# Patient Record
Sex: FEMALE | Race: WHITE | NOT HISPANIC OR LATINO | Employment: OTHER | URBAN - METROPOLITAN AREA
[De-identification: names, ages, dates, MRNs, and addresses within clinical notes are randomized per-mention and may not be internally consistent; named-entity substitution may affect disease eponyms.]

---

## 2018-02-21 ENCOUNTER — AMBULATORY - HEALTHEAST (OUTPATIENT)
Dept: ADMINISTRATIVE | Facility: CLINIC | Age: 73
End: 2018-02-21

## 2018-02-22 ENCOUNTER — OFFICE VISIT - HEALTHEAST (OUTPATIENT)
Dept: GERIATRICS | Facility: CLINIC | Age: 73
End: 2018-02-22

## 2018-02-22 DIAGNOSIS — J18.9 COMMUNITY ACQUIRED PNEUMONIA, UNSPECIFIED LATERALITY: ICD-10-CM

## 2018-02-22 DIAGNOSIS — J43.9 PULMONARY EMPHYSEMA, UNSPECIFIED EMPHYSEMA TYPE (H): ICD-10-CM

## 2018-02-22 DIAGNOSIS — K46.9 HERNIA OF ABDOMINAL CAVITY: ICD-10-CM

## 2018-02-22 DIAGNOSIS — F32.9 MAJOR DEPRESSIVE DISORDER: ICD-10-CM

## 2018-02-22 DIAGNOSIS — K21.9 CHRONIC GERD: ICD-10-CM

## 2018-02-22 DIAGNOSIS — K59.01 SLOW TRANSIT CONSTIPATION: ICD-10-CM

## 2018-02-22 DIAGNOSIS — I25.10 CORONARY ARTERY DISEASE WITHOUT ANGINA PECTORIS: ICD-10-CM

## 2018-02-22 DIAGNOSIS — N39.3 STRESS INCONTINENCE IN FEMALE: ICD-10-CM

## 2018-02-28 ENCOUNTER — OFFICE VISIT - HEALTHEAST (OUTPATIENT)
Dept: GERIATRICS | Facility: CLINIC | Age: 73
End: 2018-02-28

## 2018-02-28 DIAGNOSIS — J18.9 COMMUNITY ACQUIRED PNEUMONIA, UNSPECIFIED LATERALITY: ICD-10-CM

## 2018-02-28 DIAGNOSIS — N32.81 OVERACTIVE BLADDER: ICD-10-CM

## 2018-02-28 DIAGNOSIS — F32.A DEPRESSION: ICD-10-CM

## 2018-02-28 DIAGNOSIS — J44.9 COPD (CHRONIC OBSTRUCTIVE PULMONARY DISEASE) (H): ICD-10-CM

## 2018-02-28 DIAGNOSIS — K44.9 HIATAL HERNIA: ICD-10-CM

## 2018-03-01 ENCOUNTER — OFFICE VISIT - HEALTHEAST (OUTPATIENT)
Dept: GERIATRICS | Facility: CLINIC | Age: 73
End: 2018-03-01

## 2018-03-01 DIAGNOSIS — K59.01 SLOW TRANSIT CONSTIPATION: ICD-10-CM

## 2018-03-01 DIAGNOSIS — J18.9 COMMUNITY ACQUIRED PNEUMONIA, UNSPECIFIED LATERALITY: ICD-10-CM

## 2018-03-01 DIAGNOSIS — K21.9 CHRONIC GERD: ICD-10-CM

## 2018-03-01 DIAGNOSIS — K46.9 HERNIA OF ABDOMINAL CAVITY: ICD-10-CM

## 2018-03-01 DIAGNOSIS — F32.9 MAJOR DEPRESSIVE DISORDER: ICD-10-CM

## 2018-03-05 ENCOUNTER — OFFICE VISIT - HEALTHEAST (OUTPATIENT)
Dept: GERIATRICS | Facility: CLINIC | Age: 73
End: 2018-03-05

## 2018-03-05 DIAGNOSIS — M81.0 OSTEOPOROSIS: ICD-10-CM

## 2018-03-05 DIAGNOSIS — R63.4 WEIGHT LOSS: ICD-10-CM

## 2018-03-05 DIAGNOSIS — J44.9 COPD (CHRONIC OBSTRUCTIVE PULMONARY DISEASE) (H): ICD-10-CM

## 2018-03-05 DIAGNOSIS — K44.9 HIATAL HERNIA: ICD-10-CM

## 2018-03-05 DIAGNOSIS — J18.9 COMMUNITY ACQUIRED PNEUMONIA, UNSPECIFIED LATERALITY: ICD-10-CM

## 2018-03-08 ENCOUNTER — OFFICE VISIT - HEALTHEAST (OUTPATIENT)
Dept: GERIATRICS | Facility: CLINIC | Age: 73
End: 2018-03-08

## 2018-03-08 DIAGNOSIS — J18.9 COMMUNITY ACQUIRED PNEUMONIA, UNSPECIFIED LATERALITY: ICD-10-CM

## 2018-03-08 DIAGNOSIS — K59.01 SLOW TRANSIT CONSTIPATION: ICD-10-CM

## 2018-03-08 DIAGNOSIS — N39.3 STRESS INCONTINENCE IN FEMALE: ICD-10-CM

## 2018-03-08 DIAGNOSIS — I25.10 CORONARY ARTERY DISEASE WITHOUT ANGINA PECTORIS: ICD-10-CM

## 2018-03-08 DIAGNOSIS — F32.9 MAJOR DEPRESSIVE DISORDER: ICD-10-CM

## 2018-03-08 DIAGNOSIS — K21.9 CHRONIC GERD: ICD-10-CM

## 2018-03-08 DIAGNOSIS — J43.9 PULMONARY EMPHYSEMA, UNSPECIFIED EMPHYSEMA TYPE (H): ICD-10-CM

## 2018-03-08 DIAGNOSIS — K46.9 HERNIA OF ABDOMINAL CAVITY: ICD-10-CM

## 2018-03-08 ASSESSMENT — MIFFLIN-ST. JEOR: SCORE: 845.16

## 2018-03-12 ENCOUNTER — AMBULATORY - HEALTHEAST (OUTPATIENT)
Dept: GERIATRICS | Facility: CLINIC | Age: 73
End: 2018-03-12

## 2018-07-03 ENCOUNTER — OFFICE VISIT - HEALTHEAST (OUTPATIENT)
Dept: GERIATRICS | Facility: CLINIC | Age: 73
End: 2018-07-03

## 2018-07-03 ENCOUNTER — AMBULATORY - HEALTHEAST (OUTPATIENT)
Dept: ADMINISTRATIVE | Facility: CLINIC | Age: 73
End: 2018-07-03

## 2018-07-03 DIAGNOSIS — K46.9 HERNIA OF ABDOMINAL CAVITY: ICD-10-CM

## 2018-07-03 DIAGNOSIS — I25.10 CORONARY ARTERY DISEASE WITHOUT ANGINA PECTORIS: ICD-10-CM

## 2018-07-03 DIAGNOSIS — K59.01 SLOW TRANSIT CONSTIPATION: ICD-10-CM

## 2018-07-03 DIAGNOSIS — F32.9 MAJOR DEPRESSIVE DISORDER: ICD-10-CM

## 2018-07-03 DIAGNOSIS — K21.9 CHRONIC GERD: ICD-10-CM

## 2018-07-03 DIAGNOSIS — N39.3 STRESS INCONTINENCE IN FEMALE: ICD-10-CM

## 2018-07-03 DIAGNOSIS — J43.9 PULMONARY EMPHYSEMA, UNSPECIFIED EMPHYSEMA TYPE (H): ICD-10-CM

## 2018-07-03 DIAGNOSIS — R62.7 FAILURE TO THRIVE IN ADULT: ICD-10-CM

## 2018-07-03 ASSESSMENT — MIFFLIN-ST. JEOR: SCORE: 771.67

## 2018-07-04 ENCOUNTER — OFFICE VISIT - HEALTHEAST (OUTPATIENT)
Dept: GERIATRICS | Facility: CLINIC | Age: 73
End: 2018-07-04

## 2018-07-04 DIAGNOSIS — J44.9 COPD (CHRONIC OBSTRUCTIVE PULMONARY DISEASE) (H): ICD-10-CM

## 2018-07-04 DIAGNOSIS — J69.0 ASPIRATION PNEUMONITIS (H): ICD-10-CM

## 2018-07-04 DIAGNOSIS — D64.9 ANEMIA: ICD-10-CM

## 2018-07-04 DIAGNOSIS — E46 MALNUTRITION (H): ICD-10-CM

## 2018-07-05 ENCOUNTER — OFFICE VISIT - HEALTHEAST (OUTPATIENT)
Dept: GERIATRICS | Facility: CLINIC | Age: 73
End: 2018-07-05

## 2018-07-05 ENCOUNTER — RECORDS - HEALTHEAST (OUTPATIENT)
Dept: LAB | Facility: CLINIC | Age: 73
End: 2018-07-05

## 2018-07-05 DIAGNOSIS — I25.10 CORONARY ARTERY DISEASE WITHOUT ANGINA PECTORIS: ICD-10-CM

## 2018-07-05 DIAGNOSIS — K59.01 SLOW TRANSIT CONSTIPATION: ICD-10-CM

## 2018-07-05 DIAGNOSIS — K46.9 HERNIA OF ABDOMINAL CAVITY: ICD-10-CM

## 2018-07-05 DIAGNOSIS — K21.9 CHRONIC GERD: ICD-10-CM

## 2018-07-05 DIAGNOSIS — F32.9 MAJOR DEPRESSIVE DISORDER: ICD-10-CM

## 2018-07-05 DIAGNOSIS — J43.9 PULMONARY EMPHYSEMA, UNSPECIFIED EMPHYSEMA TYPE (H): ICD-10-CM

## 2018-07-05 DIAGNOSIS — R09.89 RHONCHI: ICD-10-CM

## 2018-07-05 DIAGNOSIS — R62.7 FAILURE TO THRIVE IN ADULT: ICD-10-CM

## 2018-07-05 DIAGNOSIS — N39.3 STRESS INCONTINENCE IN FEMALE: ICD-10-CM

## 2018-07-05 ASSESSMENT — MIFFLIN-ST. JEOR: SCORE: 767.14

## 2018-07-06 LAB
ALBUMIN SERPL-MCNC: 2.9 G/DL (ref 3.5–5)
ALP SERPL-CCNC: 104 U/L (ref 45–120)
ALT SERPL W P-5'-P-CCNC: 16 U/L (ref 0–45)
ANION GAP SERPL CALCULATED.3IONS-SCNC: 5 MMOL/L (ref 5–18)
AST SERPL W P-5'-P-CCNC: 17 U/L (ref 0–40)
BILIRUB SERPL-MCNC: 0.5 MG/DL (ref 0–1)
BUN SERPL-MCNC: 21 MG/DL (ref 8–28)
CALCIUM SERPL-MCNC: 9.4 MG/DL (ref 8.5–10.5)
CHLORIDE BLD-SCNC: 104 MMOL/L (ref 98–107)
CO2 SERPL-SCNC: 29 MMOL/L (ref 22–31)
CREAT SERPL-MCNC: 0.58 MG/DL (ref 0.6–1.1)
ERYTHROCYTE [DISTWIDTH] IN BLOOD BY AUTOMATED COUNT: 18.2 % (ref 11–14.5)
GFR SERPL CREATININE-BSD FRML MDRD: >60 ML/MIN/1.73M2
GLUCOSE BLD-MCNC: 101 MG/DL (ref 70–125)
HCT VFR BLD AUTO: 36.8 % (ref 35–47)
HGB BLD-MCNC: 11.6 G/DL (ref 12–16)
MCH RBC QN AUTO: 30.2 PG (ref 27–34)
MCHC RBC AUTO-ENTMCNC: 31.5 G/DL (ref 32–36)
MCV RBC AUTO: 96 FL (ref 80–100)
PLATELET # BLD AUTO: 269 THOU/UL (ref 140–440)
PMV BLD AUTO: 13.4 FL (ref 8.5–12.5)
POTASSIUM BLD-SCNC: 4.5 MMOL/L (ref 3.5–5)
PROT SERPL-MCNC: 5.9 G/DL (ref 6–8)
RBC # BLD AUTO: 3.84 MILL/UL (ref 3.8–5.4)
SODIUM SERPL-SCNC: 138 MMOL/L (ref 136–145)
WBC: 5.4 THOU/UL (ref 4–11)

## 2018-07-10 ENCOUNTER — AMBULATORY - HEALTHEAST (OUTPATIENT)
Dept: GERIATRICS | Facility: CLINIC | Age: 73
End: 2018-07-10

## 2018-07-13 ENCOUNTER — AMBULATORY - HEALTHEAST (OUTPATIENT)
Dept: ADMINISTRATIVE | Facility: CLINIC | Age: 73
End: 2018-07-13

## 2018-07-16 ENCOUNTER — OFFICE VISIT - HEALTHEAST (OUTPATIENT)
Dept: GERIATRICS | Facility: CLINIC | Age: 73
End: 2018-07-16

## 2018-07-16 DIAGNOSIS — D64.9 ANEMIA: ICD-10-CM

## 2018-07-16 DIAGNOSIS — E46 MALNUTRITION (H): ICD-10-CM

## 2018-07-16 DIAGNOSIS — F32.A DEPRESSION: ICD-10-CM

## 2018-07-16 DIAGNOSIS — J44.9 COPD (CHRONIC OBSTRUCTIVE PULMONARY DISEASE) (H): ICD-10-CM

## 2018-07-16 DIAGNOSIS — J93.9 PNEUMOTHORAX: ICD-10-CM

## 2018-07-17 ENCOUNTER — OFFICE VISIT - HEALTHEAST (OUTPATIENT)
Dept: GERIATRICS | Facility: CLINIC | Age: 73
End: 2018-07-17

## 2018-07-17 DIAGNOSIS — F32.9 MAJOR DEPRESSIVE DISORDER: ICD-10-CM

## 2018-07-17 DIAGNOSIS — K46.9 HERNIA OF ABDOMINAL CAVITY: ICD-10-CM

## 2018-07-17 DIAGNOSIS — R62.7 FAILURE TO THRIVE IN ADULT: ICD-10-CM

## 2018-07-17 DIAGNOSIS — S22.42XK: ICD-10-CM

## 2018-07-17 DIAGNOSIS — N39.3 STRESS INCONTINENCE IN FEMALE: ICD-10-CM

## 2018-07-17 DIAGNOSIS — K21.9 CHRONIC GERD: ICD-10-CM

## 2018-07-17 DIAGNOSIS — I25.10 CORONARY ARTERY DISEASE WITHOUT ANGINA PECTORIS: ICD-10-CM

## 2018-07-17 DIAGNOSIS — J43.9 PULMONARY EMPHYSEMA, UNSPECIFIED EMPHYSEMA TYPE (H): ICD-10-CM

## 2018-07-17 ASSESSMENT — MIFFLIN-ST. JEOR: SCORE: 781.65

## 2018-07-19 ENCOUNTER — OFFICE VISIT - HEALTHEAST (OUTPATIENT)
Dept: GERIATRICS | Facility: CLINIC | Age: 73
End: 2018-07-19

## 2018-07-19 DIAGNOSIS — R62.7 FAILURE TO THRIVE IN ADULT: ICD-10-CM

## 2018-07-19 DIAGNOSIS — S22.42XK: ICD-10-CM

## 2018-07-24 ENCOUNTER — OFFICE VISIT - HEALTHEAST (OUTPATIENT)
Dept: GERIATRICS | Facility: CLINIC | Age: 73
End: 2018-07-24

## 2018-07-24 DIAGNOSIS — F32.9 MAJOR DEPRESSIVE DISORDER: ICD-10-CM

## 2018-07-24 DIAGNOSIS — N39.3 STRESS INCONTINENCE IN FEMALE: ICD-10-CM

## 2018-07-24 DIAGNOSIS — S22.42XK: ICD-10-CM

## 2018-07-24 DIAGNOSIS — I25.10 CORONARY ARTERY DISEASE WITHOUT ANGINA PECTORIS: ICD-10-CM

## 2018-07-24 DIAGNOSIS — R62.7 FAILURE TO THRIVE IN ADULT: ICD-10-CM

## 2018-07-24 DIAGNOSIS — J43.9 PULMONARY EMPHYSEMA, UNSPECIFIED EMPHYSEMA TYPE (H): ICD-10-CM

## 2018-07-24 DIAGNOSIS — K21.9 CHRONIC GERD: ICD-10-CM

## 2018-07-24 DIAGNOSIS — K46.9 HERNIA OF ABDOMINAL CAVITY: ICD-10-CM

## 2018-07-24 ASSESSMENT — MIFFLIN-ST. JEOR: SCORE: 786.19

## 2018-07-26 ENCOUNTER — OFFICE VISIT - HEALTHEAST (OUTPATIENT)
Dept: GERIATRICS | Facility: CLINIC | Age: 73
End: 2018-07-26

## 2018-07-26 DIAGNOSIS — R62.7 FAILURE TO THRIVE IN ADULT: ICD-10-CM

## 2018-07-26 DIAGNOSIS — N39.3 STRESS INCONTINENCE IN FEMALE: ICD-10-CM

## 2018-07-26 DIAGNOSIS — K21.9 CHRONIC GERD: ICD-10-CM

## 2018-07-26 DIAGNOSIS — K46.9 HERNIA OF ABDOMINAL CAVITY: ICD-10-CM

## 2018-07-26 DIAGNOSIS — J43.9 PULMONARY EMPHYSEMA, UNSPECIFIED EMPHYSEMA TYPE (H): ICD-10-CM

## 2018-07-26 DIAGNOSIS — S22.42XK: ICD-10-CM

## 2018-07-26 DIAGNOSIS — I25.10 CORONARY ARTERY DISEASE WITHOUT ANGINA PECTORIS: ICD-10-CM

## 2018-07-26 DIAGNOSIS — F32.9 MAJOR DEPRESSIVE DISORDER: ICD-10-CM

## 2018-07-26 DIAGNOSIS — R15.9 INCONTINENCE OF FECES, UNSPECIFIED FECAL INCONTINENCE TYPE: ICD-10-CM

## 2018-07-26 DIAGNOSIS — K59.1 FUNCTIONAL DIARRHEA: ICD-10-CM

## 2018-07-26 ASSESSMENT — MIFFLIN-ST. JEOR: SCORE: 784.37

## 2018-07-31 ENCOUNTER — OFFICE VISIT - HEALTHEAST (OUTPATIENT)
Dept: GERIATRICS | Facility: CLINIC | Age: 73
End: 2018-07-31

## 2018-07-31 DIAGNOSIS — N39.3 STRESS INCONTINENCE IN FEMALE: ICD-10-CM

## 2018-07-31 DIAGNOSIS — K21.9 CHRONIC GERD: ICD-10-CM

## 2018-07-31 DIAGNOSIS — F32.9 MAJOR DEPRESSIVE DISORDER: ICD-10-CM

## 2018-07-31 DIAGNOSIS — I25.10 CORONARY ARTERY DISEASE WITHOUT ANGINA PECTORIS: ICD-10-CM

## 2018-07-31 DIAGNOSIS — K59.1 FUNCTIONAL DIARRHEA: ICD-10-CM

## 2018-07-31 DIAGNOSIS — R62.7 FAILURE TO THRIVE IN ADULT: ICD-10-CM

## 2018-07-31 DIAGNOSIS — R15.9 INCONTINENCE OF FECES, UNSPECIFIED FECAL INCONTINENCE TYPE: ICD-10-CM

## 2018-07-31 DIAGNOSIS — K46.9 HERNIA OF ABDOMINAL CAVITY: ICD-10-CM

## 2018-07-31 ASSESSMENT — MIFFLIN-ST. JEOR: SCORE: 783.47

## 2018-08-01 ENCOUNTER — COMMUNICATION - HEALTHEAST (OUTPATIENT)
Dept: GERIATRICS | Facility: CLINIC | Age: 73
End: 2018-08-01

## 2018-08-02 ENCOUNTER — OFFICE VISIT - HEALTHEAST (OUTPATIENT)
Dept: GERIATRICS | Facility: CLINIC | Age: 73
End: 2018-08-02

## 2018-08-02 ENCOUNTER — RECORDS - HEALTHEAST (OUTPATIENT)
Dept: LAB | Facility: CLINIC | Age: 73
End: 2018-08-02

## 2018-08-02 DIAGNOSIS — R62.7 FAILURE TO THRIVE IN ADULT: ICD-10-CM

## 2018-08-02 DIAGNOSIS — I25.10 CORONARY ARTERY DISEASE WITHOUT ANGINA PECTORIS: ICD-10-CM

## 2018-08-02 DIAGNOSIS — N39.3 STRESS INCONTINENCE IN FEMALE: ICD-10-CM

## 2018-08-02 DIAGNOSIS — K59.1 FUNCTIONAL DIARRHEA: ICD-10-CM

## 2018-08-02 DIAGNOSIS — K21.9 CHRONIC GERD: ICD-10-CM

## 2018-08-02 DIAGNOSIS — K46.9 HERNIA OF ABDOMINAL CAVITY: ICD-10-CM

## 2018-08-02 DIAGNOSIS — F32.9 MAJOR DEPRESSIVE DISORDER: ICD-10-CM

## 2018-08-02 ASSESSMENT — MIFFLIN-ST. JEOR: SCORE: 778.93

## 2018-08-06 LAB
H PYLORI AG STL QL IA: NORMAL
REPORT STATUS: NORMAL
SPECIMEN DESCRIPTION: NORMAL

## 2018-08-07 ENCOUNTER — OFFICE VISIT - HEALTHEAST (OUTPATIENT)
Dept: GERIATRICS | Facility: CLINIC | Age: 73
End: 2018-08-07

## 2018-08-07 DIAGNOSIS — I25.10 CORONARY ARTERY DISEASE WITHOUT ANGINA PECTORIS: ICD-10-CM

## 2018-08-07 DIAGNOSIS — R62.7 FAILURE TO THRIVE IN ADULT: ICD-10-CM

## 2018-08-07 DIAGNOSIS — K21.9 CHRONIC GERD: ICD-10-CM

## 2018-08-07 DIAGNOSIS — R32 BOWEL AND BLADDER INCONTINENCE: ICD-10-CM

## 2018-08-07 DIAGNOSIS — K46.9 HERNIA OF ABDOMINAL CAVITY: ICD-10-CM

## 2018-08-07 DIAGNOSIS — S22.42XK: ICD-10-CM

## 2018-08-07 DIAGNOSIS — R15.9 BOWEL AND BLADDER INCONTINENCE: ICD-10-CM

## 2018-08-07 DIAGNOSIS — F32.9 MAJOR DEPRESSIVE DISORDER: ICD-10-CM

## 2018-08-07 DIAGNOSIS — J43.9 PULMONARY EMPHYSEMA, UNSPECIFIED EMPHYSEMA TYPE (H): ICD-10-CM

## 2018-08-07 ASSESSMENT — MIFFLIN-ST. JEOR: SCORE: 788

## 2018-08-09 ENCOUNTER — OFFICE VISIT - HEALTHEAST (OUTPATIENT)
Dept: GERIATRICS | Facility: CLINIC | Age: 73
End: 2018-08-09

## 2018-08-09 DIAGNOSIS — I25.10 CORONARY ARTERY DISEASE INVOLVING NATIVE CORONARY ARTERY OF NATIVE HEART WITHOUT ANGINA PECTORIS: ICD-10-CM

## 2018-08-09 DIAGNOSIS — F32.9 MAJOR DEPRESSIVE DISORDER: ICD-10-CM

## 2018-08-09 DIAGNOSIS — R62.7 FAILURE TO THRIVE IN ADULT: ICD-10-CM

## 2018-08-09 DIAGNOSIS — R15.9 BOWEL AND BLADDER INCONTINENCE: ICD-10-CM

## 2018-08-09 DIAGNOSIS — S22.42XK: ICD-10-CM

## 2018-08-09 DIAGNOSIS — R32 BOWEL AND BLADDER INCONTINENCE: ICD-10-CM

## 2018-08-09 DIAGNOSIS — K46.9 HERNIA OF ABDOMINAL CAVITY: ICD-10-CM

## 2018-08-09 DIAGNOSIS — K21.9 CHRONIC GERD: ICD-10-CM

## 2018-08-09 DIAGNOSIS — J43.9 PULMONARY EMPHYSEMA, UNSPECIFIED EMPHYSEMA TYPE (H): ICD-10-CM

## 2018-08-09 ASSESSMENT — MIFFLIN-ST. JEOR: SCORE: 792.54

## 2018-08-14 ENCOUNTER — OFFICE VISIT - HEALTHEAST (OUTPATIENT)
Dept: GERIATRICS | Facility: CLINIC | Age: 73
End: 2018-08-14

## 2018-08-14 DIAGNOSIS — R15.9 BOWEL AND BLADDER INCONTINENCE: ICD-10-CM

## 2018-08-14 DIAGNOSIS — R13.19 ESOPHAGEAL DYSPHAGIA: ICD-10-CM

## 2018-08-14 DIAGNOSIS — R62.7 FAILURE TO THRIVE IN ADULT: ICD-10-CM

## 2018-08-14 DIAGNOSIS — K46.9 HERNIA OF ABDOMINAL CAVITY: ICD-10-CM

## 2018-08-14 DIAGNOSIS — F32.9 MAJOR DEPRESSIVE DISORDER: ICD-10-CM

## 2018-08-14 DIAGNOSIS — K21.9 CHRONIC GERD: ICD-10-CM

## 2018-08-14 DIAGNOSIS — R32 BOWEL AND BLADDER INCONTINENCE: ICD-10-CM

## 2018-08-14 DIAGNOSIS — S22.42XK: ICD-10-CM

## 2018-08-14 DIAGNOSIS — I25.10 CORONARY ARTERY DISEASE INVOLVING NATIVE CORONARY ARTERY OF NATIVE HEART WITHOUT ANGINA PECTORIS: ICD-10-CM

## 2018-08-14 DIAGNOSIS — J43.9 PULMONARY EMPHYSEMA, UNSPECIFIED EMPHYSEMA TYPE (H): ICD-10-CM

## 2018-08-14 ASSESSMENT — MIFFLIN-ST. JEOR: SCORE: 798.89

## 2018-08-21 ENCOUNTER — OFFICE VISIT - HEALTHEAST (OUTPATIENT)
Dept: GERIATRICS | Facility: CLINIC | Age: 73
End: 2018-08-21

## 2018-08-21 DIAGNOSIS — K46.9 HERNIA OF ABDOMINAL CAVITY: ICD-10-CM

## 2018-08-21 DIAGNOSIS — J43.9 PULMONARY EMPHYSEMA, UNSPECIFIED EMPHYSEMA TYPE (H): ICD-10-CM

## 2018-08-21 DIAGNOSIS — R62.7 FAILURE TO THRIVE IN ADULT: ICD-10-CM

## 2018-08-21 DIAGNOSIS — R15.9 BOWEL AND BLADDER INCONTINENCE: ICD-10-CM

## 2018-08-21 DIAGNOSIS — R32 BOWEL AND BLADDER INCONTINENCE: ICD-10-CM

## 2018-08-21 DIAGNOSIS — S22.42XK: ICD-10-CM

## 2018-08-21 DIAGNOSIS — R13.19 ESOPHAGEAL DYSPHAGIA: ICD-10-CM

## 2018-08-21 DIAGNOSIS — F32.9 MAJOR DEPRESSIVE DISORDER: ICD-10-CM

## 2018-08-21 DIAGNOSIS — K21.9 CHRONIC GERD: ICD-10-CM

## 2018-08-21 DIAGNOSIS — I25.10 CORONARY ARTERY DISEASE INVOLVING NATIVE CORONARY ARTERY OF NATIVE HEART WITHOUT ANGINA PECTORIS: ICD-10-CM

## 2018-08-21 ASSESSMENT — MIFFLIN-ST. JEOR: SCORE: 802.52

## 2018-08-23 ENCOUNTER — OFFICE VISIT - HEALTHEAST (OUTPATIENT)
Dept: GERIATRICS | Facility: CLINIC | Age: 73
End: 2018-08-23

## 2018-08-23 DIAGNOSIS — R62.7 FAILURE TO THRIVE IN ADULT: ICD-10-CM

## 2018-08-23 DIAGNOSIS — K46.9 HERNIA OF ABDOMINAL CAVITY: ICD-10-CM

## 2018-08-23 DIAGNOSIS — S22.42XK: ICD-10-CM

## 2018-08-23 DIAGNOSIS — J43.9 PULMONARY EMPHYSEMA, UNSPECIFIED EMPHYSEMA TYPE (H): ICD-10-CM

## 2018-08-23 DIAGNOSIS — F32.9 MAJOR DEPRESSIVE DISORDER: ICD-10-CM

## 2018-08-23 DIAGNOSIS — R32 BOWEL AND BLADDER INCONTINENCE: ICD-10-CM

## 2018-08-23 DIAGNOSIS — R13.19 ESOPHAGEAL DYSPHAGIA: ICD-10-CM

## 2018-08-23 DIAGNOSIS — R15.9 BOWEL AND BLADDER INCONTINENCE: ICD-10-CM

## 2018-08-23 DIAGNOSIS — I25.10 CORONARY ARTERY DISEASE INVOLVING NATIVE CORONARY ARTERY OF NATIVE HEART WITHOUT ANGINA PECTORIS: ICD-10-CM

## 2018-08-23 DIAGNOSIS — K21.9 CHRONIC GERD: ICD-10-CM

## 2018-08-23 ASSESSMENT — MIFFLIN-ST. JEOR: SCORE: 797.98

## 2018-08-28 ENCOUNTER — OFFICE VISIT - HEALTHEAST (OUTPATIENT)
Dept: GERIATRICS | Facility: CLINIC | Age: 73
End: 2018-08-28

## 2018-08-28 DIAGNOSIS — R32 BOWEL AND BLADDER INCONTINENCE: ICD-10-CM

## 2018-08-28 DIAGNOSIS — R13.19 ESOPHAGEAL DYSPHAGIA: ICD-10-CM

## 2018-08-28 DIAGNOSIS — F32.9 MAJOR DEPRESSIVE DISORDER: ICD-10-CM

## 2018-08-28 DIAGNOSIS — I25.10 CORONARY ARTERY DISEASE INVOLVING NATIVE CORONARY ARTERY OF NATIVE HEART WITHOUT ANGINA PECTORIS: ICD-10-CM

## 2018-08-28 DIAGNOSIS — K46.9 HERNIA OF ABDOMINAL CAVITY: ICD-10-CM

## 2018-08-28 DIAGNOSIS — R15.9 BOWEL AND BLADDER INCONTINENCE: ICD-10-CM

## 2018-08-28 DIAGNOSIS — J43.9 PULMONARY EMPHYSEMA, UNSPECIFIED EMPHYSEMA TYPE (H): ICD-10-CM

## 2018-08-28 DIAGNOSIS — K21.9 CHRONIC GERD: ICD-10-CM

## 2018-08-28 DIAGNOSIS — R62.7 FAILURE TO THRIVE IN ADULT: ICD-10-CM

## 2018-08-28 DIAGNOSIS — S22.42XK: ICD-10-CM

## 2018-08-28 ASSESSMENT — MIFFLIN-ST. JEOR: SCORE: 801.61

## 2018-08-30 ENCOUNTER — OFFICE VISIT - HEALTHEAST (OUTPATIENT)
Dept: GERIATRICS | Facility: CLINIC | Age: 73
End: 2018-08-30

## 2018-08-30 DIAGNOSIS — K46.9 HERNIA OF ABDOMINAL CAVITY: ICD-10-CM

## 2018-08-30 DIAGNOSIS — R32 BOWEL AND BLADDER INCONTINENCE: ICD-10-CM

## 2018-08-30 DIAGNOSIS — F32.9 MAJOR DEPRESSIVE DISORDER: ICD-10-CM

## 2018-08-30 DIAGNOSIS — J43.9 PULMONARY EMPHYSEMA, UNSPECIFIED EMPHYSEMA TYPE (H): ICD-10-CM

## 2018-08-30 DIAGNOSIS — K21.9 CHRONIC GERD: ICD-10-CM

## 2018-08-30 DIAGNOSIS — R13.19 ESOPHAGEAL DYSPHAGIA: ICD-10-CM

## 2018-08-30 DIAGNOSIS — R62.7 FAILURE TO THRIVE IN ADULT: ICD-10-CM

## 2018-08-30 DIAGNOSIS — I25.10 CORONARY ARTERY DISEASE INVOLVING NATIVE CORONARY ARTERY OF NATIVE HEART WITHOUT ANGINA PECTORIS: ICD-10-CM

## 2018-08-30 DIAGNOSIS — R15.9 BOWEL AND BLADDER INCONTINENCE: ICD-10-CM

## 2018-08-30 DIAGNOSIS — S22.42XK: ICD-10-CM

## 2018-08-30 ASSESSMENT — MIFFLIN-ST. JEOR: SCORE: 798.89

## 2018-09-04 ENCOUNTER — OFFICE VISIT - HEALTHEAST (OUTPATIENT)
Dept: GERIATRICS | Facility: CLINIC | Age: 73
End: 2018-09-04

## 2018-09-04 DIAGNOSIS — R62.7 FAILURE TO THRIVE IN ADULT: ICD-10-CM

## 2018-09-04 DIAGNOSIS — F32.9 MAJOR DEPRESSIVE DISORDER: ICD-10-CM

## 2018-09-04 DIAGNOSIS — S22.42XK: ICD-10-CM

## 2018-09-04 DIAGNOSIS — M79.604 RIGHT LEG PAIN: ICD-10-CM

## 2018-09-04 DIAGNOSIS — R15.9 BOWEL AND BLADDER INCONTINENCE: ICD-10-CM

## 2018-09-04 DIAGNOSIS — K21.9 CHRONIC GERD: ICD-10-CM

## 2018-09-04 DIAGNOSIS — J43.9 PULMONARY EMPHYSEMA, UNSPECIFIED EMPHYSEMA TYPE (H): ICD-10-CM

## 2018-09-04 DIAGNOSIS — I25.10 CORONARY ARTERY DISEASE INVOLVING NATIVE CORONARY ARTERY OF NATIVE HEART WITHOUT ANGINA PECTORIS: ICD-10-CM

## 2018-09-04 DIAGNOSIS — K46.9 HERNIA OF ABDOMINAL CAVITY: ICD-10-CM

## 2018-09-04 DIAGNOSIS — R32 BOWEL AND BLADDER INCONTINENCE: ICD-10-CM

## 2018-09-04 DIAGNOSIS — R13.19 ESOPHAGEAL DYSPHAGIA: ICD-10-CM

## 2018-09-04 ASSESSMENT — MIFFLIN-ST. JEOR: SCORE: 803.42

## 2018-09-11 ENCOUNTER — OFFICE VISIT - HEALTHEAST (OUTPATIENT)
Dept: GERIATRICS | Facility: CLINIC | Age: 73
End: 2018-09-11

## 2018-09-11 DIAGNOSIS — I25.10 CORONARY ARTERY DISEASE INVOLVING NATIVE CORONARY ARTERY OF NATIVE HEART WITHOUT ANGINA PECTORIS: ICD-10-CM

## 2018-09-11 DIAGNOSIS — R62.7 FAILURE TO THRIVE IN ADULT: ICD-10-CM

## 2018-09-11 DIAGNOSIS — K21.9 CHRONIC GERD: ICD-10-CM

## 2018-09-11 DIAGNOSIS — R15.9 BOWEL AND BLADDER INCONTINENCE: ICD-10-CM

## 2018-09-11 DIAGNOSIS — F32.9 MAJOR DEPRESSIVE DISORDER: ICD-10-CM

## 2018-09-11 DIAGNOSIS — R13.19 ESOPHAGEAL DYSPHAGIA: ICD-10-CM

## 2018-09-11 DIAGNOSIS — K46.9 HERNIA OF ABDOMINAL CAVITY: ICD-10-CM

## 2018-09-11 DIAGNOSIS — S22.42XK: ICD-10-CM

## 2018-09-11 DIAGNOSIS — J43.9 PULMONARY EMPHYSEMA, UNSPECIFIED EMPHYSEMA TYPE (H): ICD-10-CM

## 2018-09-11 DIAGNOSIS — R32 BOWEL AND BLADDER INCONTINENCE: ICD-10-CM

## 2018-09-11 ASSESSMENT — MIFFLIN-ST. JEOR: SCORE: 803.42

## 2018-09-18 ENCOUNTER — OFFICE VISIT - HEALTHEAST (OUTPATIENT)
Dept: GERIATRICS | Facility: CLINIC | Age: 73
End: 2018-09-18

## 2018-09-18 DIAGNOSIS — F32.9 MAJOR DEPRESSIVE DISORDER: ICD-10-CM

## 2018-09-18 DIAGNOSIS — R15.9 BOWEL AND BLADDER INCONTINENCE: ICD-10-CM

## 2018-09-18 DIAGNOSIS — R32 BOWEL AND BLADDER INCONTINENCE: ICD-10-CM

## 2018-09-18 DIAGNOSIS — K46.9 HERNIA OF ABDOMINAL CAVITY: ICD-10-CM

## 2018-09-18 DIAGNOSIS — K21.9 CHRONIC GERD: ICD-10-CM

## 2018-09-18 DIAGNOSIS — J43.9 PULMONARY EMPHYSEMA, UNSPECIFIED EMPHYSEMA TYPE (H): ICD-10-CM

## 2018-09-18 DIAGNOSIS — I25.10 CORONARY ARTERY DISEASE INVOLVING NATIVE CORONARY ARTERY OF NATIVE HEART WITHOUT ANGINA PECTORIS: ICD-10-CM

## 2018-09-18 DIAGNOSIS — R13.19 ESOPHAGEAL DYSPHAGIA: ICD-10-CM

## 2018-09-18 DIAGNOSIS — R62.7 FAILURE TO THRIVE IN ADULT: ICD-10-CM

## 2018-09-18 DIAGNOSIS — S22.42XK: ICD-10-CM

## 2018-09-18 ASSESSMENT — MIFFLIN-ST. JEOR: SCORE: 801.61

## 2018-09-27 ENCOUNTER — OFFICE VISIT - HEALTHEAST (OUTPATIENT)
Dept: GERIATRICS | Facility: CLINIC | Age: 73
End: 2018-09-27

## 2018-09-27 DIAGNOSIS — S22.42XK: ICD-10-CM

## 2018-09-27 DIAGNOSIS — K21.9 CHRONIC GERD: ICD-10-CM

## 2018-09-27 DIAGNOSIS — R13.19 ESOPHAGEAL DYSPHAGIA: ICD-10-CM

## 2018-09-27 DIAGNOSIS — R62.7 FAILURE TO THRIVE IN ADULT: ICD-10-CM

## 2018-09-27 DIAGNOSIS — F32.9 MAJOR DEPRESSIVE DISORDER: ICD-10-CM

## 2018-10-02 ENCOUNTER — OFFICE VISIT - HEALTHEAST (OUTPATIENT)
Dept: GERIATRICS | Facility: CLINIC | Age: 73
End: 2018-10-02

## 2018-10-02 DIAGNOSIS — R62.7 FAILURE TO THRIVE IN ADULT: ICD-10-CM

## 2018-10-02 DIAGNOSIS — R15.9 BOWEL AND BLADDER INCONTINENCE: ICD-10-CM

## 2018-10-02 DIAGNOSIS — K46.9 HERNIA OF ABDOMINAL CAVITY: ICD-10-CM

## 2018-10-02 DIAGNOSIS — R32 BOWEL AND BLADDER INCONTINENCE: ICD-10-CM

## 2018-10-02 DIAGNOSIS — J43.9 PULMONARY EMPHYSEMA, UNSPECIFIED EMPHYSEMA TYPE (H): ICD-10-CM

## 2018-10-02 DIAGNOSIS — K21.9 CHRONIC GERD: ICD-10-CM

## 2018-10-02 DIAGNOSIS — S22.42XK: ICD-10-CM

## 2018-10-02 DIAGNOSIS — I25.10 CORONARY ARTERY DISEASE INVOLVING NATIVE CORONARY ARTERY OF NATIVE HEART WITHOUT ANGINA PECTORIS: ICD-10-CM

## 2018-10-02 DIAGNOSIS — F32.9 MAJOR DEPRESSIVE DISORDER: ICD-10-CM

## 2018-10-02 DIAGNOSIS — R13.19 ESOPHAGEAL DYSPHAGIA: ICD-10-CM

## 2018-10-02 ASSESSMENT — MIFFLIN-ST. JEOR: SCORE: 801.61

## 2018-10-15 ENCOUNTER — COMMUNICATION - HEALTHEAST (OUTPATIENT)
Dept: GERIATRICS | Facility: CLINIC | Age: 73
End: 2018-10-15

## 2018-10-24 ENCOUNTER — HOSPITAL ENCOUNTER (OUTPATIENT)
Dept: INTERVENTIONAL RADIOLOGY/VASCULAR | Facility: HOSPITAL | Age: 73
Discharge: HOME | End: 2018-10-24
Attending: RADIOLOGY | Admitting: RADIOLOGY

## 2018-10-24 ENCOUNTER — RECORDS - HEALTHEAST (OUTPATIENT)
Dept: ADMINISTRATIVE | Facility: OTHER | Age: 73
End: 2018-10-24

## 2018-10-24 ENCOUNTER — OFFICE VISIT - HEALTHEAST (OUTPATIENT)
Dept: GERIATRICS | Facility: CLINIC | Age: 73
End: 2018-10-24

## 2018-10-24 DIAGNOSIS — E46 MALNUTRITION (H): ICD-10-CM

## 2018-10-24 DIAGNOSIS — R13.19 ESOPHAGEAL DYSPHAGIA: ICD-10-CM

## 2018-10-24 DIAGNOSIS — I10 ESSENTIAL HYPERTENSION: ICD-10-CM

## 2018-10-24 DIAGNOSIS — J44.9 COPD (CHRONIC OBSTRUCTIVE PULMONARY DISEASE) (H): ICD-10-CM

## 2018-10-24 DIAGNOSIS — D64.9 ANEMIA, UNSPECIFIED TYPE: ICD-10-CM

## 2018-11-07 ENCOUNTER — RECORDS - HEALTHEAST (OUTPATIENT)
Dept: ADMINISTRATIVE | Facility: OTHER | Age: 73
End: 2018-11-07

## 2018-11-08 ENCOUNTER — HOSPITAL ENCOUNTER (OUTPATIENT)
Dept: INTERVENTIONAL RADIOLOGY/VASCULAR | Facility: HOSPITAL | Age: 73
Discharge: HOME | End: 2018-11-08
Attending: RADIOLOGY | Admitting: RADIOLOGY

## 2018-11-08 ENCOUNTER — OFFICE VISIT - HEALTHEAST (OUTPATIENT)
Dept: GERIATRICS | Facility: CLINIC | Age: 73
End: 2018-11-08

## 2018-11-08 DIAGNOSIS — F32.9 MAJOR DEPRESSIVE DISORDER: ICD-10-CM

## 2018-11-08 DIAGNOSIS — J43.9 PULMONARY EMPHYSEMA, UNSPECIFIED EMPHYSEMA TYPE (H): ICD-10-CM

## 2018-11-08 DIAGNOSIS — R32 BOWEL AND BLADDER INCONTINENCE: ICD-10-CM

## 2018-11-08 DIAGNOSIS — K46.9 HERNIA OF ABDOMINAL CAVITY: ICD-10-CM

## 2018-11-08 DIAGNOSIS — I25.10 CORONARY ARTERY DISEASE INVOLVING NATIVE CORONARY ARTERY OF NATIVE HEART WITHOUT ANGINA PECTORIS: ICD-10-CM

## 2018-11-08 DIAGNOSIS — R13.19 ESOPHAGEAL DYSPHAGIA: ICD-10-CM

## 2018-11-08 DIAGNOSIS — R63.30 FEEDING DIFFICULTIES: ICD-10-CM

## 2018-11-08 DIAGNOSIS — K21.9 CHRONIC GERD: ICD-10-CM

## 2018-11-08 DIAGNOSIS — R15.9 BOWEL AND BLADDER INCONTINENCE: ICD-10-CM

## 2018-11-08 DIAGNOSIS — R62.7 FAILURE TO THRIVE IN ADULT: ICD-10-CM

## 2018-11-08 ASSESSMENT — MIFFLIN-ST. JEOR: SCORE: 821.57

## 2018-11-26 ENCOUNTER — COMMUNICATION - HEALTHEAST (OUTPATIENT)
Dept: GERIATRICS | Facility: CLINIC | Age: 73
End: 2018-11-26

## 2018-12-10 ENCOUNTER — RECORDS - HEALTHEAST (OUTPATIENT)
Dept: LAB | Facility: CLINIC | Age: 73
End: 2018-12-10

## 2018-12-10 ENCOUNTER — RECORDS - HEALTHEAST (OUTPATIENT)
Dept: ADMINISTRATIVE | Facility: OTHER | Age: 73
End: 2018-12-10

## 2018-12-10 ENCOUNTER — COMMUNICATION - HEALTHEAST (OUTPATIENT)
Dept: GERIATRICS | Facility: CLINIC | Age: 73
End: 2018-12-10

## 2018-12-10 LAB
ANION GAP SERPL CALCULATED.3IONS-SCNC: 8 MMOL/L (ref 5–18)
BUN SERPL-MCNC: 20 MG/DL (ref 8–28)
CALCIUM SERPL-MCNC: 9.3 MG/DL (ref 8.5–10.5)
CHLORIDE BLD-SCNC: 104 MMOL/L (ref 98–107)
CO2 SERPL-SCNC: 27 MMOL/L (ref 22–31)
CREAT SERPL-MCNC: 0.51 MG/DL (ref 0.6–1.1)
GFR SERPL CREATININE-BSD FRML MDRD: >60 ML/MIN/1.73M2
GLUCOSE BLD-MCNC: 89 MG/DL (ref 70–125)
POTASSIUM BLD-SCNC: 4.4 MMOL/L (ref 3.5–5)
SODIUM SERPL-SCNC: 139 MMOL/L (ref 136–145)

## 2018-12-12 ENCOUNTER — RECORDS - HEALTHEAST (OUTPATIENT)
Dept: ADMINISTRATIVE | Facility: OTHER | Age: 73
End: 2018-12-12

## 2018-12-13 ENCOUNTER — HOSPITAL ENCOUNTER (OUTPATIENT)
Dept: INTERVENTIONAL RADIOLOGY/VASCULAR | Facility: HOSPITAL | Age: 73
Discharge: HOME | End: 2018-12-13
Attending: FAMILY MEDICINE | Admitting: RADIOLOGY

## 2018-12-13 ENCOUNTER — AMBULATORY - HEALTHEAST (OUTPATIENT)
Dept: ADMINISTRATIVE | Facility: CLINIC | Age: 73
End: 2018-12-13

## 2018-12-13 DIAGNOSIS — R63.30 FEEDING DIFFICULTIES: ICD-10-CM

## 2018-12-17 ENCOUNTER — COMMUNICATION - HEALTHEAST (OUTPATIENT)
Dept: CARE COORDINATION | Facility: HOSPITAL | Age: 73
End: 2018-12-17

## 2018-12-20 ENCOUNTER — RECORDS - HEALTHEAST (OUTPATIENT)
Dept: LAB | Facility: CLINIC | Age: 73
End: 2018-12-20

## 2018-12-20 LAB
ALBUMIN SERPL-MCNC: 3.2 G/DL (ref 3.5–5)
ALP SERPL-CCNC: 96 U/L (ref 45–120)
ALT SERPL W P-5'-P-CCNC: 27 U/L (ref 0–45)
ANION GAP SERPL CALCULATED.3IONS-SCNC: 8 MMOL/L (ref 5–18)
AST SERPL W P-5'-P-CCNC: 23 U/L (ref 0–40)
BILIRUB SERPL-MCNC: 0.5 MG/DL (ref 0–1)
BUN SERPL-MCNC: 22 MG/DL (ref 8–28)
CALCIUM SERPL-MCNC: 9.7 MG/DL (ref 8.5–10.5)
CHLORIDE BLD-SCNC: 104 MMOL/L (ref 98–107)
CO2 SERPL-SCNC: 28 MMOL/L (ref 22–31)
CREAT SERPL-MCNC: 0.57 MG/DL (ref 0.6–1.1)
ERYTHROCYTE [DISTWIDTH] IN BLOOD BY AUTOMATED COUNT: 16.8 % (ref 11–14.5)
GFR SERPL CREATININE-BSD FRML MDRD: >60 ML/MIN/1.73M2
GLUCOSE BLD-MCNC: 107 MG/DL (ref 70–125)
HCT VFR BLD AUTO: 45.5 % (ref 35–47)
HGB BLD-MCNC: 14.3 G/DL (ref 12–16)
MAGNESIUM SERPL-MCNC: 2.2 MG/DL (ref 1.8–2.6)
MCH RBC QN AUTO: 28.9 PG (ref 27–34)
MCHC RBC AUTO-ENTMCNC: 31.4 G/DL (ref 32–36)
MCV RBC AUTO: 92 FL (ref 80–100)
PHOSPHATE SERPL-MCNC: 2.5 MG/DL (ref 2.5–4.5)
PLATELET # BLD AUTO: 200 THOU/UL (ref 140–440)
PMV BLD AUTO: 13.1 FL (ref 8.5–12.5)
POTASSIUM BLD-SCNC: 4 MMOL/L (ref 3.5–5)
PROT SERPL-MCNC: 6.6 G/DL (ref 6–8)
RBC # BLD AUTO: 4.95 MILL/UL (ref 3.8–5.4)
SODIUM SERPL-SCNC: 140 MMOL/L (ref 136–145)
WBC: 6.8 THOU/UL (ref 4–11)

## 2019-01-04 ENCOUNTER — RECORDS - HEALTHEAST (OUTPATIENT)
Dept: LAB | Facility: CLINIC | Age: 74
End: 2019-01-04

## 2019-01-04 LAB
ALBUMIN SERPL-MCNC: 3 G/DL (ref 3.5–5)
ANION GAP SERPL CALCULATED.3IONS-SCNC: 8 MMOL/L (ref 5–18)
BUN SERPL-MCNC: 24 MG/DL (ref 8–28)
CALCIUM SERPL-MCNC: 9.3 MG/DL (ref 8.5–10.5)
CHLORIDE BLD-SCNC: 102 MMOL/L (ref 98–107)
CO2 SERPL-SCNC: 30 MMOL/L (ref 22–31)
CREAT SERPL-MCNC: 0.62 MG/DL (ref 0.6–1.1)
ERYTHROCYTE [DISTWIDTH] IN BLOOD BY AUTOMATED COUNT: 15.7 % (ref 11–14.5)
FERRITIN SERPL-MCNC: 39 NG/ML (ref 10–130)
GFR SERPL CREATININE-BSD FRML MDRD: >60 ML/MIN/1.73M2
GLUCOSE BLD-MCNC: 83 MG/DL (ref 70–125)
HCT VFR BLD AUTO: 44 % (ref 35–47)
HGB BLD-MCNC: 13.8 G/DL (ref 12–16)
INR PPP: 0.91 (ref 0.9–1.1)
MCH RBC QN AUTO: 28.9 PG (ref 27–34)
MCHC RBC AUTO-ENTMCNC: 31.4 G/DL (ref 32–36)
MCV RBC AUTO: 92 FL (ref 80–100)
PLATELET # BLD AUTO: 212 THOU/UL (ref 140–440)
PMV BLD AUTO: 12.5 FL (ref 8.5–12.5)
POTASSIUM BLD-SCNC: 4.4 MMOL/L (ref 3.5–5)
PREALB SERPL-MCNC: 27.9 MG/DL (ref 19–38)
RBC # BLD AUTO: 4.77 MILL/UL (ref 3.8–5.4)
SODIUM SERPL-SCNC: 140 MMOL/L (ref 136–145)
WBC: 6 THOU/UL (ref 4–11)

## 2019-01-19 ENCOUNTER — OFFICE VISIT - HEALTHEAST (OUTPATIENT)
Dept: GERIATRICS | Facility: CLINIC | Age: 74
End: 2019-01-19

## 2019-01-19 DIAGNOSIS — R32 BOWEL AND BLADDER INCONTINENCE: ICD-10-CM

## 2019-01-19 DIAGNOSIS — I25.10 CORONARY ARTERY DISEASE INVOLVING NATIVE CORONARY ARTERY OF NATIVE HEART WITHOUT ANGINA PECTORIS: ICD-10-CM

## 2019-01-19 DIAGNOSIS — K21.9 CHRONIC GERD: ICD-10-CM

## 2019-01-19 DIAGNOSIS — R15.9 BOWEL AND BLADDER INCONTINENCE: ICD-10-CM

## 2019-01-19 DIAGNOSIS — J43.9 PULMONARY EMPHYSEMA, UNSPECIFIED EMPHYSEMA TYPE (H): ICD-10-CM

## 2019-01-19 DIAGNOSIS — K46.9 HERNIA OF ABDOMINAL CAVITY: ICD-10-CM

## 2019-01-19 DIAGNOSIS — R13.19 ESOPHAGEAL DYSPHAGIA: ICD-10-CM

## 2019-01-19 DIAGNOSIS — F32.9 MAJOR DEPRESSIVE DISORDER, REMISSION STATUS UNSPECIFIED, UNSPECIFIED WHETHER RECURRENT: ICD-10-CM

## 2019-01-19 ASSESSMENT — MIFFLIN-ST. JEOR: SCORE: 862.39

## 2019-01-29 ENCOUNTER — AMBULATORY - HEALTHEAST (OUTPATIENT)
Dept: ADMINISTRATIVE | Facility: CLINIC | Age: 74
End: 2019-01-29

## 2019-01-31 ENCOUNTER — OFFICE VISIT - HEALTHEAST (OUTPATIENT)
Dept: GERIATRICS | Facility: CLINIC | Age: 74
End: 2019-01-31

## 2019-01-31 DIAGNOSIS — J44.9 CHRONIC OBSTRUCTIVE PULMONARY DISEASE, UNSPECIFIED COPD TYPE (H): ICD-10-CM

## 2019-01-31 DIAGNOSIS — K44.9 PARAESOPHAGEAL HERNIA: ICD-10-CM

## 2019-01-31 DIAGNOSIS — D64.9 ANEMIA, UNSPECIFIED TYPE: ICD-10-CM

## 2019-01-31 DIAGNOSIS — I10 ESSENTIAL HYPERTENSION: ICD-10-CM

## 2019-02-05 ENCOUNTER — COMMUNICATION - HEALTHEAST (OUTPATIENT)
Dept: GERIATRICS | Facility: CLINIC | Age: 74
End: 2019-02-05

## 2019-02-05 ENCOUNTER — RECORDS - HEALTHEAST (OUTPATIENT)
Dept: LAB | Facility: CLINIC | Age: 74
End: 2019-02-05

## 2019-02-05 LAB — WBC: 8.6 THOU/UL (ref 4–11)

## 2019-02-08 ENCOUNTER — COMMUNICATION - HEALTHEAST (OUTPATIENT)
Dept: GERIATRICS | Facility: CLINIC | Age: 74
End: 2019-02-08

## 2019-02-14 ENCOUNTER — OFFICE VISIT - HEALTHEAST (OUTPATIENT)
Dept: GERIATRICS | Facility: CLINIC | Age: 74
End: 2019-02-14

## 2019-02-14 DIAGNOSIS — I25.10 CORONARY ARTERY DISEASE INVOLVING NATIVE CORONARY ARTERY OF NATIVE HEART WITHOUT ANGINA PECTORIS: ICD-10-CM

## 2019-02-14 DIAGNOSIS — F32.9 MAJOR DEPRESSIVE DISORDER, REMISSION STATUS UNSPECIFIED, UNSPECIFIED WHETHER RECURRENT: ICD-10-CM

## 2019-02-14 DIAGNOSIS — Z87.19 HISTORY OF ABDOMINAL HERNIA: ICD-10-CM

## 2019-02-14 DIAGNOSIS — J43.9 PULMONARY EMPHYSEMA, UNSPECIFIED EMPHYSEMA TYPE (H): ICD-10-CM

## 2019-02-14 DIAGNOSIS — R15.9 BOWEL AND BLADDER INCONTINENCE: ICD-10-CM

## 2019-02-14 DIAGNOSIS — K21.9 CHRONIC GERD: ICD-10-CM

## 2019-02-14 DIAGNOSIS — R13.19 ESOPHAGEAL DYSPHAGIA: ICD-10-CM

## 2019-02-14 DIAGNOSIS — R32 BOWEL AND BLADDER INCONTINENCE: ICD-10-CM

## 2019-02-14 ASSESSMENT — MIFFLIN-ST. JEOR: SCORE: 846.06

## 2019-03-15 ENCOUNTER — HOSPITAL ENCOUNTER (OUTPATIENT)
Dept: INTERVENTIONAL RADIOLOGY/VASCULAR | Facility: HOSPITAL | Age: 74
Discharge: HOME | End: 2019-03-15
Attending: PHYSICIAN ASSISTANT | Admitting: RADIOLOGY

## 2019-03-15 DIAGNOSIS — R63.30 FEEDING DIFFICULTIES: ICD-10-CM

## 2019-03-25 ENCOUNTER — RECORDS - HEALTHEAST (OUTPATIENT)
Dept: ADMINISTRATIVE | Facility: OTHER | Age: 74
End: 2019-03-25

## 2019-03-25 ENCOUNTER — HOSPITAL ENCOUNTER (OUTPATIENT)
Dept: INTERVENTIONAL RADIOLOGY/VASCULAR | Facility: HOSPITAL | Age: 74
Discharge: HOME | End: 2019-03-25
Attending: RADIOLOGY | Admitting: RADIOLOGY

## 2019-03-25 DIAGNOSIS — R63.30 FEEDING DIFFICULTIES: ICD-10-CM

## 2019-04-29 ENCOUNTER — COMMUNICATION - HEALTHEAST (OUTPATIENT)
Dept: GERIATRICS | Facility: CLINIC | Age: 74
End: 2019-04-29

## 2019-06-18 ENCOUNTER — OFFICE VISIT - HEALTHEAST (OUTPATIENT)
Dept: GERIATRICS | Facility: CLINIC | Age: 74
End: 2019-06-18

## 2019-06-18 DIAGNOSIS — R32 BOWEL AND BLADDER INCONTINENCE: ICD-10-CM

## 2019-06-18 DIAGNOSIS — R15.9 BOWEL AND BLADDER INCONTINENCE: ICD-10-CM

## 2019-06-18 DIAGNOSIS — F32.5 MAJOR DEPRESSIVE DISORDER IN FULL REMISSION, UNSPECIFIED WHETHER RECURRENT (H): ICD-10-CM

## 2019-06-18 DIAGNOSIS — R68.81 EARLY SATIETY: ICD-10-CM

## 2019-06-18 DIAGNOSIS — Z87.898 HISTORY OF DYSPHAGIA: ICD-10-CM

## 2019-06-18 DIAGNOSIS — J43.9 PULMONARY EMPHYSEMA, UNSPECIFIED EMPHYSEMA TYPE (H): ICD-10-CM

## 2019-06-18 DIAGNOSIS — I25.10 CORONARY ARTERY DISEASE INVOLVING NATIVE CORONARY ARTERY OF NATIVE HEART WITHOUT ANGINA PECTORIS: ICD-10-CM

## 2019-06-18 ASSESSMENT — MIFFLIN-ST. JEOR: SCORE: 905.94

## 2019-06-24 ENCOUNTER — HOSPITAL ENCOUNTER (OUTPATIENT)
Dept: INTERVENTIONAL RADIOLOGY/VASCULAR | Facility: HOSPITAL | Age: 74
Discharge: HOME | End: 2019-06-24
Admitting: RADIOLOGY

## 2019-06-24 DIAGNOSIS — E46 MALNUTRITION, UNSPECIFIED TYPE (H): ICD-10-CM

## 2019-07-01 ENCOUNTER — COMMUNICATION - HEALTHEAST (OUTPATIENT)
Dept: GERIATRICS | Facility: CLINIC | Age: 74
End: 2019-07-01

## 2019-07-25 ENCOUNTER — OFFICE VISIT - HEALTHEAST (OUTPATIENT)
Dept: GERIATRICS | Facility: CLINIC | Age: 74
End: 2019-07-25

## 2019-07-25 DIAGNOSIS — I10 ESSENTIAL HYPERTENSION: ICD-10-CM

## 2019-07-25 DIAGNOSIS — J44.9 CHRONIC OBSTRUCTIVE PULMONARY DISEASE, UNSPECIFIED COPD TYPE (H): ICD-10-CM

## 2019-07-25 DIAGNOSIS — D64.9 ANEMIA, UNSPECIFIED TYPE: ICD-10-CM

## 2019-07-25 DIAGNOSIS — K44.9 PARAESOPHAGEAL HERNIA: ICD-10-CM

## 2019-08-08 ENCOUNTER — OFFICE VISIT - HEALTHEAST (OUTPATIENT)
Dept: GERIATRICS | Facility: CLINIC | Age: 74
End: 2019-08-08

## 2019-08-08 DIAGNOSIS — Z87.898 HISTORY OF DYSPHAGIA: ICD-10-CM

## 2019-08-08 DIAGNOSIS — Z87.19 HISTORY OF ABDOMINAL HERNIA: ICD-10-CM

## 2019-08-08 DIAGNOSIS — R15.9 BOWEL AND BLADDER INCONTINENCE: ICD-10-CM

## 2019-08-08 DIAGNOSIS — F32.5 MAJOR DEPRESSIVE DISORDER IN FULL REMISSION, UNSPECIFIED WHETHER RECURRENT (H): ICD-10-CM

## 2019-08-08 DIAGNOSIS — I25.10 CORONARY ARTERY DISEASE INVOLVING NATIVE CORONARY ARTERY OF NATIVE HEART WITHOUT ANGINA PECTORIS: ICD-10-CM

## 2019-08-08 DIAGNOSIS — R32 BOWEL AND BLADDER INCONTINENCE: ICD-10-CM

## 2019-08-08 DIAGNOSIS — J43.9 PULMONARY EMPHYSEMA, UNSPECIFIED EMPHYSEMA TYPE (H): ICD-10-CM

## 2019-08-08 ASSESSMENT — MIFFLIN-ST. JEOR: SCORE: 948.58

## 2019-08-16 ENCOUNTER — RECORDS - HEALTHEAST (OUTPATIENT)
Dept: ADMINISTRATIVE | Facility: OTHER | Age: 74
End: 2019-08-16

## 2019-08-16 ENCOUNTER — COMMUNICATION - HEALTHEAST (OUTPATIENT)
Dept: GERIATRICS | Facility: CLINIC | Age: 74
End: 2019-08-16

## 2019-08-19 ENCOUNTER — HOSPITAL ENCOUNTER (OUTPATIENT)
Dept: INTERVENTIONAL RADIOLOGY/VASCULAR | Facility: HOSPITAL | Age: 74
Discharge: HOME | End: 2019-08-19

## 2019-08-19 DIAGNOSIS — R13.19 ESOPHAGEAL DYSPHAGIA: ICD-10-CM

## 2019-10-24 ENCOUNTER — OFFICE VISIT - HEALTHEAST (OUTPATIENT)
Dept: GERIATRICS | Facility: CLINIC | Age: 74
End: 2019-10-24

## 2019-10-24 DIAGNOSIS — Z87.19 HISTORY OF ABDOMINAL HERNIA: ICD-10-CM

## 2019-10-24 DIAGNOSIS — I25.10 CORONARY ARTERY DISEASE INVOLVING NATIVE CORONARY ARTERY OF NATIVE HEART WITHOUT ANGINA PECTORIS: ICD-10-CM

## 2019-10-24 DIAGNOSIS — F32.5 MAJOR DEPRESSIVE DISORDER IN FULL REMISSION, UNSPECIFIED WHETHER RECURRENT (H): ICD-10-CM

## 2019-10-24 DIAGNOSIS — J43.9 PULMONARY EMPHYSEMA, UNSPECIFIED EMPHYSEMA TYPE (H): ICD-10-CM

## 2019-10-24 DIAGNOSIS — Z87.898 HISTORY OF DYSPHAGIA: ICD-10-CM

## 2019-10-24 DIAGNOSIS — R15.9 BOWEL AND BLADDER INCONTINENCE: ICD-10-CM

## 2019-10-24 DIAGNOSIS — R32 BOWEL AND BLADDER INCONTINENCE: ICD-10-CM

## 2019-10-24 ASSESSMENT — MIFFLIN-ST. JEOR: SCORE: 965.36

## 2019-11-14 ENCOUNTER — AMBULATORY - HEALTHEAST (OUTPATIENT)
Dept: ADMINISTRATIVE | Facility: CLINIC | Age: 74
End: 2019-11-14

## 2019-11-25 ENCOUNTER — COMMUNICATION - HEALTHEAST (OUTPATIENT)
Dept: CARE COORDINATION | Facility: HOSPITAL | Age: 74
End: 2019-11-25

## 2019-11-29 ENCOUNTER — OFFICE VISIT - HEALTHEAST (OUTPATIENT)
Dept: GERIATRICS | Facility: CLINIC | Age: 74
End: 2019-11-29

## 2019-11-29 DIAGNOSIS — Z87.19 STATUS POST REPAIR OF PARAESOPHAGEAL DIAPHRAGMATIC HERNIA: ICD-10-CM

## 2019-11-29 DIAGNOSIS — Z98.890 STATUS POST REPAIR OF PARAESOPHAGEAL DIAPHRAGMATIC HERNIA: ICD-10-CM

## 2019-11-29 DIAGNOSIS — F32.A DEPRESSION, UNSPECIFIED DEPRESSION TYPE: ICD-10-CM

## 2019-11-29 DIAGNOSIS — I10 ESSENTIAL HYPERTENSION: ICD-10-CM

## 2019-11-29 DIAGNOSIS — J44.9 CHRONIC OBSTRUCTIVE PULMONARY DISEASE, UNSPECIFIED COPD TYPE (H): ICD-10-CM

## 2019-12-17 ENCOUNTER — RECORDS - HEALTHEAST (OUTPATIENT)
Dept: LAB | Facility: CLINIC | Age: 74
End: 2019-12-17

## 2019-12-17 LAB
ANION GAP SERPL CALCULATED.3IONS-SCNC: 8 MMOL/L (ref 5–18)
BUN SERPL-MCNC: 16 MG/DL (ref 8–28)
CALCIUM SERPL-MCNC: 9 MG/DL (ref 8.5–10.5)
CHLORIDE BLD-SCNC: 100 MMOL/L (ref 98–107)
CO2 SERPL-SCNC: 31 MMOL/L (ref 22–31)
CREAT SERPL-MCNC: 0.82 MG/DL (ref 0.6–1.1)
GFR SERPL CREATININE-BSD FRML MDRD: >60 ML/MIN/1.73M2
GLUCOSE BLD-MCNC: 99 MG/DL (ref 70–125)
POTASSIUM BLD-SCNC: 3.5 MMOL/L (ref 3.5–5)
SODIUM SERPL-SCNC: 139 MMOL/L (ref 136–145)

## 2019-12-20 ENCOUNTER — COMMUNICATION - HEALTHEAST (OUTPATIENT)
Dept: GERIATRICS | Facility: CLINIC | Age: 74
End: 2019-12-20

## 2020-01-07 ENCOUNTER — OFFICE VISIT - HEALTHEAST (OUTPATIENT)
Dept: GERIATRICS | Facility: CLINIC | Age: 75
End: 2020-01-07

## 2020-01-07 DIAGNOSIS — J43.9 PULMONARY EMPHYSEMA, UNSPECIFIED EMPHYSEMA TYPE (H): ICD-10-CM

## 2020-01-07 DIAGNOSIS — R32 BOWEL AND BLADDER INCONTINENCE: ICD-10-CM

## 2020-01-07 DIAGNOSIS — Z87.19 HISTORY OF ABDOMINAL HERNIA: ICD-10-CM

## 2020-01-07 DIAGNOSIS — S00.83XA CONTUSION OF OTHER PART OF HEAD, INITIAL ENCOUNTER: ICD-10-CM

## 2020-01-07 DIAGNOSIS — Z87.898 HISTORY OF DYSPHAGIA: ICD-10-CM

## 2020-01-07 DIAGNOSIS — R07.89 CHEST WALL PAIN: ICD-10-CM

## 2020-01-07 DIAGNOSIS — R15.9 BOWEL AND BLADDER INCONTINENCE: ICD-10-CM

## 2020-01-07 DIAGNOSIS — F32.5 MAJOR DEPRESSIVE DISORDER IN FULL REMISSION, UNSPECIFIED WHETHER RECURRENT (H): ICD-10-CM

## 2020-01-07 DIAGNOSIS — I25.10 CORONARY ARTERY DISEASE INVOLVING NATIVE CORONARY ARTERY OF NATIVE HEART WITHOUT ANGINA PECTORIS: ICD-10-CM

## 2020-01-07 ASSESSMENT — MIFFLIN-ST. JEOR: SCORE: 977.61

## 2020-01-16 ENCOUNTER — COMMUNICATION - HEALTHEAST (OUTPATIENT)
Dept: GERIATRICS | Facility: CLINIC | Age: 75
End: 2020-01-16

## 2020-01-20 ENCOUNTER — COMMUNICATION - HEALTHEAST (OUTPATIENT)
Dept: GERIATRICS | Facility: CLINIC | Age: 75
End: 2020-01-20

## 2020-01-21 ENCOUNTER — OFFICE VISIT - HEALTHEAST (OUTPATIENT)
Dept: GERIATRICS | Facility: CLINIC | Age: 75
End: 2020-01-21

## 2020-01-21 DIAGNOSIS — Z01.818 PREOPERATIVE CLEARANCE: ICD-10-CM

## 2020-01-21 ASSESSMENT — MIFFLIN-ST. JEOR: SCORE: 976.7

## 2020-01-23 ENCOUNTER — RECORDS - HEALTHEAST (OUTPATIENT)
Dept: LAB | Facility: CLINIC | Age: 75
End: 2020-01-23

## 2020-01-23 LAB
ALBUMIN SERPL-MCNC: 3 G/DL (ref 3.5–5)
ALP SERPL-CCNC: 106 U/L (ref 45–120)
ALT SERPL W P-5'-P-CCNC: 26 U/L (ref 0–45)
ANION GAP SERPL CALCULATED.3IONS-SCNC: 5 MMOL/L (ref 5–18)
AST SERPL W P-5'-P-CCNC: 17 U/L (ref 0–40)
BILIRUB SERPL-MCNC: 0.4 MG/DL (ref 0–1)
BUN SERPL-MCNC: 12 MG/DL (ref 8–28)
CALCIUM SERPL-MCNC: 9 MG/DL (ref 8.5–10.5)
CHLORIDE BLD-SCNC: 99 MMOL/L (ref 98–107)
CO2 SERPL-SCNC: 34 MMOL/L (ref 22–31)
CREAT SERPL-MCNC: 0.73 MG/DL (ref 0.6–1.1)
ERYTHROCYTE [DISTWIDTH] IN BLOOD BY AUTOMATED COUNT: 14.8 % (ref 11–14.5)
GFR SERPL CREATININE-BSD FRML MDRD: >60 ML/MIN/1.73M2
GLUCOSE BLD-MCNC: 123 MG/DL (ref 70–125)
HCT VFR BLD AUTO: 43.2 % (ref 35–47)
HGB BLD-MCNC: 13.8 G/DL (ref 12–16)
MCH RBC QN AUTO: 28.6 PG (ref 27–34)
MCHC RBC AUTO-ENTMCNC: 31.9 G/DL (ref 32–36)
MCV RBC AUTO: 90 FL (ref 80–100)
PLATELET # BLD AUTO: 227 THOU/UL (ref 140–440)
PMV BLD AUTO: 11.7 FL (ref 8.5–12.5)
POTASSIUM BLD-SCNC: 4.3 MMOL/L (ref 3.5–5)
PROT SERPL-MCNC: 6 G/DL (ref 6–8)
RBC # BLD AUTO: 4.82 MILL/UL (ref 3.8–5.4)
SODIUM SERPL-SCNC: 138 MMOL/L (ref 136–145)
WBC: 5.7 THOU/UL (ref 4–11)

## 2020-02-26 ENCOUNTER — AMBULATORY - HEALTHEAST (OUTPATIENT)
Dept: GERIATRICS | Facility: CLINIC | Age: 75
End: 2020-02-26

## 2020-02-26 ENCOUNTER — COMMUNICATION - HEALTHEAST (OUTPATIENT)
Dept: GERIATRICS | Facility: CLINIC | Age: 75
End: 2020-02-26

## 2020-02-26 DIAGNOSIS — R52 PAIN: ICD-10-CM

## 2020-03-18 ENCOUNTER — AMBULATORY - HEALTHEAST (OUTPATIENT)
Dept: ADMINISTRATIVE | Facility: CLINIC | Age: 75
End: 2020-03-18

## 2020-03-19 ENCOUNTER — OFFICE VISIT - HEALTHEAST (OUTPATIENT)
Dept: GERIATRICS | Facility: CLINIC | Age: 75
End: 2020-03-19

## 2020-03-19 DIAGNOSIS — I25.10 CORONARY ARTERY DISEASE INVOLVING NATIVE CORONARY ARTERY OF NATIVE HEART WITHOUT ANGINA PECTORIS: ICD-10-CM

## 2020-03-19 DIAGNOSIS — F32.5 MAJOR DEPRESSIVE DISORDER IN FULL REMISSION, UNSPECIFIED WHETHER RECURRENT (H): ICD-10-CM

## 2020-03-19 DIAGNOSIS — R62.7 FAILURE TO THRIVE IN ADULT: ICD-10-CM

## 2020-03-19 DIAGNOSIS — J43.9 PULMONARY EMPHYSEMA, UNSPECIFIED EMPHYSEMA TYPE (H): ICD-10-CM

## 2020-03-19 DIAGNOSIS — I10 ESSENTIAL HYPERTENSION: ICD-10-CM

## 2020-03-19 DIAGNOSIS — J44.9 CHRONIC OBSTRUCTIVE PULMONARY DISEASE, UNSPECIFIED COPD TYPE (H): ICD-10-CM

## 2020-03-30 ENCOUNTER — OFFICE VISIT - HEALTHEAST (OUTPATIENT)
Dept: GERIATRICS | Facility: CLINIC | Age: 75
End: 2020-03-30

## 2020-03-30 DIAGNOSIS — J44.9 CHRONIC OBSTRUCTIVE PULMONARY DISEASE, UNSPECIFIED COPD TYPE (H): ICD-10-CM

## 2020-03-30 DIAGNOSIS — I31.39 PERICARDIAL EFFUSION: ICD-10-CM

## 2020-03-30 DIAGNOSIS — J96.21 ACUTE ON CHRONIC RESPIRATORY FAILURE WITH HYPOXIA (H): ICD-10-CM

## 2020-03-30 DIAGNOSIS — F32.A DEPRESSION, UNSPECIFIED DEPRESSION TYPE: ICD-10-CM

## 2020-04-17 ENCOUNTER — OFFICE VISIT - HEALTHEAST (OUTPATIENT)
Dept: GERIATRICS | Facility: CLINIC | Age: 75
End: 2020-04-17

## 2020-04-17 DIAGNOSIS — I25.10 CORONARY ARTERY DISEASE INVOLVING NATIVE CORONARY ARTERY OF NATIVE HEART WITHOUT ANGINA PECTORIS: ICD-10-CM

## 2020-04-17 DIAGNOSIS — J44.9 CHRONIC OBSTRUCTIVE PULMONARY DISEASE, UNSPECIFIED COPD TYPE (H): ICD-10-CM

## 2020-04-17 DIAGNOSIS — F32.A DEPRESSION, UNSPECIFIED DEPRESSION TYPE: ICD-10-CM

## 2020-04-17 DIAGNOSIS — R62.7 FAILURE TO THRIVE IN ADULT: ICD-10-CM

## 2020-04-17 DIAGNOSIS — I10 ESSENTIAL HYPERTENSION: ICD-10-CM

## 2020-05-18 ENCOUNTER — OFFICE VISIT - HEALTHEAST (OUTPATIENT)
Dept: GERIATRICS | Facility: CLINIC | Age: 75
End: 2020-05-18

## 2020-05-18 DIAGNOSIS — I10 ESSENTIAL HYPERTENSION: ICD-10-CM

## 2020-05-18 DIAGNOSIS — F32.A DEPRESSION, UNSPECIFIED DEPRESSION TYPE: ICD-10-CM

## 2020-05-18 DIAGNOSIS — J44.9 CHRONIC OBSTRUCTIVE PULMONARY DISEASE, UNSPECIFIED COPD TYPE (H): ICD-10-CM

## 2020-05-18 DIAGNOSIS — I31.39 PERICARDIAL EFFUSION: ICD-10-CM

## 2020-06-17 ENCOUNTER — OFFICE VISIT - HEALTHEAST (OUTPATIENT)
Dept: GERIATRICS | Facility: CLINIC | Age: 75
End: 2020-06-17

## 2020-06-17 DIAGNOSIS — J44.9 CHRONIC OBSTRUCTIVE PULMONARY DISEASE, UNSPECIFIED COPD TYPE (H): ICD-10-CM

## 2020-06-17 DIAGNOSIS — I25.10 CORONARY ARTERY DISEASE INVOLVING NATIVE CORONARY ARTERY OF NATIVE HEART WITHOUT ANGINA PECTORIS: ICD-10-CM

## 2020-06-17 DIAGNOSIS — I10 ESSENTIAL HYPERTENSION: ICD-10-CM

## 2020-06-17 DIAGNOSIS — R62.7 FAILURE TO THRIVE IN ADULT: ICD-10-CM

## 2020-06-17 DIAGNOSIS — F32.5 MAJOR DEPRESSIVE DISORDER IN FULL REMISSION, UNSPECIFIED WHETHER RECURRENT (H): ICD-10-CM

## 2020-07-23 ENCOUNTER — OFFICE VISIT - HEALTHEAST (OUTPATIENT)
Dept: GERIATRICS | Facility: CLINIC | Age: 75
End: 2020-07-23

## 2020-07-23 DIAGNOSIS — F32.5 MAJOR DEPRESSIVE DISORDER IN FULL REMISSION, UNSPECIFIED WHETHER RECURRENT (H): ICD-10-CM

## 2020-07-23 DIAGNOSIS — I10 ESSENTIAL HYPERTENSION: ICD-10-CM

## 2020-07-23 DIAGNOSIS — J44.9 CHRONIC OBSTRUCTIVE PULMONARY DISEASE, UNSPECIFIED COPD TYPE (H): ICD-10-CM

## 2020-07-23 DIAGNOSIS — K21.9 CHRONIC GERD: ICD-10-CM

## 2020-08-26 ENCOUNTER — OFFICE VISIT - HEALTHEAST (OUTPATIENT)
Dept: GERIATRICS | Facility: CLINIC | Age: 75
End: 2020-08-26

## 2020-08-26 ENCOUNTER — COMMUNICATION - HEALTHEAST (OUTPATIENT)
Dept: GERIATRICS | Facility: CLINIC | Age: 75
End: 2020-08-26

## 2020-08-26 DIAGNOSIS — I10 ESSENTIAL HYPERTENSION: ICD-10-CM

## 2020-08-26 DIAGNOSIS — F32.A DEPRESSION, UNSPECIFIED DEPRESSION TYPE: ICD-10-CM

## 2020-08-26 DIAGNOSIS — J44.9 CHRONIC OBSTRUCTIVE PULMONARY DISEASE, UNSPECIFIED COPD TYPE (H): ICD-10-CM

## 2020-08-26 DIAGNOSIS — B02.9 HERPES ZOSTER WITHOUT COMPLICATION: ICD-10-CM

## 2020-08-31 ENCOUNTER — COMMUNICATION - HEALTHEAST (OUTPATIENT)
Dept: GERIATRICS | Facility: CLINIC | Age: 75
End: 2020-08-31

## 2020-09-01 ENCOUNTER — OFFICE VISIT - HEALTHEAST (OUTPATIENT)
Dept: GERIATRICS | Facility: CLINIC | Age: 75
End: 2020-09-01

## 2020-09-01 DIAGNOSIS — M79.2 NEURALGIA: ICD-10-CM

## 2020-09-01 DIAGNOSIS — B02.9 HERPES ZOSTER WITHOUT COMPLICATION: ICD-10-CM

## 2020-10-02 ENCOUNTER — OFFICE VISIT - HEALTHEAST (OUTPATIENT)
Dept: GERIATRICS | Facility: CLINIC | Age: 75
End: 2020-10-02

## 2020-10-02 DIAGNOSIS — R29.898 WEAKNESS OF BOTH LOWER EXTREMITIES: ICD-10-CM

## 2020-10-02 DIAGNOSIS — Z00.01 ENCOUNTER FOR GENERAL ADULT MEDICAL EXAMINATION WITH ABNORMAL FINDINGS: ICD-10-CM

## 2020-12-02 ENCOUNTER — OFFICE VISIT - HEALTHEAST (OUTPATIENT)
Dept: GERIATRICS | Facility: CLINIC | Age: 75
End: 2020-12-02

## 2020-12-02 DIAGNOSIS — K29.00 ACUTE GASTRITIS WITHOUT HEMORRHAGE, UNSPECIFIED GASTRITIS TYPE: ICD-10-CM

## 2020-12-02 DIAGNOSIS — R55 SYNCOPE, UNSPECIFIED SYNCOPE TYPE: ICD-10-CM

## 2020-12-02 ASSESSMENT — MIFFLIN-ST. JEOR: SCORE: 916.82

## 2020-12-31 ENCOUNTER — OFFICE VISIT - HEALTHEAST (OUTPATIENT)
Dept: GERIATRICS | Facility: CLINIC | Age: 75
End: 2020-12-31

## 2020-12-31 DIAGNOSIS — D64.9 ANEMIA, UNSPECIFIED TYPE: ICD-10-CM

## 2020-12-31 DIAGNOSIS — F32.A DEPRESSION, UNSPECIFIED DEPRESSION TYPE: ICD-10-CM

## 2020-12-31 DIAGNOSIS — J44.9 CHRONIC OBSTRUCTIVE PULMONARY DISEASE, UNSPECIFIED COPD TYPE (H): ICD-10-CM

## 2020-12-31 DIAGNOSIS — I10 ESSENTIAL HYPERTENSION: ICD-10-CM

## 2021-02-24 ENCOUNTER — OFFICE VISIT - HEALTHEAST (OUTPATIENT)
Dept: GERIATRICS | Facility: CLINIC | Age: 76
End: 2021-02-24

## 2021-02-24 DIAGNOSIS — E46 MALNUTRITION, UNSPECIFIED TYPE (H): ICD-10-CM

## 2021-02-24 DIAGNOSIS — I25.10 CORONARY ARTERY DISEASE INVOLVING NATIVE CORONARY ARTERY OF NATIVE HEART WITHOUT ANGINA PECTORIS: ICD-10-CM

## 2021-02-24 DIAGNOSIS — I10 ESSENTIAL HYPERTENSION: ICD-10-CM

## 2021-02-24 DIAGNOSIS — F32.5 MAJOR DEPRESSIVE DISORDER IN FULL REMISSION, UNSPECIFIED WHETHER RECURRENT (H): ICD-10-CM

## 2021-02-24 DIAGNOSIS — J44.9 CHRONIC OBSTRUCTIVE PULMONARY DISEASE, UNSPECIFIED COPD TYPE (H): ICD-10-CM

## 2021-02-24 DIAGNOSIS — K21.9 CHRONIC GERD: ICD-10-CM

## 2021-02-24 ASSESSMENT — MIFFLIN-ST. JEOR: SCORE: 940.41

## 2021-03-11 ENCOUNTER — COMMUNICATION - HEALTHEAST (OUTPATIENT)
Dept: GERIATRICS | Facility: CLINIC | Age: 76
End: 2021-03-11

## 2021-03-17 ENCOUNTER — OFFICE VISIT - HEALTHEAST (OUTPATIENT)
Dept: GERIATRICS | Facility: CLINIC | Age: 76
End: 2021-03-17

## 2021-03-17 DIAGNOSIS — J02.9 SORE THROAT: ICD-10-CM

## 2021-03-17 DIAGNOSIS — K21.9 CHRONIC GERD: ICD-10-CM

## 2021-03-17 ASSESSMENT — MIFFLIN-ST. JEOR: SCORE: 912.29

## 2021-04-30 ENCOUNTER — OFFICE VISIT - HEALTHEAST (OUTPATIENT)
Dept: GERIATRICS | Facility: CLINIC | Age: 76
End: 2021-04-30

## 2021-04-30 DIAGNOSIS — I10 ESSENTIAL HYPERTENSION: ICD-10-CM

## 2021-04-30 DIAGNOSIS — K21.9 GASTROESOPHAGEAL REFLUX DISEASE, UNSPECIFIED WHETHER ESOPHAGITIS PRESENT: ICD-10-CM

## 2021-04-30 DIAGNOSIS — F32.A DEPRESSION, UNSPECIFIED DEPRESSION TYPE: ICD-10-CM

## 2021-04-30 DIAGNOSIS — J44.9 CHRONIC OBSTRUCTIVE PULMONARY DISEASE, UNSPECIFIED COPD TYPE (H): ICD-10-CM

## 2021-05-27 VITALS — HEART RATE: 96 BPM | RESPIRATION RATE: 14 BRPM | TEMPERATURE: 97.8 F

## 2021-05-28 NOTE — TELEPHONE ENCOUNTER
[facility] Nurse Triage Telephone Note      Provider: ERIBERTO Walker  Facility: [facility], [place] Type: LTC    Caller: [facility] fax notification    Allergies: Sulfa (sulfonamide antibiotics) and Penicillins    Reason for call: Spiriva Inhaler requires step therapy attempts/failures before consideration for coverage.     Verbal Order/Direction given by Provider: Change to Incruse Ellipta 1 inhalation daily, when current supply of Spiriva runs out.    Provider giving order: ERIBERTO Walker    Verbal order given to: Temi Roman RN

## 2021-05-29 NOTE — PROGRESS NOTES
[facility]    Name:   Rosina Link  : 1945  Facility:   [facility] [[id number]]   Room: 228A  Code Status: DNR/DNI -   Fac type:   NF (Long Term Care, LTC) -     CHIEF COMPLAINT / REASON FOR VISIT:  Chief Complaint   Patient presents with     Review Of Multiple Medical Conditions     [facility] from 18 until 18  [facility] TCU from 18 until 03/10/18  [facility] from 18 until 18  [facility] TCU from 8018 until   [facility] from 18 until 18    Patient was last seen by me on 19 for a preop examination prior to hiatal hernia repair and cholecystic gastroplasty.  She was subsequently seen by Dr. Guo for a readmission visit on 19.    HPI: Rosina is a 73 y.o. female with a history of severe COPD/pulmonary emphysema, depression, hiatal hernia/esophageal dysphagia/GERD, and insomnia.        MOST RECENT HOSPITALIZATION    Hospitalization 2019: She underwent surgery in 2016 for a large hiatal hernia (total gastric herniation) that included laparoscopic repair with mesh and Gorge fundoplication.  With recurrence, she was scheduled again for repair (and possible Jamee gastroplasty) on 19 with Dr. De Jesus.    On 19, she was admitted after laparoscopic reduction repair of a recurrent paraesophageal hernia.  Her postoperative course was uneventful.  An esophagram on POD 1 revealed no hernia, no leak, and no obstruction.  She was tolerating a full liquid diet, and her pain was under control.  Tube feeding was reinitiated during her admission as well.  Discharge orders back to [facility] included a full liquid diet for 2 weeks with subsequent follow-up with Dr. De Jesus.  The hope was that tube feeding could be discontinued in the near future.  Overall, she was noted to be doing well and was considered happy with the outcome.  She was  "readmitted on 01/25/19.      EARLIER HOSPITALIZATIONS    Hospitalization March 2018: She had suffered a 60 pound unintentional weight loss prior to that, dropping from about 160 pounds down to 95 pounds.  There was a brief period of weight gain after that, but she eventually returned to the lower weight.  She developed progressive weakness and inability to eat much.  She would take several bites and then feel full.  She was so weak upon her admission to [facility] in February 2018 that she could hardly walk.      She had had a CT scan on 02/09/18 that showed of her stomach back in her chest.  She was supposed to see Dr. De Jesus at on 03/07/18 to review pictures and to discuss options.  The CT scan described \"small amount of scarring and residual consolidation in the paramedial right lower lobe at the site of resolving pneumonia.\"  Chest x-rays performed on 02/16/18 showed \"right lower lobe pneumonia with small parapneumonic effusion.  The amount of infiltrate in the right lower lobe appeared increased from the CT examination.\"  She was admitted for community-acquired pneumonia.  She had no fevers or leukocytosis.      Hospitalization June 2018: More recently, she was seen by Dr. Vasquez on 05/18/18 for dysphagia, nausea, and early satiety with weight loss.  An EGD with general anesthesia was planned at some point with consideration for repair of hiatal hernia and redo partial fundoplication.    She presented to [facility] ER on 06/05/18 with weakness, anorexia, and constipation.  Constipation eventually resolved without changes to appetite.  Psych was consulted with concern that depression may be contributory, and she was started on low-dose mirtazapine.  Megace was started as an appetite stimulant, and a CT showed a large recurrent hiatal hernia.  A GJ tube placement was suggested, but the patient was felt not to be a candidate for GJ tube per IR due to anatomy.  Therefore, GI and surgery were consulted, " and she underwent laparoscopic partial fundoplication takedown repair paraesophageal hernia and placement of gastric jejunostomy tube placement; upper endoscopy by Dr. Daniel De Jesus on 06/12/18.  She was also found to have esophageal candidiasis and was given fluconazole.  The PICC line was placed on 06/13/18 to start TPN.    Overnight (06/14/18 to 06/15/18), rapid response was called due to hypoxemia and tachycardia.  A CT of the chest was negative for pulmonary embolism but showed aspiration pneumonitis.  She was treated with IV antibiotics which ended on 06/23/18.    She underwent EGD and advancement of GJ feeding tube on 06/21/18.  Abdominal x-rays on 06/22/18 showed GJ tube malpositioned, looped in the fundus.  This J-tube repositioned itself and was then being used again.      Of note: An echocardiogram on 06/15/18 showed moderate pericardial effusion (known pericardial effusion 08/20/17 and 12/20/17).  Cardiology felt likely malnutrition with low protein causing capillary leak as a probable etiology.  Recommendations for observation with no further workup, but a repeat echocardiogram on 06/29/18 showed no change in no hemodynamic effects of effusion.      Hospitalization in July 2018: On 07/7/18, she was getting out of bed early in the day and decided to walk to the bathroom on her own rather than summoned assistance.  On the way to her bathroom, she became dizzy, fell, and struck the side of the garbage can.  She had experienced dizzy spells which have caused falls frequently before.  A CT of the chest, abdomen, and pelvis showed displaced left ninth and 10th rib fractures with a moderate sized pneumothorax and increased moderate pericardial effusion.  A chest tube was placed in the ED.  Trauma surgery evaluated the patient, and the chest tube was removed on 07/10/18.  She remained stable off oxygen with no dyspnea.    Another echocardiogram was done, as the pericardial effusion had possibly increased in size  per the CT on admission.  It showed no significant change compared to previously, and there are recommendations for her to follow-up with cardiology in 4 weeks.    A UGI was done, showing persistent partial functional obstruction at the level of the diaphragmatic hiatus, and a strict n.p.o. and tube feedings continued.  She was subsequently discharged back here on 07/12/18.  She was to follow-up with Dr. De Jesus on 08/10/18 for further esophageal evaluation (as described in CURRENT ISSUES).      CURRENT ISSUES    Early satiety/weight gain: Most likely a result of the long time she was not eating.  She tells me she can eat without choking but gets full rather quickly.  She still receives tube feeding at night for nutritional supplementation.  Fortunately, she has been gaining weight.  When last seen in February, she weighed 98.2 pounds.  Over the last 4 months, she is now up to 111.4 pounds, a gain of 13.2 pounds.    Fall: She did fall yesterday, twisting around from her walker to sit in the chair.  There was no apparent injury.    GERD: She has been on both omeprazole and sucralfate.  Lately, she has been taking how standing orders Maalox frequently.  We will write orders for 15 mL every 6 hours as needed.    Cardiopulmonary issues: Chest x-rays on 02/05/2019 revealed mild cardiomegaly with congestive failure and superimposed right lower lobe infiltrate.  Her white count was normal but temp slightly elevated from her baseline.  She had a nonproductive cough, wheeze on inspiration, crackles on expiration, and diminished sounds in the left lower lobe with sats of 89% on room air.  We ordered 40 mg of Lasix daily and Augmentin for 10 days.  A follow-up BMP was unremarkable.  She continues on 40 mg of Lasix.    COPD: She does have COPD/pulmonary emphysema, is barrel-chested, and is on a variety of inhalers.  She continues to deny dyspnea, coughing, or chest pain.    Urinary incontinence: She does have stress  incontinence and is checked by nursing staff during the night.   She finds it very hard to control, stating that she does not realize it until after it starts.  There is some nocturia as well.  None of this has changed since admission.      ROS: No complaints whatsoever today. She is on trazodone 25 mg nightly and sleeps well with this.  She denies any headaches or chest pains, coughing or congestion, nausea or vomiting,  dizziness or dyspnea, dysuria, difficulty chewing or swallowing, or any integumentary issues.      Past Medical History:   Diagnosis Date     Acute encephalopathy      Aperistalsis of esophagus      Arthritis      Compression fracture of lumbar vertebra (H)     L2     COPD (chronic obstructive pulmonary disease) (H)      Depression      Dyslipidemia      Encephalopathy      Esophageal candidiasis (H)      Esophagitis      Failure to thrive in adult      Fall      GERD (gastroesophageal reflux disease)      Hiatal hernia      HLD (hyperlipidemia)      HTN (hypertension)      Hypocalcemia      Hypomagnesemia      Hypoxia      Insomnia      Lactic acidosis      Major depressive disorder      Malnutrition (H)      Maternal MCV (mean corpuscular volume) low      Microcytic anemia      Microcytic hypochromic anemia 01/2013     Osteoporosis      Pericardial effusion      PMR (polymyalgia rheumatica) (H)      Pneumonia      Pneumothorax      Positive urine drug screen 10/2012    meth and coke positive 10/12. negative in 11/19/12     Pulmonary emphysema (H)      Rib fracture      S/P laparoscopic fundoplication      SIRS (systemic inflammatory response syndrome) (H)      Thrombocytopenia (H)      Trigger finger     left hand, middle finger     Underweight      Urinary urgency               Family History   Problem Relation Age of Onset     Diabetes Mother      Cancer Mother         lung     Hypertension Mother      Emphysema Father      Heart disease Father      Hypertension Father      No Medical Problems  Brother      Breast cancer Maternal Aunt      Social History     Socioeconomic History     Marital status: Single     Spouse name: Not on file     Number of children: Not on file     Years of education: Not on file     Highest education level: Not on file   Occupational History     Not on file   Social Needs     Financial resource strain: Not on file     Food insecurity:     Worry: Not on file     Inability: Not on file     Transportation needs:     Medical: Not on file     Non-medical: Not on file   Tobacco Use     Smoking status: Former Smoker     Packs/day: 1.50     Years: 30.00     Pack years: 45.00     Types: Cigarettes     Last attempt to quit: 8/10/1991     Years since quittin.8     Smokeless tobacco: Never Used   Substance and Sexual Activity     Alcohol use: No     Drug use: No     Sexual activity: Not Currently   Lifestyle     Physical activity:     Days per week: Not on file     Minutes per session: Not on file     Stress: Not on file   Relationships     Social connections:     Talks on phone: Not on file     Gets together: Not on file     Attends Judaism service: Not on file     Active member of club or organization: Not on file     Attends meetings of clubs or organizations: Not on file     Relationship status: Not on file     Intimate partner violence:     Fear of current or ex partner: Not on file     Emotionally abused: Not on file     Physically abused: Not on file     Forced sexual activity: Not on file   Other Topics Concern     Not on file   Social History Narrative     Not on file     MEDICATIONS: Reviewed from the MAR, physician orders, and earlier progress notes.   Updated by me today (2019) with a decrease in venlafaxine (and other changes not previously made) reflected below.  Current Outpatient Medications   Medication Sig     aluminum-magnesium hydroxide 200-200 mg/5 mL suspension Take 15 mL by mouth every 6 (six) hours as needed for indigestion.     sucralfate (CARAFATE) 100  "mg/mL suspension 1 g by G-tube route 4 (four) times a day with meals and bedtime.     acetaminophen (TYLENOL) 650 mg/20.3 mL Soln 650 mg by G-tube route every 4 (four) hours as needed.     aspirin 81 mg chewable tablet 81 mg by G-tube route daily.     atorvastatin (LIPITOR) 20 MG tablet 20 mg by G-tube route daily.      budesonide-formoterol (SYMBICORT) 160-4.5 mcg/actuation inhaler Inhale 2 puffs 2 (two) times a day.     calcium carbonate 500 mg/5 mL (1,250 mg/5 mL) suspension 600 mg by G-tube route daily.     cholecalciferol, vitamin D3, 400 unit/5 mL Liqd 400 Units by G-tube route daily.     ferrous sulfate 220 mg (44 mg iron)/5 mL solution 7.4ml by gastric tube daily.           furosemide (LASIX) 40 MG tablet Take 40 mg by mouth daily.     lactose-reduced food/fiber (ISOSOURCE 1.5 JASPREET ENTERAL TUBE) 71 mL/hr by Feeding route 2 (two) times a day.            methylcellulose (CITRUCEL) 500 mg Tab 500 mg by g tube at bedtime.           mirtazapine (REMERON) 15 MG tablet 7.5 mg by G-tube route at bedtime.     omeprazole (PRILOSEC) 2 mg/mL SusR suspension 20 mg by Gastrostomy Tube route daily before breakfast.     saliva stimulant (BIOTENE ORALBALANCE, GLYCERIN,) gel Take 1 application by mouth as needed Half inch length of gel directly on the tongue and spread thoroughly inside mouth .     umeclidinium (INCRUSE ELLIPTA) 62.5 mcg/actuation DsDv inhaler Inhale 1 puff daily.     venlafaxine (EFFEXOR) 37.5 MG tablet 37.5 mg by G-tube route daily.            ALLERGIES:   Allergies   Allergen Reactions     Sulfa (Sulfonamide Antibiotics) Other (See Comments)      Ototoxicity         Penicillins Rash     DIET: Regular diet, mechanical soft texture, thin liquids.  She is still receiving tube feeding supplementally as well as an oral supplement.    Vitals:    06/18/19 1031   BP: 122/78   Pulse: 88   Resp: 18   Temp: 97  F (36.1  C)   SpO2: 96%   Weight: 111 lb 6.4 oz (50.5 kg)   Height: 4' 11\" (1.499 m)   Weight gain of 13.2 " pounds with oral intake and tube feeding at night, along with oral nutritional supplement.  Body mass index is 22.5 kg/m .    EXAMINATION:   General: Pleasant, alert, and conversant middle-aged female, lying in bed, looking much older than her stated age, in no apparent distress.  Head: Normocephalic and atraumatic.  Significant hirsutism.  Eyes: PERRLA, sclerae clear.   ENT: Slightly dry oral mucosa.  Full upper and lower dentures.  She is only wearing the uppers currently.  Hearing is unimpaired.  Cardiovascular: Sinus tachycardia with no appreciable murmur.  Respiratory: Lung sounds are diminished due to severe kyphosis but otherwise clear.   Abdomen: Soft and nontender.   Musculoskeletal/Extremities: Age-related degenerative joint disease.  Barrel chested with severe thoracic kyphosis.  No peripheral edema.   Neurological: Occasional fine bilateral upper extremity tremor (not noticeable today).  Integument: No rashes, clinically significant lesions, or skin breakdown.   Cognitive/Psychiatric: Alert and oriented.  To affect is euthymic/upbeat.    DIAGNOSTICS: .  Results for orders placed or performed in visit on 01/04/19   Basic Metabolic Panel   Result Value Ref Range    Sodium 140 136 - 145 mmol/L    Potassium 4.4 3.5 - 5.0 mmol/L    Chloride 102 98 - 107 mmol/L    CO2 30 22 - 31 mmol/L    Anion Gap, Calculation 8 5 - 18 mmol/L    Glucose 83 70 - 125 mg/dL    Calcium 9.3 8.5 - 10.5 mg/dL    BUN 24 8 - 28 mg/dL    Creatinine 0.62 0.60 - 1.10 mg/dL    GFR MDRD Af Amer >60 >60 mL/min/1.73m2    GFR MDRD Non Af Amer >60 >60 mL/min/1.73m2     Lab Results   Component Value Date    WBC 8.6 02/05/2019    HGB 13.8 01/04/2019    HCT 44.0 01/04/2019    MCV 92 01/04/2019     01/04/2019     CrCl cannot be calculated (Patient's most recent lab result is older than the maximum 5 days allowed.).    ASSESSMENT/Plan:      ICD-10-CM    1. History of esophageal dysphagia Z87.19     Resolved following surgery.   2. Early  satiety R68.81     Suggesting that she eats several small meals.   3. Major depressive disorder in full remission, unspecified whether recurrent (H) F32.5     Doing well.  Decreasing her venlafaxine.   4. Pulmonary emphysema, unspecified emphysema type (H) J43.9    5. Bowel and bladder incontinence R32     R15.9     No change.   6. Coronary artery disease involving native coronary artery of native heart without angina pectoris I25.10      CHANGES:  Decrease venlafaxine from 37.5 mg twice daily to 37.5 mg daily.  No change to mirtazapine at this time.   Maalox 15 mL every 6 hours as needed.    CARE PLAN:  The care plan has been reviewed and all orders signed.  Changes to care plan, if any, as noted.  Otherwise, continue current plan of care.    The above has been created using voice recognition software. Please be aware that this may unintentionally  produce inaccuracies and/or nonsensical sentences.       Electronically signed by: Ramana Blandon CNP

## 2021-05-30 NOTE — PROGRESS NOTES
[facility]    Facility:   [facility] [[id number]]   Code Status: DNR/DNI       Chief Complaint   Patient presents with     Review Of Multiple Medical Conditions     LT 7/25/19.       HPI:  Rosina is a 74 y.o. female with hx of HTN, hiatal hernia, GERD, anemia, PMR, TERESA, pancreatitis, gastric obstruction and FTT on feeding tube, residing in LT. She is seen today for routine physician follow up. Of note, she was last hospitalized 1/23/19-1/25/19 for paraesophageal hernia repair as per below.     HOSPITAL DISCHARGE SUMMARY  BRIEF HOSPITAL COURSE: This 73 y.o. female was admitted after laparoscopic reduction repair of recurrent paraesophageal hernia. Her postoperative course has been uneventful. A esophagram on postop day 1 revealed no hernia, no leak, and no obstruction. She is tolerating a full liquid diet and her pain is under control. We reinitiated tube feeding during her admission as well.    She will be transferred back to her facility. Her orders will be the same except that we will send her on a full liquid diet for 2 weeks. She will follow-up with Dr. De Jesus. Our hope was that we can discontinue tube feeding and remove the feeding tube in the next couple of weeks. Overall she is done very well and she is happy with the outcome.    PROCEDURES PERFORMED DURING HOSPITALIZATION: Laparoscopic reduction and repair of recurrent paraesophageal hernia    Today:  She has been doing pretty well, has gained weight but she is actually upset a little at that thinking she is now too heavy. TF has been reduced to 4 hours and dietician following. She apparently has good oral intake. She will have follow up with her surgeon to discuss removal of TF. She denies any abdominal pain. Denies shortness of breath or chest pain. Normal BMs. No urinary problems. She went on MARY over the 4th of July holiday with her family to [place]. She ate normally, reports she didn't do too much, mostly stayed  in hotel room but she enjoyed getting away.       Past Medical History:  Past Medical History:   Diagnosis Date     Acute encephalopathy      Aperistalsis of esophagus      Arthritis      Compression fracture of lumbar vertebra (H)     L2     COPD (chronic obstructive pulmonary disease) (H)      Depression      Dyslipidemia      Encephalopathy      Esophageal candidiasis (H)      Esophagitis      Failure to thrive in adult      Fall      GERD (gastroesophageal reflux disease)      Hiatal hernia      HLD (hyperlipidemia)      HTN (hypertension)      Hypocalcemia      Hypomagnesemia      Hypoxia      Insomnia      Lactic acidosis      Major depressive disorder      Malnutrition (H)      Maternal MCV (mean corpuscular volume) low      Microcytic anemia      Microcytic hypochromic anemia 01/2013     Osteoporosis      Pericardial effusion      PMR (polymyalgia rheumatica) (H)      Pneumonia      Pneumothorax      Positive urine drug screen 10/2012    meth and coke positive 10/12. negative in 11/19/12     Pulmonary emphysema (H)      Rib fracture      S/P laparoscopic fundoplication      SIRS (systemic inflammatory response syndrome) (H)      Thrombocytopenia (H)      Trigger finger     left hand, middle finger     Underweight      Urinary urgency        Medications:  Current Outpatient Medications   Medication Sig Note     acetaminophen (TYLENOL) 650 mg/20.3 mL Soln 650 mg by G-tube route every 4 (four) hours as needed.      aluminum-magnesium hydroxide 200-200 mg/5 mL suspension Take 15 mL by mouth every 6 (six) hours as needed for indigestion.      aspirin 81 mg chewable tablet 81 mg by G-tube route daily.      atorvastatin (LIPITOR) 20 MG tablet 20 mg by G-tube route daily.       budesonide-formoterol (SYMBICORT) 160-4.5 mcg/actuation inhaler Inhale 2 puffs 2 (two) times a day.      calcium carbonate 500 mg/5 mL (1,250 mg/5 mL) suspension 600 mg by G-tube route daily.      cholecalciferol, vitamin D3, 400 unit/5 mL  Liqd 400 Units by G-tube route daily.      ferrous sulfate 220 mg (44 mg iron)/5 mL solution 7.4ml by gastric tube daily.            furosemide (LASIX) 40 MG tablet Take 40 mg by mouth daily.      lactose-reduced food/fiber (ISOSOURCE 1.5 JASPREET ENTERAL TUBE) 71 mL/hr by Feeding route 2 (two) times a day.             methylcellulose (CITRUCEL) 500 mg Tab 500 mg by g tube at bedtime.            mirtazapine (REMERON) 15 MG tablet 7.5 mg by G-tube route at bedtime.      omeprazole (PRILOSEC) 2 mg/mL SusR suspension 20 mg by Gastrostomy Tube route daily before breakfast.      saliva stimulant (BIOTENE ORALBALANCE, GLYCERIN,) gel Take 1 application by mouth as needed Half inch length of gel directly on the tongue and spread thoroughly inside mouth .      sucralfate (CARAFATE) 100 mg/mL suspension 1 g by G-tube route 4 (four) times a day with meals and bedtime.      umeclidinium (INCRUSE ELLIPTA) 62.5 mcg/actuation DsDv inhaler Inhale 1 puff daily.      venlafaxine (EFFEXOR) 37.5 MG tablet 37.5 mg by G-tube route daily.        6/18/2019: Decreased from a 37.5 mg twice daily to 37.5 mg daily on 06/18/2019, an attempt at gradual dose reduction.   Note: Iron was discontinued.     Physical Exam:   General: Patient is alert pale female, no distress.   Vitals: BP 99/63, 106/64, Temp 97.4, Pulse 76, RR 19, O2 sat 96% RA.  HEENT: Head is NCAT. Eyes show no injection or icterus. Nares negative. Oropharynx well hydrated.  Neck: Supple. No tenderness or adenopathy. No JVD.  Lungs: Clear bilaterally. No wheezes.  Cardiovascular: Regular rate and rhythm, normal S1. S2.  Back: No spinal or CVA tenderness.  Abdomen: FT intact. Soft, no tenderness on exam. Bowel sounds present. No guarding rebound or rigidity.  Extremities: No LE edema is noted.  Musculoskeletal: Mild degen changes.   Psych: Mood appears good.      Labs:  Lab Results   Component Value Date    WBC 8.6 02/05/2019    HGB 13.8 01/04/2019    HCT 44.0 01/04/2019    MCV 92  01/04/2019     01/04/2019     Results for orders placed or performed in visit on 01/04/19   Basic Metabolic Panel   Result Value Ref Range    Sodium 140 136 - 145 mmol/L    Potassium 4.4 3.5 - 5.0 mmol/L    Chloride 102 98 - 107 mmol/L    CO2 30 22 - 31 mmol/L    Anion Gap, Calculation 8 5 - 18 mmol/L    Glucose 83 70 - 125 mg/dL    Calcium 9.3 8.5 - 10.5 mg/dL    BUN 24 8 - 28 mg/dL    Creatinine 0.62 0.60 - 1.10 mg/dL    GFR MDRD Af Amer >60 >60 mL/min/1.73m2    GFR MDRD Non Af Amer >60 >60 mL/min/1.73m2       Assessment/Plan:   1. Paraesophageal hernia. S/p operative repair Jan 2019. Continues on supplemental TF with noted weight gain. Dietary following. She plans to follow up with GI/surgeon regarding removal of TF.    2. Anemia. No longer on iron. Last hgb at 13.8.   3. COPD. Stable. No current respiratory concerns.    4. HTN. On Lasix but no other BP meds.   5. Depression. Continue on venlafaxine.          Electronically signed by: Rosina Guo MD

## 2021-05-30 NOTE — TELEPHONE ENCOUNTER
[facility] Nurse Triage Telephone Note      Provider: ERIBERTO Walker  Facility: [facility] Type: LTC    Caller: Justine  Call Back Number:  [phone]    Allergies: Sulfa (sulfonamide antibiotics) and Penicillins    Reason for call: Daughter requests to take mom to [place] 7/3-7/7 like has done previously. She knows & is able to manage her transfers, cares with her TF, meds etc. Is not on O2, insulin or narcotics.     Verbal Order/Direction given by Provider: Okay for MARY with daughter.    Provider giving order: ERIBERTO Walker    Verbal order given to: Justine Roman RN

## 2021-05-31 NOTE — TELEPHONE ENCOUNTER
[facility] Nurse Triage Telephone Note      Provider: ERIBERTO Walker  Facility: [facility] Type: LTC    Caller: Gema  Call Back Number:  [phone]    Allergies: Sulfa (sulfonamide antibiotics) and Penicillins    Reason for call: Staff is noting that patient has had increasing constipation.  Currently taking fiber tablets, but she's no longer receiving tube feedings.  Staff have been giving her Colace per EVAN, but is still in need of something.  Patient also has not been using incentive spirometer.       Verbal Order/Direction given by Provider: Start Senna S, 1 tablet two times a day.  DC fiber tabs.  DC IS.      Provider giving order: ERIBERTO Walker    Verbal order given to: Gema Nagy RN

## 2021-05-31 NOTE — PROGRESS NOTES
[facility]    Name:   Rosina Link  : 1945  Facility:   [facility] [[id number]]   Room: 228A  Code Status: DNR/DNI -   Fac type:   NF (Long Term Care, LTC) -     CHIEF COMPLAINT / REASON FOR VISIT:  Chief Complaint   Patient presents with     FVP Care Coordination - Home Visit-Annual Assessment     [facility] from 18 until 18  [facility] TCU from 18 until 03/10/18  [facility] from 18 until 18  [facility] TCU from 8018 until 18  [facility] from 18 until 18    Patient was last seen by me on 2019 and subsequently seen by Dr. Guo on 2019.      HPI: Rosina is a 74 y.o. female with a history of severe COPD/pulmonary emphysema, depression, hiatal hernia/esophageal dysphagia/GERD, and insomnia.        MOST RECENT HOSPITALIZATION    Hospitalization 2019: She underwent surgery in 2016 for a large hiatal hernia (total gastric herniation) that included laparoscopic repair with mesh and Gorge fundoplication.  With recurrence, she was scheduled again for repair (and possible Jamee gastroplasty) on 19 with Dr. De Jesus.    On 19, she was admitted after laparoscopic reduction repair of a recurrent paraesophageal hernia.  Her postoperative course was uneventful.  An esophagram on POD 1 revealed no hernia, no leak, and no obstruction.  She was tolerating a full liquid diet, and her pain was under control.  Tube feeding was reinitiated during her admission as well.  Discharge orders back to [facility] included a full liquid diet for 2 weeks with subsequent follow-up with Dr. De Jesus.  The hope was that tube feeding could be discontinued in the near future.  Overall, she was noted to be doing well and was considered happy with the outcome.  She was readmitted on 19.      EARLIER HOSPITALIZATIONS    Hospitalization 2018: She had  "suffered a 60 pound unintentional weight loss prior to that, dropping from about 160 pounds down to 95 pounds.  There was a brief period of weight gain after that, but she eventually returned to the lower weight.  She developed progressive weakness and inability to eat much.  She would take several bites and then feel full.  She was so weak upon her admission to [facility] in February 2018 that she could hardly walk.      She had had a CT scan on 02/09/18 that showed of her stomach back in her chest.  She was supposed to see Dr. De Jesus at on 03/07/18 to review pictures and to discuss options.  The CT scan described \"small amount of scarring and residual consolidation in the paramedial right lower lobe at the site of resolving pneumonia.\"  Chest x-rays performed on 02/16/18 showed \"right lower lobe pneumonia with small parapneumonic effusion.  The amount of infiltrate in the right lower lobe appeared increased from the CT examination.\"  She was admitted for community-acquired pneumonia.  She had no fevers or leukocytosis.      Hospitalization June 2018: More recently, she was seen by Dr. Vasquez on 05/18/18 for dysphagia, nausea, and early satiety with weight loss.  An EGD with general anesthesia was planned at some point with consideration for repair of hiatal hernia and redo partial fundoplication.    She presented to [facility] ER on 06/05/18 with weakness, anorexia, and constipation.  Constipation eventually resolved without changes to appetite.  Psych was consulted with concern that depression may be contributory, and she was started on low-dose mirtazapine.  Megace was started as an appetite stimulant, and a CT showed a large recurrent hiatal hernia.  A GJ tube placement was suggested, but the patient was felt not to be a candidate for GJ tube per IR due to anatomy.  Therefore, GI and surgery were consulted, and she underwent laparoscopic partial fundoplication takedown repair paraesophageal hernia " and placement of gastric jejunostomy tube placement; upper endoscopy by Dr. Daniel De Jesus on 06/12/18.  She was also found to have esophageal candidiasis and was given fluconazole.  The PICC line was placed on 06/13/18 to start TPN.    Overnight (06/14/18 to 06/15/18), rapid response was called due to hypoxemia and tachycardia.  A CT of the chest was negative for pulmonary embolism but showed aspiration pneumonitis.  She was treated with IV antibiotics which ended on 06/23/18.    She underwent EGD and advancement of GJ feeding tube on 06/21/18.  Abdominal x-rays on 06/22/18 showed GJ tube malpositioned, looped in the fundus.  This J-tube repositioned itself and was then being used again.      Of note: An echocardiogram on 06/15/18 showed moderate pericardial effusion (known pericardial effusion 08/20/17 and 12/20/17).  Cardiology felt likely malnutrition with low protein causing capillary leak as a probable etiology.  Recommendations for observation with no further workup, but a repeat echocardiogram on 06/29/18 showed no change in no hemodynamic effects of effusion.      Hospitalization in July 2018: On 07/7/18, she was getting out of bed early in the day and decided to walk to the bathroom on her own rather than summoned assistance.  On the way to her bathroom, she became dizzy, fell, and struck the side of the garbage can.  She had experienced dizzy spells which have caused falls frequently before.  A CT of the chest, abdomen, and pelvis showed displaced left ninth and 10th rib fractures with a moderate sized pneumothorax and increased moderate pericardial effusion.  A chest tube was placed in the ED.  Trauma surgery evaluated the patient, and the chest tube was removed on 07/10/18.  She remained stable off oxygen with no dyspnea.    Another echocardiogram was done, as the pericardial effusion had possibly increased in size per the CT on admission.  It showed no significant change compared to previously, and there  are recommendations for her to follow-up with cardiology in 4 weeks.    A UGI was done, showing persistent partial functional obstruction at the level of the diaphragmatic hiatus, and a strict n.p.o. and tube feedings continued.  She was subsequently discharged back here on 07/12/18.  She was to follow-up with Dr. De Jesus on 08/10/18 for further esophageal evaluation (as described in CURRENT ISSUES).      CURRENT ISSUES    GERD: She had been on both omeprazole and sucralfate.  When last seen, she had been taking standing orders Maalox frequently.  We wrote orders for 15 mL every 6 hours as needed.  Seen by Dr. Meng Michelle of [facility] on 07/15/2019, ondansetron was started 4 mg every 8 hours.  Tube feeding was decreased.    Oral intake/weight gain: Previously, early satiety was most likely a result of the long time she was not eating.  She tells me told me she could eat without choking but was getting full rather quickly.  Up until 08/02/2019, she was receiving tube feedings at night for nutritional supplementation.  Fortunately, she has been gaining weight.  When last seen in February, she weighed 98.2 pounds.  By my visit in June, she was up to 111.4 pounds, a gain of 13.2 pounds.  Currently, she is up an additional 9.4 pounds at 120.8 pounds.  Tube feeding is being held for 2 weeks to see if she can maintain her weight by oral intake alone.  We will continue with water flushes 100 mL 3 times daily.  If she does well, she will be referred to Dr. De Jesus for discontinuation of her feeding tube.    Cardiopulmonary issues: Chest x-rays on 02/05/2019 revealed mild cardiomegaly with congestive failure and superimposed right lower lobe infiltrate.  Her white count was normal but temp slightly elevated from her baseline.  She had a nonproductive cough, wheeze on inspiration, crackles on expiration, and diminished sounds in the left lower lobe with sats of 89% on room air.  We ordered 40 mg of Lasix  daily and Augmentin for 10 days.  A follow-up BMP was unremarkable.  She continues on 40 mg of Lasix.    COPD: She does have COPD/pulmonary emphysema, is barrel-chested, and is on a variety of inhalers.  She continues to deny dyspnea, coughing, or chest pain.    Urinary incontinence: She does have stress incontinence and is checked by nursing staff during the night.   She finds it very hard to control, stating that she does not realize it until after it starts.  There is some nocturia as well.  None of this has changed since admission.      ROS: No complaints whatsoever today (except for the eggs).  She tells me that her appetite is all right as long as the food is good.  She is no longer getting nauseated.  She is on trazodone 25 mg nightly and sleeps well with this.  She denies any headaches or chest pains, coughing or congestion, nausea or vomiting,  dizziness or dyspnea, dysuria, difficulty chewing or swallowing, or any integumentary issues.      Past Medical History:   Diagnosis Date     Acute encephalopathy      Aperistalsis of esophagus      Arthritis      Compression fracture of lumbar vertebra (H)     L2     COPD (chronic obstructive pulmonary disease) (H)      Depression      Dyslipidemia      Encephalopathy      Esophageal candidiasis (H)      Esophagitis      Failure to thrive in adult      Fall      GERD (gastroesophageal reflux disease)      Hiatal hernia      HLD (hyperlipidemia)      HTN (hypertension)      Hypocalcemia      Hypomagnesemia      Hypoxia      Insomnia      Lactic acidosis      Major depressive disorder      Malnutrition (H)      Maternal MCV (mean corpuscular volume) low      Microcytic anemia      Microcytic hypochromic anemia 01/2013     Osteoporosis      Pericardial effusion      PMR (polymyalgia rheumatica) (H)      Pneumonia      Pneumothorax      Positive urine drug screen 10/2012    meth and coke positive 10/12. negative in 11/19/12     Pulmonary emphysema (H)      Rib fracture       S/P laparoscopic fundoplication      SIRS (systemic inflammatory response syndrome) (H)      Thrombocytopenia (H)      Trigger finger     left hand, middle finger     Underweight      Urinary urgency               Family History   Problem Relation Age of Onset     Diabetes Mother      Cancer Mother         lung     Hypertension Mother      Emphysema Father      Heart disease Father      Hypertension Father      No Medical Problems Brother      Breast cancer Maternal Aunt      Social History     Socioeconomic History     Marital status: Single     Spouse name: Not on file     Number of children: Not on file     Years of education: Not on file     Highest education level: Not on file   Occupational History     Not on file   Social Needs     Financial resource strain: Not on file     Food insecurity:     Worry: Not on file     Inability: Not on file     Transportation needs:     Medical: Not on file     Non-medical: Not on file   Tobacco Use     Smoking status: Former Smoker     Packs/day: 1.50     Years: 30.00     Pack years: 45.00     Types: Cigarettes     Last attempt to quit: 8/10/1991     Years since quittin.0     Smokeless tobacco: Never Used   Substance and Sexual Activity     Alcohol use: No     Drug use: No     Sexual activity: Not Currently   Lifestyle     Physical activity:     Days per week: Not on file     Minutes per session: Not on file     Stress: Not on file   Relationships     Social connections:     Talks on phone: Not on file     Gets together: Not on file     Attends Restoration service: Not on file     Active member of club or organization: Not on file     Attends meetings of clubs or organizations: Not on file     Relationship status: Not on file     Intimate partner violence:     Fear of current or ex partner: Not on file     Emotionally abused: Not on file     Physically abused: Not on file     Forced sexual activity: Not on file   Other Topics Concern     Not on file   Social History  Narrative     Not on file     MEDICATIONS: Reviewed from the MAR, physician orders, and earlier progress notes.     Current Outpatient Medications   Medication Sig     ondansetron (ZOFRAN) 4 MG tablet Take 4 mg by mouth every 8 (eight) hours.     acetaminophen (TYLENOL) 650 mg/20.3 mL Soln 650 mg by G-tube route every 4 (four) hours as needed.     aluminum-magnesium hydroxide 200-200 mg/5 mL suspension Take 15 mL by mouth every 6 (six) hours as needed for indigestion.     aspirin 81 mg chewable tablet 81 mg by G-tube route daily.     atorvastatin (LIPITOR) 20 MG tablet 20 mg by G-tube route daily.      budesonide-formoterol (SYMBICORT) 160-4.5 mcg/actuation inhaler Inhale 2 puffs 2 (two) times a day.     calcium carbonate 500 mg/5 mL (1,250 mg/5 mL) suspension 600 mg by G-tube route daily.     cholecalciferol, vitamin D3, 400 unit/5 mL Liqd 400 Units by G-tube route daily.     ferrous sulfate 220 mg (44 mg iron)/5 mL solution 7.4ml by gastric tube daily.           furosemide (LASIX) 40 MG tablet Take 40 mg by mouth daily.     lactose-reduced food/fiber (ISOSOURCE 1.5 JASPREET ENTERAL TUBE) 71 mL/hr by Feeding route 2 (two) times a day.            methylcellulose (CITRUCEL) 500 mg Tab 500 mg by g tube at bedtime.           mirtazapine (REMERON) 15 MG tablet 7.5 mg by G-tube route at bedtime.     omeprazole (PRILOSEC) 2 mg/mL SusR suspension 20 mg by Gastrostomy Tube route daily before breakfast.     saliva stimulant (BIOTENE ORALBALANCE, GLYCERIN,) gel Take 1 application by mouth as needed Half inch length of gel directly on the tongue and spread thoroughly inside mouth .     umeclidinium (INCRUSE ELLIPTA) 62.5 mcg/actuation DsDv inhaler Inhale 1 puff daily.     venlafaxine (EFFEXOR) 37.5 MG tablet 37.5 mg by G-tube route daily.            ALLERGIES:   Allergies   Allergen Reactions     Sulfa (Sulfonamide Antibiotics) Other (See Comments)      Ototoxicity         Penicillins Rash     DIET: Regular diet, mechanical soft  "texture, thin liquids.  She is still receiving tube feeding supplementally as well as an oral supplement.    Vitals:    08/08/19 1252   BP: 123/82   Pulse: 96   Resp: 20   Temp: (!) 95.4  F (35.2  C)   SpO2: 93%   Weight: 120 lb 12.8 oz (54.8 kg)   Height: 4' 11\" (1.499 m)   Weight gain of 13.2 pounds with oral intake and tube feeding at night, along with oral nutritional supplement.  Body mass index is 24.4 kg/m .    EXAMINATION:   General: Pleasant, alert, and conversant middle-aged female, sitting on her bed and eating breakfast, looking much older than her stated age, in no apparent distress.  She is complaining about the eggs.  Head: Normocephalic and atraumatic.  Significant hirsutism.  Eyes: PERRLA, sclerae clear.   ENT: Slightly dry oral mucosa.  Full upper and lower dentures.  She is only wearing the uppers currently.  Hearing is unimpaired.  Cardiovascular: Regular rate and rhythm with no appreciable murmur.  Respiratory: Lung sounds are clear to auscultation bilaterally.   Abdomen: Soft and nontender.   Musculoskeletal/Extremities: Age-related degenerative joint disease.  Barrel chested with severe thoracic kyphosis.  No peripheral edema.   Neurological: Occasional fine bilateral upper extremity tremor (not noticeable today).  Integument: No rashes, clinically significant lesions, or skin breakdown.   Cognitive/Psychiatric: Alert and oriented.  To affect is euthymic/upbeat.    DIAGNOSTICS: .  Results for orders placed or performed in visit on 01/04/19   Basic Metabolic Panel   Result Value Ref Range    Sodium 140 136 - 145 mmol/L    Potassium 4.4 3.5 - 5.0 mmol/L    Chloride 102 98 - 107 mmol/L    CO2 30 22 - 31 mmol/L    Anion Gap, Calculation 8 5 - 18 mmol/L    Glucose 83 70 - 125 mg/dL    Calcium 9.3 8.5 - 10.5 mg/dL    BUN 24 8 - 28 mg/dL    Creatinine 0.62 0.60 - 1.10 mg/dL    GFR MDRD Af Amer >60 >60 mL/min/1.73m2    GFR MDRD Non Af Amer >60 >60 mL/min/1.73m2     Lab Results   Component Value Date "    WBC 8.6 02/05/2019    HGB 13.8 01/04/2019    HCT 44.0 01/04/2019    MCV 92 01/04/2019     01/04/2019     CrCl cannot be calculated (Patient's most recent lab result is older than the maximum 5 days allowed.).    ASSESSMENT/Plan:      ICD-10-CM    1. History of esophageal dysphagia Z87.19    2. History of abdominal hernia Z87.19    3. Major depressive disorder in full remission, unspecified whether recurrent (H) F32.5    4. Pulmonary emphysema, unspecified emphysema type (H) J43.9    5. Bowel and bladder incontinence R32     R15.9    6. Coronary artery disease involving native coronary artery of native heart without angina pectoris I25.10      CHANGES:  On 08/16/2019, if weight has been maintained over the prior 2 weeks with adjust p.o. intake, may refer to Dr. De Jesus for discontinuation of tube feeding.    Case Management:  I have reviewed the facility/SNF care plan/MDS which was done 05/14/2019, including the falls risk, nutrition and pain screening. I also reviewed the current immunizations, and preventive care.. Future cancer screening is not clinically indicated secondary to age/goals of care.   Patient's desire to return to the community is present, but is not able due to care needs .    Advance Directive Discussion:    I reviewed the current advanced directives as reflected in EPIC and the facility chart. I did review the advance directives with the resident.     Team Discussion:  I communicated with the appropriate disciplines involved with the Plan of Care:   Nursing   and Dietitian      Patient Goal:  Patient's goal is comfort.    Information reviewed:  Medications, vital signs, orders, and nursing notes.      CARE PLAN:  The care plan has been reviewed and all orders signed.  Changes to care plan, if any, as noted.  Otherwise, continue current plan of care.  Total time spent with this patient with approximately 35 minutes, with greater than 50% spent in counseling and coordination of care that  included discussions/review with nursing staff and dietary as well as other issues described above.    The above has been created using voice recognition software. Please be aware that this may unintentionally  produce inaccuracies and/or nonsensical sentences.       Electronically signed by: Ramana Blandon CNP

## 2021-06-01 VITALS — HEIGHT: 59 IN | BODY MASS INDEX: 16.29 KG/M2 | WEIGHT: 80.8 LBS

## 2021-06-01 VITALS — HEIGHT: 59 IN | BODY MASS INDEX: 16.49 KG/M2 | WEIGHT: 81.8 LBS

## 2021-06-01 VITALS — HEIGHT: 59 IN | BODY MASS INDEX: 17.66 KG/M2 | WEIGHT: 87.6 LBS

## 2021-06-01 VITALS — BODY MASS INDEX: 17.82 KG/M2 | WEIGHT: 88.4 LBS | HEIGHT: 59 IN

## 2021-06-01 VITALS — HEIGHT: 59 IN | BODY MASS INDEX: 17.7 KG/M2 | WEIGHT: 87.8 LBS

## 2021-06-01 VITALS — HEIGHT: 59 IN | WEIGHT: 84.4 LBS | BODY MASS INDEX: 17.01 KG/M2

## 2021-06-01 VITALS — HEIGHT: 59 IN | WEIGHT: 84 LBS | BODY MASS INDEX: 16.93 KG/M2

## 2021-06-01 VITALS — WEIGHT: 83.4 LBS | BODY MASS INDEX: 16.81 KG/M2 | HEIGHT: 59 IN

## 2021-06-01 VITALS — WEIGHT: 86.4 LBS | BODY MASS INDEX: 17.42 KG/M2 | HEIGHT: 59 IN

## 2021-06-01 VITALS — WEIGHT: 87.8 LBS | HEIGHT: 59 IN | BODY MASS INDEX: 17.7 KG/M2

## 2021-06-01 VITALS — HEIGHT: 59 IN | BODY MASS INDEX: 17.86 KG/M2 | WEIGHT: 88.6 LBS

## 2021-06-01 VITALS — BODY MASS INDEX: 17.05 KG/M2 | WEIGHT: 84.6 LBS | HEIGHT: 59 IN

## 2021-06-01 VITALS — BODY MASS INDEX: 17.22 KG/M2 | HEIGHT: 59 IN | WEIGHT: 85.4 LBS

## 2021-06-01 VITALS — BODY MASS INDEX: 17.9 KG/M2 | WEIGHT: 88.8 LBS | HEIGHT: 59 IN

## 2021-06-01 VITALS — BODY MASS INDEX: 19.76 KG/M2 | WEIGHT: 98 LBS | HEIGHT: 59 IN

## 2021-06-01 VITALS — BODY MASS INDEX: 17.14 KG/M2 | HEIGHT: 59 IN | WEIGHT: 85 LBS

## 2021-06-01 VITALS — WEIGHT: 100.4 LBS

## 2021-06-01 VITALS — WEIGHT: 97 LBS

## 2021-06-02 VITALS — HEIGHT: 59 IN | WEIGHT: 88.8 LBS | BODY MASS INDEX: 17.9 KG/M2

## 2021-06-02 VITALS — WEIGHT: 88.4 LBS | BODY MASS INDEX: 17.82 KG/M2 | HEIGHT: 59 IN

## 2021-06-02 VITALS — BODY MASS INDEX: 20.52 KG/M2 | WEIGHT: 101.8 LBS | HEIGHT: 59 IN

## 2021-06-02 VITALS — BODY MASS INDEX: 19.8 KG/M2 | WEIGHT: 98.2 LBS | HEIGHT: 59 IN

## 2021-06-02 VITALS — WEIGHT: 92.8 LBS | BODY MASS INDEX: 18.71 KG/M2 | HEIGHT: 59 IN

## 2021-06-02 VITALS — BODY MASS INDEX: 17.82 KG/M2 | WEIGHT: 88.4 LBS | HEIGHT: 59 IN

## 2021-06-03 VITALS
HEIGHT: 59 IN | SYSTOLIC BLOOD PRESSURE: 101 MMHG | BODY MASS INDEX: 25.1 KG/M2 | DIASTOLIC BLOOD PRESSURE: 63 MMHG | TEMPERATURE: 96.4 F | RESPIRATION RATE: 19 BRPM | WEIGHT: 124.5 LBS | HEART RATE: 73 BPM | OXYGEN SATURATION: 93 %

## 2021-06-03 VITALS — BODY MASS INDEX: 24.35 KG/M2 | HEIGHT: 59 IN | WEIGHT: 120.8 LBS

## 2021-06-03 VITALS — HEIGHT: 59 IN | BODY MASS INDEX: 22.46 KG/M2 | WEIGHT: 111.4 LBS

## 2021-06-03 NOTE — PROGRESS NOTES
[facility]    Facility:   [facility] [[id number]]   Code Status: DNR/DNI       Chief Complaint   Patient presents with     Review Of Multiple Medical Conditions     LT 11/29/19.       HPI:  Rosina is a 74 y.o. female with hx of HTN, hiatal hernia, GERD, anemia, PMR, TERESA, pancreatitis, gastric obstruction and FTT on feeding tube, residing in LT. She is seen today for routine physician follow up. Of note, she was last hospitalized 1/23/19-1/25/19 for paraesophageal hernia repair as per below.     HOSPITAL DISCHARGE SUMMARY  BRIEF HOSPITAL COURSE: This 73 y.o. female was admitted after laparoscopic reduction repair of recurrent paraesophageal hernia. Her postoperative course has been uneventful. A esophagram on postop day 1 revealed no hernia, no leak, and no obstruction. She is tolerating a full liquid diet and her pain is under control. We reinitiated tube feeding during her admission as well.    She will be transferred back to her facility. Her orders will be the same except that we will send her on a full liquid diet for 2 weeks. She will follow-up with Dr. De Jesus. Our hope was that we can discontinue tube feeding and remove the feeding tube in the next couple of weeks. Overall she is done very well and she is happy with the outcome.    PROCEDURES PERFORMED DURING HOSPITALIZATION: Laparoscopic reduction and repair of recurrent paraesophageal hernia    Today:  She had her GJ tube removed Aug 19, 2019. Was no longer using. Had gained weight and in fact has gained another 10 pounds since removal. She denies any abdominal pain. Denies shortness of breath or chest pain. Normal BMs. No urinary problems. She reports contracture of left hand little and ring fingers, not similar to trigger finger she had injected a while ago, but unable to straighten, thinks it might be arthritis. Suggested she should see ortho for consult and she will consider. She has not been ill recently with  cough cold or congestion. No new concerns per nursing.       Past Medical History:  Past Medical History:   Diagnosis Date     Acute encephalopathy      Aperistalsis of esophagus      Arthritis      Compression fracture of lumbar vertebra (H)     L2     COPD (chronic obstructive pulmonary disease) (H)      Depression      Dyslipidemia      Encephalopathy      Esophageal candidiasis (H)      Esophagitis      Failure to thrive in adult      Fall      GERD (gastroesophageal reflux disease)      Hiatal hernia      HLD (hyperlipidemia)      HTN (hypertension)      Hypocalcemia      Hypomagnesemia      Hypoxia      Insomnia      Lactic acidosis      Major depressive disorder      Malnutrition (H)      Maternal MCV (mean corpuscular volume) low      Microcytic anemia      Microcytic hypochromic anemia 01/2013     Osteoporosis      Pericardial effusion      PMR (polymyalgia rheumatica) (H)      Pneumonia      Pneumothorax      Positive urine drug screen 10/2012    meth and coke positive 10/12. negative in 11/19/12     Pulmonary emphysema (H)      Rib fracture      S/P laparoscopic fundoplication      SIRS (systemic inflammatory response syndrome) (H)      Thrombocytopenia (H)      Trigger finger     left hand, middle finger     Underweight      Urinary urgency        Medications:  Current Outpatient Medications   Medication Sig Note     acetaminophen (TYLENOL) 325 MG tablet Take 650 mg by mouth every 4 (four) hours as needed for pain.      aspirin 81 mg chewable tablet Chew 81 mg daily.             atorvastatin (LIPITOR) 20 MG tablet Take 20 mg by mouth daily.             budesonide-formoterol (SYMBICORT) 160-4.5 mcg/actuation inhaler Inhale 2 puffs 2 (two) times a day.      calcium carbonate (OS-JASPREET) 600 mg calcium (1,500 mg) tablet Take 600 mg by mouth daily.      cholecalciferol, vitamin D3, (CHOLECALCIFEROL) 400 unit Tab Take 400 Units by mouth daily.      furosemide (LASIX) 40 MG tablet Take 40 mg by mouth daily.       omeprazole (PRILOSEC) 20 MG capsule Take 20 mg by mouth daily.      senna-docusate (SENNOSIDES-DOCUSATE SODIUM) 8.6-50 mg tablet Take 1 tablet by mouth 2 (two) times a day.      umeclidinium (INCRUSE ELLIPTA) 62.5 mcg/actuation DsDv inhaler Inhale 1 puff daily.      venlafaxine (EFFEXOR) 37.5 MG tablet Take 37.5 mg by mouth daily.        6/18/2019: Decreased from a 37.5 mg twice daily to 37.5 mg daily on 06/18/2019, an attempt at gradual dose reduction.       Physical Exam:   General: Patient is alert pale female, no distress.   Vitals: /80, Temp 97.3, Pulse 96, RR 18, O2 sat 94% RA.  HEENT: Head is NCAT. Eyes show no injection or icterus. Nares negative. Oropharynx well hydrated.  Neck: Supple. No tenderness or adenopathy. No JVD.  Lungs: Clear bilaterally. No wheezes.  Cardiovascular: Regular rate and rhythm, normal S1. S2.  Back: No spinal or CVA tenderness.  Abdomen: FT intact. Soft, no tenderness on exam. Bowel sounds present. No guarding rebound or rigidity.  Extremities: No LE edema is noted.  Musculoskeletal: Mild degen changes. Contracture left hand ring and little fingers.  Psych: Mood appears good.      Labs:  Lab Results   Component Value Date    WBC 8.6 02/05/2019    HGB 13.8 01/04/2019    HCT 44.0 01/04/2019    MCV 92 01/04/2019     01/04/2019     Results for orders placed or performed in visit on 01/04/19   Basic Metabolic Panel   Result Value Ref Range    Sodium 140 136 - 145 mmol/L    Potassium 4.4 3.5 - 5.0 mmol/L    Chloride 102 98 - 107 mmol/L    CO2 30 22 - 31 mmol/L    Anion Gap, Calculation 8 5 - 18 mmol/L    Glucose 83 70 - 125 mg/dL    Calcium 9.3 8.5 - 10.5 mg/dL    BUN 24 8 - 28 mg/dL    Creatinine 0.62 0.60 - 1.10 mg/dL    GFR MDRD Af Amer >60 >60 mL/min/1.73m2    GFR MDRD Non Af Amer >60 >60 mL/min/1.73m2       Assessment/Plan:  1. HTN. BPs well controlled on Lasix. Continue to monitor per protocol.  2. COPD. On Symbicort. No current respiratory concerns. No need for  oxygen.  3. Depression. She is on venlafaxine.  4. Hx of Paraesophageal hernia. S/p operative repair Jan 2019. Had supplemental TF for quite some time. She however, gained weight and was also allowed oral feeding and ultimately GJ was removed Aug 2019 as was no longer using.    5. Anemia. She is no longer on iron. Last hgb at 13.8.        Electronically signed by: Rosina Guo MD

## 2021-06-03 NOTE — TELEPHONE ENCOUNTER
This patient's medication list and chart were reviewed as part of the service provided by [facility].    Assessment/Recommendations:  Noted upon chart review that Mirtazapine was recently dc'd at end of October (10/24/19); had been initiated to help with appetite stimulation.  Continues on Venlafaxine IR 37.5mg daily for depression.  If pt has tolerated d'c of Mirtazapine without negative changes to mood, may consider d'c of Venlafaxine altogether and monitor.  (please note epic med list indicates pt still on Mirtazapine; please reconcile).    Appears Bps run low, and pt with h/o urinary incontinence.  If no current peripheral edema, pulmonary congestion, may consider reduction in furosemide to 20mg daily and follow-up BMP.    Mayelin Do, Pharm.D.,[facility] [place]  Board Certified Geriatric Pharmacist  Medication Therapy Management Pharmacist  [phone]

## 2021-06-03 NOTE — PROGRESS NOTES
"[facility]    Name:   Rosina Link  : 1945  Facility:   [facility] [[id number]]   Room: 228A  Code Status: DNR/DNI -   Fac type:   NF (Long Term Care, LTC) -     CHIEF COMPLAINT / REASON FOR VISIT:  Chief Complaint   Patient presents with     FVP Care Coordination - Regulatory     [facility] from 18 until 18  [facility]CU from 18 until 03/10/18  [facility] from 18 until 18  [facility] from 8018 until 18  [facility] from 18 until 18    Patient was last seen by me on 2019.      HPI: Rosina is a 74 y.o. female with a history of severe COPD/pulmonary emphysema, depression, hiatal hernia/esophageal dysphagia/GERD, and insomnia.        HOSPITALIZATIONS    Hospitalization 2018: She had suffered a 60 pound unintentional weight loss prior to that, dropping from about 160 pounds down to 95 pounds.  There was a brief period of weight gain after that, but she eventually returned to the lower weight.  She developed progressive weakness and inability to eat much.  She would take several bites and then feel full.  She was so weak upon her admission to [facility] in 2018 that she could hardly walk.      She had had a CT scan on 18 that showed of her stomach back in her chest.  She was supposed to see Dr. De Jesus at on 18 to review pictures and to discuss options.  The CT scan described \"small amount of scarring and residual consolidation in the paramedial right lower lobe at the site of resolving pneumonia.\"  Chest x-rays performed on 18 showed \"right lower lobe pneumonia with small parapneumonic effusion.  The amount of infiltrate in the right lower lobe appeared increased from the CT examination.\"  She was admitted for community-acquired pneumonia.  She had no fevers or leukocytosis.    Hospitalization 2018: More recently, she was seen " by Dr. Vasquez on 05/18/18 for dysphagia, nausea, and early satiety with weight loss.  An EGD with general anesthesia was planned at some point with consideration for repair of hiatal hernia and redo partial fundoplication.    She presented to [facility] ER on 06/05/18 with weakness, anorexia, and constipation.  Constipation eventually resolved without changes to appetite.  Psych was consulted with concern that depression may be contributory, and she was started on low-dose mirtazapine.  Megace was started as an appetite stimulant, and a CT showed a large recurrent hiatal hernia.  A GJ tube placement was suggested, but the patient was felt not to be a candidate for GJ tube per IR due to anatomy.  Therefore, GI and surgery were consulted, and she underwent laparoscopic partial fundoplication takedown repair paraesophageal hernia and placement of gastric jejunostomy tube placement; upper endoscopy by Dr. Daniel De Jesus on 06/12/18.  She was also found to have esophageal candidiasis and was given fluconazole.  The PICC line was placed on 06/13/18 to start TPN.    Overnight (06/14/18 to 06/15/18), rapid response was called due to hypoxemia and tachycardia.  A CT of the chest was negative for pulmonary embolism but showed aspiration pneumonitis.  She was treated with IV antibiotics which ended on 06/23/18.    She underwent EGD and advancement of GJ feeding tube on 06/21/18.  Abdominal x-rays on 06/22/18 showed GJ tube malpositioned, looped in the fundus.  This J-tube repositioned itself and was then being used again.      Of note: An echocardiogram on 06/15/18 showed moderate pericardial effusion (known pericardial effusion 08/20/17 and 12/20/17).  Cardiology felt likely malnutrition with low protein causing capillary leak as a probable etiology.  Recommendations for observation with no further workup, but a repeat echocardiogram on 06/29/18 showed no change in no hemodynamic effects of effusion.    Hospitalization  in July 2018: On 07/7/18, she was getting out of bed early in the day and decided to walk to the bathroom on her own rather than summoned assistance.  On the way to her bathroom, she became dizzy, fell, and struck the side of the garbage can.  She had experienced dizzy spells which have caused falls frequently before.  A CT of the chest, abdomen, and pelvis showed displaced left ninth and 10th rib fractures with a moderate sized pneumothorax and increased moderate pericardial effusion.  A chest tube was placed in the ED.  Trauma surgery evaluated the patient, and the chest tube was removed on 07/10/18.  She remained stable off oxygen with no dyspnea.    Another echocardiogram was done, as the pericardial effusion had possibly increased in size per the CT on admission.  It showed no significant change compared to previously, and there are recommendations for her to follow-up with cardiology in 4 weeks.    A UGI was done, showing persistent partial functional obstruction at the level of the diaphragmatic hiatus, and a strict n.p.o. and tube feedings continued.  She was subsequently discharged back here on 07/12/18.  She was to follow-up with Dr. De Jesus on 08/10/18 for further esophageal evaluation (as described in CURRENT ISSUES).    Hospitalization January 2019: She underwent surgery in February 2016 for a large hiatal hernia (total gastric herniation) that included laparoscopic repair with mesh and Gorge fundoplication.  With recurrence, she was scheduled again for repair (and possible Jamee gastroplasty) on 01/23/19 with Dr. De Jesus.    On 01/23/19, she was admitted after laparoscopic reduction repair of a recurrent paraesophageal hernia.  Her postoperative course was uneventful.  An esophagram on POD 1 revealed no hernia, no leak, and no obstruction.  She was tolerating a full liquid diet, and her pain was under control.  Tube feeding was reinitiated during her admission as well.  Discharge orders back to [facility] included a full liquid diet for 2 weeks with subsequent follow-up with Dr. De Jesus.  The hope was that tube feeding could be discontinued in the near future.  Overall, she was noted to be doing well and was considered happy with the outcome.  She was readmitted on 01/25/19.      CURRENT ISSUES    GERD: Accompanied by nausea, all has resolved after hernia repair.  We are discontinuing ondansetron.    Oral intake/weight gain: With discontinuation of tube feeding, she initially had difficulty eating but, as oral intake improved -- and her PEG tube was discontinued -- she has slowly gained weight.  We will be discontinuing mirtazapine.  Saliva stimulant also no longer needed and will be discontinued.    Cardiopulmonary issues/COPD: Chest x-rays on 02/05/2019 revealed mild cardiomegaly with congestive failure and superimposed right lower lobe infiltrate.  Her white count was normal but temp slightly elevated from her baseline.  She had a nonproductive cough, wheeze on inspiration, crackles on expiration, and diminished sounds in the left lower lobe with sats of 89% on room air.  We ordered 40 mg of Lasix daily and Augmentin for 10 days.  A follow-up BMP was unremarkable.  She continues on 40 mg of Lasix.    She does have COPD/pulmonary emphysema, is barrel-chested, and is on a variety of inhalers.  She continues to deny dyspnea, coughing, or chest pain.    Urinary incontinence: She does have stress incontinence and is checked by nursing staff during the night.   She finds it very hard to control, stating that she does not realize it until after it starts.  There is some nocturia as well.  None of this has changed since admission.      ROS: She is no longer getting nauseated.  She is on trazodone 25 mg nightly and sleeps well with this.  She denies any headaches or chest pains, coughing or congestion, nausea or vomiting,  dizziness or dyspnea, dysuria, difficulty chewing or swallowing, or any  integumentary issues.      Past Medical History:   Diagnosis Date     Acute encephalopathy      Aperistalsis of esophagus      Arthritis      Compression fracture of lumbar vertebra (H)     L2     COPD (chronic obstructive pulmonary disease) (H)      Depression      Dyslipidemia      Encephalopathy      Esophageal candidiasis (H)      Esophagitis      Failure to thrive in adult      Fall      GERD (gastroesophageal reflux disease)      Hiatal hernia      HLD (hyperlipidemia)      HTN (hypertension)      Hypocalcemia      Hypomagnesemia      Hypoxia      Insomnia      Lactic acidosis      Major depressive disorder      Malnutrition (H)      Maternal MCV (mean corpuscular volume) low      Microcytic anemia      Microcytic hypochromic anemia 01/2013     Osteoporosis      Pericardial effusion      PMR (polymyalgia rheumatica) (H)      Pneumonia      Pneumothorax      Positive urine drug screen 10/2012    meth and coke positive 10/12. negative in 11/19/12     Pulmonary emphysema (H)      Rib fracture      S/P laparoscopic fundoplication      SIRS (systemic inflammatory response syndrome) (H)      Thrombocytopenia (H)      Trigger finger     left hand, middle finger     Underweight      Urinary urgency               Family History   Problem Relation Age of Onset     Diabetes Mother      Cancer Mother         lung     Hypertension Mother      Emphysema Father      Heart disease Father      Hypertension Father      No Medical Problems Brother      Breast cancer Maternal Aunt      Social History     Socioeconomic History     Marital status: Single     Spouse name: Not on file     Number of children: Not on file     Years of education: Not on file     Highest education level: Not on file   Occupational History     Not on file   Social Needs     Financial resource strain: Not on file     Food insecurity:     Worry: Not on file     Inability: Not on file     Transportation needs:     Medical: Not on file     Non-medical: Not on  file   Tobacco Use     Smoking status: Former Smoker     Packs/day: 1.50     Years: 30.00     Pack years: 45.00     Types: Cigarettes     Last attempt to quit: 8/10/1991     Years since quittin.2     Smokeless tobacco: Never Used   Substance and Sexual Activity     Alcohol use: No     Drug use: No     Sexual activity: Not Currently   Lifestyle     Physical activity:     Days per week: Not on file     Minutes per session: Not on file     Stress: Not on file   Relationships     Social connections:     Talks on phone: Not on file     Gets together: Not on file     Attends Cheondoism service: Not on file     Active member of club or organization: Not on file     Attends meetings of clubs or organizations: Not on file     Relationship status: Not on file     Intimate partner violence:     Fear of current or ex partner: Not on file     Emotionally abused: Not on file     Physically abused: Not on file     Forced sexual activity: Not on file   Other Topics Concern     Not on file   Social History Narrative     Not on file     MEDICATIONS: Reviewed from the MAR, physician orders, and earlier progress notes.   Updated by me today (10/24/2019) with a change in all G-tube medications to oral and elimination of ondansetron and mirtazapine (as well as saliva stimulant and fiber) reflected below.  Current Outpatient Medications   Medication Sig     acetaminophen (TYLENOL) 650 mg/20.3 mL Soln Take 650 mg by mouth every 4 (four) hours as needed.            aluminum-magnesium hydroxide 200-200 mg/5 mL suspension Take 15 mL by mouth every 6 (six) hours as needed for indigestion.     aspirin 81 mg chewable tablet Chew 81 mg daily.            atorvastatin (LIPITOR) 20 MG tablet Take 20 mg by mouth daily.            budesonide-formoterol (SYMBICORT) 160-4.5 mcg/actuation inhaler Inhale 2 puffs 2 (two) times a day.     calcium carbonate 500 mg/5 mL (1,250 mg/5 mL) suspension Take 600 mg by mouth daily.            cholecalciferol,  "vitamin D3, 400 unit/5 mL Liqd Take 400 Units by mouth daily.            furosemide (LASIX) 40 MG tablet Take 40 mg by mouth daily.     omeprazole (PRILOSEC) 2 mg/mL SusR suspension Take 20 mg by mouth daily before breakfast.            senna-docusate (SENNOSIDES-DOCUSATE SODIUM) 8.6-50 mg tablet Take 1 tablet by mouth 2 (two) times a day.     umeclidinium (INCRUSE ELLIPTA) 62.5 mcg/actuation DsDv inhaler Inhale 1 puff daily.     venlafaxine (EFFEXOR) 37.5 MG tablet Take 37.5 mg by mouth daily.            ALLERGIES:   Allergies   Allergen Reactions     Sulfa (Sulfonamide Antibiotics) Other (See Comments)      Ototoxicity         Penicillins Rash     DIET: Regular diet, mechanical soft texture, thin liquids.  She is still receiving tube feeding supplementally as well as an oral supplement.    Vitals:    10/24/19 1509   BP: 101/63   Pulse: 73   Resp: 19   Temp: (!) 96.4  F (35.8  C)   SpO2: 93%   Weight: 124 lb 8 oz (56.5 kg)   Height: 4' 11\" (1.499 m)   She is put on a good deal of weight over the last several months.  Body mass index is 25.15 kg/m .    EXAMINATION:   General: Pleasant, alert, and conversant middle-aged female, observed after a long walk with her walker, looking a bit winded.  She has clearly put on a good deal of weight.  Head: Normocephalic and atraumatic.  Significant hirsutism.  Eyes: PERRLA, sclerae clear.   ENT: Slightly dry oral mucosa.  Full upper and lower dentures.  She is only wearing the uppers currently.  Hearing is unimpaired.  Cardiovascular: Regular rate and rhythm with no appreciable murmur.  Respiratory: Lung sounds are clear to auscultation bilaterally.   Abdomen: Soft and nontender.   Musculoskeletal/Extremities: Age-related degenerative joint disease.  Barrel chested with severe thoracic kyphosis.  No peripheral edema.   Neurological: Occasional fine bilateral upper extremity tremor (not noticeable today).  Integument: No rashes, clinically significant lesions, or skin breakdown. "   Cognitive/Psychiatric: Alert and oriented.  To affect is euthymic/upbeat.    DIAGNOSTICS: .  Results for orders placed or performed in visit on 01/04/19   Basic Metabolic Panel   Result Value Ref Range    Sodium 140 136 - 145 mmol/L    Potassium 4.4 3.5 - 5.0 mmol/L    Chloride 102 98 - 107 mmol/L    CO2 30 22 - 31 mmol/L    Anion Gap, Calculation 8 5 - 18 mmol/L    Glucose 83 70 - 125 mg/dL    Calcium 9.3 8.5 - 10.5 mg/dL    BUN 24 8 - 28 mg/dL    Creatinine 0.62 0.60 - 1.10 mg/dL    GFR MDRD Af Amer >60 >60 mL/min/1.73m2    GFR MDRD Non Af Amer >60 >60 mL/min/1.73m2     Lab Results   Component Value Date    WBC 8.6 02/05/2019    HGB 13.8 01/04/2019    HCT 44.0 01/04/2019    MCV 92 01/04/2019     01/04/2019     CrCl cannot be calculated (Patient's most recent lab result is older than the maximum 5 days allowed.).    ASSESSMENT/Plan:      ICD-10-CM    1. Major depressive disorder in full remission, unspecified whether recurrent (H) F32.5    2. Pulmonary emphysema, unspecified emphysema type (H) J43.9    3. Bowel and bladder incontinence R32     R15.9    4. Coronary artery disease involving native coronary artery of native heart without angina pectoris I25.10    5. History of esophageal dysphagia Z87.19    6. History of abdominal hernia Z87.19      CHANGES:  Discontinue ondansetron and mirtazapine.      CASE MANAGEMENT:  I have reviewed the facility/SNF care plan/MDS which was done 00/00/00, including the falls risk, nutrition and pain screening. I also reviewed the current immunizations, and preventive care.. Future cancer screening is not clinically indicated secondary to age/goals of care.   Patient's desire to return to the community is not present.    Information reviewed:  Medications, vital signs, orders, and nursing notes.    Total 40 minutes of which 50% was spent counseling and coordination of care of the above plan.      CARE PLAN:  The care plan has been reviewed and all orders signed.  Changes  to care plan, if any, as noted.  Otherwise, continue current plan of care.  Total time spent with this patient with approximately 40 minutes, with greater than 50% spent in counseling and coordination of care that, because of changing from tube feeding to oral, each one of her medications was reviewed with some changes made.    The above has been created using voice recognition software. Please be aware that this may unintentionally  produce inaccuracies and/or nonsensical sentences.       Electronically signed by: Ramana Blandon CNP

## 2021-06-04 VITALS
TEMPERATURE: 97.9 F | DIASTOLIC BLOOD PRESSURE: 77 MMHG | BODY MASS INDEX: 25.64 KG/M2 | RESPIRATION RATE: 19 BRPM | OXYGEN SATURATION: 93 % | HEIGHT: 59 IN | HEART RATE: 77 BPM | WEIGHT: 127.2 LBS | SYSTOLIC BLOOD PRESSURE: 116 MMHG

## 2021-06-04 VITALS
HEART RATE: 80 BPM | HEIGHT: 59 IN | WEIGHT: 127 LBS | OXYGEN SATURATION: 95 % | RESPIRATION RATE: 16 BRPM | BODY MASS INDEX: 25.6 KG/M2

## 2021-06-04 NOTE — TELEPHONE ENCOUNTER
[facility] Nurse Triage Telephone Note      Provider: ERIBERTO Walker  Facility: [facility] Type: LTC    Caller: Nai  Call Back Number:  [phone]    Allergies: Sulfa (sulfonamide antibiotics) and Penicillins    Reason for call: Pt's daughter is at the facility now requesting to take her on an MARY to [place] for 3 days and would like to get the ok from the NP to go.    Pt has gone in the past with no issues, walks with a walker, no diabetic and not on any narcotics    Verbal Order/Direction given by Provider: ok to leave on MARY with daughter with medications     Provider giving order: ERIBERTO Walker    Verbal order given to: Nai Anderson RN

## 2021-06-05 VITALS
SYSTOLIC BLOOD PRESSURE: 133 MMHG | HEART RATE: 83 BPM | RESPIRATION RATE: 20 BRPM | DIASTOLIC BLOOD PRESSURE: 89 MMHG | WEIGHT: 119 LBS | TEMPERATURE: 97.5 F | BODY MASS INDEX: 23.99 KG/M2 | OXYGEN SATURATION: 96 % | HEIGHT: 59 IN

## 2021-06-05 VITALS
SYSTOLIC BLOOD PRESSURE: 116 MMHG | HEIGHT: 59 IN | OXYGEN SATURATION: 94 % | WEIGHT: 113.8 LBS | RESPIRATION RATE: 20 BRPM | DIASTOLIC BLOOD PRESSURE: 73 MMHG | TEMPERATURE: 97.6 F | HEART RATE: 76 BPM | BODY MASS INDEX: 22.94 KG/M2

## 2021-06-05 VITALS
WEIGHT: 112.8 LBS | HEIGHT: 59 IN | DIASTOLIC BLOOD PRESSURE: 75 MMHG | OXYGEN SATURATION: 93 % | SYSTOLIC BLOOD PRESSURE: 124 MMHG | RESPIRATION RATE: 19 BRPM | TEMPERATURE: 97.5 F | HEART RATE: 72 BPM | BODY MASS INDEX: 22.74 KG/M2

## 2021-06-05 NOTE — TELEPHONE ENCOUNTER
[facility] Nurse Triage Telephone Note      Provider: ERIBERTO Walker  Facility: [facility] Type: LTC    Caller: Gema  Call Back Number:  [phone]    Allergies: Sulfa (sulfonamide antibiotics) and Penicillins    Reason for call: C/O severe chest pain when coughing.  Has been using Tylenol 1000mg which she states is ineffective. Wonder about Tramadol rx?  (She dis have a fall several days ago With chest pain-x-ray was negative for Fx.)    Verbal Order/Direction given by Provider: Robitussin AC 120ml, 15ml q 4hrPRN cough.    Provider giving order: ERIBERTO Walker    Verbal order given to: Gema Roman RN

## 2021-06-05 NOTE — PROGRESS NOTES
[facility]    Name:   Rosina Lnik  : 1945  Facility:   [facility] [[id number]]   Room: 228A  Code Status: DNR/DNI -   Fac type:   NF (Long Term Care, LTC) -     CHIEF COMPLAINT / REASON FOR VISIT:  Chief Complaint   Patient presents with     [facility]VP Care Coordination - Regulatory     Problem Visit     Recent fall with head contusion and chest pain.     [facility] from 18 until 18  [facility] from 18 until 03/10/18  [facility] from 18 until 18  [facility] from 8018 until 18  [facility] from 18 until 18    Patient was last seen by me on 10/24/2019 and subsequently seen by Dr. Guo on 2019.      HPI: Rosina is a 74 y.o. female with a history of severe COPD/pulmonary emphysema, depression, hiatal hernia/esophageal dysphagia/GERD (resolved surgically), and insomnia.      Initially here in the TCU.  For a while, she was existing on tube feedings due to esophageal dysphagia and massive hiatal hernia.  Eventual surgery resolve this issue.    CURRENT ISSUES    Problem -- fall with injury: Yesterday, she was reaching for something from bed and rolled out, suffering a mid forehead contusion, tiny laceration of the left temple (Steri-Stripped), and now complaining of suprasternal pain.  It is quite uncomfortable, hurts when breathing, and we will obtain chest x-rays.    Medication changes: Accompanied by nausea, all has resolved after hernia repair.  We discontinued ondansetron.  With discontinuation of tube feeding, she initially had difficulty eating but, as oral intake improved -- and her PEG tube was discontinued -- she has slowly gained weight.  We discontinued mirtazapine.  Saliva stimulant was also no longer needed and was discontinued as well.  She does have COPD/pulmonary emphysema, is barrel-chested, and is on a variety of inhalers.  She continues to deny  dyspnea, coughing, or chest pain.  We discontinued Incruse Ellipta due to nonuse.    She has a history of GERD due to issues of esophageal dysphagia and hiatal hernia.  With these items resolved, she continues on 20 mg of omeprazole daily, and we are going to decrease the dose to 10 mg daily with the possibility of eventually discontinuing.    Cardiopulmonary issues/COPD: Chest x-rays on 02/05/2019 revealed mild cardiomegaly with congestive failure and superimposed right lower lobe infiltrate.  Her white count was normal but temp slightly elevated from her baseline.  She had a nonproductive cough, wheeze on inspiration, crackles on expiration, and diminished sounds in the left lower lobe with sats of 89% on room air.  We ordered 40 mg of Lasix daily and Augmentin for 10 days.  A follow-up BMP was unremarkable.  She continues on 40 mg of Lasix.  She does suffer from COPD/pulmonary emphysema from years of smoking.    Urinary incontinence: She does have stress incontinence and is checked by nursing staff during the night.   She finds it very hard to control, stating that she does not realize it until after it starts.  There is some nocturia as well.  None of this has changed since admission.      ROS: She is no longer getting nauseated.  She is on trazodone 25 mg nightly and sleeps well with this.  She denies any headaches or chest pains, coughing or congestion, nausea or vomiting,  dizziness or dyspnea, dysuria, difficulty chewing or swallowing, or any integumentary issues.        HOSPITALIZATIONS    Hospitalization March 2018: She had suffered a 60 pound unintentional weight loss prior to that, dropping from about 160 pounds down to 95 pounds.  There was a brief period of weight gain after that, but she eventually returned to the lower weight.  She developed progressive weakness and inability to eat much.  She would take several bites and then feel full.  She was so weak upon her admission to [facility] in  "February 2018 that she could hardly walk.      She had had a CT scan on 02/09/18 that showed her stomach back in her chest.  She was supposed to see Dr. De Jesus at on 03/07/18 to review pictures and to discuss options.  The CT scan described \"small amount of scarring and residual consolidation in the paramedial right lower lobe at the site of resolving pneumonia.\"  Chest x-rays performed on 02/16/18 showed \"right lower lobe pneumonia with small parapneumonic effusion.  The amount of infiltrate in the right lower lobe appeared increased from the CT examination.\"  She was admitted for community-acquired pneumonia.  She had no fevers or leukocytosis.    Hospitalization June 2018: More recently, she was seen by Dr. Vasquez on 05/18/18 for dysphagia, nausea, and early satiety with weight loss.  An EGD with general anesthesia was planned at some point with consideration for repair of hiatal hernia and redo partial fundoplication.    She presented to [facility] ER on 06/05/18 with weakness, anorexia, and constipation.  Constipation eventually resolved without changes to appetite.  Psych was consulted with concern that depression may be contributory, and she was started on low-dose mirtazapine.  Megace was started as an appetite stimulant, and a CT showed a large recurrent hiatal hernia.  A GJ tube placement was suggested, but the patient was felt not to be a candidate for GJ tube per IR due to anatomy.  Therefore, GI and surgery were consulted, and she underwent laparoscopic partial fundoplication takedown repair paraesophageal hernia and placement of gastric jejunostomy tube placement; upper endoscopy by Dr. Daniel De Jesus on 06/12/18.  She was also found to have esophageal candidiasis and was given fluconazole.  The PICC line was placed on 06/13/18 to start TPN.    Overnight (06/14/18 to 06/15/18), rapid response was called due to hypoxemia and tachycardia.  A CT of the chest was negative for pulmonary embolism but " showed aspiration pneumonitis.  She was treated with IV antibiotics which ended on 06/23/18.    She underwent EGD and advancement of GJ feeding tube on 06/21/18.  Abdominal x-rays on 06/22/18 showed GJ tube malpositioned, looped in the fundus.  This J-tube repositioned itself and was then being used again.      Of note: An echocardiogram on 06/15/18 showed moderate pericardial effusion (known pericardial effusion 08/20/17 and 12/20/17).  Cardiology felt likely malnutrition with low protein causing capillary leak as a probable etiology.  Recommendations for observation with no further workup, but a repeat echocardiogram on 06/29/18 showed no change in no hemodynamic effects of effusion.    Hospitalization in July 2018: On 07/7/18, she was getting out of bed early in the day and decided to walk to the bathroom on her own rather than summoned assistance.  On the way to her bathroom, she became dizzy, fell, and struck the side of the garbage can.  She had experienced dizzy spells which have caused falls frequently before.  A CT of the chest, abdomen, and pelvis showed displaced left ninth and 10th rib fractures with a moderate sized pneumothorax and increased moderate pericardial effusion.  A chest tube was placed in the ED.  Trauma surgery evaluated the patient, and the chest tube was removed on 07/10/18.  She remained stable off oxygen with no dyspnea.    Another echocardiogram was done, as the pericardial effusion had possibly increased in size per the CT on admission.  It showed no significant change compared to previously, and there are recommendations for her to follow-up with cardiology in 4 weeks.    A UGI was done, showing persistent partial functional obstruction at the level of the diaphragmatic hiatus, and a strict n.p.o. and tube feedings continued.  She was subsequently discharged back here on 07/12/18.  She was to follow-up with Dr. De Jesus on 08/10/18 for further esophageal evaluation (as described in  CURRENT ISSUES).    Hospitalization January 2019: She underwent surgery in February 2016 for a large hiatal hernia (total gastric herniation) that included laparoscopic repair with mesh and Gorge fundoplication.  With recurrence, she was scheduled again for repair (and possible Jamee gastroplasty) on 01/23/19 with Dr. De Jesus.    On 01/23/19, she was admitted after laparoscopic reduction repair of a recurrent paraesophageal hernia.  Her postoperative course was uneventful.  An esophagram on POD 1 revealed no hernia, no leak, and no obstruction.  She was tolerating a full liquid diet, and her pain was under control.  Tube feeding was reinitiated during her admission as well.  Discharge orders back to [facility] included a full liquid diet for 2 weeks with subsequent follow-up with Dr. De Jesus.  The hope was that tube feeding could be discontinued in the near future.  Overall, she was noted to be doing well and was considered happy with the outcome.  She was readmitted on 01/25/19.    Past Medical History:   Diagnosis Date     Acute encephalopathy      Aperistalsis of esophagus      Arthritis      Compression fracture of lumbar vertebra (H)     L2     COPD (chronic obstructive pulmonary disease) (H)      Depression      Dyslipidemia      Encephalopathy      Esophageal candidiasis (H)      Esophagitis      Failure to thrive in adult      Fall      GERD (gastroesophageal reflux disease)      Hiatal hernia      HLD (hyperlipidemia)      HTN (hypertension)      Hypocalcemia      Hypomagnesemia      Hypoxia      Insomnia      Lactic acidosis      Major depressive disorder      Malnutrition (H)      Maternal MCV (mean corpuscular volume) low      Microcytic anemia      Microcytic hypochromic anemia 01/2013     Osteoporosis      Pericardial effusion      PMR (polymyalgia rheumatica) (H)      Pneumonia      Pneumothorax      Positive urine drug screen 10/2012    meth and coke positive 10/12. negative in 11/19/12      Pulmonary emphysema (H)      Rib fracture      S/P laparoscopic fundoplication      SIRS (systemic inflammatory response syndrome) (H)      Thrombocytopenia (H)      Trigger finger     left hand, middle finger     Underweight      Urinary urgency               Family History   Problem Relation Age of Onset     Diabetes Mother      Cancer Mother         lung     Hypertension Mother      Emphysema Father      Heart disease Father      Hypertension Father      No Medical Problems Brother      Breast cancer Maternal Aunt      Social History     Socioeconomic History     Marital status: Single     Spouse name: Not on file     Number of children: Not on file     Years of education: Not on file     Highest education level: Not on file   Occupational History     Not on file   Social Needs     Financial resource strain: Not on file     Food insecurity:     Worry: Not on file     Inability: Not on file     Transportation needs:     Medical: Not on file     Non-medical: Not on file   Tobacco Use     Smoking status: Former Smoker     Packs/day: 1.50     Years: 30.00     Pack years: 45.00     Types: Cigarettes     Last attempt to quit: 8/10/1991     Years since quittin.4     Smokeless tobacco: Never Used   Substance and Sexual Activity     Alcohol use: No     Drug use: No     Sexual activity: Not Currently   Lifestyle     Physical activity:     Days per week: Not on file     Minutes per session: Not on file     Stress: Not on file   Relationships     Social connections:     Talks on phone: Not on file     Gets together: Not on file     Attends Hindu service: Not on file     Active member of club or organization: Not on file     Attends meetings of clubs or organizations: Not on file     Relationship status: Not on file     Intimate partner violence:     Fear of current or ex partner: Not on file     Emotionally abused: Not on file     Physically abused: Not on file     Forced sexual activity: Not on file   Other  "Topics Concern     Not on file   Social History Narrative     Not on file     MEDICATIONS: Reviewed from the MAR, physician orders, and earlier progress notes.   Updated by me today (01/07/2020) with discontinuation of Incruse Ellipta and a decrease in omeprazole reflected below.  Current Outpatient Medications   Medication Sig     omeprazole (PRILOSEC) 10 MG capsule Take 10 mg by mouth daily before breakfast.     acetaminophen (TYLENOL) 325 MG tablet Take 650 mg by mouth every 4 (four) hours as needed for pain.     atorvastatin (LIPITOR) 20 MG tablet Take 20 mg by mouth daily.            budesonide-formoterol (SYMBICORT) 160-4.5 mcg/actuation inhaler Inhale 2 puffs 2 (two) times a day.     calcium carbonate (OS-JASPREET) 600 mg calcium (1,500 mg) tablet Take 600 mg by mouth daily.     cholecalciferol, vitamin D3, (CHOLECALCIFEROL) 400 unit Tab Take 400 Units by mouth daily.     furosemide (LASIX) 40 MG tablet Take 40 mg by mouth daily.     senna-docusate (SENNOSIDES-DOCUSATE SODIUM) 8.6-50 mg tablet Take 1 tablet by mouth 2 (two) times a day.     venlafaxine (EFFEXOR) 37.5 MG tablet Take 37.5 mg by mouth daily.            ALLERGIES:   Allergies   Allergen Reactions     Sulfa (Sulfonamide Antibiotics) Other (See Comments)      Ototoxicity         Penicillins Rash     DIET: Regular diet, mechanical soft texture, thin liquids.  She is still receiving tube feeding supplementally as well as an oral supplement.    Vitals:    01/07/20 1028   BP: 116/77   Pulse: 77   Resp: 19   Temp: 97.9  F (36.6  C)   SpO2: 93%   Weight: 127 lb 3.2 oz (57.7 kg)   Height: 4' 11\" (1.499 m)   She is put on a good deal of weight over the last several months, now stable.  Body mass index is 25.69 kg/m .    EXAMINATION:   General: Pleasant, alert, and conversant middle-aged female, observed after a long walk with her walker, looking a bit winded.  She has clearly put on a good deal of weight.  Head: Normocephalic.  Contusion/abrasion of the mid " forehead, small laceration of the left temple (with Steri-Strips).  Significant hirsutism.  Eyes: PERRLA, sclerae clear.   ENT: Slightly dry oral mucosa.  Full upper and lower dentures.  She is only wearing the uppers currently.  Hearing is unimpaired.  Cardiovascular: Regular rate and rhythm with no appreciable murmur.  Respiratory: Lung sounds are clear to auscultation bilaterally.   Abdomen: Soft and nontender.   Musculoskeletal/Extremities: Age-related degenerative joint disease.  Barrel chested with severe thoracic kyphosis.  No peripheral edema.  Chest is tender to palpation suprasternally.  Neurological: Occasional fine bilateral upper extremity tremor (not noticeable today).  Integument: No rashes, clinically significant lesions, or skin breakdown.   Cognitive/Psychiatric: Alert and oriented.  Affect is euthymic.    DIAGNOSTICS: .  Results for orders placed or performed in visit on 12/17/19   Basic Metabolic Panel   Result Value Ref Range    Sodium 139 136 - 145 mmol/L    Potassium 3.5 3.5 - 5.0 mmol/L    Chloride 100 98 - 107 mmol/L    CO2 31 22 - 31 mmol/L    Anion Gap, Calculation 8 5 - 18 mmol/L    Glucose 99 70 - 125 mg/dL    Calcium 9.0 8.5 - 10.5 mg/dL    BUN 16 8 - 28 mg/dL    Creatinine 0.82 0.60 - 1.10 mg/dL    GFR MDRD Af Amer >60 >60 mL/min/1.73m2    GFR MDRD Non Af Amer >60 >60 mL/min/1.73m2     Lab Results   Component Value Date    WBC 8.6 02/05/2019    HGB 13.8 01/04/2019    HCT 44.0 01/04/2019    MCV 92 01/04/2019     01/04/2019     CrCl cannot be calculated (Patient's most recent lab result is older than the maximum 5 days allowed.).    ASSESSMENT/Plan:      ICD-10-CM    1. Pulmonary emphysema, unspecified emphysema type (H) J43.9    2. Major depressive disorder in full remission, unspecified whether recurrent (H) F32.5    3. Bowel and bladder incontinence R32     R15.9    4. Chest wall pain R07.89    5. Contusion of other part of head, initial encounter S00.83XA    6. Coronary artery  disease involving native coronary artery of native heart without angina pectoris I25.10    7. History of esophageal dysphagia Z87.19    8. History of abdominal hernia Z87.19      CASE MANAGEMENT:  I have reviewed the facility/SNF care plan/MDS which was done 01/05/2020, including the falls risk, nutrition and pain screening. I also reviewed the current immunizations, and preventive care. Future cancer screening is not clinically indicated secondary to age/goals of care.   Patient's desire to return to the community is not present.    Information reviewed:  Medications, vital signs, orders, and nursing notes.    Total 30 minutes of which 50% was spent counseling and coordination of care of the above plan.    CHANGES:  1.  AP chest x-ray.  2.  Decrease omeprazole from 20 mg daily to 10 mg daily.    CARE PLAN:  The care plan has been reviewed and all orders signed.  Changes to care plan, if any, as noted.  Otherwise, continue current plan of care.      The above has been created using voice recognition software. Please be aware that this may unintentionally  produce inaccuracies and/or nonsensical sentences.       Electronically signed by: Ramana Blandon, CNP

## 2021-06-05 NOTE — PROGRESS NOTES
[facility]    Facility:   [facility] [[id number]]   Code Status: POLST AVAILABLE  PCP: Ramana Blandon CNP   Phone: [phone]   Fax: [phone]      Assessment/Plan:        Visit for Preoperative Exam.     Patient approved for surgery with general or local anesthesia. Labs will be done as indicated. Above recommendations were reviewed with the patient. Copy of the pre-op was given to the patient to bring along on the day of surgery. Follow up as needed. No Cardiology consultation or non-invasive testing.     Today Ms. Link was evaluated for a preoperative exam prior to left 4th/5th trigger finger release on 1/28/20.  She denied any concerns at this visit.  She did note that she has a cardiac history with CAD and was agreeable to having an EKG this week prior to her procedure.  Her blood pressure has been well-controlled on her current antihypertensive regimen with furosemide for chronic diuresis.  She noted that she has a history of COPD that she manages with Symbicort without any worsening dyspnea, wheezing, or cough recently.  She was agreeable to preoperative monitoring of her BMP and CBC this week as well.  The patient denied lightheadedness, dizziness, breathing difficulty, chest pain, palpitations, constipation, urinary symptoms, numbness or tingling in extremities, and pain. Nursing staff denied any new concerns for the patient at this time and feel that they have been at their baseline functioning over the past month.     Subjective:     Scheduled Procedure: Left 4th/5th trigger finger release  Surgery Date:  1/28/20  Surgery Location:  [facility]  Surgeon:  Dr. Jorge Luis Aguirre    Current Outpatient Medications   Medication Sig Dispense Refill     acetaminophen (TYLENOL) 325 MG tablet Take 650 mg by mouth every 4 (four) hours as needed for pain.       atorvastatin (LIPITOR) 20 MG tablet Take 20 mg by mouth daily.              budesonide-formoterol  (SYMBICORT) 160-4.5 mcg/actuation inhaler Inhale 2 puffs 2 (two) times a day.       calcium carbonate (OS-JASPREET) 600 mg calcium (1,500 mg) tablet Take 600 mg by mouth daily.       cholecalciferol, vitamin D3, (CHOLECALCIFEROL) 400 unit Tab Take 400 Units by mouth daily.       furosemide (LASIX) 40 MG tablet Take 40 mg by mouth daily.       omeprazole (PRILOSEC) 10 MG capsule Take 10 mg by mouth daily before breakfast.       senna-docusate (SENNOSIDES-DOCUSATE SODIUM) 8.6-50 mg tablet Take 1 tablet by mouth 2 (two) times a day.       venlafaxine (EFFEXOR) 37.5 MG tablet Take 37.5 mg by mouth daily.              No current facility-administered medications for this visit.        Allergies   Allergen Reactions     Sulfa (Sulfonamide Antibiotics) Other (See Comments)      Ototoxicity         Penicillins Rash       Immunization History   Administered Date(s) Administered     Influenza, inj, historic,unspecified 10/31/2018       Patient Active Problem List   Diagnosis     Pulmonary emphysema (H)     Hernia of abdominal cavity     Slow transit constipation     Chronic GERD     Coronary artery disease without angina pectoris     Major depressive disorder     Stress incontinence in female     Failure to thrive in adult     Bowel incontinence     Heartburn     Functional diarrhea     Bowel and bladder incontinence     Esophageal dysphagia     History of abdominal hernia     Early satiety     History of esophageal dysphagia       Past Medical History:   Diagnosis Date     Acute encephalopathy      Aperistalsis of esophagus      Arthritis      Compression fracture of lumbar vertebra (H)     L2     COPD (chronic obstructive pulmonary disease) (H)      Depression      Dyslipidemia      Encephalopathy      Esophageal candidiasis (H)      Esophagitis      Failure to thrive in adult      Fall      GERD (gastroesophageal reflux disease)      Hiatal hernia      HLD (hyperlipidemia)      HTN (hypertension)      Hypocalcemia       Hypomagnesemia      Hypoxia      Insomnia      Lactic acidosis      Major depressive disorder      Malnutrition (H)      Maternal MCV (mean corpuscular volume) low      Microcytic anemia      Microcytic hypochromic anemia 2013     Osteoporosis      Pericardial effusion      PMR (polymyalgia rheumatica) (H)      Pneumonia      Pneumothorax      Positive urine drug screen 10/2012    meth and coke positive 10/12. negative in 12     Pulmonary emphysema (H)      Rib fracture      S/P laparoscopic fundoplication      SIRS (systemic inflammatory response syndrome) (H)      Thrombocytopenia (H)      Trigger finger     left hand, middle finger     Underweight      Urinary urgency        Social History     Socioeconomic History     Marital status: Single     Spouse name: Not on file     Number of children: Not on file     Years of education: Not on file     Highest education level: Not on file   Occupational History     Not on file   Social Needs     Financial resource strain: Not on file     Food insecurity:     Worry: Not on file     Inability: Not on file     Transportation needs:     Medical: Not on file     Non-medical: Not on file   Tobacco Use     Smoking status: Former Smoker     Packs/day: 1.50     Years: 30.00     Pack years: 45.00     Types: Cigarettes     Last attempt to quit: 8/10/1991     Years since quittin.4     Smokeless tobacco: Never Used   Substance and Sexual Activity     Alcohol use: No     Drug use: No     Sexual activity: Not Currently   Lifestyle     Physical activity:     Days per week: Not on file     Minutes per session: Not on file     Stress: Not on file   Relationships     Social connections:     Talks on phone: Not on file     Gets together: Not on file     Attends [employer] service: Not on file     Active member of club or organization: Not on file     Attends meetings of clubs or organizations: Not on file     Relationship status: Not on file     Intimate partner violence:      Fear of current or ex partner: Not on file     Emotionally abused: Not on file     Physically abused: Not on file     Forced sexual activity: Not on file   Other Topics Concern     Not on file   Social History Narrative     Not on file       Past Surgical History:   Procedure Laterality Date     CARPAL TUNNEL RELEASE Right      CHOLECYSTECTOMY  01/09/2015     ESOPHAGOGASTRODUODENOSCOPY       HIATAL HERNIA REPAIR  02/15/2016    lap with mesh     HIATAL HERNIA REPAIR  01/23/2019    Laparoscopic repair (reduction and repair) of recurrent hiatal hernia,      HYSTERECTOMY  1990     IR GJ TUBE REPLACEMENT  12/13/2018     IR GJ TUBE REPLACEMENT  3/15/2019     IR GJ TUBE REPLACEMENT  3/25/2019     IR GJ TUBE REPLACEMENT  6/24/2019     IR TUBE REMOVAL  8/19/2019     REPAIR PARA ESOPHAGEAL PLACEMENT OF GASTRIC J TUBE       TRIGGER FINGER RELEASE Left     left middle finger       History of Present Illness  Recent Health  Fever: no  Chills: no  Fatigue: no  Chest Pain: no  Cough: no  Dyspnea: yes, hx COPD  Urinary Frequency: no  Nausea: no  Vomiting: no  Diarrhea: no  Abdominal Pain: no  Easy Bruising: no  Lower Extremity Swelling: no  Poor Exercise Tolerance: yes, hx multiple chronic illnesses    Most recent Health Maintenance Visit:  2 week(s) ago    Pertinent History  Prior Anesthesia: yes  Previous Anesthesia Reaction:  no  Diabetes: no  Cardiovascular Disease: yes  Pulmonary Disease: yes  Renal Disease: no  GI Disease: yes  Sleep Apnea: no  Thromboembolic Problems: no  Clotting Disorder: no  Bleeding Disorder: no  Transfusion Reaction: no  Impaired Immunity: no  Steroid use in the last 6 months: no  Frequent Aspirin use: no    Family history of no perioperative complications    Social history of there are no concerns regarding care after surgery    After surgery, the patient plans to recover at a nursing home.    Review of Systems  Review of Systems      **As noted in HPI    Objective:         Vitals:    01/21/20 0843  "  Pulse: 80   Resp: 16   SpO2: 95%   Weight: 127 lb (57.6 kg)   Height: 4' 11\" (1.499 m)   PainSc: 0-No pain       Physical Exam:  Physical Exam      Physical Examination: General appearance - alert, well appearing, and in no distress, oriented to person, place, and time and normal appearing weight  Mental status - alert, oriented to person, place, and time, normal mood, behavior, speech, dress, motor activity, and thought processes  Eyes - pupils equal and reactive, extraocular eye movements intact  Ears - bilateral TM's and external ear canals normal  Nose - normal and patent, no erythema, discharge or polyps  Mouth - mucous membranes moist, pharynx normal without lesions  Chest - clear to auscultation, no wheezes, rales or rhonchi, symmetric air entry  Heart - normal rate, regular rhythm, normal S1, S2, no murmurs, rubs, clicks or gallops  Abdomen - soft, nontender, nondistended, no masses or organomegaly  Neurological - alert, oriented, normal speech, no focal findings or movement disorder noted  Musculoskeletal - no joint tenderness, deformity or swelling  Extremities - peripheral pulses normal, no pedal edema, no clubbing or cyanosis; left 4th/5th trigger fingers contracted  Skin - normal coloration and turgor, no rashes, no suspicious skin lesions noted      Check EKG, BMP, and CBC on 1/23/20 for preoperative monitoring.  Otherwise continue current care plan for all other chronic medical conditions, as they are stable. Encouraged patient to engage in healthy lifestyle behaviors such as engaging in social activities, exercising (PT/OT), eating well, and following care plan. Follow up for routine check-up, or sooner if needed. Will continue to monitor patient and work with nursing staff collaboratively to work toward positive patient outcomes.     Electronically signed by: Jessi Arthur CNP  "

## 2021-06-05 NOTE — TELEPHONE ENCOUNTER
[facility] Nurse Triage Telephone Note      Provider: ERIBERTO Walker  Facility: [facility] Type: TCU    Caller: nurse  Call Back Number:  [phone]    Allergies: Sulfa (sulfonamide antibiotics) and Penicillins    Reason for call: Pt having Left ring trigger finger release surgery on Tues 1/28/2020 & needs a Pre-Op done.     Verbal Order/Direction given by Provider: Will see this week.    Provider giving order: ERIBERTO Quintanilla    Verbal order given to: nurse      Martha Roman RN

## 2021-06-07 NOTE — PROGRESS NOTES
[facility]    Facility:   [facility] [[id number]]   Code Status: DNR/DNI on Hospice      CHIEF COMPLAINT/REASON FOR VISIT:  Chief Complaint   Patient presents with     Review Of Multiple Medical Conditions     COPD, constipation and failure to thrive       HISTORY:      HPI: Rosina is a 74 y.o. female who has a past medical history of COPD, dyslipidemia, HTN, anemia, PMR, TERESA, pancreatitis, chronic pericardial effusion, depression, and insomnia. She is on Hospice for her COPD as she did not want progressive treatments during her last hospitalization.     Today, she is seen lying in bed and states she isn't feeling well.  She is unable to elaborate on this.  Nursing reports that often times she becomes constipated and needs a suppository and then she feels better afterward.  The suppository was given to her earlier this morning.  She has been weaned off of her nasal cannula oxygen.  She states her breathing is well.  I did not examine her lungs due to the COVID crisis and social distancing however she did appear to be respiratorily comfortable without any significant issues. She continues to be failure to thrive and does not eat much.  She is on the same dose of venlafaxine and does not feel this needs to change.       Past Medical History:   Diagnosis Date     Acute encephalopathy      Acute on chronic respiratory failure (H)      Aperistalsis of esophagus      Arthritis      Compression fracture of lumbar vertebra (H)     L2     COPD (chronic obstructive pulmonary disease) (H)      Depression      Dyslipidemia      Encephalopathy      Esophageal candidiasis (H)      Esophagitis      Failure to thrive in adult      Fall      GERD (gastroesophageal reflux disease)      Hiatal hernia      HLD (hyperlipidemia)      HTN (hypertension)      Hypocalcemia      Hypomagnesemia      Hypoxia      Insomnia      Lactic acidosis      Major depressive disorder      Malnutrition (H)       Maternal MCV (mean corpuscular volume) low      Microcytic anemia      Microcytic hypochromic anemia 2013     Mucus plugging of bronchi      Osteoporosis      Pericardial effusion      PMR (polymyalgia rheumatica) (H)      Pneumonia      Pneumothorax      Positive urine drug screen 10/2012    meth and coke positive 10/12. negative in 12     Pulmonary emphysema (H)      Rib fracture      S/P laparoscopic fundoplication      SIRS (systemic inflammatory response syndrome) (H)      Thrombocytopenia (H)      Trigger finger     left hand, middle finger     Underweight      Urinary urgency              Family History   Problem Relation Age of Onset     Diabetes Mother      Cancer Mother         lung     Hypertension Mother      Emphysema Father      Heart disease Father      Hypertension Father      No Medical Problems Brother      Breast cancer Maternal Aunt      Social History     Socioeconomic History     Marital status: Single     Spouse name: Not on file     Number of children: Not on file     Years of education: Not on file     Highest education level: Not on file   Occupational History     Not on file   Social Needs     Financial resource strain: Not on file     Food insecurity     Worry: Not on file     Inability: Not on file     Transportation needs     Medical: Not on file     Non-medical: Not on file   Tobacco Use     Smoking status: Former Smoker     Packs/day: 1.50     Years: 30.00     Pack years: 45.00     Types: Cigarettes     Last attempt to quit: 8/10/1991     Years since quittin.7     Smokeless tobacco: Never Used   Substance and Sexual Activity     Alcohol use: No     Drug use: No     Sexual activity: Not Currently   Lifestyle     Physical activity     Days per week: Not on file     Minutes per session: Not on file     Stress: Not on file   Relationships     Social connections     Talks on phone: Not on file     Gets together: Not on file     Attends Restorationist service: Not on file      Active member of club or organization: Not on file     Attends meetings of clubs or organizations: Not on file     Relationship status: Not on file     Intimate partner violence     Fear of current or ex partner: Not on file     Emotionally abused: Not on file     Physically abused: Not on file     Forced sexual activity: Not on file   Other Topics Concern     Not on file   Social History Narrative     Not on file         Review of Systems   Patient denies fever, chills, headache, lightheadedness, dizziness, rhinorrhea, cough, congestion, shortness of breath, chest pain, palpitations, abdominal pain, n/v, diarrhea, change in appetite, dysuria, frequency, burning or pain with urination.  Other than stated in HPI all other review of systems is negative.       Physical Exam   Vital signs: /78, heart rate 100, temp 95.5, weight down 6 pounds in 1 month at 110 pounds.  GENERAL APPEARANCE: Thin, frail elderly woman in no acute distress.  HEENT: normocephalic, atraumatic  PERRL, sclerae anicteric, conjunctivae clear and moist, EOM intact  LUNGS:respiratory effort normal.  MSK: Muscle strength and tone were normal.  EXTREMITIES: No cyanosis, clubbing or edema.  NEURO: Alert and oriented x 3. . Face is symmetric.  SKIN: Inspection of the skin reveals no rashes, ulcerations or petechiae.  PSYCH: Flat affect      LABS:   No results found for this or any previous visit (from the past 240 hour(s)).    Case Management:  Patient has the desire to be in her own home but requires nursing care at LTC level    Information reviewed:  Medications, vital signs, orders, and nursing notes.    ASSESSMENT:      ICD-10-CM    1. Chronic obstructive pulmonary disease, unspecified COPD type (H)  J44.9    2. Coronary artery disease involving native coronary artery of native heart without angina pectoris  I25.10    3. Essential hypertension  I10    4. Failure to thrive in adult  R62.7    5. Depression, unspecified depression type  F32.9         PLAN:    COPD: Stable, continue with inhalers, and oxygen as needed.  Is on hospice medications for air hunger morphine does not ask for this very frequently at this time.    CAD: On no treatments for this due to being on hospice aspirin and statins have been discontinued.    Hypertension: Blood pressures are controlled on no antihypertensives.    Failure to thrive: On hospice continue to encourage fluids and meals.    Depression: Continue with venlafaxine 37.5 mg per patient request.    Electronically signed by: Elif Carter CNP

## 2021-06-07 NOTE — PROGRESS NOTES
Code Status:  DNR/DNI on [facility]  Visit Type: Review Of Multiple Medical Conditions (recent hospitalization for COPD exacerbation, pericardial effusion. )     Facility:  [facility] [[id number]]      Facility Type:  (Long Term Care, LTC)    History of Present Illness:   Hospital Admission Date: 2/28/2020 Hospital Discharge Date: 3/16/2020      Rosina Link is a 74 y.o. female who has a past medical history of COPD, dyslipidemia, chronic pericardial effusion, depression, and insomnia.  She was recently hospitalized at [facility] after following up at the cardiology clinic for routine visit and she complained of a nonproductive cough with mild rhinorrhea.  She was found to be hypoxemic and respiratory failure secondary to rhinovirus and was started on O2 supplementation.  She developed bronchitis with COPD exacerbation needing multiple bronchoscopies for mucous plugging.  On 3/6 she was found unresponsive briefly.  She did not want to continue with compressive vest or meta nebs or therapy and was struggling with her nasal cannula and a CPAP mask and so goals of care were discussed and palliative care was consulted.  Given her refusal to conventional treatments and knowing that her previous bronchoscopy had not been effective she was placed on comfort cares and signed onto hospice.  She was taken off of high flow nasal cannula and started on MS Contin.  She eventually stabilized and was transferred back to the long-term care facility with hospice services.    Today, I see her and she appears very comfortable breathing comfortably on 2 L nasal cannula.  She states that her mood waxes and wanes she has good days and bad days.  For her mood she does have Effexor.  She does not appear to have any agitation and appears very comfortable.  She does have a budesonide/formoterol inhaler which at this point I think she can still manage.  She denies any pain or discomforts.    Past Medical History:    Diagnosis Date     Acute encephalopathy      Acute on chronic respiratory failure (H)      Aperistalsis of esophagus      Arthritis      Compression fracture of lumbar vertebra (H)     L2     COPD (chronic obstructive pulmonary disease) (H)      Depression      Dyslipidemia      Encephalopathy      Esophageal candidiasis (H)      Esophagitis      Failure to thrive in adult      Fall      GERD (gastroesophageal reflux disease)      Hiatal hernia      HLD (hyperlipidemia)      HTN (hypertension)      Hypocalcemia      Hypomagnesemia      Hypoxia      Insomnia      Lactic acidosis      Major depressive disorder      Malnutrition (H)      Maternal MCV (mean corpuscular volume) low      Microcytic anemia      Microcytic hypochromic anemia 01/2013     Mucus plugging of bronchi      Osteoporosis      Pericardial effusion      PMR (polymyalgia rheumatica) (H)      Pneumonia      Pneumothorax      Positive urine drug screen 10/2012    meth and coke positive 10/12. negative in 11/19/12     Pulmonary emphysema (H)      Rib fracture      S/P laparoscopic fundoplication      SIRS (systemic inflammatory response syndrome) (H)      Thrombocytopenia (H)      Trigger finger     left hand, middle finger     Underweight      Urinary urgency      Past Surgical History:   Procedure Laterality Date     CARPAL TUNNEL RELEASE Right      CHOLECYSTECTOMY  01/09/2015     ESOPHAGOGASTRODUODENOSCOPY       HIATAL HERNIA REPAIR  02/15/2016    lap with mesh     HIATAL HERNIA REPAIR  01/23/2019    Laparoscopic repair (reduction and repair) of recurrent hiatal hernia,      HYSTERECTOMY  1990     IR GJ TUBE REPLACEMENT  12/13/2018     IR GJ TUBE REPLACEMENT  3/15/2019     IR GJ TUBE REPLACEMENT  3/25/2019     IR GJ TUBE REPLACEMENT  6/24/2019     IR TUBE REMOVAL  8/19/2019     REPAIR PARA ESOPHAGEAL PLACEMENT OF GASTRIC J TUBE       TRIGGER FINGER RELEASE Left     left middle finger     Family History   Problem Relation Age of Onset     Diabetes  Mother      Cancer Mother         lung     Hypertension Mother      Emphysema Father      Heart disease Father      Hypertension Father      No Medical Problems Brother      Breast cancer Maternal Aunt      Social History     Socioeconomic History     Marital status: Single     Spouse name: Not on file     Number of children: Not on file     Years of education: Not on file     Highest education level: Not on file   Occupational History     Not on file   Social Needs     Financial resource strain: Not on file     Food insecurity     Worry: Not on file     Inability: Not on file     Transportation needs     Medical: Not on file     Non-medical: Not on file   Tobacco Use     Smoking status: Former Smoker     Packs/day: 1.50     Years: 30.00     Pack years: 45.00     Types: Cigarettes     Last attempt to quit: 8/10/1991     Years since quittin.6     Smokeless tobacco: Never Used   Substance and Sexual Activity     Alcohol use: No     Drug use: No     Sexual activity: Not Currently   Lifestyle     Physical activity     Days per week: Not on file     Minutes per session: Not on file     Stress: Not on file   Relationships     Social connections     Talks on phone: Not on file     Gets together: Not on file     Attends [employer] service: Not on file     Active member of club or organization: Not on file     Attends meetings of clubs or organizations: Not on file     Relationship status: Not on file     Intimate partner violence     Fear of current or ex partner: Not on file     Emotionally abused: Not on file     Physically abused: Not on file     Forced sexual activity: Not on file   Other Topics Concern     Not on file   Social History Narrative     Not on file       Current Outpatient Medications   Medication Sig Dispense Refill     atropine 1 % ophthalmic solution Place 1-4 drops under the tongue every 4 (four) hours as needed.       bisacodyL (DULCOLAX) 10 mg suppository Insert 10 mg into the rectum daily as  needed.       budesonide-formoterol (SYMBICORT) 160-4.5 mcg/actuation inhaler Inhale 2 puffs 2 (two) times a day.       calcium carbonate (OS-JASPREET) 600 mg calcium (1,500 mg) tablet Take 600 mg by mouth daily.       codeine-guaiFENesin (GUAIFENESIN AC)  mg/5 mL liquid Take 15 mL by mouth every 4 (four) hours as needed for cough.       melatonin 3 mg Tab tablet Take 3 mg by mouth at bedtime as needed.       morphine (MS CONTIN) 15 MG 12 hr tablet Take 15 mg by mouth every 3 (three) hours as needed for pain.       morphine 100 mg/5 mL (20 mg/mL) concentrated solution Take 10 mg by mouth every hour as needed for pain.       omeprazole (PRILOSEC) 10 MG capsule Take 10 mg by mouth daily before breakfast.       ondansetron (ZOFRAN-ODT) 4 MG disintegrating tablet Take 4 mg by mouth every 6 (six) hours as needed for nausea.       polyethylene glycol (MIRALAX) 17 gram packet Take 17 g by mouth daily as needed.       senna-docusate (SENNOSIDES-DOCUSATE SODIUM) 8.6-50 mg tablet Take 1 tablet by mouth 2 (two) times a day.       venlafaxine (EFFEXOR) 37.5 MG tablet Take 37.5 mg by mouth daily.              No current facility-administered medications for this visit.      Allergies   Allergen Reactions     Sulfa (Sulfonamide Antibiotics) Other (See Comments)      Ototoxicity         Penicillins Rash     Immunization History   Administered Date(s) Administered     Influenza, inj, historic,unspecified 10/31/2018, 11/11/2019       Post Discharge Medication Reconciliation Status: discharge medications reconciled, continue medications without change    Review of Systems   Patient denies fever, chills, headache, lightheadedness, dizziness, rhinorrhea, cough, congestion, shortness of breath, chest pain, palpitations, abdominal pain, n/v, diarrhea, constipation, change in appetite, dysuria, frequency, burning or pain with urination.  Other than stated in HPI all other review of systems is negative.         Physical Exam   Vital  signs: /69, heart rate 89, respiratory 16, temp 97.5, 96% on 2 L nasal cannula.  GENERAL APPEARANCE: Well developed, well nourished, in no acute distress.  HEENT: normocephalic, atraumatic  PERRL, sclerae anicteric, conjunctivae clear and moist, EOM intact  LUNGS: Lung sounds CTA, no adventitious sounds, respiratory effort normal.  CARD: RRR, S1, S2, without murmurs, gallops, rubs,   ABD: Soft and nontender with normal bowel sounds.   MSK: Muscle strength and tone were normal.  EXTREMITIES: No cyanosis, clubbing or edema.  NEURO: Alert with mild cognitive impairment,. Face is symmetric.  SKIN: Inspection of the skin reveals no rashes, ulcerations or petechiae.  PSYCH: euthymic            Labs:  No results found for this or any previous visit (from the past 240 hour(s)).      Assessment:  1. Pulmonary emphysema, unspecified emphysema type (H)     2. Coronary artery disease involving native coronary artery of native heart without angina pectoris     3. Essential hypertension     4. Chronic obstructive pulmonary disease, unspecified COPD type (H)     5. Major depressive disorder in full remission, unspecified whether recurrent (H)     6. Failure to thrive in adult         Plan:     At this time will continue with comfort cares.  Would consider switching her Symbicort inhaler to nebulizer treatments if she becomes significantly short of breath or if I find that she is unable to draw and the MDI.  Because she does not exhibit any terminal agitation will discontinue Haldol at this time and consider reinstating this when and if she is closer to end-of-life.  Continue with morphine for air hunger.  For her depression continue to monitor.  I did discuss with her and she is not open to increasing her antidepressant at this time will continue to observe for symptoms.  Supplements for her failure to thrive.    Electronically signed by: Elif Carter CNP

## 2021-06-07 NOTE — PROGRESS NOTES
[facility]      Facility:    [facility] [[id number]]  Code Status: DNR/DNI       Chief Complaint/Reason for Visit:  Chief Complaint   Patient presents with     H & P     Re-admit to LTC for COPD with respiratory failure, now on Hospice.        HPI:   Rosina is a 74 y.o. female who resides in LTC at [facility]. She has hx of COPD, HTN, hiatal hernia, GERD, anemia, PMR, TERESA, pancreatitis, gastric obstruction and FTT previously on feeding tube (discontinued 8/2019). She was sent to the hospital on 2/28/20 from a planned routine cardiology visit due to cough, dyspnea and hypoxia.    DISCHARGE SUMMARY   [facility]  Hospital Course:   Rosina Link is a 74 y.o. female with a history of COPD, dyslipidemia, chronic pericardial effusion which is not hemodynamically significant who presented to cardiology clinic for routine follow-up on 2/28/2020 and complained of 2 weeks of nonproductive cough with associated mild rhinorrhea. She was diagnosed with hypoxemic respiratory failure caused by Rhinovirus (positive on respiratory multiplex), started on HFNC O2 supplementation.     Bronch 3/1/20 opened L upper lobe and her O2/breathing improved somewhat, however she was unable to wean further than HFNC and taken for repeat bronch 3/4/2020 that showed extensive mucus plugging. Post-bronch CXR showed expanded lung.     Developed acute hypoxic respiratory failure 3/6/2020 requiring CPAP, was also briefly unresponsive. Patient has not wanted to comply with additional therapies (compressive vest, metanebs, ambulation/HOB elevation, etc) and has struggled even with nasal cannula let alone CPAP mask. Patient was not able to comprehend the risks/benefits of her decisions therefore [facility] deemed her to not have capacity and discussed with daughter, pulmonology and RN who were in agreement that we may be running out of options. Consulted palliative care to discuss goals of care  "and address end-of-life cares with her daughter and comfort cares given her refusal to conventional treatments and knowing previous bronchoscopy has not been effective for her. She underwent BAL which was nonspecific.      Hospital course:    After Care Conference with family and patient, pursued comfort cares but not initially withdrawing high flow oxygen until requested    Comfort care orders placed.     taken off HFNC and started MS Contin     Hospital Problems Addressed:    Principal Problem:  COPD exacerbation with hypoxic respiratory failure  Mucus plugging of bronchi  Complete left lower lobe atelectasis and partial left upper lobe atelectasis    Stop aggressive cares    Comfort care orders placed    HFNC O2 stopped    Added oral PRN morphine     Active Problems:  History of longstanding Pericardial effusion    Stable. Comfort cares     Major depressive disorder in partial remission    Continue home effexor     ACP    DNR    Daughter Maritza is health care agent     Return to SNF with Hospice Care     She returned to LTC on 3/16/20 on [facility].      Today:  She denies shortness of breath today and in fact is not using supplemental oxygen. No fever or cough reported. She denies chest pain. She is followed by Hospice. She is comfortable, denies pain. Simply states \"I'm ok\", tends to minimize concerns. No new concerns nursing. Her appetite is fair. No abdominal pain. No urinary sx. No new vision or hearing problems.       Past Medical History:  Past Medical History:   Diagnosis Date     Acute encephalopathy      Acute on chronic respiratory failure (H)      Aperistalsis of esophagus      Arthritis      Compression fracture of lumbar vertebra (H)     L2     COPD (chronic obstructive pulmonary disease) (H)      Depression      Dyslipidemia      Encephalopathy      Esophageal candidiasis (H)      Esophagitis      Failure to thrive in adult      Fall      GERD (gastroesophageal reflux disease)      Hiatal " hernia      HLD (hyperlipidemia)      HTN (hypertension)      Hypocalcemia      Hypomagnesemia      Hypoxia      Insomnia      Lactic acidosis      Major depressive disorder      Malnutrition (H)      Maternal MCV (mean corpuscular volume) low      Microcytic anemia      Microcytic hypochromic anemia 01/2013     Mucus plugging of bronchi      Osteoporosis      Pericardial effusion      PMR (polymyalgia rheumatica) (H)      Pneumonia      Pneumothorax      Positive urine drug screen 10/2012    meth and coke positive 10/12. negative in 11/19/12     Pulmonary emphysema (H)      Rib fracture      S/P laparoscopic fundoplication      SIRS (systemic inflammatory response syndrome) (H)      Thrombocytopenia (H)      Trigger finger     left hand, middle finger     Underweight      Urinary urgency            Surgical History:  Past Surgical History:   Procedure Laterality Date     CARPAL TUNNEL RELEASE Right      CHOLECYSTECTOMY  01/09/2015     ESOPHAGOGASTRODUODENOSCOPY       HIATAL HERNIA REPAIR  02/15/2016    lap with mesh     HIATAL HERNIA REPAIR  01/23/2019    Laparoscopic repair (reduction and repair) of recurrent hiatal hernia,      HYSTERECTOMY  1990     IR GJ TUBE REPLACEMENT  12/13/2018     IR GJ TUBE REPLACEMENT  3/15/2019     IR GJ TUBE REPLACEMENT  3/25/2019     IR GJ TUBE REPLACEMENT  6/24/2019     IR TUBE REMOVAL  8/19/2019     REPAIR PARA ESOPHAGEAL PLACEMENT OF GASTRIC J TUBE       TRIGGER FINGER RELEASE Left     left middle finger       Family History:   Family History   Problem Relation Age of Onset     Diabetes Mother      Cancer Mother         lung     Hypertension Mother      Emphysema Father      Heart disease Father      Hypertension Father      No Medical Problems Brother      Breast cancer Maternal Aunt        Social History:    Social History     Socioeconomic History     Marital status: Single     Spouse name: Not on file     Number of children: Not on file     Years of education: Not on file      Highest education level: Not on file   Occupational History     Not on file   Social Needs     Financial resource strain: Not on file     Food insecurity     Worry: Not on file     Inability: Not on file     Transportation needs     Medical: Not on file     Non-medical: Not on file   Tobacco Use     Smoking status: Former Smoker     Packs/day: 1.50     Years: 30.00     Pack years: 45.00     Types: Cigarettes     Last attempt to quit: 8/10/1991     Years since quittin.6     Smokeless tobacco: Never Used   Substance and Sexual Activity     Alcohol use: No     Drug use: No     Sexual activity: Not Currently   Lifestyle     Physical activity     Days per week: Not on file     Minutes per session: Not on file     Stress: Not on file   Relationships     Social connections     Talks on phone: Not on file     Gets together: Not on file     Attends [employer] service: Not on file     Active member of club or organization: Not on file     Attends meetings of clubs or organizations: Not on file     Relationship status: Not on file     Intimate partner violence     Fear of current or ex partner: Not on file     Emotionally abused: Not on file     Physically abused: Not on file     Forced sexual activity: Not on file   Other Topics Concern     Not on file   Social History Narrative     Not on file       Medications:  Current Outpatient Medications   Medication Sig Note     atropine 1 % ophthalmic solution Place 1-4 drops under the tongue every 4 (four) hours as needed.      bisacodyL (DULCOLAX) 10 mg suppository Insert 10 mg into the rectum daily as needed.      budesonide-formoterol (SYMBICORT) 160-4.5 mcg/actuation inhaler Inhale 2 puffs 2 (two) times a day.      calcium carbonate (OS-JASPREET) 600 mg calcium (1,500 mg) tablet Take 600 mg by mouth daily.      codeine-guaiFENesin (GUAIFENESIN AC)  mg/5 mL liquid Take 15 mL by mouth every 4 (four) hours as needed for cough.      melatonin 3 mg Tab tablet Take 3 mg by mouth  at bedtime as needed.      morphine (MS CONTIN) 15 MG 12 hr tablet Take 15 mg by mouth every 3 (three) hours as needed for pain.      morphine 100 mg/5 mL (20 mg/mL) concentrated solution Take 10 mg by mouth every hour as needed for pain.      omeprazole (PRILOSEC) 10 MG capsule Take 10 mg by mouth daily before breakfast. 1/7/2020: Reduction from 20 mg to 10 mg daily on 01/07/2020 in attempt to taper off.     ondansetron (ZOFRAN-ODT) 4 MG disintegrating tablet Take 4 mg by mouth every 6 (six) hours as needed for nausea.      polyethylene glycol (MIRALAX) 17 gram packet Take 17 g by mouth daily as needed.      senna-docusate (SENNOSIDES-DOCUSATE SODIUM) 8.6-50 mg tablet Take 1 tablet by mouth 2 (two) times a day.      venlafaxine (EFFEXOR) 37.5 MG tablet Take 37.5 mg by mouth daily.        6/18/2019: Decreased from a 37.5 mg twice daily to 37.5 mg daily on 06/18/2019, an attempt at gradual dose reduction.       Allergies:  Allergies   Allergen Reactions     Sulfa (Sulfonamide Antibiotics) Other (See Comments)      Ototoxicity         Penicillins Rash        Review of Systems:  Pertinent items are noted in HPI.      Physical Exam:   General: Patient is alert, somewhat pale appearing female, no distress, not on oxygen.   Vitals: /80, Temp 96.4, Pulse 96, RR 18, O2 sat 94%RA.  HEENT: Head is NCAT. Eyes show no injection or icterus. Nares negative. Oropharynx well hydrated.  Neck: Supple. No tenderness or adenopathy. No JVD.  Lungs: Diminished. No wheezes.  Cardiovascular: Regular rate and rhythm, normal S1, S2.  Back: No spinal or CVA tenderness.  Abdomen: Soft, no tenderness on exam. Bowel sounds present. No guarding rebound or rigidity.  : Deferred.  Extremities: No LE edema is noted.  Musculoskeletal: Age related degen changes.   Skin: Warm and dry.   Psych: Mood appears good.      Labs:  Outside hospital.      Assessment/Plan:  1. Respiratory failure. Acute on chronic with complex hospitalization, ultimately  with goals of care discussion concluding for Hospice cares and return to LTC.   2. COPD. Continue on Symbicort. She actually is no longer requiring supplemental oxygen.  3. Pericardial effusion. Has been followed by cardiology, no intervention.  4. Depression. Continue on Venlafaxine.  5. HTN. Noted per records. Not on BP meds and BPs have been satisfactory.  6. Anemia. Hx of anemia though hgb within normal range during hospital stay.  7. Debilitation. Overall frailty, multiple medical concerns, respiratory failure, not very responsive to inpatient treatment and patient not interested in more aggressive cares.  8. Code status is DNR/DNI/Hospice.         Electronically signed by: Rosina Guo MD

## 2021-06-08 NOTE — PROGRESS NOTES
[facility]    Facility:   [facility] [[id number]]   Code Status: DNR/DNI on Hospice      CHIEF COMPLAINT/REASON FOR VISIT:  Chief Complaint   Patient presents with     Problem Visit     pain management, COPD       HISTORY:      HPI: Rosina is a 74 y.o. female who has a past medical history of COPD, dyslipidemia, HTN, anemia, PMR, TERESA, pancreatitis, chronic pericardial effusion, depression, and insomnia. She is on Hospice for her COPD as she did not want progressive treatments during her last hospitalization.     Today, nursing request that I evaluate her pain management regimen.  Upon exam she is laying in her bed and she appears to be in good health.  Respiratory effort is normal and she denies any shortness of breath or issues with breathing.  She has had no illness symptoms of recent.  She reports a good appetite and is eating crackers when I visit her.  She has had a 5 pound weight gain in the past month.  She is on [facility] hospice who will be planning a video visit in the near future.  Upon review it is noted that she has 2 morphine orders active.  She has not used any of these medications in the past month.        Review of Systems   Patient denies fever, chills, headache, lightheadedness, dizziness, rhinorrhea, cough, congestion, shortness of breath, chest pain, palpitations, abdominal pain, n/v, diarrhea, change in appetite, dysuria, frequency, burning or pain with urination.  Other than stated in HPI all other review of systems is negative.       Physical Exam   Vital signs: /73, heart rate 75, respirations 17, temp 96.8.    Thorough physical exam not completed in order to maintain social distancing during the COVID pandemic.    Patient lying in bed in no acute distress.  Head is atraumatic and normocephalic and EOM is intact.  Respiratory effort is normal and nonlabored.  No lower extremity edema.  She is alert and oriented x3 and face is symmetric.  Range of  motion to bilateral upper and lower extremities is equal.  Mood euthymic.        LABS:   No results found for this or any previous visit (from the past 240 hour(s)).    ASSESSMENT:      ICD-10-CM    1. Chronic obstructive pulmonary disease, unspecified COPD type (H)  J44.9    2. Coronary artery disease involving native coronary artery of native heart without angina pectoris  I25.10    3. Essential hypertension  I10    4. Failure to thrive in adult  R62.7    5. Major depressive disorder in full remission, unspecified whether recurrent (H)  F32.5        PLAN:    At this time her status has shown great improvement since signing onto hospice.  Her weight is improved and her vital signs are stable.  She has had no changes with her blood pressures or CAD after discontinuing those medications.  She is on an inhaler with stable COPD without exacerbation.  Her depression is stable on her current dose of venlafaxine.  I will discontinue morphine 15 mg every 3 hours as needed at this time we will continue the hospice dose of morphine 10 mg every 3 hours as needed and atropine.  It is likely that she no longer will require hospice and these medications will be eventually discontinued.  At this time she is happy with the care that she is getting and will wait for hospice re-evaluation.    Electronically signed by: Elif Carter CNP

## 2021-06-08 NOTE — PROGRESS NOTES
[facility]    Facility:   [facility] [[id number]]   Code Status: DNR/DNI       Chief Complaint   Patient presents with     Review Of Multiple Medical Conditions     LTC 5/18/2020.       HPI:   Rosina is a 74 y.o. female who resides in LTC at [facility]. She has hx of COPD, HTN, hiatal hernia, GERD, anemia, PMR, TERESA, pancreatitis, gastric obstruction and FTT previously on feeding tube (discontinued 8/2019). She was sent to the hospital on 2/28/20 from a planned routine cardiology visit due to cough, dyspnea and hypoxia.    DISCHARGE SUMMARY   [facility] Course:   Rosina Link is a 74 y.o. female with a history of COPD, dyslipidemia, chronic pericardial effusion which is not hemodynamically significant who presented to cardiology clinic for routine follow-up on 2/28/2020 and complained of 2 weeks of nonproductive cough with associated mild rhinorrhea. She was diagnosed with hypoxemic respiratory failure caused by Rhinovirus (positive on respiratory multiplex), started on HFNC O2 supplementation.     Bronch 3/1/20 opened L upper lobe and her O2/breathing improved somewhat, however she was unable to wean further than HFNC and taken for repeat bronch 3/4/2020 that showed extensive mucus plugging. Post-bronch CXR showed expanded lung.     Developed acute hypoxic respiratory failure 3/6/2020 requiring CPAP, was also briefly unresponsive. Patient has not wanted to comply with additional therapies (compressive vest, metanebs, ambulation/HOB elevation, etc) and has struggled even with nasal cannula let alone CPAP mask. Patient was not able to comprehend the risks/benefits of her decisions therefore [facility] deemed her to not have capacity and discussed with daughter, pulmonology and RN who were in agreement that we may be running out of options. Consulted palliative care to discuss goals of care and address end-of-life cares with her daughter and comfort  cares given her refusal to conventional treatments and knowing previous bronchoscopy has not been effective for her. She underwent BAL which was nonspecific.      Hospital course:    After Care Conference with family and patient, pursued comfort cares but not initially withdrawing high flow oxygen until requested    Comfort care orders placed.     taken off HFNC and started MS Contin     Hospital Problems Addressed:    Principal Problem:  COPD exacerbation with hypoxic respiratory failure  Mucus plugging of bronchi  Complete left lower lobe atelectasis and partial left upper lobe atelectasis    Stop aggressive cares    Comfort care orders placed    HFNC O2 stopped    Added oral PRN morphine     Active Problems:  History of longstanding Pericardial effusion    Stable. Comfort cares     Major depressive disorder in partial remission    Continue home effexor     ACP    DNR    Daughter Maritza is health care agent     Return to SNF with [facility]     She returned to LTC on 3/16/20 on [facility].      Today:  Seen for routine physician follow up today. She has been doing well per her report. Not on oxygen. Continues on symbicort. Denies shortness of breath, chest pain, no hypoxia. No cough or fever. Doesn't get out of bed much, states it hurts to be up in chair. She continues on Hospice. Scheduled meds include omeprazole, Ca, senna and Effexor, all other meds are prn for comfort, symptom control. Her appetite is fair. She has lost weight, expected with decline. She is on supplements and dietician evaluates as needed. No abdominal pain. No urinary sx. No open areas of skin.       Past Medical History:  Past Medical History:   Diagnosis Date     Acute encephalopathy      Acute on chronic respiratory failure (H)      Aperistalsis of esophagus      Arthritis      Compression fracture of lumbar vertebra (H)     L2     COPD (chronic obstructive pulmonary disease) (H)      Depression      Dyslipidemia      Encephalopathy       Esophageal candidiasis (H)      Esophagitis      Failure to thrive in adult      Fall      GERD (gastroesophageal reflux disease)      Hiatal hernia      HLD (hyperlipidemia)      HTN (hypertension)      Hypocalcemia      Hypomagnesemia      Hypoxia      Insomnia      Lactic acidosis      Major depressive disorder      Malnutrition (H)      Maternal MCV (mean corpuscular volume) low      Microcytic anemia      Microcytic hypochromic anemia 01/2013     Mucus plugging of bronchi      Osteoporosis      Pericardial effusion      PMR (polymyalgia rheumatica) (H)      Pneumonia      Pneumothorax      Positive urine drug screen 10/2012    meth and coke positive 10/12. negative in 11/19/12     Pulmonary emphysema (H)      Rib fracture      S/P laparoscopic fundoplication      SIRS (systemic inflammatory response syndrome) (H)      Thrombocytopenia (H)      Trigger finger     left hand, middle finger     Underweight      Urinary urgency        Medications:  Current Outpatient Medications   Medication Sig Note     atropine 1 % ophthalmic solution Place 1-4 drops under the tongue every 4 (four) hours as needed.      bisacodyL (DULCOLAX) 10 mg suppository Insert 10 mg into the rectum daily as needed.      budesonide-formoterol (SYMBICORT) 160-4.5 mcg/actuation inhaler Inhale 2 puffs 2 (two) times a day.      calcium carbonate (OS-JASPREET) 600 mg calcium (1,500 mg) tablet Take 600 mg by mouth daily.      codeine-guaiFENesin (GUAIFENESIN AC)  mg/5 mL liquid Take 15 mL by mouth every 4 (four) hours as needed for cough.      melatonin 3 mg Tab tablet Take 3 mg by mouth at bedtime as needed.      morphine (MS CONTIN) 15 MG 12 hr tablet Take 15 mg by mouth every 3 (three) hours as needed for pain.      morphine 100 mg/5 mL (20 mg/mL) concentrated solution Take 10 mg by mouth every hour as needed for pain.      omeprazole (PRILOSEC) 10 MG capsule Take 10 mg by mouth daily before breakfast. 1/7/2020: Reduction from 20 mg to 10  mg daily on 01/07/2020 in attempt to taper off.     ondansetron (ZOFRAN-ODT) 4 MG disintegrating tablet Take 4 mg by mouth every 6 (six) hours as needed for nausea.      polyethylene glycol (MIRALAX) 17 gram packet Take 17 g by mouth daily as needed.      senna-docusate (SENNOSIDES-DOCUSATE SODIUM) 8.6-50 mg tablet Take 1 tablet by mouth 2 (two) times a day.      venlafaxine (EFFEXOR) 37.5 MG tablet Take 37.5 mg by mouth daily.        6/18/2019: Decreased from a 37.5 mg twice daily to 37.5 mg daily on 06/18/2019, an attempt at gradual dose reduction.       Physical Exam:   Note: COVID-19 pandemic precautions in place. Physical exam performed with social distancing considerations.  General: Patient is alert pale appearing female, no distress, not on oxygen.   Vitals: /84, Temp 96.7, Pulse 83, RR 17, O2 sat 94%RA.  HEENT: Head is NCAT. Eyes show no injection or icterus. Nares negative. Oropharynx well hydrated.  Neck: No JVD.  Lungs: Non labored respirations.   Abdomen: Soft.  : Deferred.  Extremities: No LE edema is noted.  Musculoskeletal: Age related degen changes.   Skin: Warm and dry.   Psych: Mood appears pretty good.      Labs:  Outside hospital.      Assessment/Plan:  1. COPD. Continue on Symbicort. She does not require supplemental oxygen. Hospitalization Feb/March 2020 for acute on chronic respiratory failure, complex hospitalization, returned to LTC on Hospice. Respiratory status currently stable.   2. Pericardial effusion. Had been followed by cardiology, no intervention. No complaints of shortness of breath, chest pain, no hypoxia.   3. Depression. Continue on Venlafaxine.  4. HTN. Noted per records. BPs satisfactory, not on medications for BP.   5. Anemia. Hx of anemia though hgb within normal range during hospital stay and not checked since due to Hospice designation.          Electronically signed by: Rosina Guo MD

## 2021-06-09 NOTE — PROGRESS NOTES
[facility]    Facility:   [facility] [[id number]]   Code Status: DNR/DNI on [facility]      CHIEF COMPLAINT/REASON FOR VISIT:  Chief Complaint   Patient presents with     Review Of Multiple Medical Conditions     COPD, GERD       HISTORY:      HPI: Rosina is a 75 y.o. female who has a past medical history of COPD, dyslipidemia, HTN, anemia, PMR, TERESA, pancreatitis, chronic pericardial effusion, depression, and insomnia. She has a history of pancreatitis, gastric obstruction and feeding tube (removed 8/2019) She is on Hospice for her COPD as she did not want progressive treatments during her last hospitalization.     Today, I see patient for review of her medical conditions.  She is currently on [facility] for her COPD.  In the past 3 months she has been very stable without any respiratory illnesses.  She has not had to use her O2.  She does spend a fair amount of time lying in bed due to having to stay in her room because of COVID restrictions.  She reports she has been able to have outside visitation with her daughter and she does enjoy those visits.  She does have multiple snacks in her room and endorses a good appetite.  She has 2lb weight gain in the past month and is 114lbs this month.  She reports she is sleeping well.  BPs are in in good range.    Last month, her maalox was discontinued due to nonuse.  Since then, nursing reports she has had reflux in the past week.  She continues on omeprazole 10mg daily.        Review of Systems   Patient denies fever, chills, headache, lightheadedness, dizziness, rhinorrhea, cough, congestion, shortness of breath, chest pain, palpitations, abdominal pain, n/v, diarrhea, constipation, change in appetite, dysuria, frequency, burning or pain with urination.  Other than stated in HPI all other review of systems is negative.         Physical Exam   Vital signs: /65, HR 74, resp 18, temp 97.6    Thorough physical exam not  completed in order to maintain social distancing during the COVID pandemic.    Patient lying in bed in no acute distress.  Head is atraumatic and normocephalic and EOM is intact.  Respiratory effort is normal and nonlabored.  No lower extremity edema.  She is alert and oriented x3 and face is symmetric.  Range of motion to bilateral upper and lower extremities is equal.  Mood euthymic.      Case Management:  Patient's desire to return to the community is present, but is not able due to care needs .    Advance Directive Discussion:    I reviewed the current advanced directives as reflected in EPIC and the facility chart. I did review the advance directives with the resident.      LABS:   No results found for this or any previous visit (from the past 240 hour(s)).    ASSESSMENT:      ICD-10-CM    1. Chronic obstructive pulmonary disease, unspecified COPD type (H)  J44.9    2. Essential hypertension  I10    3. Major depressive disorder in full remission, unspecified whether recurrent (H)  F32.5    4. Chronic GERD  K21.9        PLAN:    COPD: stable without exacerbation, continue on symbicort inhaler.  On no nebulizers as this time.     HTN: BPs stable on no medications    Depression: mood is stable, continue on venlafaxine    Chronic GERD: increased symptoms, add back in prn MAALOX and continue omeprazole 10mg daily.     Electronically signed by: Elif Carter CNP

## 2021-06-10 NOTE — TELEPHONE ENCOUNTER
"[facility] Nurse Triage Telephone Note      Provider: ERIBERTO Delgado  Facility: [facility] Type: LTC    Caller: Temi  Call Back Number:  [phone]    Allergies: Sulfa (sulfonamide antibiotics) and Penicillins    Reason for call: Nurse calling to report that patient has been c/o neck pain since last evening.  Upon exam, patient has a red, fiery rash that goes from behind her right ear, down the right side of her neck to her right should and under the right axilla and also on the upper right bicep.  No pustules or blisters noted at this time, but patient does endorse pain with the rash.  Of note, patient is currently on [facility] hospice, but was supposed to discharge off of hospice as of today.  The hospice team was out today and they're currently \"working on\" her hospice discharge.  Staff has given Tylenol 650mg of the standing house orders along with 2 doses of liquid morphine today.  Patient said they're helpful, but \"don't last long\".  No blisters/pustules noted in the right ear, nose or face.       Verbal Order/Direction given by Provider: Start Valtrex 1g three times a day x 7 days for herpes zoster.  Tylenol 650mg Q 4 hours PRN.      Provider giving order: Rosina Guo MD    Verbal order given to: Mechelle Nagy RN      "

## 2021-06-10 NOTE — PROGRESS NOTES
[facility]    Facility:   [facility] [[id number]]   Code Status: DNR/DNI       Chief Complaint   Patient presents with     Problem Visit     LTC 8/26/2020. Shingles.       HPI:   Rosina is a 75 y.o. female who resides in LTC at [facility]. She has hx of COPD, HTN, hiatal hernia, GERD, anemia, PMR, TERESA, pancreatitis, gastric obstruction and FTT previously on feeding tube (discontinued 8/2019). She was sent to the hospital on 2/28/20 from a planned routine cardiology visit due to cough, dyspnea and hypoxia.    DISCHARGE SUMMARY   [facility] Hospital Course:   Rosina Link is a 74 y.o. female with a history of COPD, dyslipidemia, chronic pericardial effusion which is not hemodynamically significant who presented to cardiology clinic for routine follow-up on 2/28/2020 and complained of 2 weeks of nonproductive cough with associated mild rhinorrhea. She was diagnosed with hypoxemic respiratory failure caused by Rhinovirus (positive on respiratory multiplex), started on HFNC O2 supplementation.     Bronch 3/1/20 opened L upper lobe and her O2/breathing improved somewhat, however she was unable to wean further than HFNC and taken for repeat bronch 3/4/2020 that showed extensive mucus plugging. Post-bronch CXR showed expanded lung.     Developed acute hypoxic respiratory failure 3/6/2020 requiring CPAP, was also briefly unresponsive. Patient has not wanted to comply with additional therapies (compressive vest, metanebs, ambulation/HOB elevation, etc) and has struggled even with nasal cannula let alone CPAP mask. Patient was not able to comprehend the risks/benefits of her decisions therefore [facility] deemed her to not have capacity and discussed with daughter, pulmonology and RN who were in agreement that we may be running out of options. Consulted palliative care to discuss goals of care and address end-of-life cares with her daughter and comfort cares given  her refusal to conventional treatments and knowing previous bronchoscopy has not been effective for her. She underwent BAL which was nonspecific.      Hospital course:    After Care Conference with family and patient, pursued comfort cares but not initially withdrawing high flow oxygen until requested    Comfort care orders placed.     taken off HFNC and started MS Contin     Hospital Problems Addressed:    Principal Problem:  COPD exacerbation with hypoxic respiratory failure  Mucus plugging of bronchi  Complete left lower lobe atelectasis and partial left upper lobe atelectasis    Stop aggressive cares    Comfort care orders placed    HFNC O2 stopped    Added oral PRN morphine     Active Problems:  History of longstanding Pericardial effusion    Stable. Comfort cares     Major depressive disorder in partial remission    Continue home effexor     ACP    DNR    Daughter Maritza is health care agent     Return to SNF with [facility]osp[facility]     She returned to LTC on 3/16/20 on [facility].      Today:  She is seen today for reports of rash and pain. She has had pain on the right side of her neck for a few days with some pain in to her right arm. Today had some itching and nursing noted new development of red rash concerning for shingles. She was therefore started on Valtrex. She states it is itchy, not particularly painful at this time but does have the aforementioned pain in her neck. She is on Hospice and has prn order for morphine or staff can use house prn acetaminophen. She otherwise states she is doing okay. She notes she does not walk, dose not feel that she can any longer due to weakness. No dizziness. She has been on Hospice since March. Per report, Hospice was here today and staff were told they would be signing her off Hospice soon. Her Hospice dx is COPD with respiratory failure and depression. She currently does not have any respiratoy concerns. No shortness of breath or wheezing. No fever or cough. She is  on budesonide/formoterol MDI.       Past Medical History:  Past Medical History:   Diagnosis Date     Acute encephalopathy      Acute on chronic respiratory failure (H)      Aperistalsis of esophagus      Arthritis      Compression fracture of lumbar vertebra (H)     L2     COPD (chronic obstructive pulmonary disease) (H)      Depression      Dyslipidemia      Encephalopathy      Esophageal candidiasis (H)      Esophagitis      Failure to thrive in adult      Fall      GERD (gastroesophageal reflux disease)      Hiatal hernia      HLD (hyperlipidemia)      HTN (hypertension)      Hypocalcemia      Hypomagnesemia      Hypoxia      Insomnia      Lactic acidosis      Major depressive disorder      Malnutrition (H)      Maternal MCV (mean corpuscular volume) low      Microcytic anemia      Microcytic hypochromic anemia 01/2013     Mucus plugging of bronchi      Osteoporosis      Pericardial effusion      PMR (polymyalgia rheumatica) (H)      Pneumonia      Pneumothorax      Positive urine drug screen 10/2012    meth and coke positive 10/12. negative in 11/19/12     Pulmonary emphysema (H)      Rib fracture      S/P laparoscopic fundoplication      SIRS (systemic inflammatory response syndrome) (H)      Thrombocytopenia (H)      Trigger finger     left hand, middle finger     Underweight      Urinary urgency        Medications:  Current Outpatient Medications   Medication Sig Note     atropine 1 % ophthalmic solution Place 1-4 drops under the tongue every 4 (four) hours as needed.      bisacodyL (DULCOLAX) 10 mg suppository Insert 10 mg into the rectum daily as needed.      budesonide-formoterol (SYMBICORT) 160-4.5 mcg/actuation inhaler Inhale 2 puffs 2 (two) times a day.      calcium carbonate (OS-JASPREET) 600 mg calcium (1,500 mg) tablet Take 600 mg by mouth daily.      codeine-guaiFENesin (GUAIFENESIN AC)  mg/5 mL liquid Take 15 mL by mouth every 4 (four) hours as needed for cough.      hydrOXYzine HCL (ATARAX) 10  MG tablet Take 10 mg by mouth 3 (three) times a day as needed for itching.      lidocaine (LIDODERM TOP) Apply 4 % topically 3 (three) times a day as needed.      melatonin 3 mg Tab tablet Take 3 mg by mouth at bedtime as needed.      morphine 100 mg/5 mL (20 mg/mL) concentrated solution Take 10 mg by mouth every hour as needed for pain.      omeprazole (PRILOSEC) 10 MG capsule Take 10 mg by mouth daily before breakfast. 1/7/2020: Reduction from 20 mg to 10 mg daily on 01/07/2020 in attempt to taper off.     ondansetron (ZOFRAN-ODT) 4 MG disintegrating tablet Take 4 mg by mouth every 6 (six) hours as needed for nausea.      polyethylene glycol (MIRALAX) 17 gram packet Take 17 g by mouth daily as needed.      senna-docusate (SENNOSIDES-DOCUSATE SODIUM) 8.6-50 mg tablet Take 1 tablet by mouth 2 (two) times a day.      valACYclovir (VALTREX) 1000 MG tablet Take 1,000 mg by mouth 3 (three) times a day. (for 7 days for herpes zoster of the right neck/axilla/arm)      venlafaxine (EFFEXOR) 37.5 MG tablet Take 37.5 mg by mouth daily.        6/18/2019: Decreased from a 37.5 mg twice daily to 37.5 mg daily on 06/18/2019, an attempt at gradual dose reduction.       Physical Exam:  Note: COVID-19 pandemic precautions in place. Physical exam performed with social distancing considerations.  General: Patient is alert pale appearing female, no distress, not on oxygen.   Vitals: /84, Temp 96.7, Pulse 83, RR 17, O2 sat 94%RA.  HEENT: Head is NCAT. Eyes show no injection or icterus. Nares negative. Oropharynx well hydrated.  Neck: No JVD.  Lungs: Non labored respirations.   Abdomen: Soft.  : Deferred.  Extremities: No LE edema is noted.  Musculoskeletal: Age related degen changes.   Skin: Red blotchy rash, somewhat thickened and irregular bumpy appearance though no pustules or vesicles at this time. Distribution right lateral neck to anterior upper chest, mid medial upper arm, does not cross midline.  Psych: Mood appears  pretty good.      Labs:  Outside hospital.      Assessment/Plan:  1. Shingles. Start Valtrex 1 gm three times a day for 7 days. Notify facility infection control nurse.   2. COPD. Continue on Symbicort. She does not require supplemental oxygen. Hospitalization Feb/March 2020 for acute on chronic respiratory failure, complex hospitalization, returned to LTC on Hospice. Respiratory status currently stable. Reports are she may be discharged from Hospice soon, no details.   3. Depression. Continue on Venlafaxine.  4. HTN. Noted per records. BPs satisfactory, not on medications for BP.   5. Anemia. Hx of anemia though hgb within normal range during hospital stay and not checked since due to Hospice designation.              Electronically signed by: Rosina Guo MD

## 2021-06-11 ENCOUNTER — OFFICE VISIT - HEALTHEAST (OUTPATIENT)
Dept: GERIATRICS | Facility: CLINIC | Age: 76
End: 2021-06-11

## 2021-06-11 DIAGNOSIS — J44.9 CHRONIC OBSTRUCTIVE PULMONARY DISEASE, UNSPECIFIED COPD TYPE (H): ICD-10-CM

## 2021-06-11 DIAGNOSIS — E46 MALNUTRITION, UNSPECIFIED TYPE (H): ICD-10-CM

## 2021-06-11 DIAGNOSIS — F32.A DEPRESSION, UNSPECIFIED DEPRESSION TYPE: ICD-10-CM

## 2021-06-11 DIAGNOSIS — I25.10 CORONARY ARTERY DISEASE INVOLVING NATIVE CORONARY ARTERY OF NATIVE HEART WITHOUT ANGINA PECTORIS: ICD-10-CM

## 2021-06-11 DIAGNOSIS — K21.9 CHRONIC GERD: ICD-10-CM

## 2021-06-11 DIAGNOSIS — E78.5 HYPERLIPIDEMIA, UNSPECIFIED HYPERLIPIDEMIA TYPE: ICD-10-CM

## 2021-06-11 ASSESSMENT — MIFFLIN-ST. JEOR: SCORE: 922.27

## 2021-06-11 NOTE — TELEPHONE ENCOUNTER
[facility] Nurse Triage Telephone Note      Provider: Rosina Guo MD  Facility: [facility] Type: LT    Caller: Temi  Call Back Number:  [phone]    Allergies: Sulfa (sulfonamide antibiotics) and Penicillins    Reason for call: Pt has a Dx of shingles last week and is currently on antiviral and the pt is c/o that the area is itchy and is requesting something for the itching.      Verbal Order/Direction given by Provider: make sure to cover the area with either clothes, guaze or abd until resolved. Atarax 10mg three times a day prn x2 weeks for itching.     Provider giving order: Rosina Guo MD    Verbal order given to: Giana Anderson RN

## 2021-06-11 NOTE — PROGRESS NOTES
[facility]    Facility:   [facility] [[id number]]   Code Status: DNR/DNI on [facility]      CHIEF COMPLAINT/REASON FOR VISIT:  Chief Complaint   Patient presents with     Problem Visit     herpes zoster, pain       HISTORY:      HPI: Rosina is a 75 y.o. female who has a past medical history of COPD, dyslipidemia, HTN, anemia, PMR, TERESA, pancreatitis, chronic pericardial effusion, depression, and insomnia. She has a history of pancreatitis, gastric obstruction and feeding tube (removed 8/2019) She is on Hospice for her COPD as she did not want progressive treatments during her last hospitalization.     Today, nursing requests that I see her for Herpes Zoster and pain.  Upon exam she does have a dried raised rash on her neck without any papules.  It is red in color.  Yesterday, nursing had called our office due to patient's itching and atarax was started.  Unfortunately, her insurance does not cover Atarax and so she never used this.  She denies any itching today but mostly burning pain.  Valtrex should be done on 9/3.     Of note: she is to discharge from Hospice soon. Awaiting notification from Hospice and then we will need to make some medication adjustments per her needs.     Review of Systems   Patient denies fever, chills, headache, lightheadedness, dizziness, rhinorrhea, cough, congestion, shortness of breath, chest pain, palpitations, abdominal pain, n/v, diarrhea, constipation, change in appetite, dysuria, frequency, burning or pain with urination.  Other than stated in HPI all other review of systems is negative.         Physical Exam   Vital signs: Hr 83, resp 16, temp 98.9    Thorough physical exam not completed in order to maintain social distancing during the COVID pandemic.    Patient lying in bed in no acute distress.  Head is atraumatic and normocephalic and EOM is intact.  Respiratory effort is normal and nonlabored.  No lower extremity edema. Right neck with  red, raised, dry/crusty rash, no drainage or papules.  She is alert and oriented x3 and face is symmetric.  Range of motion to bilateral upper and lower extremities is equal.  Mood euthymic.          LABS:   No results found for this or any previous visit (from the past 240 hour(s)).    ASSESSMENT:      ICD-10-CM    1. Neuralgia  M79.2    2. Herpes zoster without complication  B02.9        PLAN:    Neuralgia: will dc Atarax and start on Lidoderm gel 4% three times a day, recommend adding moisture to the skin to help with dryness which may cause itching.      Shingles; improving, finish Valtrex this week. Discussed with IC nurse and she will be top of the list for a Zostavax vaccine.     Electronically signed by: Elif Carter CNP

## 2021-06-12 ENCOUNTER — RECORDS - HEALTHEAST (OUTPATIENT)
Dept: LAB | Facility: CLINIC | Age: 76
End: 2021-06-12

## 2021-06-12 NOTE — PROGRESS NOTES
Assessment and Plan:     Patient has been advised of split billing requirements and indicates understanding: Yes        The patient's current medical problems were reviewed.      The following health maintenance schedule was reviewed      Health Maintenance   Topic Date Due     DEPRESSION ACTION PLAN  1945     HEPATITIS C SCREENING  1945     SPIROMETRY  1945     COPD ACTION PLAN  1945     TD 18+ HE  07/12/1963     ADVANCE CARE PLANNING  07/12/1963     COLORECTAL CANCER SCREENING  07/12/1963     LIPID  07/12/1990     ZOSTER VACCINES (1 of 2) 07/12/1995     MEDICARE ANNUAL WELLNESS VISIT  07/12/2010     Pneumococcal Vaccine: 65+ Years (1 of 1 - PPSV23) 07/12/2010     DXA SCAN  07/12/2010     FALL RISK ASSESSMENT  07/12/2010     INFLUENZA VACCINE RULE BASED (1) 08/01/2020     Pneumococcal Vaccine: Pediatrics (0 to 5 Years) and At-Risk Patients (6 to 64 Years)  Aged Out     HEPATITIS B VACCINES  Aged Out        Subjective:   Chief Complaint: Rosina Link is an 75 y.o. female here for an Annual Wellness visit.   HPI:  Patient has a PMH of COPD, dyslipidemia, HTN, anemia, PMR, TERESA, pancreatitis, chronic pericardial effusion, depression, and insomnia. She has a history of pancreatitis, gastric obstruction and feeding tube (removed 8/2019).  She was on Hospice 4/2020 thru 9/2020 for COPD.  She was hospitalized back in March 2020 for COPD exacerbation and had indicated she did not want to do aggressive care and so was signed onto Hospice.  Her respiratory status improved and she required no changes with medications and eventually weaned off of supplemental O2 and so Hospice discharge her from care.      Today, she reports feeling generally well and has no complaints other than progressive LE weakness and not able to ambulate.  She spends most of her time in her bed and is incontinent of urine.     Review of Systems:   Patient denies fever, chills, headache, lightheadedness, dizziness, rhinorrhea,  cough, congestion, shortness of breath, chest pain, palpitations, abdominal pain, n/v, diarrhea, constipation, change in appetite, dysuria, frequency, burning or pain with urination.  Other than stated in HPI all other review of systems is negative.         Patient Care Team:  Elif Carter CNP as PCP - General (Nurse Practitioner)  [facility] as Nursing Home Facility (Generic Provider)  Elif Carter CNP as Assigned PCP     Patient Active Problem List   Diagnosis     Pulmonary emphysema (H)     Hernia of abdominal cavity     Slow transit constipation     Chronic GERD     Coronary artery disease without angina pectoris     Major depressive disorder     Stress incontinence in female     Failure to thrive in adult     Bowel incontinence     Bowel and bladder incontinence     Esophageal dysphagia     History of abdominal hernia     Early satiety     History of esophageal dysphagia     Weakness of both lower extremities     Past Medical History:   Diagnosis Date     Acute encephalopathy      Acute on chronic respiratory failure (H)      Aperistalsis of esophagus      Arthritis      Compression fracture of lumbar vertebra (H)     L2     COPD (chronic obstructive pulmonary disease) (H)      Depression      Dyslipidemia      Encephalopathy      Esophageal candidiasis (H)      Esophagitis      Failure to thrive in adult      Fall      GERD (gastroesophageal reflux disease)      Hiatal hernia      HLD (hyperlipidemia)      HTN (hypertension)      Hypocalcemia      Hypomagnesemia      Hypoxia      Insomnia      Lactic acidosis      Major depressive disorder      Malnutrition (H)      Maternal MCV (mean corpuscular volume) low      Microcytic anemia      Microcytic hypochromic anemia 01/2013     Mucus plugging of bronchi      Osteoporosis      Pericardial effusion      PMR (polymyalgia rheumatica) (H)      Pneumonia      Pneumothorax      Positive urine drug screen 10/2012    meth  and coke positive 10/12. negative in 12     Pulmonary emphysema (H)      Rib fracture      S/P laparoscopic fundoplication      SIRS (systemic inflammatory response syndrome) (H)      Thrombocytopenia (H)      Trigger finger     left hand, middle finger     Underweight      Urinary urgency       Past Surgical History:   Procedure Laterality Date     CARPAL TUNNEL RELEASE Right      CHOLECYSTECTOMY  2015     ESOPHAGOGASTRODUODENOSCOPY       HIATAL HERNIA REPAIR  02/15/2016    lap with mesh     HIATAL HERNIA REPAIR  2019    Laparoscopic repair (reduction and repair) of recurrent hiatal hernia,      HYSTERECTOMY       IR GJ TUBE REPLACEMENT  2018     IR GJ TUBE REPLACEMENT  3/15/2019     IR GJ TUBE REPLACEMENT  3/25/2019     IR GJ TUBE REPLACEMENT  2019     IR TUBE REMOVAL  2019     REPAIR PARA ESOPHAGEAL PLACEMENT OF GASTRIC J TUBE       TRIGGER FINGER RELEASE Left     left middle finger      Family History   Problem Relation Age of Onset     Diabetes Mother      Cancer Mother         lung     Hypertension Mother      Emphysema Father      Heart disease Father      Hypertension Father      No Medical Problems Brother      Breast cancer Maternal Aunt       Social History     Socioeconomic History     Marital status: Single     Spouse name: Not on file     Number of children: Not on file     Years of education: Not on file     Highest education level: Not on file   Occupational History     Not on file   Social Needs     Financial resource strain: Not on file     Food insecurity     Worry: Not on file     Inability: Not on file     Transportation needs     Medical: Not on file     Non-medical: Not on file   Tobacco Use     Smoking status: Former Smoker     Packs/day: 1.50     Years: 30.00     Pack years: 45.00     Types: Cigarettes     Quit date: 8/10/1991     Years since quittin.1     Smokeless tobacco: Never Used   Substance and Sexual Activity     Alcohol use: No     Drug  use: No     Sexual activity: Not Currently   Lifestyle     Physical activity     Days per week: Not on file     Minutes per session: Not on file     Stress: Not on file   Relationships     Social connections     Talks on phone: Not on file     Gets together: Not on file     Attends [employer] service: Not on file     Active member of club or organization: Not on file     Attends meetings of clubs or organizations: Not on file     Relationship status: Not on file     Intimate partner violence     Fear of current or ex partner: Not on file     Emotionally abused: Not on file     Physically abused: Not on file     Forced sexual activity: Not on file   Other Topics Concern     Not on file   Social History Narrative     Not on file      Current Outpatient Medications   Medication Sig Dispense Refill     acetaminophen (TYLENOL) 325 MG tablet Take 650 mg by mouth every 4 (four) hours as needed for pain.       aluminum-magnesium hydroxide-simethicone (MAALOX ADVANCED) 200-200-20 mg/5 mL Susp Take 30 mL by mouth every 4 (four) hours as needed.       senna (SENOKOT) 8.6 mg tablet Take 1 tablet by mouth 2 (two) times a day.       bisacodyL (DULCOLAX) 10 mg suppository Insert 10 mg into the rectum daily as needed.       budesonide-formoterol (SYMBICORT) 160-4.5 mcg/actuation inhaler Inhale 2 puffs 2 (two) times a day.       calcium carbonate (OS-JASPREET) 600 mg calcium (1,500 mg) tablet Take 600 mg by mouth daily.       omeprazole (PRILOSEC) 10 MG capsule Take 10 mg by mouth daily before breakfast.       venlafaxine (EFFEXOR) 37.5 MG tablet Take 37.5 mg by mouth daily.              No current facility-administered medications for this visit.       Objective:   Vital Signs:   Visit Vitals  Pulse 96   Temp 97.8  F (36.6  C)   Resp 14        PHYSICAL EXAM  In order to maintain social distancing during the COVID pandemic, a visual exam was completed.       Patient is thin, frail, elderly woman in no acute distress.  Head is AT/NC, EOM  intact. Respiratory effort normal. No visible LE edema. Has minor foot contractures bilaterally. Alert and oriented, face symmetric. No visible skin issues or rashes. Mood euthymic      Assessment Results 10/2/2020   Activities of Daily Living 2-4 - Moderate impairment   Instrumental Activities of Daily Living 5-6 - Severe functional impairment   Get Up and Go Score (No Data)   Mini Cog Total Score 4   Some recent data might be hidden     A Mini-Cog score of 0-2 suggests the possibility of dementia, score of 3-5 suggests no dementia    Fall Risk not completed for medical reasons.      Identified Health Risks:     Malnutrition; Dietician involved and recommends supplements.     Weakness with functional decline: acute and likely due to being on Hospice care with poor functional goals.  PT/OT to eval and treat.     Depression: PHQ-9 negative, has a history and on antidepressant.  She recently had a gradual dose reduction and tolerated well.  Will continue on antidepressant.       Assessment and Plan:     Patient has been advised of split billing requirements and indicates understanding: Yes  Rosina was seen today for review of multiple medical conditions.    Encounter for general adult medical examination with abnormal findings    Weakness of both lower extremities        The patient's current medical problems were reviewed.    I have had an Advance Directives discussion with the patient.  The following health maintenance schedule was reviewed with the patient and provided in printed form in the after visit summary:   Health Maintenance   Topic Date Due     DEPRESSION ACTION PLAN  1945     HEPATITIS C SCREENING  1945     SPIROMETRY  1945     COPD ACTION PLAN  1945     TD 18+ HE  07/12/1963     ADVANCE CARE PLANNING  07/12/1963     COLORECTAL CANCER SCREENING  07/12/1963     LIPID  07/12/1990     ZOSTER VACCINES (1 of 2) 07/12/1995     MEDICARE ANNUAL WELLNESS VISIT  07/12/2010     Pneumococcal  Vaccine: 65+ Years (1 of 1 - PPSV23) 07/12/2010     DXA SCAN  07/12/2010     FALL RISK ASSESSMENT  07/12/2010     INFLUENZA VACCINE RULE BASED (1) 08/01/2020     Pneumococcal Vaccine: Pediatrics (0 to 5 Years) and At-Risk Patients (6 to 64 Years)  Aged Out     HEPATITIS B VACCINES  Aged Out        Subjective:   Chief Complaint: Rosina Link is an 75 y.o. female here for an Annual Wellness visit.   HPI:  COPD, dyslipidemia, HTN, anemia, PMR, TERESA, pancreatitis, chronic pericardial effusion, depression, and insomnia. She has a history of pancreatitis, gastric obstruction and feeding tube (removed 8/2019).  In March 2020, she had a severe COPD exacerbation and had indicated she did not want aggressive treatment.  She was sent back to the LTC on Hospice services for her COPD.  She did improve in status, weaning off of O2.  She has had no recent illnesses or exacerbations.  At the beginning of September she was signed off of Hospice.     Today, she states she generally feels well and has no significant complaints.  She reports that she no longer can walk due to increase weakness.  Prior to signing onto Hospice she did ambulate with a walker.  She spends a good part of her day in bed and is incontinent of urine.  Her mood is stable and she denies any depression.     Review of Systems:    Patient denies fever, chills, headache, lightheadedness, dizziness, rhinorrhea, cough, congestion, shortness of breath, chest pain, palpitations, abdominal pain, n/v, diarrhea, constipation, change in appetite, dysuria, frequency, burning or pain with urination.  Other than stated in HPI all other review of systems is negative.         Patient Care Team:  Elif Carter CNP as PCP - General (Nurse Practitioner)  [facility] Nursing Home Facility (Generic Provider)  Elif Carter CNP as Assigned PCP     Patient Active Problem List   Diagnosis     Pulmonary emphysema (H)     Hernia of abdominal  cavity     Slow transit constipation     Chronic GERD     Coronary artery disease without angina pectoris     Major depressive disorder     Stress incontinence in female     Failure to thrive in adult     Bowel incontinence     Bowel and bladder incontinence     Esophageal dysphagia     History of abdominal hernia     Early satiety     History of esophageal dysphagia     Weakness of both lower extremities     Past Medical History:   Diagnosis Date     Acute encephalopathy      Acute on chronic respiratory failure (H)      Aperistalsis of esophagus      Arthritis      Compression fracture of lumbar vertebra (H)     L2     COPD (chronic obstructive pulmonary disease) (H)      Depression      Dyslipidemia      Encephalopathy      Esophageal candidiasis (H)      Esophagitis      Failure to thrive in adult      Fall      GERD (gastroesophageal reflux disease)      Hiatal hernia      HLD (hyperlipidemia)      HTN (hypertension)      Hypocalcemia      Hypomagnesemia      Hypoxia      Insomnia      Lactic acidosis      Major depressive disorder      Malnutrition (H)      Maternal MCV (mean corpuscular volume) low      Microcytic anemia      Microcytic hypochromic anemia 01/2013     Mucus plugging of bronchi      Osteoporosis      Pericardial effusion      PMR (polymyalgia rheumatica) (H)      Pneumonia      Pneumothorax      Positive urine drug screen 10/2012    meth and coke positive 10/12. negative in 11/19/12     Pulmonary emphysema (H)      Rib fracture      S/P laparoscopic fundoplication      SIRS (systemic inflammatory response syndrome) (H)      Thrombocytopenia (H)      Trigger finger     left hand, middle finger     Underweight      Urinary urgency       Past Surgical History:   Procedure Laterality Date     CARPAL TUNNEL RELEASE Right      CHOLECYSTECTOMY  01/09/2015     ESOPHAGOGASTRODUODENOSCOPY       HIATAL HERNIA REPAIR  02/15/2016    lap with mesh     HIATAL HERNIA REPAIR  01/23/2019    Laparoscopic repair  (reduction and repair) of recurrent hiatal hernia,      HYSTERECTOMY       IR GJ TUBE REPLACEMENT  2018     IR GJ TUBE REPLACEMENT  3/15/2019     IR GJ TUBE REPLACEMENT  3/25/2019     IR GJ TUBE REPLACEMENT  2019     IR TUBE REMOVAL  2019     REPAIR PARA ESOPHAGEAL PLACEMENT OF GASTRIC J TUBE       TRIGGER FINGER RELEASE Left     left middle finger      Family History   Problem Relation Age of Onset     Diabetes Mother      Cancer Mother         lung     Hypertension Mother      Emphysema Father      Heart disease Father      Hypertension Father      No Medical Problems Brother      Breast cancer Maternal Aunt       Social History     Socioeconomic History     Marital status: Single     Spouse name: Not on file     Number of children: Not on file     Years of education: Not on file     Highest education level: Not on file   Occupational History     Not on file   Social Needs     Financial resource strain: Not on file     Food insecurity     Worry: Not on file     Inability: Not on file     Transportation needs     Medical: Not on file     Non-medical: Not on file   Tobacco Use     Smoking status: Former Smoker     Packs/day: 1.50     Years: 30.00     Pack years: 45.00     Types: Cigarettes     Quit date: 8/10/1991     Years since quittin.1     Smokeless tobacco: Never Used   Substance and Sexual Activity     Alcohol use: No     Drug use: No     Sexual activity: Not Currently   Lifestyle     Physical activity     Days per week: Not on file     Minutes per session: Not on file     Stress: Not on file   Relationships     Social connections     Talks on phone: Not on file     Gets together: Not on file     Attends [employer] service: Not on file     Active member of club or organization: Not on file     Attends meetings of clubs or organizations: Not on file     Relationship status: Not on file     Intimate partner violence     Fear of current or ex partner: Not on file     Emotionally abused:  Not on file     Physically abused: Not on file     Forced sexual activity: Not on file   Other Topics Concern     Not on file   Social History Narrative     Not on file      Current Outpatient Medications   Medication Sig Dispense Refill     acetaminophen (TYLENOL) 325 MG tablet Take 650 mg by mouth every 4 (four) hours as needed for pain.       aluminum-magnesium hydroxide-simethicone (MAALOX ADVANCED) 200-200-20 mg/5 mL Susp Take 30 mL by mouth every 4 (four) hours as needed.       senna (SENOKOT) 8.6 mg tablet Take 1 tablet by mouth 2 (two) times a day.       bisacodyL (DULCOLAX) 10 mg suppository Insert 10 mg into the rectum daily as needed.       budesonide-formoterol (SYMBICORT) 160-4.5 mcg/actuation inhaler Inhale 2 puffs 2 (two) times a day.       calcium carbonate (OS-JASPREET) 600 mg calcium (1,500 mg) tablet Take 600 mg by mouth daily.       omeprazole (PRILOSEC) 10 MG capsule Take 10 mg by mouth daily before breakfast.       venlafaxine (EFFEXOR) 37.5 MG tablet Take 37.5 mg by mouth daily.              No current facility-administered medications for this visit.       Objective:   Vital Signs:   Visit Vitals  Pulse 96   Temp 97.8  F (36.6  C)   Resp 14          VisionScreening:  No exam data present     PHYSICAL EXAM  In order to maintain social distancing during the COVID pandemic, a visual exam was completed.       Patient is elderly, thin female, and no acute distress.  Head is AT/NC, EOM intact. Respiratory effort normal. No visible LE edema, foot contractures. Alert and oriented, face symmetric. No visible skin issues or rashes. Mood euthymic      Assessment Results 10/2/2020   Activities of Daily Living 2-4 - Moderate impairment   Instrumental Activities of Daily Living 5-6 - Severe functional impairment   Get Up and Go Score (No Data)   Mini Cog Total Score 4   Some recent data might be hidden     A Mini-Cog score of 0-2 suggests the possibility of dementia, score of 3-5 suggests no dementia  Fall Risk  not completed for medical reasons.      Identified Health Risks:     She is at risk for lack of exercise and has been provided with information to increase physical activity for the benefit of her well-being.    Referral to Dietician for malnutrition and follow up on foods provided.     Referral to PT/OT for new onset weakness likely due to Hospice goal was not functional.     Has depression and will continue to be monitored and screened by the SW.

## 2021-06-13 NOTE — PROGRESS NOTES
Code Status:  DNI  Visit Type: Problem Visit (unresponsive episode, n/v)     Facility:  [facility] [[id number]]        Facility Type:  (Long Term Care, LTC)    History of Present Illness: Rosina Link is a 75 y.o. female with a PMH of COPD, dyslipidemia, HTN, anemia, PMR, TERESA, pancreatitis, chronic pericardial effusion, depression, and insomnia. She has a history of pancreatitis, gastric obstruction and feeding tube (removed 8/2019).  She was on Hospice 4/2020 thru 9/2020 for COPD.  She was hospitalized back in March 2020 for COPD exacerbation and had indicated she did not want to do aggressive care and so was signed onto Hospice.  Her respiratory status improved and she required no changes with medications and eventually weaned off of supplemental O2 and so Hospice discharge her from care.    Today, I am requested to see her after an unresponsive episode in the bathtub.  Nursing reports that she was typical self this morning until she had an reported episode of dizziness in the tub and her eyes fluttered and she did not respond.  Nursing states the episode was only seconds long and then she responded and was A&O x 3.  They were able to get her to her bed and she had a BM and vital signs were 108/72, , resp 21 and temp 97.0 and O2 sats were 92% on RA.  She complained of nausea and eventually had multiple episodes of small amounts of emesis that was brown/yellow thin.  Nursing reports she doesn't usually eat breakfast in the morning and her typical meals consist of high calorie snacks and Dr. Pepper.  LS are CTA and I don't hear any arrhythmias.     Review of Systems   Patient denies cough, congestion, shortness of breath, chest pain, palpitations,  change in appetite, dysuria, frequency, burning or pain with urination.  Other than stated in HPI all other review of systems is negative.         Physical Exam   In order to maintain social distancing during the COVID pandemic, a visual exam was  completed.     Vitals:    12/02/20 1041   BP: 116/73   Pulse: 76   Resp: 20   Temp: 97.6  F (36.4  C)   SpO2: 94%        Patient is a thin, elderly woman that is diaphoretic. Head is AT/NC, EOM intact. Respiratory effort normal. Lungs are CTA. Heart: regular rhythm, rapid pulse, S1, S2, no murmur, gallop or rub.  No visible LE edema. Alert and oriented, face symmetric. No visible skin issues or rashes.       Labs:  No results found for this or any previous visit (from the past 240 hour(s)).      Assessment:  1. Syncope, unspecified syncope type     2. Acute gastritis without hemorrhage, unspecified gastritis type         Plan:  Syncope: unsure of etiology, vitals normalized approximately 30 minutes after incident.      Acute gastritis: likely related to something she ate.  She was given ODT zofran during my visit and symptoms resolved within 30 minutes.  Recommend a BRAT diet today and be sure lots of fluids.          Electronically signed by: Elif Carter CNP

## 2021-06-14 NOTE — PROGRESS NOTES
[facility]    Facility:   [facility] [[id number]]   Code Status: DNI       Chief Complaint   Patient presents with     Review Of Multiple Medical Conditions     LTC 12/31/2020. COPD.        HPI:   Rosina is a 75 y.o. female who resides in LTC at [facility]. She has hx of COPD, HTN, hiatal hernia, GERD, anemia, PMR, TERESA, pancreatitis, gastric obstruction and FTT previously on feeding tube (discontinued 8/2019). She was sent to the hospital on 2/28/20 from a planned routine cardiology visit due to cough, dyspnea and hypoxia. She was diagnosed with hypoxemic respiratory failure, needed oxygen, had bronchoscopy which opened left upper lobe with some improvement but unable to be weaned. Repeat bronch with extensive mucous plugging. Subsequent resp failure requiring CPAP during which briefly unresponsive. She did not want to comply with treatments and ultimately moved to comfort based approach, returning to LTC on 3/16/20 on [facility]. Subsequent stabilization over time and was discharged from Hospice eff 9/11/2020.      Today:  She was seen by NP on 12/2/2020 after reported unresponsive episode. Per note, sx abated fairly quickly, unknown etiology. There is no nursing note in the facility EMR after that episode, until 12/20/2020, suggesting stability and no further concerns after that episode. Today patient has no complaints. She did have some nausea and loose bowel movement yesterday. No issues today. No fever, cough or congestion. She has consistently tested negative on facility wide COVID-19 surveillance testing. Appetite per usual. No open areas of skin. She is non ambulatory, does not get out of bed. She has COPD, respiratory status stable on Symbicort. She is on Venlafaxine for depression.         Past Medical History:  Past Medical History:   Diagnosis Date     Acute encephalopathy      Acute on chronic respiratory failure (H)      Aperistalsis of esophagus       Arthritis      Compression fracture of lumbar vertebra (H)     L2     COPD (chronic obstructive pulmonary disease) (H)      Depression      Dyslipidemia      Encephalopathy      Esophageal candidiasis (H)      Esophagitis      Failure to thrive in adult      Fall      GERD (gastroesophageal reflux disease)      Hiatal hernia      HLD (hyperlipidemia)      HTN (hypertension)      Hypocalcemia      Hypomagnesemia      Hypoxia      Insomnia      Lactic acidosis      Major depressive disorder      Malnutrition (H)      Maternal MCV (mean corpuscular volume) low      Microcytic anemia      Microcytic hypochromic anemia 01/2013     Mucus plugging of bronchi      Osteoporosis      Pericardial effusion      PMR (polymyalgia rheumatica) (H)      Pneumonia      Pneumothorax      Positive urine drug screen 10/2012    meth and coke positive 10/12. negative in 11/19/12     Pulmonary emphysema (H)      Rib fracture      S/P laparoscopic fundoplication      SIRS (systemic inflammatory response syndrome) (H)      Thrombocytopenia (H)      Trigger finger     left hand, middle finger     Underweight      Urinary urgency        Medications:  Current Outpatient Medications   Medication Sig Note     acetaminophen (TYLENOL) 325 MG tablet Take 650 mg by mouth every 4 (four) hours as needed for pain.      aluminum-magnesium hydroxide-simethicone (MAALOX ADVANCED) 200-200-20 mg/5 mL Susp Take 30 mL by mouth every 4 (four) hours as needed.      bisacodyL (DULCOLAX) 10 mg suppository Insert 10 mg into the rectum daily as needed.      budesonide-formoterol (SYMBICORT) 160-4.5 mcg/actuation inhaler Inhale 2 puffs 2 (two) times a day.      calcium carbonate (OS-JASPREET) 600 mg calcium (1,500 mg) tablet Take 600 mg by mouth daily.      omeprazole (PRILOSEC) 10 MG capsule Take 10 mg by mouth daily before breakfast. 1/7/2020: Reduction from 20 mg to 10 mg daily on 01/07/2020 in attempt to taper off.     ondansetron (ZOFRAN-ODT) 4 MG disintegrating  tablet Take 4 mg by mouth 3 (three) times a day as needed for nausea.      senna (SENOKOT) 8.6 mg tablet Take 1 tablet by mouth 2 (two) times a day.      venlafaxine (EFFEXOR) 37.5 MG tablet Take 37.5 mg by mouth daily.        6/18/2019: Decreased from a 37.5 mg twice daily to 37.5 mg daily on 06/18/2019, an attempt at gradual dose reduction.       Physical Exam:  Note: COVID-19 pandemic precautions in place. Physical exam performed with social distancing considerations.  General: Patient is alert pale appearing female, no distress, not on oxygen.   Vitals: /79, Temp 98, Pulse 77, RR 18, O2 sat 92%RA.  HEENT: Head is NCAT. Eyes show no injection or icterus. Nares negative. Oropharynx well hydrated.  Neck: No JVD.  Lungs: Non labored respirations.   Abdomen: Soft.  : Deferred.  Extremities: No LE edema is noted.  Musculoskeletal: Age related degen changes.   Psych: Mood appears pretty good.      Labs:  Lab Results   Component Value Date    WBC 5.7 01/23/2020    HGB 13.8 01/23/2020    HCT 43.2 01/23/2020    MCV 90 01/23/2020     01/23/2020     Results for orders placed or performed in visit on 12/17/19   Basic Metabolic Panel   Result Value Ref Range    Sodium 139 136 - 145 mmol/L    Potassium 3.5 3.5 - 5.0 mmol/L    Chloride 100 98 - 107 mmol/L    CO2 31 22 - 31 mmol/L    Anion Gap, Calculation 8 5 - 18 mmol/L    Glucose 99 70 - 125 mg/dL    Calcium 9.0 8.5 - 10.5 mg/dL    BUN 16 8 - 28 mg/dL    Creatinine 0.82 0.60 - 1.10 mg/dL    GFR MDRD Af Amer >60 >60 mL/min/1.73m2    GFR MDRD Non Af Amer >60 >60 mL/min/1.73m2         Assessment/Plan:  1. COPD. Continue on Symbicort. She does not require supplemental oxygen. Hospitalization Feb/March 2020 for acute on chronic respiratory failure, complex hospitalization, returned to LTC on Hospice though subsequently discharged eff 9/11/2020. Respiratory status currently stable.   2. Depression. Continue on Venlafaxine.  3. HTN. BPs are satisfactory, not on  medications for HTN.   4. Anemia. Hx of anemia, last checked Jan 2020 at 13.8.          Electronically signed by: Rosina Guo MD

## 2021-06-15 ENCOUNTER — COMMUNICATION - HEALTHEAST (OUTPATIENT)
Dept: GERIATRICS | Facility: CLINIC | Age: 76
End: 2021-06-15

## 2021-06-15 LAB
CHOLEST SERPL-MCNC: 162 MG/DL
FASTING STATUS PATIENT QL REPORTED: NORMAL
HBA1C MFR BLD: 5.1 %
HDLC SERPL-MCNC: 54 MG/DL
LDLC SERPL CALC-MCNC: 98 MG/DL
TRIGL SERPL-MCNC: 51 MG/DL
TSH SERPL DL<=0.005 MIU/L-ACNC: 1.55 UIU/ML (ref 0.3–5)

## 2021-06-15 NOTE — TELEPHONE ENCOUNTER
[facility] Nurse Triage Telephone Note      Provider: ERIBERTO Delgado  Facility: [facility] Type: LT    Caller: Kanika  Call Back Number:  [phone]    Allergies: Sulfa (sulfonamide antibiotics) and Penicillins    Reason for call: Nurse calling to report that patient is c/o of a sore throat.  Patient reports that her throat hurts when she swallows.  Nurse stated that patients throat is not red.  Patient denies cough and runny nose.  Temperature 97.3.  Patient does have GERD and recently her Omeprazole was decreased.       Verbal Order/Direction given by Provider: Push fluids, start Chloroseptine spray four times a day PRN, and increase Omeprazole to 20 mg po daily.      Provider giving order: ERIBERTO Delgado    Verbal order given to: Mechelle Batista RN

## 2021-06-15 NOTE — PROGRESS NOTES
[facility]    Facility:   [facility] [[id number]]   Code Status: DNI       CHIEF COMPLAINT/REASON FOR VISIT:  Chief Complaint   Patient presents with     Review Of Multiple Medical Conditions     COPD, HTN       HISTORY:      HPI: Rosina is a 75 y.o. female who has a past medical history of COPD, dyslipidemia, HTN, anemia, PMR, TERESA, pancreatitis, chronic pericardial effusion, depression, and insomnia. She has a history of pancreatitis, gastric obstruction and feeding tube (removed 8/2019). She was on Hospice for her COPD as she did not want progressive treatments during her last hospitalization however her status improved at the [facility] and she was signed off of Hospice on 9/11/2020.      I see her today for review of her multiple medical conditions.  She denies any pain or issues.  She spends most of her day lying in bed and watching tv.  Nursing reports that she does like junk food.      In September 2020, she had herpes zoster on her abdomen. This resolved with treatment of Valtrex.     In December 2020, she did have an unresponsive episode in the tub which appeared to be orthostatic. Vitals were stable and she quickly returned to baseline.  She did not have reoccurence of this and no residual effects.     COPD: without exacerbation since March 2020.  She continues on Symbicort and prn albuterol.     HTN: on no medications, BPs stable    PMR: no recent exacerbation, denies any pain, no medications    GERD: eats many snacks while lying in bed.  In December, she had an episode of acute gastritis that resolved with diet change and prn zofran. She has tolerated weaning omeprazole to 10mg every other day.  She denies any issues today.     HLD: no lipids noted, on no statin.      Labs drawn 1/23 showed CMP and CBC within normal limits.     Current Outpatient Medications on File Prior to Visit   Medication Sig Dispense Refill     acetaminophen (TYLENOL) 325 MG tablet Take 650 mg by  mouth every 4 (four) hours as needed for pain.       aluminum-magnesium hydroxide-simethicone (MAALOX ADVANCED) 200-200-20 mg/5 mL Susp Take 30 mL by mouth every 4 (four) hours as needed.       bisacodyL (DULCOLAX) 10 mg suppository Insert 10 mg into the rectum daily as needed.       budesonide-formoterol (SYMBICORT) 160-4.5 mcg/actuation inhaler Inhale 2 puffs 2 (two) times a day.       calcium carbonate (OS-JASPREET) 600 mg calcium (1,500 mg) tablet Take 600 mg by mouth daily.       omeprazole (PRILOSEC) 10 MG capsule Take 10 mg by mouth daily before breakfast.       senna (SENOKOT) 8.6 mg tablet Take 1 tablet by mouth 2 (two) times a day.       venlafaxine (EFFEXOR) 37.5 MG tablet Take 37.5 mg by mouth daily.              [DISCONTINUED] ondansetron (ZOFRAN-ODT) 4 MG disintegrating tablet Take 4 mg by mouth 3 (three) times a day as needed for nausea.       No current facility-administered medications on file prior to visit.             Review of Systems   Patient denies fever, chills, headache, lightheadedness, dizziness, rhinorrhea, cough, congestion, shortness of breath, chest pain, palpitations, abdominal pain, n/v, diarrhea, constipation, change in appetite, dysuria, frequency, burning or pain with urination.  Other than stated in HPI all other review of systems is negative.         Physical Exam   In order to maintain social distancing during the COVID pandemic, a visual exam was completed.     Vitals:    02/24/21 0942   BP: 133/89   Pulse: 83   Resp: 20   Temp: 97.5  F (36.4  C)   SpO2: 96%        Patient is a thin, frail elderly woman in no acute distress.  Head is AT/NC, EOM intact. Respiratory effort normal. No visible LE edema. Alert and oriented, face symmetric. No visible skin issues or rashes. Mood euthymic          LABS:   Results for TITA HODGSON (MRN [id number]) as of 2/25/2021 16:10   Ref. Range 1/23/2020 05:25   Sodium Latest Ref Range: 136 - 145 mmol/L 138   Potassium Latest Ref Range: 3.5 - 5.0  mmol/L 4.3   Chloride Latest Ref Range: 98 - 107 mmol/L 99   CO2 Latest Ref Range: 22 - 31 mmol/L 34 (H)   Anion Gap, Calculation Latest Ref Range: 5 - 18 mmol/L 5   BUN Latest Ref Range: 8 - 28 mg/dL 12   Creatinine Latest Ref Range: 0.60 - 1.10 mg/dL 0.73   GFR MDRD Af Amer Latest Ref Range: >60 mL/min/1.73m2 >60   GFR MDRD Non Af Amer Latest Ref Range: >60 mL/min/1.73m2 >60   Calcium Latest Ref Range: 8.5 - 10.5 mg/dL 9.0   AST Latest Ref Range: 0 - 40 U/L 17   ALT Latest Ref Range: 0 - 45 U/L 26   ALBUMIN Latest Ref Range: 3.5 - 5.0 g/dL 3.0 (L)   Protein, Total Latest Ref Range: 6.0 - 8.0 g/dL 6.0   Alkaline Phosphatase Latest Ref Range: 45 - 120 U/L 106   Bilirubin, Total Latest Ref Range: 0.0 - 1.0 mg/dL 0.4   Glucose Latest Ref Range: 70 - 125 mg/dL 123   WBC Latest Ref Range: 4.0 - 11.0 thou/uL 5.7   RBC Latest Ref Range: 3.80 - 5.40 mill/uL 4.82   Hemoglobin Latest Ref Range: 12.0 - 16.0 g/dL 13.8   Hematocrit Latest Ref Range: 35.0 - 47.0 % 43.2   MCV Latest Ref Range: 80 - 100 fL 90   MCH Latest Ref Range: 27.0 - 34.0 pg 28.6   MCHC Latest Ref Range: 32.0 - 36.0 g/dL 31.9 (L)   RDW Latest Ref Range: 11.0 - 14.5 % 14.8 (H)   Platelets Latest Ref Range: 140 - 440 thou/uL 227   MPV Latest Ref Range: 8.5 - 12.5 fL 11.7     ASSESSMENT:      ICD-10-CM    1. Coronary artery disease involving native coronary artery of native heart without angina pectoris  I25.10    2. Malnutrition, unspecified type (H)  E46    3. Chronic obstructive pulmonary disease, unspecified COPD type (H)  J44.9    4. Major depressive disorder in full remission, unspecified whether recurrent (H)  F32.5    5. Chronic GERD  K21.9        PLAN:    CAD: No chest pain, no asa or statin.  Will need to consider if adding these meds would align with her goals of care.     Malnutrition: good appetite, eats lots of junk food.  Weights are stable 119lbs.     COPD: stable without exacerbation, continue on symbicort and albuterol inhaler.       Depression: mood is stable, continue venlafaxine    GERD:  Tolerating reduction of omeprazole 10mg EOD.  Continue to monitor.     HTN: BPs stable on no medications    Depression: mood is stable, continue on venlafaxine    HLD: taken off of statin when started on hospice to reduce pill burden.  Will plan to check lipids on next lab draw and discuss goals of care with patient.         Electronically signed by: Elif Carter CNP

## 2021-06-16 PROBLEM — N39.3 STRESS INCONTINENCE IN FEMALE: Status: ACTIVE | Noted: 2018-02-25

## 2021-06-16 PROBLEM — F32.9 MAJOR DEPRESSIVE DISORDER: Status: ACTIVE | Noted: 2018-02-25

## 2021-06-16 PROBLEM — K46.9 HERNIA OF ABDOMINAL CAVITY: Status: ACTIVE | Noted: 2018-02-25

## 2021-06-16 PROBLEM — R13.19 ESOPHAGEAL DYSPHAGIA: Status: ACTIVE | Noted: 2018-08-14

## 2021-06-16 PROBLEM — J43.9 PULMONARY EMPHYSEMA (H): Status: ACTIVE | Noted: 2018-02-25

## 2021-06-16 PROBLEM — K21.9 CHRONIC GERD: Status: ACTIVE | Noted: 2018-02-25

## 2021-06-16 PROBLEM — I25.10 CORONARY ARTERY DISEASE WITHOUT ANGINA PECTORIS: Status: ACTIVE | Noted: 2018-02-25

## 2021-06-16 PROBLEM — K59.01 SLOW TRANSIT CONSTIPATION: Status: ACTIVE | Noted: 2018-02-25

## 2021-06-16 PROBLEM — R15.9 BOWEL AND BLADDER INCONTINENCE: Status: ACTIVE | Noted: 2018-08-07

## 2021-06-16 PROBLEM — R32 BOWEL AND BLADDER INCONTINENCE: Status: ACTIVE | Noted: 2018-08-07

## 2021-06-16 PROBLEM — E46 MALNUTRITION, UNSPECIFIED TYPE (H): Status: ACTIVE | Noted: 2021-02-25

## 2021-06-16 NOTE — PROGRESS NOTES
[facility]      Facility:    [facility] [[id number]]  239 Code Status: DNR/DNI      Chief Complaint/Reason for Visit:  Chief Complaint   Patient presents with     Review Of Multiple Medical Conditions       HPI:   Rosina is a 72 y.o. female who was admitted to [facility] on 2/17/18.  She has a past medical history GERD, sliding hiatal hernia, insomnia, depression, severe COPD was admitted for weakness, weight loss, and RLL CAP.  She was subsequently given ceftriaxone and azithromycin per coverage of community acquired pneumonia.  She was then transitioned to orals upon discharge on 2/20/18.  She reports several weeks to months having a gradual progressive weight loss and debility and inability to eat much.  She also was apparently 115lb and then rapidly returned to 95lb. She previously underwent a Nissen fundoplication in 2016 which resolved these like symptoms though now she presents with the same. Recent CT showed moderate sliding hiatal hernia with recurrence, with most of her stomach back in her chest wall. General surgery was consulted and patient will follow up with Dr. De Jesus after recovery from pneumonia on 3/7/18.     Patient denies pain, headache, chest pain, numbness or tingling, shortest of breath, eating or swallowing concerns, nausea or vomiting, diarrhea or bowel abnormalities, or no new integumentary concerns today.     Past Medical History:  Past Medical History:   Diagnosis Date     Aperistalsis of esophagus      Arthritis      COPD (chronic obstructive pulmonary disease)      Depression      GERD (gastroesophageal reflux disease)      Hiatal hernia      HLD (hyperlipidemia)      HTN (hypertension)      Hypoxia      Insomnia      Maternal MCV (mean corpuscular volume) low      Microcytic hypochromic anemia 01/2013     Osteoporosis      Pericardial effusion      PMR (polymyalgia rheumatica)      Pneumonia      Positive urine drug screen 10/2012    meth  and cocaine, confirmatory testing not done, negative since then     Urinary urgency            Surgical History:  Past Surgical History:   Procedure Laterality Date     CARPAL TUNNEL RELEASE Right      CHOLECYSTECTOMY  01/09/2015     HIATAL HERNIA REPAIR  02/15/2016    lap with mesh     HYSTERECTOMY  1990     TRIGGER FINGER RELEASE Left     left middle finger       Family History:   Family History   Problem Relation Age of Onset     Diabetes Mother      Cancer Mother      lung     Hypertension Mother      Emphysema Father      Heart disease Father      Hypertension Father      No Medical Problems Brother      Breast cancer Maternal Aunt        Social History:    Social History     Social History     Marital status: Single     Spouse name: N/A     Number of children: N/A     Years of education: N/A     Social History Main Topics     Smoking status: Former Smoker     Packs/day: 1.50     Years: 30.00     Quit date: 8/10/1991     Smokeless tobacco: Never Used     Alcohol use No     Drug use: No     Sexual activity: Not Currently     Other Topics Concern     Not on file     Social History Narrative          Review of Systems   Constitutional: Positive for activity change, appetite change, fatigue and unexpected weight change. Negative for diaphoresis and fever.   HENT: Negative for congestion, ear discharge, facial swelling, hearing loss and sinus pain.    Eyes: Negative for photophobia, pain and visual disturbance.   Respiratory: Negative for apnea, shortness of breath and wheezing.    Cardiovascular: Negative for chest pain.   Gastrointestinal: Negative for abdominal distention, abdominal pain, blood in stool, constipation, diarrhea and nausea.   Endocrine: Negative.    Genitourinary: Negative for dysuria and frequency.   Musculoskeletal:        Wc and walker, denies pain   Skin:        intact   Allergic/Immunologic: Negative.    Neurological: Negative for dizziness, tremors, speech difficulty and light-headedness.    Hematological: Bruises/bleeds easily.   Psychiatric/Behavioral: Negative for agitation, confusion and sleep disturbance. The patient is not nervous/anxious.        Vitals:    02/22/18 0750   BP: 118/68   Pulse: 90   Resp: 18   Temp: 97  F (36.1  C)   SpO2: 94%   Weight: 100 lb 6.4 oz (45.5 kg)       Physical Exam   Constitutional: She is oriented to person, place, and time. No distress.   No acute concerns   HENT:   Head: Normocephalic and atraumatic.   Mouth/Throat: Oropharynx is clear and moist. No oropharyngeal exudate.   Eyes: Pupils are equal, round, and reactive to light. Right eye exhibits no discharge. Left eye exhibits no discharge. No scleral icterus.   Neck: Normal range of motion. Neck supple. No JVD present.   Cardiovascular: Normal rate.  Exam reveals no gallop and no friction rub.    No murmur heard.  Pulmonary/Chest: Effort normal and breath sounds normal. No stridor. No respiratory distress. She has no wheezes. She has no rales.   Dim, on 1L NC   Abdominal: Soft. Bowel sounds are normal. She exhibits no distension. There is no tenderness.   Hiatal hernia, f/u with surgeon on 3/7   Genitourinary:   Genitourinary Comments: deferred   Musculoskeletal: She exhibits no edema, tenderness or deformity.   Use walker   Neurological: She is oriented to person, place, and time.   No focal deficits   Skin: Skin is warm and dry. She is not diaphoretic.   Psychiatric: She has a normal mood and affect.   No anxiety or depression       Medication List:  Current Outpatient Prescriptions   Medication Sig     acetaminophen (TYLENOL) 325 MG tablet Take 650 mg by mouth every 4 (four) hours as needed for pain.     albuterol (PROVENTIL HFA;VENTOLIN HFA) 90 mcg/actuation inhaler Inhale 2 puffs 4 (four) times a day as needed for wheezing.      aluminum-magnesium hydroxide-simethicone (MAALOX ADVANCED) 200-200-20 mg/5 mL Susp Take 20 mL by mouth 4 (four) times a day as needed.     aspirin 81 MG EC tablet Take 81 mg by  mouth daily.     atorvastatin (LIPITOR) 20 MG tablet Take 20 mg by mouth bedtime.     budesonide-formoterol (SYMBICORT) 160-4.5 mcg/actuation inhaler Inhale 2 puffs 2 (two) times a day.     calcium carbonate-vitamin D3 (CALTRATE 600 PLUS D3) 600 mg(1,500mg) -400 unit per tablet Take 1 tablet by mouth daily.      cholecalciferol, vitamin D3, 2,000 unit cap Take 2,000 capsules by mouth daily.     DULoxetine (CYMBALTA) 60 MG capsule Take 60 mg by mouth daily.     ferrous sulfate 325 (65 FE) MG tablet Take 1 tablet by mouth daily with breakfast.     omeprazole (PRILOSEC) 20 MG capsule Take 20 mg by mouth daily before breakfast.     polyethylene glycol (MIRALAX) 17 gram packet Take 17 g by mouth daily as needed.      senna (SENOKOT) 8.6 mg tablet Take 1 tablet by mouth daily.     tolterodine (DETROL LA) 4 MG ER capsule Take 4 mg by mouth daily.       Labs:  No results found for this or any previous visit (from the past 240 hour(s)).      Assessment/Plan:  CAP: finish cefdinir, total 3d, wean O2 to maintain sats >90%  COPD: continue LABA and KRAIG, no change  Sliding hiatal hernia: f/u with surgeon on 3/7, stable  Wt loss: monitor wts   Pain: denies, has tylenol PRN  Constipation: denies, continue senna daily and miralax PRN  GERD: PPI, no change  CAD: ASA 81mg daily  Depression: continue cymbalta, no issue  OAB: tolterodine, denies any concern    The care plan has been reviewed and all orders signed. Changes to care plan, if any, as noted. Otherwise, continue care plan of care.      Electronically signed by: Srini Giang NP

## 2021-06-16 NOTE — PROGRESS NOTES
[facility]      Facility:    [facility] [[id number]]  239 Code Status: DNR/DNI      Chief Complaint/Reason for Visit:  Chief Complaint   Patient presents with     Review Of Multiple Medical Conditions       HPI:   Rosina is a 72 y.o. female who was admitted to [facility] on 2/17/18.  She has a past medical history GERD, sliding hiatal hernia, insomnia, depression, severe COPD was admitted for weakness, weight loss, and RLL CAP.  She was subsequently given ceftriaxone and azithromycin per coverage of community acquired pneumonia.  She was then transitioned to orals upon discharge on 2/20/18.  She reports several weeks to months having a gradual progressive weight loss and debility and inability to eat much.  She also was apparently 115lb and then rapidly returned to 95lb. She previously underwent a Nissen fundoplication in 2016 which resolved these like symptoms though now she presents with the same. Recent CT showed moderate sliding hiatal hernia with recurrence, with most of her stomach back in her chest wall. General surgery was consulted and patient will follow up with Dr. De Jesus after recovery from pneumonia on 3/7/18.     Patient denies pain, headache, chest pain, numbness or tingling, shortest of breath, eating or swallowing concerns, nausea or vomiting, diarrhea or bowel abnormalities, or no new integumentary concerns today. She has a f/u with surgery next week.    Past Medical History:  Past Medical History:   Diagnosis Date     Aperistalsis of esophagus      Arthritis      COPD (chronic obstructive pulmonary disease)      Depression      GERD (gastroesophageal reflux disease)      Hiatal hernia      HLD (hyperlipidemia)      HTN (hypertension)      Hypoxia      Insomnia      Maternal MCV (mean corpuscular volume) low      Microcytic hypochromic anemia 01/2013     Osteoporosis      Pericardial effusion      PMR (polymyalgia rheumatica)      Pneumonia       Positive urine drug screen 10/2012    meth and cocaine, confirmatory testing not done, negative since then     Urinary urgency            Surgical History:  Past Surgical History:   Procedure Laterality Date     CARPAL TUNNEL RELEASE Right      CHOLECYSTECTOMY  01/09/2015     HIATAL HERNIA REPAIR  02/15/2016    lap with mesh     HYSTERECTOMY  1990     TRIGGER FINGER RELEASE Left     left middle finger       Family History:   Family History   Problem Relation Age of Onset     Diabetes Mother      Cancer Mother      lung     Hypertension Mother      Emphysema Father      Heart disease Father      Hypertension Father      No Medical Problems Brother      Breast cancer Maternal Aunt        Social History:    Social History     Social History     Marital status: Single     Spouse name: N/A     Number of children: N/A     Years of education: N/A     Social History Main Topics     Smoking status: Former Smoker     Packs/day: 1.50     Years: 30.00     Quit date: 8/10/1991     Smokeless tobacco: Never Used     Alcohol use No     Drug use: No     Sexual activity: Not Currently     Other Topics Concern     Not on file     Social History Narrative          Review of Systems   Constitutional: Positive for activity change, appetite change, fatigue and unexpected weight change. Negative for diaphoresis and fever.   HENT: Negative for congestion, ear discharge, facial swelling, hearing loss and sinus pain.    Eyes: Negative for photophobia, pain and visual disturbance.   Respiratory: Negative for apnea, shortness of breath and wheezing.    Cardiovascular: Negative for chest pain.   Gastrointestinal: Negative for abdominal distention, abdominal pain, blood in stool, constipation, diarrhea and nausea.   Endocrine: Negative.    Genitourinary: Negative for dysuria and frequency.   Musculoskeletal:        Wc and walker, denies pain   Skin:        intact   Allergic/Immunologic: Negative.    Neurological: Negative for dizziness, tremors,  speech difficulty and light-headedness.   Hematological: Bruises/bleeds easily.   Psychiatric/Behavioral: Negative for agitation, confusion and sleep disturbance. The patient is not nervous/anxious.        Vitals:    03/01/18 0737   BP: 125/81   Pulse: 92   Resp: 18   Temp: 97  F (36.1  C)   SpO2: 96%   Weight: (!) 97 lb (44 kg)       Physical Exam   Constitutional: She is oriented to person, place, and time. No distress.   No acute concerns   HENT:   Head: Normocephalic and atraumatic.   Mouth/Throat: Oropharynx is clear and moist. No oropharyngeal exudate.   Eyes: Pupils are equal, round, and reactive to light. Right eye exhibits no discharge. Left eye exhibits no discharge. No scleral icterus.   Neck: Normal range of motion. Neck supple. No JVD present.   Cardiovascular: Normal rate.  Exam reveals no gallop and no friction rub.    No murmur heard.  Pulmonary/Chest: Effort normal and breath sounds normal. No stridor. No respiratory distress. She has no wheezes. She has no rales.   CTA     Abdominal: Soft. Bowel sounds are normal. She exhibits no distension. There is no tenderness.   Hiatal hernia, f/u with surgeon on 3/7   Genitourinary:   Genitourinary Comments: deferred   Musculoskeletal: She exhibits no edema, tenderness or deformity.   Use walker   Neurological: She is oriented to person, place, and time.   No focal deficits   Skin: Skin is warm and dry. She is not diaphoretic.   Psychiatric: She has a normal mood and affect.   No anxiety or depression       Medication List:  Current Outpatient Prescriptions   Medication Sig     acetaminophen (TYLENOL) 325 MG tablet Take 650 mg by mouth every 4 (four) hours as needed for pain.     albuterol (PROVENTIL HFA;VENTOLIN HFA) 90 mcg/actuation inhaler Inhale 2 puffs 4 (four) times a day as needed for wheezing.      aluminum-magnesium hydroxide-simethicone (MAALOX ADVANCED) 200-200-20 mg/5 mL Susp Take 20 mL by mouth 4 (four) times a day as needed.     aspirin 81 MG EC  tablet Take 81 mg by mouth daily.     atorvastatin (LIPITOR) 20 MG tablet Take 20 mg by mouth bedtime.     budesonide-formoterol (SYMBICORT) 160-4.5 mcg/actuation inhaler Inhale 2 puffs 2 (two) times a day.     calcium carbonate-vitamin D3 (CALTRATE 600 PLUS D3) 600 mg(1,500mg) -400 unit per tablet Take 1 tablet by mouth daily.      cholecalciferol, vitamin D3, 2,000 unit cap Take 2,000 capsules by mouth daily.     DULoxetine (CYMBALTA) 60 MG capsule Take 60 mg by mouth daily.     ferrous sulfate 325 (65 FE) MG tablet Take 1 tablet by mouth daily with breakfast.     omeprazole (PRILOSEC) 20 MG capsule Take 20 mg by mouth daily before breakfast.     polyethylene glycol (MIRALAX) 17 gram packet Take 17 g by mouth daily as needed.      senna (SENOKOT) 8.6 mg tablet Take 1 tablet by mouth daily.     tolterodine (DETROL LA) 4 MG ER capsule Take 4 mg by mouth daily.       Labs:  No results found for this or any previous visit (from the past 240 hour(s)).      Assessment/Plan:  CAP: finish cefdinir, total 3d, wean O2 to maintain sats >90%  COPD: continue LABA and KRAIG, no change  Sliding hiatal hernia: f/u with surgeon on 3/7, stable  Wt loss: monitor wts, stable  Pain: denies, has tylenol PRN  Constipation: denies, continue senna daily and miralax PRN  GERD: PPI, no change  CAD: ASA 81mg daily  Depression: continue cymbalta, no issue  OAB: tolterodine, denies any concern    The care plan has been reviewed and all orders signed. Changes to care plan, if any, as noted. Otherwise, continue care plan of care.      Electronically signed by: Srini Giang NP

## 2021-06-16 NOTE — PROGRESS NOTES
[facility]    Visit Type: Review Of Multiple Medical Conditions (Multiple medical issues)    Code Status:  DNR  Facility:  [facility] [[id number]]            HISTORY OF PRESENT ILLNESS:   Rosina Link is a 72 y.o. female with a history of GERD with moderate hiatal hernia status post Nissen fundoplication with recurrence of hiatal hernia per CT scan, COPD, depression, osteoporosis who was admitted for weakness, weight loss and right lower lobe pneumonia.  Her pneumonia has resolved after completing cefdinir.  She now denies any fevers or chills, cough, shortness of breath or chest pains.  She states that she is eating better and she does not know why she still losing some weight.  She denies any nausea or vomiting although she still complains of occasional heartburns.  She does not have any diarrhea, constipation, abdominal pain.  She also states that she has improved energy and that she now walks independently with a walker.    Other review of systems are negative.      MEDICATION:   Reviewed per TCU order summary       PHYSICAL EXAMINATION:  110/64, 96, 72, 18  General: Awake, Alert, oriented x3, not in any form of acute distress, answers questions appropriately, follows simple commands, conversant, frail  HEENT: Pink conjunctiva, anicteric sclerae, oral mucosa is moist  NECK: Supple, without any lymphadenopathy, thyromegaly or any masses  LUNG: Clear to auscultation with good chest expansion. There are no crackles or wheezes, normal AP diameter  BACK: Mild kyphosis of the thoracic spine  CVS: There is good S1  S2, there are no murmurs or heaves, rhythm is regular  ABDOMEN: Globular and soft, nontender to palpation, no organomegaly, good bowel sounds  EXTREMITIES: Good range of motion on both upper and lower extremities, no pedal edema, no cyanosis or clubbing, no calf tenderness  SKIN: Warm and dry, no rashes or erythema noted    LABS:  All labs reviewed in the nursing  home record.    ASSESSMENT/PLAN:  1. Hiatal hernia   recurrent, moderate history of Nissen fundoplication.  Patient has appointment with Dr. De Jesus on 3/7 for possible repeat surgery.  Dietitian to see regarding small frequent meals.  GERD precautions discussed extensively.  Multiple questions answered   2. COPD (chronic obstructive pulmonary disease)   likely exacerbated by GERD.  Continue S TANVIR/L TANVIR   3. Community acquired pneumonia, unspecified laterality   now resolved with completion of Cefdinir   4. Weight loss   discussed multiple causes of weight loss.  Will ask dietitian to see.  GERD symptoms, recent pneumonia probably contributing   5. Osteoporosis   taking calcium with vitamin D.  Check TSH and vitamin D levels.  Consider other treatment options for osteoporosis         35 minutes of total time spent, greater than  55%  spent in coordination of care and counseling regarding the above medical issues and plan of care.    Electronically signed by:Cathi Aquino MD

## 2021-06-16 NOTE — PROGRESS NOTES
"[facility]      Facility:    [facility] [[id number]]  Code Status: DNR       Chief Complaint/Reason for Visit:  Chief Complaint   Patient presents with     H & P     New admit to TCU for pneumonia. (H & P 2/28/18).       HPI:   Rosina is a 72 y.o. female with hx of severe COPD, GERD, depression, insomnia admitted to [facility] on 2/16/18 with pneumonia.     HISTORY OF PRESENT ILLNESS: Rosina Link is a 72 y.o. female with a history of depression, insomnia, severe COPD and GERD. She had surgery in February of 2016 for a large hiatal hernia (total gastric herniation). She had suffered a 60 pound unintentional weight loss prior to that, dropping from 160 pounds to 95. After her surgery, her weight bradly to 115, but since then she has dropped back down to about 95 pounds. In the past several weeks to months she has had gradually progressive weakness and inability to eat much food. She will only eat several bites, then will feel Full. Today she was so weak she could hardly walk, so she came in to our ER. She has a CT scan on 2/9 which showed about half of her stomach was back in her chest. She was supposed to see Dr. Guzman on 3/7 to review the pictures and to discuss options. Her daughter thinks here recent problems are very similar to what she was experiencing in early 2016 when she had her surgery. The CT scan from 2/9 described \"Small amount of scarring and residual consolidation in the paramedian right lower lobe at the site of resolving pneumonia.\" Today she had a CXR, which shows \"Right lower lobe pneumonia with small parapneumonic effusion. The amount of infiltrate the right lower lobe appears increased from the CT examination\" We are asked to admit her for pneumonia. She has intermittent chronic sputum which is white, she has no fevers, she has no leukocytosis.     Hospital Course:   Rosina Link is a 72 y.o. female with a history of GERD, sliding hiatal " hernia, COPD who was admitted 2/16/2018 for weakness, weight loss and RLL pneumonia. She was started on Ceftriaxone and Azithromycin upon admission. She was seen by pulmonary for her COPD and current respiratory issues. She is now transitioned to oral antibiotics to cover pneumococcus. She previously had Nissen fundoplication done in 2016. Recent CT scan shows moderate sliding hiatal hernia (recurrence). General surgery has been consulted- plan for patient to follow with Dr De Jesus.      Today:  No SOB. Minimal cough. No need for supplemental oxygen. Plans to follow up with surgeon, states possibility she may need another surgery for hiatal hernia. Appetite is improving. No nausea or vomiting. Denies diarrhea. No abdominal pain. No new vision or hearing problems. Takes inhalers for COPD, feels breathing is almost at baseline.      Past Medical History:  Past Medical History:   Diagnosis Date     Aperistalsis of esophagus      Arthritis      COPD (chronic obstructive pulmonary disease)      Depression      GERD (gastroesophageal reflux disease)      Hiatal hernia      HLD (hyperlipidemia)      HTN (hypertension)      Hypoxia      Insomnia      Maternal MCV (mean corpuscular volume) low      Microcytic hypochromic anemia 01/2013     Osteoporosis      Pericardial effusion      PMR (polymyalgia rheumatica)      Pneumonia      Positive urine drug screen 10/2012    meth and cocaine, confirmatory testing not done, negative since then     Urinary urgency            Surgical History:  Past Surgical History:   Procedure Laterality Date     CARPAL TUNNEL RELEASE Right      CHOLECYSTECTOMY  01/09/2015     HIATAL HERNIA REPAIR  02/15/2016    lap with mesh     HYSTERECTOMY  1990     TRIGGER FINGER RELEASE Left     left middle finger       Family History:   Family History   Problem Relation Age of Onset     Diabetes Mother      Cancer Mother      lung     Hypertension Mother      Emphysema Father      Heart disease Father       Hypertension Father      No Medical Problems Brother      Breast cancer Maternal Aunt        Social History:    Social History     Social History     Marital status: Single     Spouse name: N/A     Number of children: N/A     Years of education: N/A     Social History Main Topics     Smoking status: Former Smoker     Packs/day: 1.50     Years: 30.00     Quit date: 8/10/1991     Smokeless tobacco: Never Used     Alcohol use No     Drug use: No     Sexual activity: Not Currently     Other Topics Concern     Not on file     Social History Narrative        Medications:  Current Outpatient Prescriptions   Medication Sig     acetaminophen (TYLENOL) 325 MG tablet Take 650 mg by mouth every 4 (four) hours as needed for pain.     albuterol (PROVENTIL HFA;VENTOLIN HFA) 90 mcg/actuation inhaler Inhale 2 puffs 4 (four) times a day as needed for wheezing.      aluminum-magnesium hydroxide-simethicone (MAALOX ADVANCED) 200-200-20 mg/5 mL Susp Take 20 mL by mouth 4 (four) times a day as needed.     aspirin 81 MG EC tablet Take 81 mg by mouth daily.     atorvastatin (LIPITOR) 20 MG tablet Take 20 mg by mouth bedtime.     budesonide-formoterol (SYMBICORT) 160-4.5 mcg/actuation inhaler Inhale 2 puffs 2 (two) times a day.     calcium carbonate-vitamin D3 (CALTRATE 600 PLUS D3) 600 mg(1,500mg) -400 unit per tablet Take 1 tablet by mouth daily.      cholecalciferol, vitamin D3, 2,000 unit cap Take 2,000 capsules by mouth daily.     DULoxetine (CYMBALTA) 60 MG capsule Take 60 mg by mouth daily.     ferrous sulfate 325 (65 FE) MG tablet Take 1 tablet by mouth daily with breakfast.     omeprazole (PRILOSEC) 20 MG capsule Take 20 mg by mouth daily before breakfast.     polyethylene glycol (MIRALAX) 17 gram packet Take 17 g by mouth daily as needed.      senna (SENOKOT) 8.6 mg tablet Take 1 tablet by mouth daily.     tolterodine (DETROL LA) 4 MG ER capsule Take 4 mg by mouth daily.       Allergies:  Allergies   Allergen Reactions     Sulfa  (Sulfonamide Antibiotics) Other (See Comments)      Ototoxicity         Penicillins Rash       Review of Systems:  Pertinent items are noted in HPI.      Physical Exam:   General: Patient is alert female, no distress.  Vitals: /81, Temp 96.5, Pulse 92, RR 20, O2 sat 96 %RA.  HEENT: Head is NCAT. Eyes show no injection or icterus. Nares negative. Oropharynx well hydrated.  Neck: Supple. No tenderness or adenopathy. No JVD.  Lungs: Clear bilaterally. No wheezes.  Cardiovascular: Regular rate and rhythm, normal S1, S2.  Back: No spinal or CVA tenderness.  Abdomen: Soft, no tenderness on exam. Bowel sounds present. No guarding rebound or rigidity.  : Deferred.  Extremities: No edema is noted.  Musculoskeletal: Neg.  Skin: Warm and dry.  Psych: Mood appears good.      Labs:  CBC WITH AUTO DIFFERENTIAL (02/16/2018 1:44 PM)  CBC WITH AUTO DIFFERENTIAL (02/16/2018 1:44 PM)   Component Value Ref Range   WHITE BLOOD COUNT  9.3 4.5 - 11.0 thou/cu mm   RED BLOOD COUNT  5.74 (H) 4.00 - 5.20 mil/cu mm   HEMOGLOBIN  16.1 (H) 12.0 - 16.0 g/dL   HEMATOCRIT  48.0 33.0 - 51.0 %   MCV  84 80 - 100 fL   MCH  28.0 26.0 - 34.0 pg   MCHC  33.5 32.0 - 36.0 g/dL   RDW  13.8 11.5 - 15.5 %   PLATELET COUNT  202 140 - 440 thou/cu mm   MPV  12.9 (H) 6.5 - 11.0 fL     Comp Metabolic Panel (02/16/2018 1:44 PM)  Comp Metabolic Panel (02/16/2018 1:44 PM)   Component Value Ref Range   SODIUM 130 (L) 135 - 145 mmol/L   POTASSIUM 3.4 (L) 3.5 - 5.0 mmol/L   CHLORIDE 95 (L) 98 - 110 mmol/L   CO2,TOTAL 20 (L) 21 - 31 mmol/L   ANION GAP 15 5 - 18    GLUCOSE 140 (H) 65 - 100 mg/dL   CALCIUM 8.9 8.5 - 10.5 mg/dL   BUN 17 8 - 25 mg/dL   CREATININE 0.85 0.57 - 1.11 mg/dL   BUN/CREAT RATIO  20 10 - 20    GFR if African American >60 >60 ml/min/1.73m2   GFR if not African American >60 >60 ml/min/1.73m2   ALBUMIN 3.2 3.2 - 4.6 g/dL   PROTEIN,TOTAL 7.0 6.0 - 8.0 g/dL   GLOBULIN  3.8 (H) 2.0 - 3.7 g/dL   A/G RATIO 0.8 (L) 1.0 - 2.0    BILIRUBIN,TOTAL 1.6 (H)  "0.2 - 1.2 mg/dL   ALK PHOSPHATASE 82 50 - 136 IU/L   ALT (SGPT) 6 (L) 8 - 45 IU/L   AST (SGOT) 11 2 - 40 IU/L     TSH (02/16/2018 1:44 PM)  TSH (02/16/2018 1:44 PM)   Component Value Ref Range   TSH 1.77 0.35 - 4.94 uIU/mL       STREP PNEUMO AGN (02/17/2018 8:06 PM)  STREP PNEUMO AGN (02/17/2018 8:06 PM)   Component Value Ref Range   STREP PNEUMO ANTIGEN Positive Urine (A)Comment: Positive for pneumococcal antigen, suggestive of pneumococcal pneumonia. \" \"       Assessment/Plan:  1. Pneumonia. Done with abx. Minimal cough. No need for oxygen. No SOB.  2. COPD. Cont home treatments.  3. Sliding hiatal hernia. Hx of prior surgery. Follow up with surgeon for further eval.  4. Depression. On Cymbalta.  5. Overactive bladder. Cont home tolterdine.  6. Code status is full code.      Total time greater than 50  minutes, greater than 50% counseling and coordination of care, time spent in interview and examination of patient, review of records, discussion with nursing staff.        Electronically signed by: Rosina Guo MD        "

## 2021-06-16 NOTE — PROGRESS NOTES
[facility]    Facility:   [facility] [[id number]]   Code Status: DNI       CHIEF COMPLAINT/REASON FOR VISIT:  Chief Complaint   Patient presents with     Problem Visit     sore throat, reflux       HISTORY:      HPI: Rosina is a 75 y.o. female who has a past medical history of COPD, dyslipidemia, HTN, anemia, PMR, TERESA, pancreatitis, chronic pericardial effusion, depression, and insomnia. She has a history of pancreatitis, gastric obstruction and feeding tube (removed 8/2019). She was on Hospice for her COPD as she did not want progressive treatments during her last hospitalization however her status improved at the [facility] and she was signed off of Hospice on 9/11/2020.      Today, I see her in follow up from a call received last week in which she had a sore throat.  I had recently decreased her omeprazole and she was tolerating until this call.  I did increase the omeprazole to 20mg daily.  Today, she is lying in bed with 3 empty Doritos chips around her and Pepsi on the bedside table.  She reports that her sore throat was momentary and is no longer.  Examination of throat and neck were normal.  Nursing reports she never used the chloraseptic spray.      Current Outpatient Medications on File Prior to Visit   Medication Sig Dispense Refill     acetaminophen (TYLENOL) 325 MG tablet Take 650 mg by mouth every 4 (four) hours as needed for pain.       aluminum-magnesium hydroxide-simethicone (MAALOX ADVANCED) 200-200-20 mg/5 mL Susp Take 30 mL by mouth every 4 (four) hours as needed.       bisacodyL (DULCOLAX) 10 mg suppository Insert 10 mg into the rectum daily as needed.       budesonide-formoterol (SYMBICORT) 160-4.5 mcg/actuation inhaler Inhale 2 puffs 2 (two) times a day.       calcium carbonate (OS-JASPREET) 600 mg calcium (1,500 mg) tablet Take 600 mg by mouth daily.       omeprazole (PRILOSEC) 10 MG capsule Take 20 mg by mouth daily before breakfast.        senna (SENOKOT) 8.6 mg  tablet Take 1 tablet by mouth 2 (two) times a day.       venlafaxine (EFFEXOR) 37.5 MG tablet Take 37.5 mg by mouth daily.              No current facility-administered medications on file prior to visit.             Review of Systems   Patient denies fever, chills, headache, lightheadedness, dizziness, rhinorrhea, cough, congestion, shortness of breath, chest pain, palpitations, abdominal pain, n/v, diarrhea, constipation, change in appetite, dysuria, frequency, burning or pain with urination.  Other than stated in HPI all other review of systems is negative.         Physical Exam   In order to maintain social distancing during the COVID pandemic, a visual exam was completed.     Vitals:    03/17/21 1039   BP: 124/75   Pulse: 72   Resp: 19   Temp: 97.5  F (36.4  C)   SpO2: 93%        Patient is a thin, frail elderly woman in no acute distress.  Head is AT/NC, EOM intact. Mouth and throat without erythema Respiratory effort normal. No visible LE edema. Alert and oriented, face symmetric. No visible skin issues or rashes. Mood euthymic          LABS:   No results found for this or any previous visit (from the past 240 hour(s)).      ASSESSMENT:      ICD-10-CM    1. Sore throat  J02.9    2. Chronic GERD  K21.9        PLAN:    Sore throat: 2/2 GERD resolved with increased omeprazole.     GERD: counseled patient to sit up while eating and to no lie done for 2 hours after eating.  Recommending eating junk food in more moderation.          Electronically signed by: Elif Carter CNP

## 2021-06-16 NOTE — PROGRESS NOTES
"[facility]    Name:   Rosina Link  : 1945  Facility:   [facility] [[id number]]   Room: 239  Code Status: DNR/DNI -   Fac type:   SNF (Skilled Nursing Facility, TCU) -     CHIEF COMPLAINT / REASON FOR VISIT:  Chief Complaint   Patient presents with     Discharge Summary     Discharge from TCU after being hospitalized with community-acquired pneumonia and problems with hiatal hernia.   [facility] from 18 until 18  Psychiatric from 18 until 03/10/18    HPI: Rosina is a 72 y.o. female with a history of severe COPD, depression, GERD, and insomnia.  She underwent surgery in 2016 for a large hiatal hernia (total gastric herniation).  She had suffered a 60 pound unintentional weight loss prior to that, dropping from about 160 pounds down to 95 pounds.  There was a brief period of weight gain after that, but she eventually returned to the lower weight.  She developed progressive weakness and inability to eat much.  She would take several bites and then feel full.  She was so weak upon her admission to [facility] that she could hardly walk.      She had had a CT scan on 18 that showed of her stomach back in her chest.  She was supposed to see Dr. De Jesus at on 18 to review pictures and to discuss options.  The CT scan described \"small amount of scarring and residual consolidation in the paramedial right lower lobe at the site of resolving pneumonia.\"  Chest x-rays performed on 18 showed \"right lower lobe pneumonia with small parapneumonic effusion.  The amount of infiltrate in the right lower lobe appears increased from the CT examination.\"  She was admitted for community-acquired pneumonia.  She had intermittent chronic sputum which was white.  There were no fevers, and she had no leukocytosis.    She does have COPD and is on a variety of inhalers.      CURRENT ISSUES    Follow-up visit with Dr. De Jesus did " occur she is felt to be doing well at this juncture.  She has had a small to medium recurrence of the hiatal hernia, sliding-type, for which she is asymptomatic.  She denies early satiety, dysphagia, heartburn, regurgitation, nausea, gaseous sensation, and she is eating better solid regular food after treatment for pneumococcus pneumonia.  She is to continue with 20 mg of omeprazole daily and continue a regular diet.    Note that she does not want any home services.  She lives with her daughter and the fiancé.  She does have a walker here but will not need one at home, as she has not used one in the past.    ROS: She denies any headaches or chest pains, coughing or congestion, nausea or vomiting, heartburn, dizziness or dyspnea, dysuria, difficulty chewing or swallowing, integumentary issues, problems with sleep.  She is anxious to be discharged.    Past Medical History:   Diagnosis Date     Aperistalsis of esophagus      Arthritis      COPD (chronic obstructive pulmonary disease)      Depression      GERD (gastroesophageal reflux disease)      Hiatal hernia      HLD (hyperlipidemia)      HTN (hypertension)      Hypoxia      Insomnia      Maternal MCV (mean corpuscular volume) low      Microcytic hypochromic anemia 01/2013     Osteoporosis      Pericardial effusion      PMR (polymyalgia rheumatica)      Pneumonia      Positive urine drug screen 10/2012    meth and cocaine, confirmatory testing not done, negative since then     Urinary urgency               Family History   Problem Relation Age of Onset     Diabetes Mother      Cancer Mother      lung     Hypertension Mother      Emphysema Father      Heart disease Father      Hypertension Father      No Medical Problems Brother      Breast cancer Maternal Aunt      Social History     Social History     Marital status: Single     Spouse name: N/A     Number of children: N/A     Years of education: N/A     Social History Main Topics     Smoking status: Former Smoker  "    Packs/day: 1.50     Years: 30.00     Quit date: 8/10/1991     Smokeless tobacco: Never Used     Alcohol use No     Drug use: No     Sexual activity: Not Currently     Other Topics Concern     Not on file     Social History Narrative     MEDICATIONS: Reviewed from the MAR, physician orders, and earlier progress notes.  Current Outpatient Prescriptions   Medication Sig     acetaminophen (TYLENOL) 325 MG tablet Take 650 mg by mouth every 4 (four) hours as needed for pain.     albuterol (PROVENTIL HFA;VENTOLIN HFA) 90 mcg/actuation inhaler Inhale 2 puffs 4 (four) times a day as needed for wheezing.      aluminum-magnesium hydroxide-simethicone (MAALOX ADVANCED) 200-200-20 mg/5 mL Susp Take 20 mL by mouth 4 (four) times a day as needed.     aspirin 81 MG EC tablet Take 81 mg by mouth daily.     atorvastatin (LIPITOR) 20 MG tablet Take 20 mg by mouth bedtime.     budesonide-formoterol (SYMBICORT) 160-4.5 mcg/actuation inhaler Inhale 2 puffs 2 (two) times a day.     calcium carbonate-vitamin D3 (CALTRATE 600 PLUS D3) 600 mg(1,500mg) -400 unit per tablet Take 1 tablet by mouth daily.      cholecalciferol, vitamin D3, 2,000 unit cap Take 2,000 capsules by mouth daily.     DULoxetine (CYMBALTA) 60 MG capsule Take 60 mg by mouth daily.     ferrous sulfate 325 (65 FE) MG tablet Take 1 tablet by mouth daily with breakfast.     omeprazole (PRILOSEC) 20 MG capsule Take 20 mg by mouth daily before breakfast.     polyethylene glycol (MIRALAX) 17 gram packet Take 17 g by mouth daily as needed.      senna (SENOKOT) 8.6 mg tablet Take 1 tablet by mouth daily.     tolterodine (DETROL LA) 4 MG ER capsule Take 4 mg by mouth daily.     ALLERGIES:   Allergies   Allergen Reactions     Sulfa (Sulfonamide Antibiotics) Other (See Comments)      Ototoxicity         Penicillins Rash     DIET: Regular.    Vitals:    03/08/18 1513   BP: 122/72   Pulse: 88   Resp: 18   Temp: (!) 96.2  F (35.7  C)   Weight: (!) 98 lb (44.5 kg)   Height: 4' 11\" " (1.499 m)     Body mass index is 19.79 kg/(m^2).    EXAMINATION:   General: Pleasant, alert, and conversant middle-aged female, sitting comfortably in her room, in no apparent distress.  She has a rather scratchy voice, probably from years of smoking.  She does have a history of COPD.  Head: Normocephalic and atraumatic.   Eyes: PERRLA, sclerae clear.   ENT: Moist oral mucosa.  Full upper and lower dentures.  She is only wearing the uppers currently.  Hearing is unimpaired.  Cardiovascular: Regular rate and rhythm.  No appreciable murmur.  Respiratory: Lungs clear to auscultation bilaterally.   Abdomen: Soft and nontender.   Musculoskeletal/Extremities: Age-related degenerative joint disease.  Moderate thoracic kyphosis.  Bilateral soft nonpitting lower extremity edema.  Integument: No rashes, clinically significant lesions, or skin breakdown.   Cognitive/Psychiatric:     DIAGNOSTICS:   No results found for this or any previous visit.  No results found for: WBC, HGB, HCT, MCV, PLT  CrCl cannot be calculated (No order found.).    ASSESSMENT/Plan:      ICD-10-CM    1. Community acquired pneumonia, unspecified laterality J18.9    2. Hernia of abdominal cavity K46.9    3. Chronic GERD K21.9    4. Major depressive disorder F32.9    5. Slow transit constipation K59.01    6. Pulmonary emphysema, unspecified emphysema type J43.9    7. Coronary artery disease without angina pectoris I25.10    8. Stress incontinence in female N39.3      PLAN:    The care plan has been reviewed and all orders signed. Changes to care plan, if any, as noted. Otherwise, patient may be discharged to home.  She has declined any home services.    The above has been created using voice recognition software. Please be aware that this may unintentionally  produce inaccuracies and/or nonsensical sentences.      Electronically signed by: Ramana Blandon CNP

## 2021-06-17 NOTE — PROGRESS NOTES
[facility]    Facility:   [facility] [[id number]]   Code Status: DNR/DNI       Chief Complaint   Patient presents with     Review Of Multiple Medical Conditions     LTC 4/30/2021. COPD. GERD.       HPI:   Rosina is a 75 y.o. female who resides in LTC at [facility]. She has hx of COPD, HTN, hiatal hernia, GERD, anemia, PMR, TERESA, pancreatitis, gastric obstruction and FTT previously on feeding tube (discontinued 8/2019). She was sent to the hospital on 2/28/20 from a planned routine cardiology visit due to cough, dyspnea and hypoxia. She was diagnosed with hypoxemic respiratory failure, needed oxygen, had bronchoscopy which opened left upper lobe with some improvement but unable to be weaned. Repeat bronch with extensive mucous plugging. Subsequent resp failure requiring CPAP during which briefly unresponsive. She did not want to comply with treatments and ultimately moved to comfort based approach, returning to LTC on 3/16/20 on [facility]. Subsequent stabilization over time and was discharged from Hospice eff 9/11/2020.      Today:  She has lis stable. Meds reviewed, she is on Symbicort for COPD, no reported respiratory concerns. She denies fever, cough, shortness of breath or wheezing. She is on omeprazole for GERD with increase in sx in March, no complaints today. It is noted she likes to eat chips, doritos, drinks pepsi etc. She denies abdominal pain, nausea, vomiting or diarrhea. Per nurses charting refuses toileting and doesn't like to walk. It is unclear if she is able to ambulate well. She is on venlafaxine for depression, denies any mood concerns.       Past Medical History:  Past Medical History:   Diagnosis Date     Acute encephalopathy      Acute on chronic respiratory failure (H)      Aperistalsis of esophagus      Arthritis      Compression fracture of lumbar vertebra (H)     L2     COPD (chronic obstructive pulmonary disease) (H)       Depression      Dyslipidemia      Encephalopathy      Esophageal candidiasis (H)      Esophagitis      Failure to thrive in adult      Fall      GERD (gastroesophageal reflux disease)      Hiatal hernia      HLD (hyperlipidemia)      HTN (hypertension)      Hypocalcemia      Hypomagnesemia      Hypoxia      Insomnia      Lactic acidosis      Major depressive disorder      Malnutrition (H)      Maternal MCV (mean corpuscular volume) low      Microcytic anemia      Microcytic hypochromic anemia 01/2013     Mucus plugging of bronchi      Osteoporosis      Pericardial effusion      PMR (polymyalgia rheumatica) (H)      Pneumonia      Pneumothorax      Positive urine drug screen 10/2012    meth and coke positive 10/12. negative in 11/19/12     Pulmonary emphysema (H)      Rib fracture      S/P laparoscopic fundoplication      SIRS (systemic inflammatory response syndrome) (H)      Thrombocytopenia (H)      Trigger finger     left hand, middle finger     Underweight      Urinary urgency        Medications:  Current Outpatient Medications   Medication Sig Note     acetaminophen (TYLENOL) 325 MG tablet Take 650 mg by mouth every 4 (four) hours as needed for pain.      aluminum-magnesium hydroxide-simethicone (MAALOX ADVANCED) 200-200-20 mg/5 mL Susp Take 30 mL by mouth every 4 (four) hours as needed.      bisacodyL (DULCOLAX) 10 mg suppository Insert 10 mg into the rectum daily as needed.      budesonide-formoterol (SYMBICORT) 160-4.5 mcg/actuation inhaler Inhale 2 puffs 2 (two) times a day.      calcium carbonate (OS-JASPREET) 600 mg calcium (1,500 mg) tablet Take 600 mg by mouth daily.      omeprazole (PRILOSEC) 10 MG capsule Take 20 mg by mouth daily before breakfast.  1/7/2020: Reduction from 20 mg to 10 mg daily on 01/07/2020 in attempt to taper off.     senna (SENOKOT) 8.6 mg tablet Take 1 tablet by mouth 2 (two) times a day.      venlafaxine (EFFEXOR) 37.5 MG tablet Take 37.5 mg by mouth daily.        6/18/2019: Decreased  from a 37.5 mg twice daily to 37.5 mg daily on 06/18/2019, an attempt at gradual dose reduction.       Physical Exam:  Note: COVID-19 pandemic precautions in place. Physical exam performed with social distancing considerations.  General: Patient is alert female, no distress.  Vitals: /56, Temp 97.3, Pulse 73, RR 18, O2 sat 93%RA.  HEENT: Head is NCAT. Eyes show no injection or icterus. Nares negative. Oropharynx well hydrated.  Neck: No JVD.  Lungs: Non labored respirations.   : Deferred.  Extremities: No LE edema is noted.  Musculoskeletal: Age related degen changes.   Psych: Mood appears pretty good.      Labs:  Lab Results   Component Value Date    WBC 5.7 01/23/2020    HGB 13.8 01/23/2020    HCT 43.2 01/23/2020    MCV 90 01/23/2020     01/23/2020     Results for orders placed or performed in visit on 12/17/19   Basic Metabolic Panel   Result Value Ref Range    Sodium 139 136 - 145 mmol/L    Potassium 3.5 3.5 - 5.0 mmol/L    Chloride 100 98 - 107 mmol/L    CO2 31 22 - 31 mmol/L    Anion Gap, Calculation 8 5 - 18 mmol/L    Glucose 99 70 - 125 mg/dL    Calcium 9.0 8.5 - 10.5 mg/dL    BUN 16 8 - 28 mg/dL    Creatinine 0.82 0.60 - 1.10 mg/dL    GFR MDRD Af Amer >60 >60 mL/min/1.73m2    GFR MDRD Non Af Amer >60 >60 mL/min/1.73m2         Assessment/Plan:  1. COPD. Continue on Symbicort. She does not require supplemental oxygen. Respiratory status currently stable.   2. Depression. Continue on Venlafaxine.  3. HTN. BPs are satisfactory, not on medications for HTN.   4. GERD. Sx improved with increased omeprazole. Dietary indiscretion contributing.   5. Anemia. Hx of anemia, last checked Jan 2020 at 13.8.    Overall she appears stable, no new orders needed.         Electronically signed by: Rosina Guo MD

## 2021-06-18 NOTE — PATIENT INSTRUCTIONS - HE
Patient Instructions by Elif Carter CNP at 10/2/2020  8:38 AM     Author: Elif Carter CNP Service: -- Author Type: Nurse Practitioner    Filed: 10/2/2020  2:37 PM Encounter Date: 10/2/2020 Status: Signed    : Elif Carter CNP (Nurse Practitioner)         Patient Education     Exercise for a Healthier Heart  You may wonder how you can improve the health of your heart. If youre thinking about exercise, youre on the right track. You dont need to become an athlete, but you do need a certain amount of brisk exercise to help strengthen your heart. If you have been diagnosed with a heart condition, your doctor may recommend exercise to help stabilize your condition. To help make exercise a habit, choose safe, fun activities.       Be sure to check with your health care provider before starting an exercise program.    Why exercise?  Exercising regularly offers many healthy rewards. It can help you do all of the following:    Improve your blood cholesterol levels to help prevent further heart trouble    Lower your blood pressure to help prevent a stroke or heart attack    Control diabetes, or reduce your risk of getting this disease    Improve your heart and lung function    Reach and maintain a healthy weight    Make your muscles stronger and more limber so you can stay active    Prevent falls and fractures by slowing the loss of bone mass (osteoporosis)    Manage stress better  Exercise tips  Ease into your routine. Set small goals. Then build on them.  Exercise on most days. Aim for a total of 150 or more minutes of moderate to  vigorous intensity activity each week. Consider 40 minutes, 3 to 4 times a week. For best results, activity should last for 40 minutes on average. It is OK to work up to the 40 minute period over time. Examples of moderate-intensity activity is walking one mile in 15 minutes or 30 to 45 minutes of yard work.  Step up your daily activity level. Along with your exercise program,  try being more active throughout the day. Walk instead of drive. Do more household tasks or yard work.  Choose one or more activities you enjoy. Walking is one of the easiest things you can do. You can also try swimming, riding a bike, or taking an exercise class.  Stop exercising and call your doctor if you:    Have chest pain or feel dizzy or lightheaded    Feel burning, tightness, pressure, or heaviness in your chest, neck, shoulders, back, or arms    Have unusual shortness of breath    Have increased joint or muscle pain    Have palpitations or an irregular heartbeat      2581-2022 [employer]. [address] [place] [address]. All rights reserved. This information is not intended as a substitute for professional medical care. Always follow your healthcare professional's instructions.         Patient Education   Understanding [employer] MyPlate  The USDA (US Department of Agriculture) has guidelines to help you make healthy food choices. These are called MyPlate. MyPlate shows the food groups that make up healthy meals using the image of a place setting. Before you eat, think about the healthiest choices for what to put onto your plate or into your cup or bowl. To learn more about building a healthy plate, visit www.choosemyplate.gov.       The Food Groups    Fruits: Any fruit or 100% fruit juice counts as part of the Fruit Group. Fruits may be fresh, canned, frozen, or dried, and may be whole, cut-up, or pureed. Make half your plate fruits and vegetables.    Vegetables: Any vegetable or 100% vegetable juice counts as a member of the Vegetable Group. Vegetables may be fresh, frozen, canned, or dried. They can be served raw or cooked and may be whole, cut-up, or mashed. Make half your plate fruits and vegetables.     Grains: All foods made from grains are part of the Grains Group. These include wheat, rice, oats, cornmeal, and barley such as bread, pasta, oatmeal, cereal, tortillas, and grits.  Grains should be no more than a quarter of your plate. At least half of your grains should be whole grains.    Protein: This group includes meat, poultry, seafood, beans and peas, eggs, processed soy products (like tofu), nuts (including nut butters), and seeds. Make protein choices no more than a quarter of your plate. Meat and poultry choices should be lean or low fat.    Dairy: All fluid milk products and foods made from milk that contain calcium, like yogurt and cheese are part of the Dairy Group. (Foods that have little calcium, such as cream, butter, and cream cheese, are not part of the group.) Most dairy choices should be low-fat or fat-free.    Oils: These are fats that are liquid at room temperature. They include canola, corn, olive, soybean, and sunflower oil. Foods that are mainly oil include mayonnaise, certain salad dressings, and soft margarines. You should have only 5 to 7 teaspoons of oils a day. You probably already get this much from the food you eat.  Use [employer] to Help Build Your Meals  The [employer] can help you plan and track your meals and activity. You can look up individual foods to see or compare their nutritional value. You can get guidelines for what and how much you should eat. You can compare your food choices. And you can assess personal physical activities and see ways you can improve. Go to www.[employer].gov/supertracker/.    0377-9830 [employer]. [address] [place] [address], [place] [address]. All rights reserved. This information is not intended as a substitute for professional medical care. Always follow your healthcare professional's instructions.           Patient Education   Activities of Daily Living  Your Health Risk Assessment indicates you have difficulties with activities of daily living such as eating, getting dressed, grooming, bathing, walking, or using the toilet. Please make a follow up appointment for us to address this issue in more detail.      Patient Education   Instrumental Activities of Daily Living  Your Health Risk Assessment indicates you have difficulties with instrumental activities of daily living which include laundry, housekeeping, banking, shopping, using the telephone, food preparation, transportation, or taking your own medications. Please make a follow up appointment for us to address this issue in more detail.    "[employer] has resources available on the following website: https://www.healthEmbarkly.org/caregivers.html     Also, here is a local agency that provides help with meals and other assistance:   [facility] [place]: [phone]     Patient Education   Signs of Hearing Loss  Hearing loss is a problem shared by many people. In fact, it is one of the most common health conditions, particularly as people age. Most people over age 65 have some hearing loss, and by age 80, almost everyone does. Because hearing loss usually occurs slowly over the years, you may not realize your hearing ability has gotten worse.       Have your hearing checked  Contact your [facility] care provider if you:    Have to strain to hear normal conversation.    Have to watch other peoples faces very carefully to follow what theyre saying.    Need to ask people to repeat what theyve said.    Often misunderstand what people are saying.    Turn the volume of the television or radio up so high that others complain.    Feel that people are mumbling when theyre talking to you.    Find that the effort to hear leaves you feeling tired and irritated.    Notice, when using the phone, that you hear better with 1 ear than the other.    0458-6999 [employer]. [address] [place] [address], [place] [address]. All rights reserved. This information is not intended as a substitute for professional medical care. Always follow your healthcare professional's instructions.         Patient Education   Preventing Falls in the Home  As you get older, falls are more likely. Thats  because your reaction time slows. Your muscles and joints may also get stiffer, making them less flexible. Illness, medications, and vision changes can also affect your balance. A fall could leave you unable to live on your own. To make your home safer, follow these tips:    Floors    Put nonskid pads under area rugs.    Remove throw rugs.    Replace worn floor coverings.    Tack carpets firmly to each step on carpeted stairs. Put nonskid strips on the edges of uncarpeted stairs.    Keep floors and stairs free of clutter and cords.    Arrange furniture so there are clear pathways.    Clean up any spills right away.    Bathrooms    Install grab bars in the tub or shower.    Apply nonskid strips or put a nonskid rubber mat in the tub or shower.    Sit on a bath chair to bathe.    Use bathmats with nonskid backing.    Lighting    Keep a flashlight in each room.    Put a nightlight along the pathway between the bedroom and the bathroom.    8386-4040 [employer]. [address] [place] [address]. All rights reserved. This information is not intended as a substitute for professional medical care. Always follow your healthcare professional's instructions.           Advance Directive  Patients advance directive was discussed and I am comfortable with the patients wishes.  Patient Education   Personalized Prevention Plan  You are due for the preventive services outlined below.  Your care team is available to assist you in scheduling these services.  If you have already completed any of these items, please share that information with your care team to update in your medical record.  Health Maintenance   Topic Date Due   ? DEPRESSION ACTION PLAN  1945   ? HEPATITIS C SCREENING  1945   ? SPIROMETRY  1945   ? COPD ACTION PLAN  1945   ? TD 18+ HE  07/12/1963   ? ADVANCE CARE PLANNING  07/12/1963   ? COLORECTAL CANCER SCREENING  07/12/1963   ? LIPID  07/12/1990   ? ZOSTER VACCINES (1  of 2) 07/12/1995   ? MEDICARE ANNUAL WELLNESS VISIT  07/12/2010   ? Pneumococcal Vaccine: 65+ Years (1 of 1 - PPSV23) 07/12/2010   ? DXA SCAN  07/12/2010   ? FALL RISK ASSESSMENT  07/12/2010   ? INFLUENZA VACCINE RULE BASED (1) 08/01/2020   ? Pneumococcal Vaccine: Pediatrics (0 to 5 Years) and At-Risk Patients (6 to 64 Years)  Aged Out   ? HEPATITIS B VACCINES  Aged Out

## 2021-06-18 NOTE — LETTER
Letter by Ramana Blandon CNP at      Author: Ramana Blandon CNP Service: -- Author Type: --    Filed:  Encounter Date: 2019 Status: (Other)         Patient: Rosina Link   MR Number: [id number]   YOB: 1945   Date of Visit: 2019     [facility]    Name:   Rosina Link  : 1945  Facility:   [facility] [[id number]]   Room: 224  Code Status: DNR/DNI -   Fac type:    (Long Term Care, LTC) -     CHIEF COMPLAINT / REASON FOR VISIT:  Chief Complaint   Patient presents with   ? Pre-op Exam     Laparoscopic repair of a recurrent hiatal hernia, possible open, possible cholecystic gastroplasty     [facility] from 18 until 18  [facility] from 18 until 03/10/18  [facility] from 18 until 18  [facility] from 8018 until   [facility] from 18 until 18    Patient was last seen by me on 18.    HPI: Rosina is a 73 y.o. female with a history of severe COPD/pulmonary emphysema, depression, hiatal hernia/GERD, and insomnia.  She underwent surgery in 2016 for a large hiatal hernia (total gastric herniation) that included laparoscopic repair with mesh and Gorge fundoplication.  With recurrence, she is scheduled again for repair (and possible Jamee gastroplasty) on 19 with Dr. De Jesus.      Hospitalization 2018: She had suffered a 60 pound unintentional weight loss prior to that, dropping from about 160 pounds down to 95 pounds.  There was a brief period of weight gain after that, but she eventually returned to the lower weight.  She developed progressive weakness and inability to eat much.  She would take several bites and then feel full.  She was so weak upon her admission to [facility] in 2018 that she could hardly walk.      She had had a CT scan on 18 that showed of her stomach back in her chest.  She  "was supposed to see Dr. De Jesus at on 03/07/18 to review pictures and to discuss options.  The CT scan described \"small amount of scarring and residual consolidation in the paramedial right lower lobe at the site of resolving pneumonia.\"  Chest x-rays performed on 02/16/18 showed \"right lower lobe pneumonia with small parapneumonic effusion.  The amount of infiltrate in the right lower lobe appeared increased from the CT examination.\"  She was admitted for community-acquired pneumonia.  She had no fevers or leukocytosis.      Hospitalization June 2018: More recently, she was seen by Dr. Vasquez on 05/18/18 for dysphagia, nausea, and early satiety with weight loss.  An EGD with general anesthesia was planned at some point with consideration for repair of hiatal hernia and redo partial fundoplication.    She presented to [facility] ER on 06/05/18 with weakness, anorexia, and constipation.  Constipation eventually resolved without changes to appetite.  Psych was consulted with concern that depression may be contributory, and she was started on low-dose mirtazapine.  Megace was started as an appetite stimulant, and a CT showed a large recurrent hiatal hernia.  A GJ tube placement was suggested, but the patient was felt not to be a candidate for GJ tube per IR due to anatomy.  Therefore, GI and surgery were consulted, and she underwent laparoscopic partial fundoplication takedown repair paraesophageal hernia and placement of gastric jejunostomy tube placement; upper endoscopy by Dr. Daniel De Jesus on 06/12/18.  She was also found to have esophageal candidiasis and was given fluconazole.  The PICC line was placed on 06/13/18 to start TPN.    Overnight (06/14/18 to 06/15/18), rapid response was called due to hypoxemia and tachycardia.  A CT of the chest was negative for pulmonary embolism but showed aspiration pneumonitis.  She was treated with IV antibiotics which ended on 06/23/18.    She underwent EGD and " advancement of GJ feeding tube on 06/21/18.  Abdominal x-rays on 06/22/18 showed GJ tube malpositioned, looped in the fundus.  This J-tube repositioned itself and was then being used again.      Of note: An echocardiogram on 06/15/18 showed moderate pericardial effusion (known pericardial effusion 08/20/17 and 12/20/17).  Cardiology felt likely malnutrition with low protein causing capillary leak as a probable etiology.  Recommendations for observation with no further workup, but a repeat echocardiogram on 06/29/18 showed no change in no hemodynamic effects of effusion.      Hospitalization in July 2018: On 07/7/18, she was getting out of bed early in the day and decided to walk to the bathroom on her own rather than summoned assistance.  On the way to her bathroom, she became dizzy, fell, and struck the side of the garbage can.  She had experienced dizzy spells which have caused falls frequently before.  A CT of the chest, abdomen, and pelvis showed displaced left ninth and 10th rib fractures with a moderate sized pneumothorax and increased moderate pericardial effusion.  A chest tube was placed in the ED.  Trauma surgery evaluated the patient, and the chest tube was removed on 07/10/18.  She remained stable off oxygen with no dyspnea.    Another echocardiogram was done, as the pericardial effusion had possibly increased in size per the CT on admission.  It showed no significant change compared to previously, and there are recommendations for her to follow-up with cardiology in 4 weeks.    A UGI was done, showing persistent partial functional obstruction at the level of the diaphragmatic hiatus, and a strict n.p.o. and tube feedings continued.  She was subsequently discharged back here on 07/12/18.  She was to follow-up with Dr. De Jesus on 08/10/18 for further esophageal evaluation (as described in CURRENT ISSUES).      CURRENT ISSUES    Failure to thrive/esophageal dysphagia/NPO status: This is the main reason why  "she is here.  Since her stay at the [facility] in February/March 2018, she had dropped 17 pounds.  She was quite cachectic with a BMI of 16-17 (now 20.56).   Admitted NPO with tube feedings at 30 mL/h, food was being introduced (regular diet, thin liquids, small portions).  Isosource 1.5 is now being administered at 71 mL/h for 14 hours.  Medications continue to be given via gastric tube. She was seen by speech therapy here and appeared to be doing okay; however, we are continuing to follow guidelines set by Dr. De Jesus at this juncture.  With her current tube feeding, her weight has varied by a few pounds in either direction.    She remains NPO.  She was also followed by speech therapy in the hospital, and her swallow function was deemed intact. She had a follow-up appointment on 07/09/18 but remained NPO.  At a follow-up appointment with Dr. De Jesus on 08/10/18, she was given a new diagnosis of dysphagia, related to her paraesophageal hernia.  More tests were performed, including an upper GI with barium swallow (on 08/23/18) and esophageal manometry (performed on 10/01/18) .  She is to remain strict NPO, pending recommendations, with tube feedings through the J port and medications through the G port.  At one point, she was told that surgery would not be appropriate for her but dilatation might.  She seems aware of the big picture with regard to what has already been done and her upcoming procedure(s).  She did undergo an EGD on 11/12/18.    GERD: She had complained of heartburn despite being on lansoprazole 30 mg daily.  She previously had orders for Maalox as needed, and she was not taking it.  We scheduled that while awake, keeping with 4 times daily dosing.  Epigastric/suprasternal heartburn was described as \"terrible, terrible, terrible.\"  She told me it had been all right for some time but had returned.  We added sucralfate 1 g 4 times daily and a stool check for H pylori (negative).  This " "has been quite effective, and she offers no further complaints.    Depression: She does admit to being depressed \"some days.\"  She continues on mirtazapine and duloxetine and seems to be doing well on these.    COPD: She does have COPD/pulmonary emphysema, is barrel-chested, and is on a variety of inhalers.  She continues to deny dyspnea, coughing, or chest pain.    Urinary incontinence: She does have stress incontinence and is checked by nursing staff during the night.   She finds it very hard to control, stating that she does not realize it until after it starts.  There is some nocturia as well.  None of this has changed since admission.    Bowel incontinence: Sometime back, she developed loose, incontinent stools. Her tube feeding formula had been recently changed, and despite the loose stools, she was receiving scheduled Colace and PRN senna and MiraLAX.  When addressed with staff, this was attributed to poor communication during shift report, so we discontinued all of her bowel medications.  The problem persisted until we replaced psyllium with a 500 mg tab of methylcellulose daily.  Despite resolution of her loose stools, she remains incontinent of bowel.      Pain management: For the most part, she is pain-free, and her morphine was discontinued.      ROS: No complaints whatsoever today. She is on trazodone 25 mg nightly and sleeps well with this.  She denies any headaches or chest pains, coughing or congestion, nausea or vomiting,  dizziness or dyspnea, dysuria, difficulty chewing or swallowing, or any integumentary issues.      Past Medical History:   Diagnosis Date   ? Acute encephalopathy    ? Aperistalsis of esophagus    ? Arthritis    ? Compression fracture of lumbar vertebra (H)     L2   ? COPD (chronic obstructive pulmonary disease) (H)    ? Depression    ? Dyslipidemia    ? Encephalopathy    ? Esophageal candidiasis (H)    ? Failure to thrive in adult    ? Fall    ? GERD (gastroesophageal reflux " disease)    ? Hiatal hernia    ? HLD (hyperlipidemia)    ? HTN (hypertension)    ? Hypocalcemia    ? Hypomagnesemia    ? Hypoxia    ? Insomnia    ? Lactic acidosis    ? Major depressive disorder    ? Malnutrition (H)    ? Maternal MCV (mean corpuscular volume) low    ? Microcytic anemia    ? Microcytic hypochromic anemia 2013   ? Osteoporosis    ? Pericardial effusion    ? PMR (polymyalgia rheumatica) (H)    ? Pneumonia    ? Pneumothorax    ? Positive urine drug screen 10/2012    meth and coke positive 10/12. negative in 12   ? Pulmonary emphysema (H)    ? Rib fracture    ? S/P laparoscopic fundoplication    ? SIRS (systemic inflammatory response syndrome) (H)    ? Thrombocytopenia (H)    ? Trigger finger     left hand, middle finger   ? Underweight    ? Urinary urgency               Family History   Problem Relation Age of Onset   ? Diabetes Mother    ? Cancer Mother         lung   ? Hypertension Mother    ? Emphysema Father    ? Heart disease Father    ? Hypertension Father    ? No Medical Problems Brother    ? Breast cancer Maternal Aunt      Social History     Socioeconomic History   ? Marital status: Single     Spouse name: Not on file   ? Number of children: Not on file   ? Years of education: Not on file   ? Highest education level: Not on file   Social Needs   ? Financial resource strain: Not on file   ? Food insecurity - worry: Not on file   ? Food insecurity - inability: Not on file   ? Transportation needs - medical: Not on file   ? Transportation needs - non-medical: Not on file   Occupational History   ? Not on file   Tobacco Use   ? Smoking status: Former Smoker     Packs/day: 1.50     Years: 30.00     Pack years: 45.00     Types: Cigarettes     Last attempt to quit: 8/10/1991     Years since quittin.4   ? Smokeless tobacco: Never Used   Substance and Sexual Activity   ? Alcohol use: No   ? Drug use: No   ? Sexual activity: Not Currently   Other Topics Concern   ? Not on file   Social  History Narrative   ? Not on file     MEDICATIONS: Reviewed from the MAR, physician orders, and earlier progress notes.    Current Outpatient Medications   Medication Sig   ? acetaminophen (TYLENOL) 650 mg/20.3 mL Soln 650 mg by G-tube route every 4 (four) hours as needed.   ? aluminum-magnesium hydroxide-simethicone (MAALOX ADVANCED) 200-200-20 mg/5 mL Susp 30 mL by G-tube route 4 (four) times a day .         ? aspirin 81 mg chewable tablet 81 mg by G-tube route daily.   ? atorvastatin (LIPITOR) 20 MG tablet 20 mg by G-tube route daily.    ? budesonide-formoterol (SYMBICORT) 160-4.5 mcg/actuation inhaler Inhale 2 puffs 2 (two) times a day.   ? calcium carbonate 500 mg/5 mL (1,250 mg/5 mL) suspension 600 mg by G-tube route daily.   ? cholecalciferol, vitamin D3, 400 unit/5 mL Liqd 400 Units by G-tube route daily.   ? ferrous sulfate 220 mg (44 mg iron)/5 mL solution 7.4ml by gastric tube daily.         ? lactose-reduced food/fiber (ISOSOURCE 1.5 JASPREET ENTERAL TUBE) 56 mL/hr by Enteral Tube route 2 (two) times a day .         ? lansoprazole (PREVACID SOLUTAB) 30 MG disintegrating tablet 30 mg by G-tube route daily.   ? lipase-protease-amylase (CREON) 3,000-9,500- 15,000 unit CpDR 2 capsules by G-tube route as needed With 650 mg of sodium bicarbonate dissolved in 30 cc of water. If unable to unclog GJ tube send to replace .   ? methylcellulose (CITRUCEL) 500 mg Tab 500 mg by g tube at bedtime.         ? mirtazapine (REMERON) 15 MG tablet 7.5 mg by G-tube route at bedtime.   ? omeprazole (PRILOSEC) 2 mg/mL SusR suspension 20 mg by Gastrostomy Tube route daily before breakfast.   ? saliva stimulant (BIOTENE ORALBALANCE, GLYCERIN,) gel Take 1 application by mouth as needed Half inch length of gel directly on the tongue and spread thoroughly inside mouth .   ? sucralfate (CARAFATE) 100 mg/mL suspension Take 1 g by mouth 4 (four) times a day.   ? tiotropium (SPIRIVA) 18 mcg inhalation capsule Place 18 mcg into inhaler and  "inhale daily.   ? venlafaxine (EFFEXOR) 37.5 MG tablet 37.5 mg by G-tube route 2 (two) times a day.     ALLERGIES:   Allergies   Allergen Reactions   ? Sulfa (Sulfonamide Antibiotics) Other (See Comments)      Ototoxicity       ? Penicillins Rash     DIET: NPO.  She is on tube feeding at 56 mL/h for 18 hours per day.    Vitals:    01/19/19 1753   BP: 110/76   Pulse: 78   Resp: 18   Temp: 97.2  F (36.2  C)   SpO2: 96%   Weight: 101 lb 12.8 oz (46.2 kg)   Height: 4' 11\" (1.499 m)   After considerable weight loss, she is actually gained 9 pounds over the last few months.  Body mass index is 20.56 kg/m .    EXAMINATION:   General: Pleasant, alert, and conversant middle-aged female, looking much older than her stated age, resting in bed and playing games on her smart phone, in no apparent distress.  Head: Normocephalic and atraumatic.  Significant hirsutism.  Eyes: PERRLA, sclerae clear.   ENT: Slightly dry oral mucosa.  Full upper and lower dentures.  She is only wearing the uppers currently.  Hearing is unimpaired.  Cardiovascular: Sinus tachycardia with no appreciable murmur.  Respiratory: Lung sounds are diminished due to severe kyphosis but otherwise clear.   Abdomen: Soft and nontender.   Musculoskeletal/Extremities: Age-related degenerative joint disease.  Barrel chested with severe thoracic kyphosis.  No peripheral edema.   Neurological: Occasional fine bilateral upper extremity tremor (not noticeable today).  Integument: No rashes, clinically significant lesions, or skin breakdown.   Cognitive/Psychiatric: Alert and oriented.  Today, she is obviously not feeling well following the procedure.     DIAGNOSTICS: Prior to upcoming surgery, labs ordered include PT/INR, prealbumin, albumin, ferritin, BMP, and CBC.  Results for orders placed or performed in visit on 01/04/19   Basic Metabolic Panel   Result Value Ref Range    Sodium 140 136 - 145 mmol/L    Potassium 4.4 3.5 - 5.0 mmol/L    Chloride 102 98 - 107 mmol/L "    CO2 30 22 - 31 mmol/L    Anion Gap, Calculation 8 5 - 18 mmol/L    Glucose 83 70 - 125 mg/dL    Calcium 9.3 8.5 - 10.5 mg/dL    BUN 24 8 - 28 mg/dL    Creatinine 0.62 0.60 - 1.10 mg/dL    GFR MDRD Af Amer >60 >60 mL/min/1.73m2    GFR MDRD Non Af Amer >60 >60 mL/min/1.73m2     Lab Results   Component Value Date    WBC 6.0 01/04/2019    HGB 13.8 01/04/2019    HCT 44.0 01/04/2019    MCV 92 01/04/2019     01/04/2019     CrCl cannot be calculated (Patient's most recent lab result is older than the maximum 5 days allowed.).    ASSESSMENT/Plan:      ICD-10-CM    1. Esophageal dysphagia R13.10    2. Hernia of abdominal cavity K46.9    3. Chronic GERD K21.9    4. Major depressive disorder, remission status unspecified, unspecified whether recurrent F32.9    5. Pulmonary emphysema, unspecified emphysema type (H) J43.9    6. Bowel and bladder incontinence R32     R15.9    7. Coronary artery disease involving native coronary artery of native heart without angina pectoris I25.10        This patient has been examined by me this day and has been found to be an acceptable candidate for surgery with appropriate anesthesia. Total time spent with the patient was approximately 35 minutes, with greater than 50% spent in face-to-face counseling regarding disease state, treatment, documentation, review of clinical data, and coordination of care for upcoming surgery.    The above has been created using voice recognition software. Please be aware that this may unintentionally  produce inaccuracies and/or nonsensical sentences.       Electronically signed by: Ramana Blandon, KAIDEN

## 2021-06-18 NOTE — LETTER
Letter by Rosina Guo MD at      Author: Rosina Guo MD Service: -- Author Type: --    Filed:  Encounter Date: 1/31/2019 Status: (Other)         Patient: Rosina Link   MR Number: [id number]   YOB: 1945   Date of Visit: 1/31/2019     [facility]    Facility:   [facility] [[id number]]   Code Status: DNR/DNI       Chief Complaint   Patient presents with   ? H & P     Re-admit to LT after para esophageal hernia surgery.        HPI:  Rosina is a 73 y.o. female with hx of HTN, hiatal hernia, GERD, anemia, PMR, TERESA, pancreatitis, gastric obstruction and FTT on feeding tube, residing in LT. She was admitted to the hospital on 1/23/19 for paraesophageal hernia repair as per below.     HOSPITAL DISCHARGE SUMMARY  BRIEF HOSPITAL COURSE: This 73 y.o. female was admitted after laparoscopic reduction repair of recurrent paraesophageal hernia. Her postoperative course has been uneventful. A esophagram on postop day 1 revealed no hernia, no leak, and no obstruction. She is tolerating a full liquid diet and her pain is under control. We reinitiated tube feeding during her admission as well.    She will be transferred back to her facility. Her orders will be the same except that we will send her on a full liquid diet for 2 weeks. She will follow-up with Dr. De Jesus. Our hope was that we can discontinue tube feeding and remove the feeding tube in the next couple of weeks. Overall she is done very well and she is happy with the outcome.    PROCEDURES PERFORMED DURING HOSPITALIZATION: Laparoscopic reduction and repair of recurrent paraesophageal hernia    Returned to LTC on 1/25/19.    Today:  She reports no concerns. States is now able to eat orally with TF supplemental. Will monitor closely to ensure she can hold her weight and nutrition. Dietary following. She reports no pain. Denies shortness of breath or chest pain. No abdominal pain. Normal BMs. No urinary  problems. No new vision or hearing problems.       Past Medical History:  Past Medical History:   Diagnosis Date   ? Acute encephalopathy    ? Aperistalsis of esophagus    ? Arthritis    ? Compression fracture of lumbar vertebra (H)     L2   ? COPD (chronic obstructive pulmonary disease) (H)    ? Depression    ? Dyslipidemia    ? Encephalopathy    ? Esophageal candidiasis (H)    ? Esophagitis    ? Failure to thrive in adult    ? Fall    ? GERD (gastroesophageal reflux disease)    ? Hiatal hernia    ? HLD (hyperlipidemia)    ? HTN (hypertension)    ? Hypocalcemia    ? Hypomagnesemia    ? Hypoxia    ? Insomnia    ? Lactic acidosis    ? Major depressive disorder    ? Malnutrition (H)    ? Maternal MCV (mean corpuscular volume) low    ? Microcytic anemia    ? Microcytic hypochromic anemia 01/2013   ? Osteoporosis    ? Pericardial effusion    ? PMR (polymyalgia rheumatica) (H)    ? Pneumonia    ? Pneumothorax    ? Positive urine drug screen 10/2012    meth and coke positive 10/12. negative in 11/19/12   ? Pulmonary emphysema (H)    ? Rib fracture    ? S/P laparoscopic fundoplication    ? SIRS (systemic inflammatory response syndrome) (H)    ? Thrombocytopenia (H)    ? Trigger finger     left hand, middle finger   ? Underweight    ? Urinary urgency            Surgical History:  Past Surgical History:   Procedure Laterality Date   ? CARPAL TUNNEL RELEASE Right    ? CHOLECYSTECTOMY  01/09/2015   ? ESOPHAGOGASTRODUODENOSCOPY     ? HIATAL HERNIA REPAIR  02/15/2016    lap with mesh   ? HIATAL HERNIA REPAIR  01/23/2019    Laparoscopic repair (reduction and repair) of recurrent hiatal hernia,    ? HYSTERECTOMY  1990   ? IR GJ TUBE REPLACEMENT  12/13/2018   ? REPAIR PARA ESOPHAGEAL PLACEMENT OF GASTRIC J TUBE     ? TRIGGER FINGER RELEASE Left     left middle finger       Family History:   Family History   Problem Relation Age of Onset   ? Diabetes Mother    ? Cancer Mother         lung   ? Hypertension Mother    ? Emphysema  Father    ? Heart disease Father    ? Hypertension Father    ? No Medical Problems Brother    ? Breast cancer Maternal Aunt        Social History:    Social History     Socioeconomic History   ? Marital status: Single     Spouse name: Not on file   ? Number of children: Not on file   ? Years of education: Not on file   ? Highest education level: Not on file   Social Needs   ? Financial resource strain: Not on file   ? Food insecurity - worry: Not on file   ? Food insecurity - inability: Not on file   ? Transportation needs - medical: Not on file   ? Transportation needs - non-medical: Not on file   Occupational History   ? Not on file   Tobacco Use   ? Smoking status: Former Smoker     Packs/day: 1.50     Years: 30.00     Pack years: 45.00     Types: Cigarettes     Last attempt to quit: 8/10/1991     Years since quittin.5   ? Smokeless tobacco: Never Used   Substance and Sexual Activity   ? Alcohol use: No   ? Drug use: No   ? Sexual activity: Not Currently   Other Topics Concern   ? Not on file   Social History Narrative   ? Not on file       Medications:  Current Outpatient Medications   Medication Sig   ? acetaminophen (TYLENOL) 650 mg/20.3 mL Soln 650 mg by G-tube route every 4 (four) hours as needed.   ? aluminum-magnesium hydroxide-simethicone (MAALOX ADVANCED) 200-200-20 mg/5 mL Susp 30 mL by G-tube route 4 (four) times a day .         ? amoxicillin-clavulanate (AUGMENTIN) 875-125 mg per tablet Take 1 tablet by mouth 2 (two) times a day.   ? aspirin 81 mg chewable tablet 81 mg by G-tube route daily.   ? atorvastatin (LIPITOR) 20 MG tablet 20 mg by G-tube route daily.    ? budesonide-formoterol (SYMBICORT) 160-4.5 mcg/actuation inhaler Inhale 2 puffs 2 (two) times a day.   ? calcium carbonate 500 mg/5 mL (1,250 mg/5 mL) suspension 600 mg by G-tube route daily.   ? cholecalciferol, vitamin D3, 400 unit/5 mL Liqd 400 Units by G-tube route daily.   ? ferrous sulfate 220 mg (44 mg iron)/5 mL solution 7.4ml by  gastric tube daily.         ? furosemide (LASIX) 40 MG tablet Take 40 mg by mouth daily.   ? lactose-reduced food/fiber (ISOSOURCE 1.5 JASPREET ENTERAL TUBE) 71 mL/hr by Feeding route 2 (two) times a day.          ? lipase-protease-amylase (CREON) 3,000-9,500- 15,000 unit CpDR 2 capsules by G-tube route as needed With 650 mg of sodium bicarbonate dissolved in 30 cc of water. If unable to unclog GJ tube send to replace .   ? methylcellulose (CITRUCEL) 500 mg Tab 500 mg by g tube at bedtime.         ? mirtazapine (REMERON) 15 MG tablet 7.5 mg by G-tube route at bedtime.   ? omeprazole (PRILOSEC) 2 mg/mL SusR suspension 20 mg by Gastrostomy Tube route daily before breakfast.   ? oxyCODONE (ROXICODONE) 5 mg/5 mL solution Take 5 mg by mouth every 4 (four) hours as needed for pain.   ? saliva stimulant (BIOTENE ORALBALANCE, GLYCERIN,) gel Take 1 application by mouth as needed Half inch length of gel directly on the tongue and spread thoroughly inside mouth .   ? sucralfate (CARAFATE) 100 mg/mL suspension 1 g by G-tube route 4 (four) times a day.          ? tiotropium (SPIRIVA) 18 mcg inhalation capsule Place 18 mcg into inhaler and inhale daily.   ? venlafaxine (EFFEXOR) 37.5 MG tablet 37.5 mg by G-tube route 2 (two) times a day.       Allergies:  Allergies   Allergen Reactions   ? Sulfa (Sulfonamide Antibiotics) Other (See Comments)      Ototoxicity       ? Penicillins Rash       Review of Systems:  Pertinent items are noted in HPI.      Physical Exam:   General: Patient is alert pale female, no distress.   Vitals: /73, Pulse 85, RR 18.  HEENT: Head is NCAT. Eyes show no injection or icterus. Nares negative. Oropharynx well hydrated.  Neck: Supple. No tenderness or adenopathy. No JVD.  Lungs: Clear bilaterally. No wheezes.  Cardiovascular: Regular rate and rhythm, normal S1. S2.  Back: No spinal or CVA tenderness.  Abdomen: FT intact. Soft, no tenderness on exam. Bowel sounds present. No guarding rebound or  rigidity.  : Deferred.  Extremities: No LE edema is noted.  Musculoskeletal: Mild degen changes.   Skin: No rashes.   Psych: Mood appears good.      Labs:  Outside hospital.      Assessment/Plan:  1. Paraesophageal hernia. S/p operative repair. Follow up with surgeon. Allowed oral intake with supplemental TF. Dietary following closely.  2. Anemia. On iron.  3. COPD. Stable. No current respiratory concerns.   4. HTN. Not on BP meds.  5. Depression. Continue on duloxetine.  6. Code status is DNR/DNI.       Total time greater than 35 minutes, greater than 50% counseling and coordination of care, time spent in interview and examination of patient, review of records, discussion with nursing staff. CC includes review of recent surgery, oral feeding orders and supplemental TF, discussion of nutrition, weight and overall medical condition.         Electronically signed by: Rosina Guo MD

## 2021-06-18 NOTE — LETTER
Letter by Ramana Blandon CNP at      Author: Ramana Blandon CNP Service: -- Author Type: --    Filed:  Encounter Date: 2019 Status: (Other)         Patient: Rosina Link   MR Number: [id number]   YOB: 1945   Date of Visit: 2019     [facility]    Name:   Rosina Link  : 1945  Facility:   [facility] [[id number]]   Room: 228A  Code Status: DNR/DNI -   Fac type:    (Long Term Care, LTC) -     CHIEF COMPLAINT / REASON FOR VISIT:  Chief Complaint   Patient presents with   ? Review Of Multiple Medical Conditions     Follow-up after hospitalization for laparoscopic repair of a recurrent hiatal hernia, possible open, possible cholecystic gastroplasty.     [facility] from 18 until 18  [facility] from 18 until 03/10/18  [facility] from 18 until 18  [facility] from 8018 until   [facility] from 18 until 18    Patient was last seen by me on 19 for a preop examination prior to hiatal hernia repair and cholecystic gastroplasty.  She was subsequently seen by Dr. Guo for a readmission visit on 19.    HPI: Rosina is a 73 y.o. female with a history of severe COPD/pulmonary emphysema, depression, hiatal hernia/GERD, and insomnia.        RECENT HOSPITALIZATION    Hospitalization 2019: She underwent surgery in 2016 for a large hiatal hernia (total gastric herniation) that included laparoscopic repair with mesh and Gorge fundoplication.  With recurrence, she was scheduled again for repair (and possible Jamee gastroplasty) on 19 with Dr. De Jesus.    On 19, she was admitted after laparoscopic reduction repair of a recurrent paraesophageal hernia.  Her postoperative course was uneventful.  An esophagram on POD 1 revealed no hernia, no leak, and no obstruction.  She was tolerating a full liquid diet, and  "her pain was under control.  Tube feeding was reinitiated during her admission as well.  Discharge orders back to [facility] included a full liquid diet for 2 weeks with subsequent follow-up with Dr. De Jesus.  The hope was that tube feeding could be discontinued in the near future.  Overall, she was noted to be doing well and was considered happy with the outcome.  She was readmitted on 01/25/19.      EARLIER HOSPITALIZATIONS    Hospitalization March 2018: She had suffered a 60 pound unintentional weight loss prior to that, dropping from about 160 pounds down to 95 pounds.  There was a brief period of weight gain after that, but she eventually returned to the lower weight.  She developed progressive weakness and inability to eat much.  She would take several bites and then feel full.  She was so weak upon her admission to [facility] in February 2018 that she could hardly walk.      She had had a CT scan on 02/09/18 that showed of her stomach back in her chest.  She was supposed to see Dr. De Jesus at on 03/07/18 to review pictures and to discuss options.  The CT scan described \"small amount of scarring and residual consolidation in the paramedial right lower lobe at the site of resolving pneumonia.\"  Chest x-rays performed on 02/16/18 showed \"right lower lobe pneumonia with small parapneumonic effusion.  The amount of infiltrate in the right lower lobe appeared increased from the CT examination.\"  She was admitted for community-acquired pneumonia.  She had no fevers or leukocytosis.      Hospitalization June 2018: More recently, she was seen by Dr. Vasquez on 05/18/18 for dysphagia, nausea, and early satiety with weight loss.  An EGD with general anesthesia was planned at some point with consideration for repair of hiatal hernia and redo partial fundoplication.    She presented to [facility] ER on 06/05/18 with weakness, anorexia, and constipation.  Constipation eventually resolved without " changes to appetite.  Psych was consulted with concern that depression may be contributory, and she was started on low-dose mirtazapine.  Megace was started as an appetite stimulant, and a CT showed a large recurrent hiatal hernia.  A GJ tube placement was suggested, but the patient was felt not to be a candidate for GJ tube per IR due to anatomy.  Therefore, GI and surgery were consulted, and she underwent laparoscopic partial fundoplication takedown repair paraesophageal hernia and placement of gastric jejunostomy tube placement; upper endoscopy by Dr. Daniel De Jesus on 06/12/18.  She was also found to have esophageal candidiasis and was given fluconazole.  The PICC line was placed on 06/13/18 to start TPN.    Overnight (06/14/18 to 06/15/18), rapid response was called due to hypoxemia and tachycardia.  A CT of the chest was negative for pulmonary embolism but showed aspiration pneumonitis.  She was treated with IV antibiotics which ended on 06/23/18.    She underwent EGD and advancement of GJ feeding tube on 06/21/18.  Abdominal x-rays on 06/22/18 showed GJ tube malpositioned, looped in the fundus.  This J-tube repositioned itself and was then being used again.      Of note: An echocardiogram on 06/15/18 showed moderate pericardial effusion (known pericardial effusion 08/20/17 and 12/20/17).  Cardiology felt likely malnutrition with low protein causing capillary leak as a probable etiology.  Recommendations for observation with no further workup, but a repeat echocardiogram on 06/29/18 showed no change in no hemodynamic effects of effusion.      Hospitalization in July 2018: On 07/7/18, she was getting out of bed early in the day and decided to walk to the bathroom on her own rather than summoned assistance.  On the way to her bathroom, she became dizzy, fell, and struck the side of the garbage can.  She had experienced dizzy spells which have caused falls frequently before.  A CT of the chest, abdomen, and pelvis  showed displaced left ninth and 10th rib fractures with a moderate sized pneumothorax and increased moderate pericardial effusion.  A chest tube was placed in the ED.  Trauma surgery evaluated the patient, and the chest tube was removed on 07/10/18.  She remained stable off oxygen with no dyspnea.    Another echocardiogram was done, as the pericardial effusion had possibly increased in size per the CT on admission.  It showed no significant change compared to previously, and there are recommendations for her to follow-up with cardiology in 4 weeks.    A UGI was done, showing persistent partial functional obstruction at the level of the diaphragmatic hiatus, and a strict n.p.o. and tube feedings continued.  She was subsequently discharged back here on 07/12/18.  She was to follow-up with Dr. De Jesus on 08/10/18 for further esophageal evaluation (as described in CURRENT ISSUES).      CURRENT ISSUES    Sequelae of recent surgery: Dr. De Jesus did start her on a puréed diet.  She still has her Rehan button in place, and speech say she can eat regular food; however, she tells me she feels full quickly and is not receiving sufficient nutrition orally.  She is being resumed on tube feeding as a supplement.  I did have conversations with speech therapy and the nutritionist regarding the need for supplementation.    Cardiopulmonary issues: Chest x-rays on 02/05/19 revealed mild cardiomegaly with congestive failure and superimposed right lower lobe infiltrate.  Her white count was normal but temp slightly elevated from her baseline.  She had a nonproductive cough, wheeze on inspiration, crackles on expiration, and diminished sounds in the left lower lobe with sats of 89% on room air.  We ordered 40 mg of Lasix daily and Augmentin for 10 days.  A follow-up BMP was unremarkable.     COPD: She does have COPD/pulmonary emphysema, is barrel-chested, and is on a variety of inhalers.  She continues to deny dyspnea, coughing, or  chest pain.    Urinary incontinence: She does have stress incontinence and is checked by nursing staff during the night.   She finds it very hard to control, stating that she does not realize it until after it starts.  There is some nocturia as well.  None of this has changed since admission.    Room change: She recently moved from a private room, as she no longer qualified for one.    Pain management: For the most part, she is pain-free.      ROS: No complaints whatsoever today. She is on trazodone 25 mg nightly and sleeps well with this.  She denies any headaches or chest pains, coughing or congestion, nausea or vomiting,  dizziness or dyspnea, dysuria, difficulty chewing or swallowing, or any integumentary issues.      Past Medical History:   Diagnosis Date   ? Acute encephalopathy    ? Aperistalsis of esophagus    ? Arthritis    ? Compression fracture of lumbar vertebra (H)     L2   ? COPD (chronic obstructive pulmonary disease) (H)    ? Depression    ? Dyslipidemia    ? Encephalopathy    ? Esophageal candidiasis (H)    ? Esophagitis    ? Failure to thrive in adult    ? Fall    ? GERD (gastroesophageal reflux disease)    ? Hiatal hernia    ? HLD (hyperlipidemia)    ? HTN (hypertension)    ? Hypocalcemia    ? Hypomagnesemia    ? Hypoxia    ? Insomnia    ? Lactic acidosis    ? Major depressive disorder    ? Malnutrition (H)    ? Maternal MCV (mean corpuscular volume) low    ? Microcytic anemia    ? Microcytic hypochromic anemia 01/2013   ? Osteoporosis    ? Pericardial effusion    ? PMR (polymyalgia rheumatica) (H)    ? Pneumonia    ? Pneumothorax    ? Positive urine drug screen 10/2012    meth and coke positive 10/12. negative in 11/19/12   ? Pulmonary emphysema (H)    ? Rib fracture    ? S/P laparoscopic fundoplication    ? SIRS (systemic inflammatory response syndrome) (H)    ? Thrombocytopenia (H)    ? Trigger finger     left hand, middle finger   ? Underweight    ? Urinary urgency               Family History    Problem Relation Age of Onset   ? Diabetes Mother    ? Cancer Mother         lung   ? Hypertension Mother    ? Emphysema Father    ? Heart disease Father    ? Hypertension Father    ? No Medical Problems Brother    ? Breast cancer Maternal Aunt      Social History     Socioeconomic History   ? Marital status: Single     Spouse name: Not on file   ? Number of children: Not on file   ? Years of education: Not on file   ? Highest education level: Not on file   Social Needs   ? Financial resource strain: Not on file   ? Food insecurity - worry: Not on file   ? Food insecurity - inability: Not on file   ? Transportation needs - medical: Not on file   ? Transportation needs - non-medical: Not on file   Occupational History   ? Not on file   Tobacco Use   ? Smoking status: Former Smoker     Packs/day: 1.50     Years: 30.00     Pack years: 45.00     Types: Cigarettes     Last attempt to quit: 8/10/1991     Years since quittin.5   ? Smokeless tobacco: Never Used   Substance and Sexual Activity   ? Alcohol use: No   ? Drug use: No   ? Sexual activity: Not Currently   Other Topics Concern   ? Not on file   Social History Narrative   ? Not on file     MEDICATIONS: Reviewed from the MAR, physician orders, and earlier progress notes.    Current Outpatient Medications   Medication Sig   ? acetaminophen (TYLENOL) 650 mg/20.3 mL Soln 650 mg by G-tube route every 4 (four) hours as needed.   ? aluminum-magnesium hydroxide-simethicone (MAALOX ADVANCED) 200-200-20 mg/5 mL Susp 30 mL by G-tube route 4 (four) times a day .         ? amoxicillin-clavulanate (AUGMENTIN) 875-125 mg per tablet Take 1 tablet by mouth 2 (two) times a day.   ? aspirin 81 mg chewable tablet 81 mg by G-tube route daily.   ? atorvastatin (LIPITOR) 20 MG tablet 20 mg by G-tube route daily.    ? budesonide-formoterol (SYMBICORT) 160-4.5 mcg/actuation inhaler Inhale 2 puffs 2 (two) times a day.   ? calcium carbonate 500 mg/5 mL (1,250 mg/5 mL) suspension 600 mg  "by G-tube route daily.   ? cholecalciferol, vitamin D3, 400 unit/5 mL Liqd 400 Units by G-tube route daily.   ? ferrous sulfate 220 mg (44 mg iron)/5 mL solution 7.4ml by gastric tube daily.         ? furosemide (LASIX) 40 MG tablet Take 40 mg by mouth daily.   ? lactose-reduced food/fiber (ISOSOURCE 1.5 JASPREET ENTERAL TUBE) 71 mL/hr by Feeding route 2 (two) times a day.          ? lipase-protease-amylase (CREON) 3,000-9,500- 15,000 unit CpDR 2 capsules by G-tube route as needed With 650 mg of sodium bicarbonate dissolved in 30 cc of water. If unable to unclog GJ tube send to replace .   ? methylcellulose (CITRUCEL) 500 mg Tab 500 mg by g tube at bedtime.         ? mirtazapine (REMERON) 15 MG tablet 7.5 mg by G-tube route at bedtime.   ? omeprazole (PRILOSEC) 2 mg/mL SusR suspension 20 mg by Gastrostomy Tube route daily before breakfast.   ? oxyCODONE (ROXICODONE) 5 mg/5 mL solution Take 5 mg by mouth every 4 (four) hours as needed for pain.   ? saliva stimulant (BIOTENE ORALBALANCE, GLYCERIN,) gel Take 1 application by mouth as needed Half inch length of gel directly on the tongue and spread thoroughly inside mouth .   ? sucralfate (CARAFATE) 100 mg/mL suspension 1 g by G-tube route 4 (four) times a day.          ? tiotropium (SPIRIVA) 18 mcg inhalation capsule Place 18 mcg into inhaler and inhale daily.   ? venlafaxine (EFFEXOR) 37.5 MG tablet 37.5 mg by G-tube route 2 (two) times a day.     ALLERGIES:   Allergies   Allergen Reactions   ? Sulfa (Sulfonamide Antibiotics) Other (See Comments)      Ototoxicity       ? Penicillins Rash     DIET: Regular diet, mechanical soft texture, thin liquids.  She is still receiving tube feeding supplementally.    Vitals:    02/14/19 1410   BP: 95/59   Pulse: 72   Resp: 21   Temp: (!) 96.4  F (35.8  C)   SpO2: 93%   Weight: (!) 98 lb 3.2 oz (44.5 kg)   Height: 4' 11\" (1.499 m)   Down 3.5 pounds, probably due to difficulty with oral intake.  Body mass index is 19.83 " kg/m .    EXAMINATION:   General: Pleasant, alert, and conversant middle-aged female, sitting in a wheelchair, in no apparent distress.  Head: Normocephalic and atraumatic.  Significant hirsutism.  Eyes: PERRLA, sclerae clear.   ENT: Slightly dry oral mucosa.  Full upper and lower dentures.  She is only wearing the uppers currently.  Hearing is unimpaired.  Cardiovascular: Sinus tachycardia with no appreciable murmur.  Respiratory: Lung sounds are diminished due to severe kyphosis but otherwise clear.   Abdomen: Soft and nontender.   Musculoskeletal/Extremities: Age-related degenerative joint disease.  Barrel chested with severe thoracic kyphosis.  No peripheral edema.   Neurological: Occasional fine bilateral upper extremity tremor (not noticeable today).  Integument: No rashes, clinically significant lesions, or skin breakdown.   Cognitive/Psychiatric: Alert and oriented.  Today, she is obviously not feeling well following the procedure.     DIAGNOSTICS: .  Results for orders placed or performed in visit on 01/04/19   Basic Metabolic Panel   Result Value Ref Range    Sodium 140 136 - 145 mmol/L    Potassium 4.4 3.5 - 5.0 mmol/L    Chloride 102 98 - 107 mmol/L    CO2 30 22 - 31 mmol/L    Anion Gap, Calculation 8 5 - 18 mmol/L    Glucose 83 70 - 125 mg/dL    Calcium 9.3 8.5 - 10.5 mg/dL    BUN 24 8 - 28 mg/dL    Creatinine 0.62 0.60 - 1.10 mg/dL    GFR MDRD Af Amer >60 >60 mL/min/1.73m2    GFR MDRD Non Af Amer >60 >60 mL/min/1.73m2     Lab Results   Component Value Date    WBC 8.6 02/05/2019    HGB 13.8 01/04/2019    HCT 44.0 01/04/2019    MCV 92 01/04/2019     01/04/2019     CrCl cannot be calculated (Patient's most recent lab result is older than the maximum 5 days allowed.).    ASSESSMENT/Plan:      ICD-10-CM    1. Esophageal dysphagia R13.10    2. History of abdominal hernia Z87.19    3. Chronic GERD K21.9    4. Major depressive disorder, remission status unspecified, unspecified whether recurrent F32.9     5. Pulmonary emphysema, unspecified emphysema type (H) J43.9    6. Bowel and bladder incontinence R32     R15.9    7. Coronary artery disease involving native coronary artery of native heart without angina pectoris I25.10      CHANGES:  None.    CARE PLAN:  The care plan has been reviewed and all orders signed.  Changes to care plan, if any, as noted.  Otherwise, continue current plan of care.  Total time spent with this patient was approximately 35 minutes, with greater than 50% spent in counseling and coordination of care that included a review of her recent hospital records and discussions with speech therapy and the nutritionist regarding the need for supplemental nutrition as she regains the ability to take food orally.    The above has been created using voice recognition software. Please be aware that this may unintentionally  produce inaccuracies and/or nonsensical sentences.       Electronically signed by: Ramana Blandon CNP

## 2021-06-19 NOTE — PROGRESS NOTES
[facility]      Facility:    [facility] [[id number]]  Code Status: FULL CODE       Chief Complaint/Reason for Visit:  Chief Complaint   Patient presents with     H & P       HPI:   Rosina is a 72 y.o. female who was recently hospitalized as per below.    DISCHARGE SUMMARY   Hospital Course:   Rosina Link is a 72 y.o. female with a history of COPD, GERD, hiatal hernia s/p laparoscopic repair with mesh, Gorge fundoplication 2/2016, and depression who was admitted on 6/5/2018 after presenting to ER for weakness, anorexia and constipation.      The patient reports that she did well after fundoplication until she started having nausea and loss of appetite and nausea. EGD 9/2016 showed pill esophagitis from Fosamax and partially circumferential wrap. Upper GI showed a small portion of the stomach superior to the wrap. She stopped drinking coca cola and had some improvement in symptoms. More recently she was seen by Dr. De Jesus 5/18/18 for dysphagia, nausea and early satiety with weight loss. EGD with General Anesthesia with Dr. De Jesus was planned at some point with consideration for repair of hiatal hernia and redo partial fundoplication.      Since admission, constipation now resolved without changes to appetite. Psych consulted and concerned depression may be contributing, started low dose mirtazapine. Megace started for appetite stimulant. CT showed large recurrent hiatal hernia. GJ tube placement suggested and patient was felt not a candidate for GJ tube per IR due to anatomy. GI and surgery consulted. Patient underwent LAPAROSCOPIC PARTIAL FUNDALPLICATION TAKE DOWN REPAIR PARA ESOPHAGEAL HERNIA AND PLACEMENT OF GASTRIC JEJUNOSTOMY TUBE PLACEMENT; UPPER ENDOSCOPY by  on 6/12/18. Patient found to have esophageal candidiasis, fluconazole given. PICC line placed on 6/13 to start TPN.      Overnight (6/14-6/15), rapid response called due to hypoxemia and tachycardia. CT  chest neg for pulmonary embolism, but showed aspiration pneumonitis. She was treated with IV abx (ended 6/23). She is now on RA.     She underwent EGD and advancement of J feeding tube with Dr. De Jesus 6/21/2018. Abdominal x-ray on 6/22 showed GJ tube malpositioned, looped in the fundus. This J tube repositioned itself, so now being used again. She remains strict NPO, with IVF and enteral feedings. She will need to follow up with Dr De Jesus in clinic in 1-2 weeks and will remain NPO until UGI is performed. She is also followed by speech therapy, and at present swallow function remains intact.     Of note, Echocardiogram 6/15 showed moderate pericardial effusion. Known pericardial effusion (8/2017 and 12/2017). Cardiology felt likely malnutrition with low protein causing capillary leak as the probable etiology. recommended observation with no further work up at this time, but repeat echocardiogram in 1-2 wks- so one was repeated on 6/29/2018 and it shows no change and no hemodynamic effects of effusion.      Palliative care also saw her for assistance with goals of care discussion. Community palliative care has been consulted for ongoing goals of care.     Overall stabilized and discharged to TCU on 6/29/18 for PT, OT, nursing cares, medical management and monitoring.     Today:  She is tolerating the tube feeding. Unclear what the long term plan is, she will need follow up with GI and surgery. No abdominal pian. Denies diarrhea. No fever. Minimal cough, hx of COPD. No abx currently. She does feel weak, working with therapy. No new vision or hearing problems.       Past Medical History:  Past Medical History:   Diagnosis Date     Acute encephalopathy      Aperistalsis of esophagus      Arthritis      COPD (chronic obstructive pulmonary disease) (H)      Depression      Esophageal candidiasis (H)      GERD (gastroesophageal reflux disease)      Hiatal hernia      HLD (hyperlipidemia)      HTN (hypertension)       Hypocalcemia      Hypomagnesemia      Hypoxia      Insomnia      Lactic acidosis      Maternal MCV (mean corpuscular volume) low      Microcytic hypochromic anemia 01/2013     Osteoporosis      Pericardial effusion      PMR (polymyalgia rheumatica) (H)      Pneumonia      Positive urine drug screen 10/2012    meth and cocaine, confirmatory testing not done, negative since then     SIRS (systemic inflammatory response syndrome) (H)      Thrombocytopenia (H)      Urinary urgency            Surgical History:  Past Surgical History:   Procedure Laterality Date     CARPAL TUNNEL RELEASE Right      CHOLECYSTECTOMY  01/09/2015     HIATAL HERNIA REPAIR  02/15/2016    lap with mesh     HYSTERECTOMY  1990     TRIGGER FINGER RELEASE Left     left middle finger       Family History:   Family History   Problem Relation Age of Onset     Diabetes Mother      Cancer Mother      lung     Hypertension Mother      Emphysema Father      Heart disease Father      Hypertension Father      No Medical Problems Brother      Breast cancer Maternal Aunt        Social History:    Social History     Social History     Marital status: Single     Spouse name: N/A     Number of children: N/A     Years of education: N/A     Social History Main Topics     Smoking status: Former Smoker     Packs/day: 1.50     Years: 30.00     Quit date: 8/10/1991     Smokeless tobacco: Never Used     Alcohol use No     Drug use: No     Sexual activity: Not Currently     Other Topics Concern     Not on file     Social History Narrative        Medications:  Current Outpatient Prescriptions   Medication Sig     acetaminophen (TYLENOL) 650 mg/20.3 mL Soln 650 mg by G-tube route every 4 (four) hours as needed.     albuterol (PROVENTIL HFA;VENTOLIN HFA) 90 mcg/actuation inhaler Inhale 2 puffs 4 (four) times a day as needed for wheezing.      aluminum-magnesium hydroxide-simethicone (MAALOX ADVANCED) 200-200-20 mg/5 mL Susp 20 mL by G-tube route 4 (four) times a day.       aspirin 81 MG EC tablet 81 mg by G-tube route daily.      atorvastatin (LIPITOR) 20 MG tablet 20 mg by G-tube route daily.      budesonide-formoterol (SYMBICORT) 160-4.5 mcg/actuation inhaler Inhale 2 puffs 2 (two) times a day.     calcium carbonate 500 mg/5 mL (1,250 mg/5 mL) suspension 600 mg by G-tube route daily.     cholecalciferol, vitamin D3, 2,000 unit cap 2,000 capsules by G-tube route daily.      cholecalciferol, vitamin D3, 400 unit/5 mL Liqd 400 Units by G-tube route daily.     docusate sodium (COLACE) 50 mg/5 mL oral liquid 50 mg by G-tube route daily.     DULoxetine (CYMBALTA) 60 MG capsule 60 mg by G-tube route daily.      ferrous sulfate 300 mg (60 mg iron)/5 mL syrup 325 mg by G-tube route daily.     lansoprazole (PREVACID SOLUTAB) 30 MG disintegrating tablet 30 mg by G-tube route daily.     mirtazapine (REMERON) 15 MG tablet 7.5 mg by G-tube route at bedtime.     ondansetron (ZOFRAN-ODT) 4 MG disintegrating tablet 4 mg by G-tube route every 6 (six) hours as needed for nausea.     polyethylene glycol (MIRALAX) 17 gram packet 17 g by G-tube route daily as needed.      sennosides (SENNOSIDES) 8.8 mg/5 mL Syrp syrup 6.5 mg by G-tube route daily.     tiotropium (SPIRIVA) 18 mcg inhalation capsule Place 18 mcg into inhaler and inhale daily.     traZODone (DESYREL) 50 MG tablet 25 mg by G-tube route at bedtime as needed for sleep.       Allergies:  Allergies   Allergen Reactions     Sulfa (Sulfonamide Antibiotics) Other (See Comments)      Ototoxicity         Penicillins Rash       Review of Systems:  Pertinent items are noted in HPI.      Physical Exam:   General: Patient is alert cachectic appearing female, no distress.   Vitals: Reviewed.  HEENT: Head is NCAT. Eyes show no injection or icterus. Nares negative. Oropharynx well hydrated.  Neck: Supple. No tenderness or adenopathy. No JVD.  Lungs: Clear bilaterally. No wheezes.  Cardiovascular: Regular rate and rhythm, normal S1, S2.  Back: No spinal or  CVA tenderness.  Abdomen: GJ tube. Soft. Bowel sounds present. No guarding rebound or rigidity.  : Deferred.  Extremities: Mild LE edema is noted.  Musculoskeletal: Age related degen changes.   Skin: No rashes.  Psych: Mood appears pretty good.      Labs:  None available.       Assessment/Plan:  1. Malnutrition. Hx of hiatal hernia and GERD/esophagitis. S/p prior surgery. Now with tube feeding. Dietician following. Patient to follow up with surgeon. Unclear what the long term plan is.  2. COPD. Continue inhalers.   3. Anemia. She is on iron.   4. Aspiration pneumonitis. Completed abx in the hospital.  5. Esophageal candidiasis.  6. Depression. On Cymbalta and Remeron.  7. Code status is full code.      Total time greater than 55 minutes, greater than 50% counseling and coordination of care, time spent in interview and examination of patient, review of records, discussion with nursing staff.      Electronically signed by: Rosina Guo MD

## 2021-06-19 NOTE — PROGRESS NOTES
"[facility]    Name:   Rosina Link  : 1945  Facility:   [facility] [[id number]]   Room: 243  Code Status: FULL CODE -   Fac type:   SNF (Skilled Nursing Facility, TCU) -     CHIEF COMPLAINT / REASON FOR VISIT:  Chief Complaint   Patient presents with     Review Of Multiple Medical Conditions     rib fractures, failure to thrive     [facility] from 18 until 18  [facility] from 18 until 03/10/18  [facility] from 18 until 18  [facility] from 8018 until   [facility] from 18 until 18    Patient was last seen by me on 18 and subsequently seen by Dr. Guo for a readmission visit on 18.    HPI: Rosina is a 73 y.o. female with a history of severe COPD, depression, GERD, and insomnia.  She underwent surgery in 2016 for a large hiatal hernia (total gastric herniation) that included laparoscopic repair with mesh and Gorge fundoplication.        Hospitalization 2018: She had suffered a 60 pound unintentional weight loss prior to that, dropping from about 160 pounds down to 95 pounds.  There was a brief period of weight gain after that, but she eventually returned to the lower weight.  She developed progressive weakness and inability to eat much.  She would take several bites and then feel full.  She was so weak upon her admission to [facility] in 2018 that she could hardly walk.      She had had a CT scan on 18 that showed of her stomach back in her chest.  She was supposed to see Dr. De Jesus at on 18 to review pictures and to discuss options.  The CT scan described \"small amount of scarring and residual consolidation in the paramedial right lower lobe at the site of resolving pneumonia.\"  Chest x-rays performed on 18 showed \"right lower lobe pneumonia with small parapneumonic effusion.  The amount of infiltrate in the " "right lower lobe appeared increased from the CT examination.\"  She was admitted for community-acquired pneumonia.  She had no fevers or leukocytosis.      Hospitalization June 2018: More recently, she was seen by Dr. Vasquez on 05/18/18 for dysphagia, nausea, and early satiety with weight loss.  An EGD with general anesthesia was planned at some point with consideration for repair of hiatal hernia and redo partial fundoplication.    She presented to [facility] ER on 06/05/18 with weakness, anorexia, and constipation.  Constipation eventually resolved without changes to appetite.  Psych was consulted with concern that depression may be contributory, and she was started on low-dose mirtazapine.  Megace was started as an appetite stimulant, and a CT showed a large recurrent hiatal hernia.  A GJ tube placement was suggested, but the patient was felt not to be a candidate for GJ tube per IR due to anatomy.  Therefore, GI and surgery were consulted, and she underwent laparoscopic partial fundoplication takedown repair paraesophageal hernia and placement of gastric jejunostomy tube placement; upper endoscopy by Dr. Daniel De Jesus on 06/12/18.  She was also found to have esophageal candidiasis and was given fluconazole.  The PICC line was placed on 06/13/18 to start TPN.    Overnight (06/14/18 to 06/15/18), rapid response was called due to hypoxemia and tachycardia.  A CT of the chest was negative for pulmonary embolism but showed aspiration pneumonitis.  She was treated with IV antibiotics which ended on 06/23/18.    She underwent EGD and advancement of GJ feeding tube on 06/21/18.  Abdominal x-rays on 06/22/18 showed GJ tube malpositioned, looped in the fundus.  This J-tube repositioned itself, so it is now being used again.      Of note: An echocardiogram on 06/15/18 showed moderate pericardial effusion (known pericardial effusion 08/20/17 and 12/20/17).  Cardiology felt likely malnutrition with low protein causing " "capillary leak as a probable etiology.  Recommendations for observation with no further workup, but a repeat echocardiogram on 06/29/18 showed no change in no hemodynamic effects of effusion.      Hospitalization in July 2018: On 07/7/18, she was getting out of bed early in the day and decided to walk to the bathroom on her own rather than summoned assistance.  On the way to her bathroom, she became dizzy, fell, and struck the side of the garbage can.  She had experienced dizzy spells which have caused falls frequently before.  A CT of the chest, abdomen, and pelvis showed displaced left ninth and 10th rib fractures with a moderate sized pneumothorax and increased moderate pericardial effusion.  A chest tube was placed in the ED.  Trauma surgery evaluated the patient, and the chest tube was removed on 07/10/18.  She remained stable off oxygen with no dyspnea.    Another echocardiogram was done, as the pericardial effusion had possibly increased in size per the CT on admission.  It showed no significant change compared to previously, and there are recommendations for her to follow-up with cardiology in 4 weeks.    A UGI was done, showing persistent partial functional obstruction at the level of the diaphragmatic hiatus, and a strict n.p.o. and tube feedings continued.  She is to follow-up with Dr. De Jesus on 08/10/18 for further esophageal evaluation.  She was subsequently discharged back here on 07/12/18.      CURRENT ISSUES  Today she is being evaluated for a routine review of multiple medical problems. She states she is doing just fine and doesn't have any issues. She was startled upon my arrival and cannot seem to move past that fact. Continues to state that she was \"scared\". She is laughing over it but appears somewhat anxious. She otherwise states she doesn't have any concerns, issues, or anything that she would like to discuss. She appears quite thin. She denies any other concerns including fevers/chills, " cough or cold symptoms, headaches, vision changes, chest pain/pressure, difficulty breathing, SOB, abdominal pain, nausea, vomiting, diarrhea, dysuria, increasing weakness, increasing pain.     ROS:   Per HPI    Past Medical History:   Diagnosis Date     Acute encephalopathy      Aperistalsis of esophagus      Arthritis      Compression fracture of lumbar vertebra (H)     L2     COPD (chronic obstructive pulmonary disease) (H)      Depression      Dyslipidemia      Encephalopathy      Esophageal candidiasis (H)      Failure to thrive in adult      Fall      GERD (gastroesophageal reflux disease)      Hiatal hernia      HLD (hyperlipidemia)      HTN (hypertension)      Hypocalcemia      Hypomagnesemia      Hypoxia      Insomnia      Lactic acidosis      Major depressive disorder      Malnutrition (H)      Maternal MCV (mean corpuscular volume) low      Microcytic anemia      Microcytic hypochromic anemia 01/2013     Osteoporosis      Pericardial effusion      PMR (polymyalgia rheumatica) (H)      Pneumonia      Pneumothorax      Positive urine drug screen 10/2012    meth and coke positive 10/12. negative in 11/19/12     Pulmonary emphysema (H)      Rib fracture      S/P laparoscopic fundoplication      SIRS (systemic inflammatory response syndrome) (H)      Thrombocytopenia (H)      Trigger finger     left hand, middle finger     Underweight      Urinary urgency               Family History   Problem Relation Age of Onset     Diabetes Mother      Cancer Mother      lung     Hypertension Mother      Emphysema Father      Heart disease Father      Hypertension Father      No Medical Problems Brother      Breast cancer Maternal Aunt      Social History     Social History     Marital status: Single     Spouse name: N/A     Number of children: N/A     Years of education: N/A     Social History Main Topics     Smoking status: Former Smoker     Packs/day: 1.50     Years: 30.00     Types: Cigarettes     Quit date: 8/10/1991      Smokeless tobacco: Never Used     Alcohol use No     Drug use: No     Sexual activity: Not Currently     Other Topics Concern     Not on file     Social History Narrative     MEDICATIONS: Reviewed from the MAR, physician orders, and earlier progress notes.  Current Outpatient Prescriptions   Medication Sig     acetaminophen (TYLENOL) 650 mg/20.3 mL Soln 650 mg by G-tube route every 4 (four) hours as needed.     albuterol (PROVENTIL HFA;VENTOLIN HFA) 90 mcg/actuation inhaler Inhale 2 puffs 4 (four) times a day as needed for wheezing.      aluminum-magnesium hydroxide-simethicone (MAALOX ADVANCED) 200-200-20 mg/5 mL Susp 20 mL by G-tube route 4 (four) times a day.      aspirin 81 mg chewable tablet 81 mg by G-tube route daily.     aspirin 81 MG EC tablet 81 mg by G-tube route daily.      atorvastatin (LIPITOR) 20 MG tablet 20 mg by G-tube route daily.      budesonide-formoterol (SYMBICORT) 160-4.5 mcg/actuation inhaler Inhale 2 puffs 2 (two) times a day.     calcium carbonate 500 mg/5 mL (1,250 mg/5 mL) suspension 600 mg by G-tube route daily.     cholecalciferol, vitamin D3, (VITAMIN D3 ORAL) Take by mouth daily. Vitamin D3 (D-VI-SOL)  liquid; 400U; amt: 1ml (400units); gastric tube  Once A Day;     cholecalciferol, vitamin D3, 2,000 unit cap 2,000 capsules by G-tube route daily.      cholecalciferol, vitamin D3, 400 unit/5 mL Liqd 400 Units by G-tube route daily.     docusate sodium (COLACE) 50 mg/5 mL oral liquid 50 mg by G-tube route daily.     DULoxetine (CYMBALTA) 60 MG capsule 60 mg by G-tube route daily.      ferrous sulfate 220 mg (44 mg iron)/5 mL solution Take by mouth daily. FERROUS SULFATE Rosina M Karlinski  solution; 220mg (44mg iron) / 5ml; amt: 7.4ml; gastric tube  Special Instructions: anorexia  Once A Day;     ferrous sulfate 300 mg (60 mg iron)/5 mL syrup 325 mg by G-tube route daily.     lactose-reduced food/fiber (ISOSOURCE 1.5 JASPREET ENTERAL TUBE) by Enteral Tube route. Isosource 1.5 continuous  at goal rate of 40ml/hr Rosina Guo  Special Instructions: tube feeding  Goal per day 960ml/day  Every Shift;     lansoprazole (PREVACID SOLUTAB) 30 MG disintegrating tablet 30 mg by G-tube route daily.     lidocaine (LIDODERM) 5 % Place 1 patch on the skin 2 (two) times a day. Remove & Discard patch within 12 hours or as directed by MD sienna Guo  adhesive patch,medicated; 5 %; topical  Special Instructions: apply to painful area of skin  dx: closed fracture of multiple ribs of left side  Twice A Day;     mirtazapine (REMERON) 15 MG tablet 7.5 mg by G-tube route at bedtime.     ondansetron (ZOFRAN-ODT) 4 MG disintegrating tablet 4 mg by G-tube route every 6 (six) hours as needed for nausea.     oxyCODONE (ROXICODONE) 5 MG immediate release tablet Take 5-10 mg by mouth every 4 (four) hours as needed for pain.     polyethylene glycol (MIRALAX) 17 gram packet 17 g by G-tube route daily as needed.      sennosides (SENNOSIDES) 8.8 mg/5 mL Syrp syrup 6.5 mg by G-tube route daily as needed.      tiotropium (SPIRIVA) 18 mcg inhalation capsule Place 18 mcg into inhaler and inhale daily.     tiotropium (SPIRIVA) 18 mcg inhalation capsule Place 18 mcg into inhaler and inhale daily.     traZODone (DESYREL) 50 MG tablet 25 mg by G-tube route at bedtime as needed for sleep.     ALLERGIES:   Allergies   Allergen Reactions     Sulfa (Sulfonamide Antibiotics) Other (See Comments)      Ototoxicity         Penicillins Rash     DIET: NPO.  She is on tube feeding at 35 mL/h.    Vitals:    07/23/18 2218   Resp: 20   She is down approximately 17 pounds over the last 4 months.  There is no height or weight on file to calculate BMI.    EXAMINATION:   General: Pleasant, alert, and conversant middle-aged female, looking much older than her stated age, up in a wheelchair, in no apparent distress.  She has a rather scratchy voice, probably from years of smoking.  She does have a history of COPD.    Head: Normocephalic  and atraumatic.  Significant hirsutism.  Eyes: PERRLA, sclerae clear.   ENT: Slightly dry oral mucosa.  Full upper and lower dentures.  She is only wearing the uppers currently.  Hearing is unimpaired.  Cardiovascular: Regular rate and rhythm.  No appreciable murmur.  Respiratory: Severely diminished lung sounds on the left (site of pneumothorax).  Otherwise, lungs are clear to auscultation bilaterally.  Abdomen: Soft and nontender.   Musculoskeletal/Extremities: Age-related degenerative joint disease.  Barrel chested with moderate to severe thoracic kyphosis.  Bilateral soft nonpitting lower extremity edema.  Neurological: Fine bilateral upper extremity tremor.  Integument: No rashes, clinically significant lesions, or skin breakdown.   Cognitive/Psychiatric: Alert and oriented but appears quite depressed with a very flat affect.    DIAGNOSTICS:   None today.     ASSESSMENT/Plan:      ICD-10-CM    1. Failure to thrive in adult R62.7    2. 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 Encouraged patient to engage in healthy lifestyle behaviors such as engaging in social activities, exercising (PT/OT), eating well, and following care plan. Follow up for routine check-up, or sooner if needed. Will continue to monitor patient and work with nursing staff collaboratively to work toward positive patient outcomes.    Greater than 35 minutes spent with patient with at least 55% of this time spent on review of previous records, counseling, education, and discussion of the above care plan with nursing staff, and patient.     Electronically signed by: Meera Biswas CNP

## 2021-06-19 NOTE — PROGRESS NOTES
"[facility]    Name:   Rosina Link  : 1945  Facility:   [facility] [[id number]]   Room: 224  Code Status: DNR/DNI -   Fac type:   SNF (Skilled Nursing Facility, TCU) -     CHIEF COMPLAINT / REASON FOR VISIT:  Chief Complaint   Patient presents with     Review Of Multiple Medical Conditions     TCU follow-up after hospitalization for hiatal hernia with GERD and esophagitis, as well as adult failure to thrive.  This was followed by a second hospitalization secondary to patient fall with multiple rib fractures, resulting in a pneumothorax.     [facility] from 18 until 18  [facility] TCU from 18 until 03/10/18  [facility] from 18 until 18  [facility] TCU from 8018 until   [facility] from 18 until 18    Patient was last seen by me on 18.    HPI: Rosina is a 73 y.o. female with a history of severe COPD, depression, GERD, and insomnia.  She underwent surgery in 2016 for a large hiatal hernia (total gastric herniation) that included laparoscopic repair with mesh and Gorge fundoplication.        Hospitalization 2018: She had suffered a 60 pound unintentional weight loss prior to that, dropping from about 160 pounds down to 95 pounds.  There was a brief period of weight gain after that, but she eventually returned to the lower weight.  She developed progressive weakness and inability to eat much.  She would take several bites and then feel full.  She was so weak upon her admission to [facility] in 2018 that she could hardly walk.      She had had a CT scan on 18 that showed of her stomach back in her chest.  She was supposed to see Dr. De Jesus at on 18 to review pictures and to discuss options.  The CT scan described \"small amount of scarring and residual consolidation in the paramedial right lower lobe at the site of resolving pneumonia.\"  " "Chest x-rays performed on 02/16/18 showed \"right lower lobe pneumonia with small parapneumonic effusion.  The amount of infiltrate in the right lower lobe appeared increased from the CT examination.\"  She was admitted for community-acquired pneumonia.  She had no fevers or leukocytosis.      Hospitalization June 2018: More recently, she was seen by Dr. Vasquez on 05/18/18 for dysphagia, nausea, and early satiety with weight loss.  An EGD with general anesthesia was planned at some point with consideration for repair of hiatal hernia and redo partial fundoplication.    She presented to [facility] ER on 06/05/18 with weakness, anorexia, and constipation.  Constipation eventually resolved without changes to appetite.  Psych was consulted with concern that depression may be contributory, and she was started on low-dose mirtazapine.  Megace was started as an appetite stimulant, and a CT showed a large recurrent hiatal hernia.  A GJ tube placement was suggested, but the patient was felt not to be a candidate for GJ tube per IR due to anatomy.  Therefore, GI and surgery were consulted, and she underwent laparoscopic partial fundoplication takedown repair paraesophageal hernia and placement of gastric jejunostomy tube placement; upper endoscopy by Dr. Daniel De Jesus on 06/12/18.  She was also found to have esophageal candidiasis and was given fluconazole.  The PICC line was placed on 06/13/18 to start TPN.    Overnight (06/14/18 to 06/15/18), rapid response was called due to hypoxemia and tachycardia.  A CT of the chest was negative for pulmonary embolism but showed aspiration pneumonitis.  She was treated with IV antibiotics which ended on 06/23/18.    She underwent EGD and advancement of GJ feeding tube on 06/21/18.  Abdominal x-rays on 06/22/18 showed GJ tube malpositioned, looped in the fundus.  This J-tube repositioned itself, so it is now being used again.      Of note: An echocardiogram on 06/15/18 showed " moderate pericardial effusion (known pericardial effusion 08/20/17 and 12/20/17).  Cardiology felt likely malnutrition with low protein causing capillary leak as a probable etiology.  Recommendations for observation with no further workup, but a repeat echocardiogram on 06/29/18 showed no change in no hemodynamic effects of effusion.      Hospitalization in July 2018: On 07/7/18, she was getting out of bed early in the day and decided to walk to the bathroom on her own rather than summoned assistance.  On the way to her bathroom, she became dizzy, fell, and struck the side of the garbage can.  She had experienced dizzy spells which have caused falls frequently before.  A CT of the chest, abdomen, and pelvis showed displaced left ninth and 10th rib fractures with a moderate sized pneumothorax and increased moderate pericardial effusion.  A chest tube was placed in the ED.  Trauma surgery evaluated the patient, and the chest tube was removed on 07/10/18.  She remained stable off oxygen with no dyspnea.    Another echocardiogram was done, as the pericardial effusion had possibly increased in size per the CT on admission.  It showed no significant change compared to previously, and there are recommendations for her to follow-up with cardiology in 4 weeks.    A UGI was done, showing persistent partial functional obstruction at the level of the diaphragmatic hiatus, and a strict n.p.o. and tube feedings continued.  She is to follow-up with Dr. De Jesus on 08/10/18 for further esophageal evaluation.  She was subsequently discharged back here on 07/12/18.      CURRENT ISSUES    Failure to thrive/NPO status: Since her stay at the [facility] TCU in February/March 2018, she had dropped 17 pounds.  She is quite cachectic with a BMI of 16-17.   Admitted NPO with tube feedings at 30 mL/h, food was being introduced (regular diet, thin liquids, small portions).  Isosource 1.5 is now being administered at 56 mL/h for 18  "hours, starting at 1500 and ending at 0900.  Medications continue to be given via feeding tube. She was seen by speech therapy here and appeared to be doing okay; however, we are going to follow guidelines set by Dr. De Jesus at this juncture.  With her current tube feeding, her weight has been stable.    As noted, she was to remain NPO until UGI is performed.  She was also followed by speech therapy in the hospital, and her swallow function was deemed intact. She had a follow-up appointment on 07/09/18, but she remains NPO.  Her next appointment with Dr. De Jesus is on 08/10/18.  She is anxious to start eating again.    Confusion: On 07/23/18 staff report that she became confused and agitated, requesting to be taken back to her old room and to the \"real [place]\".  When addressed with patient, she admited to feeling disoriented, says \"I thought I was imagining things and not at [place]\".  This problem has resolved.  Today she is oriented and appropriate, pleasant and conversant throughout the visit.     GERD: She is complaining of heartburn despite being on lansoprazole 30 mg daily.  She previously had orders for Maalox as needed, and she was not taking it.  We scheduled that while awake, keeping with 4 times daily dosing.  Of epigastric/suprasternal heartburn that she describes as \"terrible, terrible, terrible.\"  She told me it was all right for some time, but it had returned.  We added sucralfate 1 g 4 times daily and a stool check for H pylori (negative).  This has been effective.  She tells me, \"something's helping.\"    Depression: He does admit to being depressed \"some days.\"  She is on mirtazapine and duloxetine.    COPD: She does have COPD and is on a variety of inhalers. Denies dyspnea, coughing, or chest pain.    Urinary incontinence: She does have stress incontinence and is checked by nursing staff during the night.  She stated at my last visit, \"every time he turned around, I'm peeing in my " "britches.\"  She finds it very hard to control, stating that she does not realize it until after she starts.  There is some nocturia as well.  None of this has changed since earlier visits.    Bowel incontinence: A few weeks back, she had developed loose, incontinent stools. Her tube feeding formula had been recently changed, and despite the loose stools, she was receiving scheduled Colace and PRN senna and MiraLAX.  When addressed with staff, this was attributed to poor communication during shift report, so we discontinued all of her bowel medications.  The problem persisted until we replaced psyllium with a 500 mg tab of methylcellulose daily.  Despite resolution of her loose stools, she is still incontinent of bowel.  She tells me, \"I turn on my lights, but they don't get here in time.\"    Code status: I initially had her listed as full code.  She is now DNR/DNI.      ROS: She sleeps well only at times.  She is receiving trazodone 25 mg nightly.  We will keep monitoring to see if dose adjustment is necessary.  There might still be some tenderness in the left rib cage.  She does tend to sleep on her right side.  She denies any headaches or chest pains, coughing or congestion, nausea or vomiting,  dizziness or dyspnea, dysuria, difficulty chewing or swallowing, integumentary issues.      Past Medical History:   Diagnosis Date     Acute encephalopathy      Aperistalsis of esophagus      Arthritis      Compression fracture of lumbar vertebra (H)     L2     COPD (chronic obstructive pulmonary disease) (H)      Depression      Dyslipidemia      Encephalopathy      Esophageal candidiasis (H)      Failure to thrive in adult      Fall      GERD (gastroesophageal reflux disease)      Hiatal hernia      HLD (hyperlipidemia)      HTN (hypertension)      Hypocalcemia      Hypomagnesemia      Hypoxia      Insomnia      Lactic acidosis      Major depressive disorder      Malnutrition (H)      Maternal MCV (mean corpuscular " volume) low      Microcytic anemia      Microcytic hypochromic anemia 01/2013     Osteoporosis      Pericardial effusion      PMR (polymyalgia rheumatica) (H)      Pneumonia      Pneumothorax      Positive urine drug screen 10/2012    meth and coke positive 10/12. negative in 11/19/12     Pulmonary emphysema (H)      Rib fracture      S/P laparoscopic fundoplication      SIRS (systemic inflammatory response syndrome) (H)      Thrombocytopenia (H)      Trigger finger     left hand, middle finger     Underweight      Urinary urgency               Family History   Problem Relation Age of Onset     Diabetes Mother      Cancer Mother      lung     Hypertension Mother      Emphysema Father      Heart disease Father      Hypertension Father      No Medical Problems Brother      Breast cancer Maternal Aunt      Social History     Social History     Marital status: Single     Spouse name: N/A     Number of children: N/A     Years of education: N/A     Social History Main Topics     Smoking status: Former Smoker     Packs/day: 1.50     Years: 30.00     Types: Cigarettes     Quit date: 8/10/1991     Smokeless tobacco: Never Used     Alcohol use No     Drug use: No     Sexual activity: Not Currently     Other Topics Concern     Not on file     Social History Narrative     MEDICATIONS: Reviewed from the MAR, physician orders, and earlier progress notes.  Current Outpatient Prescriptions   Medication Sig     acetaminophen (TYLENOL) 650 mg/20.3 mL Soln 650 mg by G-tube route every 4 (four) hours as needed.     albuterol (PROVENTIL HFA;VENTOLIN HFA) 90 mcg/actuation inhaler Inhale 2 puffs 4 (four) times a day as needed for wheezing.      aluminum-magnesium hydroxide-simethicone (MAALOX ADVANCED) 200-200-20 mg/5 mL Susp 20 mL by G-tube route 4 (four) times a day.      aspirin 81 mg chewable tablet 81 mg by G-tube route daily.     aspirin 81 MG EC tablet 81 mg by G-tube route daily.      atorvastatin (LIPITOR) 20 MG tablet 20 mg by  G-tube route daily.      budesonide-formoterol (SYMBICORT) 160-4.5 mcg/actuation inhaler Inhale 2 puffs 2 (two) times a day.     calcium carbonate 500 mg/5 mL (1,250 mg/5 mL) suspension 600 mg by G-tube route daily.     cholecalciferol, vitamin D3, (VITAMIN D3 ORAL) Take by mouth daily. Vitamin D3 (D-VI-SOL)  liquid; 400U; amt: 1ml (400units); gastric tube  Once A Day;     cholecalciferol, vitamin D3, 2,000 unit cap 2,000 capsules by G-tube route daily.      cholecalciferol, vitamin D3, 400 unit/5 mL Liqd 400 Units by G-tube route daily.     DULoxetine (CYMBALTA) 60 MG capsule 60 mg by G-tube route daily.      ferrous sulfate 220 mg (44 mg iron)/5 mL solution Take by mouth daily. FERROUS SULFATE Rosina Guo  solution; 220mg (44mg iron) / 5ml; amt: 7.4ml; gastric tube  Special Instructions: anorexia  Once A Day;     ferrous sulfate 300 mg (60 mg iron)/5 mL syrup 325 mg by G-tube route daily.     lactose-reduced food/fiber (ISOSOURCE 1.5 JASPREET ENTERAL TUBE) by Enteral Tube route. Isosource 1.5 continuous at goal rate of 40ml/hr Rosina Guo  Special Instructions: tube feeding  Goal per day 960ml/day  Every Shift;     lansoprazole (PREVACID SOLUTAB) 30 MG disintegrating tablet 30 mg by G-tube route daily.     lidocaine (LIDODERM) 5 % Place 1 patch on the skin 2 (two) times a day. Remove & Discard patch within 12 hours or as directed by MD    lidocaine Rosina Guo  adhesive patch,medicated; 5 %; topical  Special Instructions: apply to painful area of skin  dx: closed fracture of multiple ribs of left side  Twice A Day;     methylcellulose (CITRUCEL) 500 mg Tab Take 500 mg by mouth daily.     mirtazapine (REMERON) 15 MG tablet 7.5 mg by G-tube route at bedtime.     ondansetron (ZOFRAN-ODT) 4 MG disintegrating tablet 4 mg by G-tube route every 6 (six) hours as needed for nausea.     oxyCODONE (ROXICODONE) 5 MG immediate release tablet Take 5-10 mg by mouth every 4 (four) hours as needed for pain.      "polyethylene glycol (MIRALAX) 17 gram packet 17 g by G-tube route daily as needed.      sennosides (SENNOSIDES) 8.8 mg/5 mL Syrp syrup 6.5 mg by G-tube route daily as needed.      sucralfate (CARAFATE) 100 mg/mL suspension Take 1 g by mouth 4 (four) times a day.     tiotropium (SPIRIVA) 18 mcg inhalation capsule Place 18 mcg into inhaler and inhale daily.     tiotropium (SPIRIVA) 18 mcg inhalation capsule Place 18 mcg into inhaler and inhale daily.     traZODone (DESYREL) 50 MG tablet 25 mg by G-tube route at bedtime as needed for sleep.     ALLERGIES:   Allergies   Allergen Reactions     Sulfa (Sulfonamide Antibiotics) Other (See Comments)      Ototoxicity         Penicillins Rash     DIET: NPO.  She is on tube feeding at 56 mL/h for 18 hours per day.    Vitals:    08/07/18 1035   BP: 108/70   Pulse: 62   Resp: 20   Temp: 96.8  F (36  C)   Weight: (!) 85 lb 6.4 oz (38.7 kg)   Height: 4' 11\" (1.499 m)   After being down approximately 17 pounds over the last 4 months, weight has stabilized on her tube feedings.  Body mass index is 17.25 kg/(m^2).    EXAMINATION:   General: Pleasant, alert, and conversant middle-aged female, looking much older than her stated age, lying in bed in a fetal position on her right side, in no apparent distress.    Head: Normocephalic and atraumatic.  Significant hirsutism.  Eyes: PERRLA, sclerae clear.   ENT: Slightly dry oral mucosa.  Full upper and lower dentures.  She is only wearing the uppers currently.  Hearing is unimpaired.  Cardiovascular: Regular rate and rhythm. No appreciable murmur.  Respiratory: Today, lungs are clear to auscultation bilaterally.  Abdomen: Soft and nontender.   Musculoskeletal/Extremities: Age-related degenerative joint disease.  Barrel chested with severe thoracic kyphosis.  No peripheral edema.   Neurological: Fine bilateral upper extremity tremor.  Integument: No rashes, clinically significant lesions, or skin breakdown.   Cognitive/Psychiatric: Alert and " oriented she seems to present with a depressed/flat affect she does a lot of wise cracking during my visit.     DIAGNOSTICS:   No results found for this or any previous visit.  Lab Results   Component Value Date    WBC 5.4 07/06/2018    HGB 11.6 (L) 07/06/2018    HCT 36.8 07/06/2018    MCV 96 07/06/2018     07/06/2018     CrCl cannot be calculated (Patient's most recent sCr result is older than the maximum 5 days allowed.).    ASSESSMENT/Plan:      ICD-10-CM    1. Failure to thrive in adult R62.7    2. Hernia of abdominal cavity K46.9    3. Chronic GERD K21.9    4. 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 Major depressive disorder F32.9    6. Pulmonary emphysema, unspecified emphysema type (H) J43.9    7. Bowel and bladder incontinence R32     R15.9    8. Coronary artery disease without angina pectoris I25.10      CHANGES:    None.    CARE PLAN:  The care plan has been reviewed and all orders signed. Changes to care plan, if any, as noted.  Otherwise, continue current plan of care.      The above has been created using voice recognition software. Please be aware that this may unintentionally  produce inaccuracies and/or nonsensical sentences.       Electronically signed by: Ramana Blandon CNP

## 2021-06-19 NOTE — LETTER
"Letter by Ramana Blandon CNP at      Author: Ramana Blandon CNP Service: -- Author Type: --    Filed:  Encounter Date: 10/24/2019 Status: Signed         Patient: Rosina Link   MR Number: [id number]   YOB: 1945   Date of Visit: 10/24/2019     [facility]    Name:   Rosina Link  : 1945  Facility:   [facility] [[id number]]   Room: 228A  Code Status: DNR/DNI -   Fac type:    (Long Term Care, LTC) -     CHIEF COMPLAINT / REASON FOR VISIT:  Chief Complaint   Patient presents with   ? FVP Care Coordination - Regulatory     [facility] from 18 until 18  [facility] from 18 until 03/10/18  [facility] from 18 until 18  [facility] from 8018 until 18  [facility] from 18 until 18    Patient was last seen by me on 2019.      HPI: Rosina is a 74 y.o. female with a history of severe COPD/pulmonary emphysema, depression, hiatal hernia/esophageal dysphagia/GERD, and insomnia.        HOSPITALIZATIONS    Hospitalization 2018: She had suffered a 60 pound unintentional weight loss prior to that, dropping from about 160 pounds down to 95 pounds.  There was a brief period of weight gain after that, but she eventually returned to the lower weight.  She developed progressive weakness and inability to eat much.  She would take several bites and then feel full.  She was so weak upon her admission to [facility] in 2018 that she could hardly walk.      She had had a CT scan on 18 that showed of her stomach back in her chest.  She was supposed to see Dr. De Jesus at on 18 to review pictures and to discuss options.  The CT scan described \"small amount of scarring and residual consolidation in the paramedial right lower lobe at the site of resolving pneumonia.\"  Chest x-rays performed on 18 showed \"right lower lobe " "pneumonia with small parapneumonic effusion.  The amount of infiltrate in the right lower lobe appeared increased from the CT examination.\"  She was admitted for community-acquired pneumonia.  She had no fevers or leukocytosis.    Hospitalization June 2018: More recently, she was seen by Dr. Vasquez on 05/18/18 for dysphagia, nausea, and early satiety with weight loss.  An EGD with general anesthesia was planned at some point with consideration for repair of hiatal hernia and redo partial fundoplication.    She presented to [facility] ER on 06/05/18 with weakness, anorexia, and constipation.  Constipation eventually resolved without changes to appetite.  Psych was consulted with concern that depression may be contributory, and she was started on low-dose mirtazapine.  Megace was started as an appetite stimulant, and a CT showed a large recurrent hiatal hernia.  A GJ tube placement was suggested, but the patient was felt not to be a candidate for GJ tube per IR due to anatomy.  Therefore, GI and surgery were consulted, and she underwent laparoscopic partial fundoplication takedown repair paraesophageal hernia and placement of gastric jejunostomy tube placement; upper endoscopy by Dr. Daniel De Jesus on 06/12/18.  She was also found to have esophageal candidiasis and was given fluconazole.  The PICC line was placed on 06/13/18 to start TPN.    Overnight (06/14/18 to 06/15/18), rapid response was called due to hypoxemia and tachycardia.  A CT of the chest was negative for pulmonary embolism but showed aspiration pneumonitis.  She was treated with IV antibiotics which ended on 06/23/18.    She underwent EGD and advancement of GJ feeding tube on 06/21/18.  Abdominal x-rays on 06/22/18 showed GJ tube malpositioned, looped in the fundus.  This J-tube repositioned itself and was then being used again.      Of note: An echocardiogram on 06/15/18 showed moderate pericardial effusion (known pericardial effusion 08/20/17 and " 12/20/17).  Cardiology felt likely malnutrition with low protein causing capillary leak as a probable etiology.  Recommendations for observation with no further workup, but a repeat echocardiogram on 06/29/18 showed no change in no hemodynamic effects of effusion.    Hospitalization in July 2018: On 07/7/18, she was getting out of bed early in the day and decided to walk to the bathroom on her own rather than summoned assistance.  On the way to her bathroom, she became dizzy, fell, and struck the side of the garbage can.  She had experienced dizzy spells which have caused falls frequently before.  A CT of the chest, abdomen, and pelvis showed displaced left ninth and 10th rib fractures with a moderate sized pneumothorax and increased moderate pericardial effusion.  A chest tube was placed in the ED.  Trauma surgery evaluated the patient, and the chest tube was removed on 07/10/18.  She remained stable off oxygen with no dyspnea.    Another echocardiogram was done, as the pericardial effusion had possibly increased in size per the CT on admission.  It showed no significant change compared to previously, and there are recommendations for her to follow-up with cardiology in 4 weeks.    A UGI was done, showing persistent partial functional obstruction at the level of the diaphragmatic hiatus, and a strict n.p.o. and tube feedings continued.  She was subsequently discharged back here on 07/12/18.  She was to follow-up with Dr. De Jesus on 08/10/18 for further esophageal evaluation (as described in CURRENT ISSUES).    Hospitalization January 2019: She underwent surgery in February 2016 for a large hiatal hernia (total gastric herniation) that included laparoscopic repair with mesh and Gorge fundoplication.  With recurrence, she was scheduled again for repair (and possible Jamee gastroplasty) on 01/23/19 with Dr. De Jesus.    On 01/23/19, she was admitted after laparoscopic reduction repair of a recurrent paraesophageal  hernia.  Her postoperative course was uneventful.  An esophagram on POD 1 revealed no hernia, no leak, and no obstruction.  She was tolerating a full liquid diet, and her pain was under control.  Tube feeding was reinitiated during her admission as well.  Discharge orders back to [facility] included a full liquid diet for 2 weeks with subsequent follow-up with Dr. De Jesus.  The hope was that tube feeding could be discontinued in the near future.  Overall, she was noted to be doing well and was considered happy with the outcome.  She was readmitted on 01/25/19.      CURRENT ISSUES    GERD: Accompanied by nausea, all has resolved after hernia repair.  We are discontinuing ondansetron.    Oral intake/weight gain: With discontinuation of tube feeding, she initially had difficulty eating but, as oral intake improved -- and her PEG tube was discontinued -- she has slowly gained weight.  We will be discontinuing mirtazapine.  Saliva stimulant also no longer needed and will be discontinued.    Cardiopulmonary issues/COPD: Chest x-rays on 02/05/2019 revealed mild cardiomegaly with congestive failure and superimposed right lower lobe infiltrate.  Her white count was normal but temp slightly elevated from her baseline.  She had a nonproductive cough, wheeze on inspiration, crackles on expiration, and diminished sounds in the left lower lobe with sats of 89% on room air.  We ordered 40 mg of Lasix daily and Augmentin for 10 days.  A follow-up BMP was unremarkable.  She continues on 40 mg of Lasix.    She does have COPD/pulmonary emphysema, is barrel-chested, and is on a variety of inhalers.  She continues to deny dyspnea, coughing, or chest pain.    Urinary incontinence: She does have stress incontinence and is checked by nursing staff during the night.   She finds it very hard to control, stating that she does not realize it until after it starts.  There is some nocturia as well.  None of this has changed since  admission.      ROS: She is no longer getting nauseated.  She is on trazodone 25 mg nightly and sleeps well with this.  She denies any headaches or chest pains, coughing or congestion, nausea or vomiting,  dizziness or dyspnea, dysuria, difficulty chewing or swallowing, or any integumentary issues.      Past Medical History:   Diagnosis Date   ? Acute encephalopathy    ? Aperistalsis of esophagus    ? Arthritis    ? Compression fracture of lumbar vertebra (H)     L2   ? COPD (chronic obstructive pulmonary disease) (H)    ? Depression    ? Dyslipidemia    ? Encephalopathy    ? Esophageal candidiasis (H)    ? Esophagitis    ? Failure to thrive in adult    ? Fall    ? GERD (gastroesophageal reflux disease)    ? Hiatal hernia    ? HLD (hyperlipidemia)    ? HTN (hypertension)    ? Hypocalcemia    ? Hypomagnesemia    ? Hypoxia    ? Insomnia    ? Lactic acidosis    ? Major depressive disorder    ? Malnutrition (H)    ? Maternal MCV (mean corpuscular volume) low    ? Microcytic anemia    ? Microcytic hypochromic anemia 01/2013   ? Osteoporosis    ? Pericardial effusion    ? PMR (polymyalgia rheumatica) (H)    ? Pneumonia    ? Pneumothorax    ? Positive urine drug screen 10/2012    meth and coke positive 10/12. negative in 11/19/12   ? Pulmonary emphysema (H)    ? Rib fracture    ? S/P laparoscopic fundoplication    ? SIRS (systemic inflammatory response syndrome) (H)    ? Thrombocytopenia (H)    ? Trigger finger     left hand, middle finger   ? Underweight    ? Urinary urgency               Family History   Problem Relation Age of Onset   ? Diabetes Mother    ? Cancer Mother         lung   ? Hypertension Mother    ? Emphysema Father    ? Heart disease Father    ? Hypertension Father    ? No Medical Problems Brother    ? Breast cancer Maternal Aunt      Social History     Socioeconomic History   ? Marital status: Single     Spouse name: Not on file   ? Number of children: Not on file   ? Years of education: Not on file   ?  Highest education level: Not on file   Occupational History   ? Not on file   Social Needs   ? Financial resource strain: Not on file   ? Food insecurity:     Worry: Not on file     Inability: Not on file   ? Transportation needs:     Medical: Not on file     Non-medical: Not on file   Tobacco Use   ? Smoking status: Former Smoker     Packs/day: 1.50     Years: 30.00     Pack years: 45.00     Types: Cigarettes     Last attempt to quit: 8/10/1991     Years since quittin.2   ? Smokeless tobacco: Never Used   Substance and Sexual Activity   ? Alcohol use: No   ? Drug use: No   ? Sexual activity: Not Currently   Lifestyle   ? Physical activity:     Days per week: Not on file     Minutes per session: Not on file   ? Stress: Not on file   Relationships   ? Social connections:     Talks on phone: Not on file     Gets together: Not on file     Attends [employer] service: Not on file     Active member of club or organization: Not on file     Attends meetings of clubs or organizations: Not on file     Relationship status: Not on file   ? Intimate partner violence:     Fear of current or ex partner: Not on file     Emotionally abused: Not on file     Physically abused: Not on file     Forced sexual activity: Not on file   Other Topics Concern   ? Not on file   Social History Narrative   ? Not on file     MEDICATIONS: Reviewed from the MAR, physician orders, and earlier progress notes.   Updated by me today (10/24/2019) with a change in all G-tube medications to oral and elimination of ondansetron and mirtazapine (as well as saliva stimulant and fiber) reflected below.  Current Outpatient Medications   Medication Sig   ? acetaminophen (TYLENOL) 650 mg/20.3 mL Soln Take 650 mg by mouth every 4 (four) hours as needed.          ? aluminum-magnesium hydroxide 200-200 mg/5 mL suspension Take 15 mL by mouth every 6 (six) hours as needed for indigestion.   ? aspirin 81 mg chewable tablet Chew 81 mg daily.          ? atorvastatin  "(LIPITOR) 20 MG tablet Take 20 mg by mouth daily.          ? budesonide-formoterol (SYMBICORT) 160-4.5 mcg/actuation inhaler Inhale 2 puffs 2 (two) times a day.   ? calcium carbonate 500 mg/5 mL (1,250 mg/5 mL) suspension Take 600 mg by mouth daily.          ? cholecalciferol, vitamin D3, 400 unit/5 mL Liqd Take 400 Units by mouth daily.          ? furosemide (LASIX) 40 MG tablet Take 40 mg by mouth daily.   ? omeprazole (PRILOSEC) 2 mg/mL SusR suspension Take 20 mg by mouth daily before breakfast.          ? senna-docusate (SENNOSIDES-DOCUSATE SODIUM) 8.6-50 mg tablet Take 1 tablet by mouth 2 (two) times a day.   ? umeclidinium (INCRUSE ELLIPTA) 62.5 mcg/actuation DsDv inhaler Inhale 1 puff daily.   ? venlafaxine (EFFEXOR) 37.5 MG tablet Take 37.5 mg by mouth daily.            ALLERGIES:   Allergies   Allergen Reactions   ? Sulfa (Sulfonamide Antibiotics) Other (See Comments)      Ototoxicity       ? Penicillins Rash     DIET: Regular diet, mechanical soft texture, thin liquids.  She is still receiving tube feeding supplementally as well as an oral supplement.    Vitals:    10/24/19 1509   BP: 101/63   Pulse: 73   Resp: 19   Temp: (!) 96.4  F (35.8  C)   SpO2: 93%   Weight: 124 lb 8 oz (56.5 kg)   Height: 4' 11\" (1.499 m)   She is put on a good deal of weight over the last several months.  Body mass index is 25.15 kg/m .    EXAMINATION:   General: Pleasant, alert, and conversant middle-aged female, observed after a long walk with her walker, looking a bit winded.  She has clearly put on a good deal of weight.  Head: Normocephalic and atraumatic.  Significant hirsutism.  Eyes: PERRLA, sclerae clear.   ENT: Slightly dry oral mucosa.  Full upper and lower dentures.  She is only wearing the uppers currently.  Hearing is unimpaired.  Cardiovascular: Regular rate and rhythm with no appreciable murmur.  Respiratory: Lung sounds are clear to auscultation bilaterally.   Abdomen: Soft and nontender. "   Musculoskeletal/Extremities: Age-related degenerative joint disease.  Barrel chested with severe thoracic kyphosis.  No peripheral edema.   Neurological: Occasional fine bilateral upper extremity tremor (not noticeable today).  Integument: No rashes, clinically significant lesions, or skin breakdown.   Cognitive/Psychiatric: Alert and oriented.  To affect is euthymic/upbeat.    DIAGNOSTICS: .  Results for orders placed or performed in visit on 01/04/19   Basic Metabolic Panel   Result Value Ref Range    Sodium 140 136 - 145 mmol/L    Potassium 4.4 3.5 - 5.0 mmol/L    Chloride 102 98 - 107 mmol/L    CO2 30 22 - 31 mmol/L    Anion Gap, Calculation 8 5 - 18 mmol/L    Glucose 83 70 - 125 mg/dL    Calcium 9.3 8.5 - 10.5 mg/dL    BUN 24 8 - 28 mg/dL    Creatinine 0.62 0.60 - 1.10 mg/dL    GFR MDRD Af Amer >60 >60 mL/min/1.73m2    GFR MDRD Non Af Amer >60 >60 mL/min/1.73m2     Lab Results   Component Value Date    WBC 8.6 02/05/2019    HGB 13.8 01/04/2019    HCT 44.0 01/04/2019    MCV 92 01/04/2019     01/04/2019     CrCl cannot be calculated (Patient's most recent lab result is older than the maximum 5 days allowed.).    ASSESSMENT/Plan:      ICD-10-CM    1. Major depressive disorder in full remission, unspecified whether recurrent (H) F32.5    2. Pulmonary emphysema, unspecified emphysema type (H) J43.9    3. Bowel and bladder incontinence R32     R15.9    4. Coronary artery disease involving native coronary artery of native heart without angina pectoris I25.10    5. History of esophageal dysphagia Z87.19    6. History of abdominal hernia Z87.19      CHANGES:  Discontinue ondansetron and mirtazapine.      CASE MANAGEMENT:  I have reviewed the facility/SNF care plan/MDS which was done 00/00/00, including the falls risk, nutrition and pain screening. I also reviewed the current immunizations, and preventive care.. Future cancer screening is not clinically indicated secondary to age/goals of care.   Patient's  desire to return to the community is not present.    Information reviewed:  Medications, vital signs, orders, and nursing notes.    Total 40 minutes of which 50% was spent counseling and coordination of care of the above plan.      CARE PLAN:  The care plan has been reviewed and all orders signed.  Changes to care plan, if any, as noted.  Otherwise, continue current plan of care.  Total time spent with this patient with approximately 40 minutes, with greater than 50% spent in counseling and coordination of care that, because of changing from tube feeding to oral, each one of her medications was reviewed with some changes made.    The above has been created using voice recognition software. Please be aware that this may unintentionally  produce inaccuracies and/or nonsensical sentences.       Electronically signed by: Ramana Blandon CNP

## 2021-06-19 NOTE — PROGRESS NOTES
"[facility]    Name:   Rosina Link  : 1945  Facility:   [facility] [[id number]]   Room: 224  Code Status: DNR/DNI -   Fac type:   SNF (Skilled Nursing Facility, TCU) -     CHIEF COMPLAINT / REASON FOR VISIT:  Chief Complaint   Patient presents with     Review Of Multiple Medical Conditions     TCU follow-up after hospitalization for hiatal hernia with GERD and esophagitis, as well as adult failure to thrive.  This was followed by a second hospitalization secondary to patient fall with multiple rib fractures, resulting in a pneumothorax.     [facility] from 18 until 18  [facility] from 18 until 03/10/18  [facility] from 18 until 18  [facility] from 8018 until   [facility] from 18 until 18    Patient was last seen by me on 18.    HPI: Rosina is a 73 y.o. female with a history of severe COPD/pulmonary emphysema, depression, hiatal hernia/GERD, and insomnia.  She underwent surgery in 2016 for a large hiatal hernia (total gastric herniation) that included laparoscopic repair with mesh and Gorge fundoplication.        Hospitalization 2018: She had suffered a 60 pound unintentional weight loss prior to that, dropping from about 160 pounds down to 95 pounds.  There was a brief period of weight gain after that, but she eventually returned to the lower weight.  She developed progressive weakness and inability to eat much.  She would take several bites and then feel full.  She was so weak upon her admission to [facility] in 2018 that she could hardly walk.      She had had a CT scan on 18 that showed of her stomach back in her chest.  She was supposed to see Dr. De Jesus at on 18 to review pictures and to discuss options.  The CT scan described \"small amount of scarring and residual consolidation in the paramedial right lower lobe at " "the site of resolving pneumonia.\"  Chest x-rays performed on 02/16/18 showed \"right lower lobe pneumonia with small parapneumonic effusion.  The amount of infiltrate in the right lower lobe appeared increased from the CT examination.\"  She was admitted for community-acquired pneumonia.  She had no fevers or leukocytosis.      Hospitalization June 2018: More recently, she was seen by Dr. Vasquez on 05/18/18 for dysphagia, nausea, and early satiety with weight loss.  An EGD with general anesthesia was planned at some point with consideration for repair of hiatal hernia and redo partial fundoplication.    She presented to [facility] on 06/05/18 with weakness, anorexia, and constipation.  Constipation eventually resolved without changes to appetite.  Psych was consulted with concern that depression may be contributory, and she was started on low-dose mirtazapine.  Megace was started as an appetite stimulant, and a CT showed a large recurrent hiatal hernia.  A GJ tube placement was suggested, but the patient was felt not to be a candidate for GJ tube per IR due to anatomy.  Therefore, GI and surgery were consulted, and she underwent laparoscopic partial fundoplication takedown repair paraesophageal hernia and placement of gastric jejunostomy tube placement; upper endoscopy by Dr. Daniel De Jesus on 06/12/18.  She was also found to have esophageal candidiasis and was given fluconazole.  The PICC line was placed on 06/13/18 to start TPN.    Overnight (06/14/18 to 06/15/18), rapid response was called due to hypoxemia and tachycardia.  A CT of the chest was negative for pulmonary embolism but showed aspiration pneumonitis.  She was treated with IV antibiotics which ended on 06/23/18.    She underwent EGD and advancement of GJ feeding tube on 06/21/18.  Abdominal x-rays on 06/22/18 showed GJ tube malpositioned, looped in the fundus.  This J-tube repositioned itself, so it is now being used again.      Of note: An " echocardiogram on 06/15/18 showed moderate pericardial effusion (known pericardial effusion 08/20/17 and 12/20/17).  Cardiology felt likely malnutrition with low protein causing capillary leak as a probable etiology.  Recommendations for observation with no further workup, but a repeat echocardiogram on 06/29/18 showed no change in no hemodynamic effects of effusion.      Hospitalization in July 2018: On 07/7/18, she was getting out of bed early in the day and decided to walk to the bathroom on her own rather than summoned assistance.  On the way to her bathroom, she became dizzy, fell, and struck the side of the garbage can.  She had experienced dizzy spells which have caused falls frequently before.  A CT of the chest, abdomen, and pelvis showed displaced left ninth and 10th rib fractures with a moderate sized pneumothorax and increased moderate pericardial effusion.  A chest tube was placed in the ED.  Trauma surgery evaluated the patient, and the chest tube was removed on 07/10/18.  She remained stable off oxygen with no dyspnea.    Another echocardiogram was done, as the pericardial effusion had possibly increased in size per the CT on admission.  It showed no significant change compared to previously, and there are recommendations for her to follow-up with cardiology in 4 weeks.    A UGI was done, showing persistent partial functional obstruction at the level of the diaphragmatic hiatus, and a strict n.p.o. and tube feedings continued.  She is to follow-up with Dr. De Jesus on 08/10/18 for further esophageal evaluation.  She was subsequently discharged back here on 07/12/18.      CURRENT ISSUES    Failure to thrive/esophageal dysphagia/NPO status: Since her stay at the [facility] in February/March 2018, she had dropped 17 pounds.  She is quite cachectic with a BMI of 16-17.   Admitted NPO with tube feedings at 30 mL/h, food was being introduced (regular diet, thin liquids, small portions).   "Isosource 1.5 is now being administered at 56 mL/h for 18 hours, starting at 1500 and ending at 0900.  Medications continue to be given via feeding tube. She was seen by speech therapy here and appeared to be doing okay; however, we are going to follow guidelines set by Dr. De Jesus at this juncture.  With her current tube feeding, her weight has been stable.    As noted, she was to remain NPO until UGI is performed.  She was also followed by speech therapy in the hospital, and her swallow function was deemed intact. She had a follow-up appointment on 07/09/18, but she remains NPO.  She did have a follow-up appointment with Dr. De Jesus is on 08/10/18.  She was given a new diagnosis of dysphagia, related to her paraesophageal hernia.  More tests are going to be performed, including esophageal manometry and upper GI with barium swallow.  She is to continue to remain strict n.p.o. with tube feedings through the J port.  She was told that surgery would not be appropriate for her but dilatation might.    Confusion: On 07/23/18 staff report that she became confused and agitated, requesting to be taken back to her old room and to the \"real [place]\".  When addressed with patient, she admited to feeling disoriented, says \"I thought I was imagining things and not at [place]\".  This problem has resolved.  Today she is oriented and appropriate, pleasant and conversant throughout the visit.     GERD: She is complaining of heartburn despite being on lansoprazole 30 mg daily.  She previously had orders for Maalox as needed, and she was not taking it.  We scheduled that while awake, keeping with 4 times daily dosing.  Of epigastric/suprasternal heartburn that she describes as \"terrible, terrible, terrible.\"  She told me it was all right for some time, but it had returned.  We added sucralfate 1 g 4 times daily and a stool check for H pylori (negative).  This has been effective.  She tells me, \"something's " "helping.\"    Depression: She does admit to being depressed \"some days.\"  She is on mirtazapine and duloxetine.    COPD: She does have COPD/pulmonary emphysema and is on a variety of inhalers. Denies dyspnea, coughing, or chest pain.    Urinary incontinence: She does have stress incontinence and is checked by nursing staff during the night.  She stated at my last visit, \"every time he turned around, I'm peeing in my britches.\"  She finds it very hard to control, stating that she does not realize it until after she starts.  There is some nocturia as well.  None of this has changed since earlier visits.    Bowel incontinence: A few weeks back, she had developed loose, incontinent stools. Her tube feeding formula had been recently changed, and despite the loose stools, she was receiving scheduled Colace and PRN senna and MiraLAX.  When addressed with staff, this was attributed to poor communication during shift report, so we discontinued all of her bowel medications.  The problem persisted until we replaced psyllium with a 500 mg tab of methylcellulose daily.  Despite resolution of her loose stools, she is still incontinent of bowel.  She previously told me, \"I turn on my lights, but they don't get here in time.\"    Code status: Initially listed as full code, she is now DNR/DNI.    Discharge planning: According to nursing staff, she is doing better than she lets on.  She tells me her daughter's house, where she can stay, is very messy, and she is a hoarder.  For now, at least, she would prefer to stay here but, down the road, she would like to go to an assisted living facility.  She tells me that one daughter is looking into this.      ROS: She sleeps well only at times.  She is receiving trazodone 25 mg nightly.  We will keep monitoring to see if dose adjustment is necessary.  There might still be some tenderness in the left rib cage.  She does tend to sleep on her right side.  She denies any headaches or chest pains, " coughing or congestion, nausea or vomiting,  dizziness or dyspnea, dysuria, difficulty chewing or swallowing, integumentary issues.      Past Medical History:   Diagnosis Date     Acute encephalopathy      Aperistalsis of esophagus      Arthritis      Compression fracture of lumbar vertebra (H)     L2     COPD (chronic obstructive pulmonary disease) (H)      Depression      Dyslipidemia      Encephalopathy      Esophageal candidiasis (H)      Failure to thrive in adult      Fall      GERD (gastroesophageal reflux disease)      Hiatal hernia      HLD (hyperlipidemia)      HTN (hypertension)      Hypocalcemia      Hypomagnesemia      Hypoxia      Insomnia      Lactic acidosis      Major depressive disorder      Malnutrition (H)      Maternal MCV (mean corpuscular volume) low      Microcytic anemia      Microcytic hypochromic anemia 01/2013     Osteoporosis      Pericardial effusion      PMR (polymyalgia rheumatica) (H)      Pneumonia      Pneumothorax      Positive urine drug screen 10/2012    meth and coke positive 10/12. negative in 11/19/12     Pulmonary emphysema (H)      Rib fracture      S/P laparoscopic fundoplication      SIRS (systemic inflammatory response syndrome) (H)      Thrombocytopenia (H)      Trigger finger     left hand, middle finger     Underweight      Urinary urgency               Family History   Problem Relation Age of Onset     Diabetes Mother      Cancer Mother      lung     Hypertension Mother      Emphysema Father      Heart disease Father      Hypertension Father      No Medical Problems Brother      Breast cancer Maternal Aunt      Social History     Social History     Marital status: Single     Spouse name: N/A     Number of children: N/A     Years of education: N/A     Social History Main Topics     Smoking status: Former Smoker     Packs/day: 1.50     Years: 30.00     Types: Cigarettes     Quit date: 8/10/1991     Smokeless tobacco: Never Used     Alcohol use No     Drug use: No      Sexual activity: Not Currently     Other Topics Concern     Not on file     Social History Narrative     MEDICATIONS: Reviewed from the MAR, physician orders, and earlier progress notes.  Current Outpatient Prescriptions   Medication Sig     acetaminophen (TYLENOL) 650 mg/20.3 mL Soln 650 mg by G-tube route every 4 (four) hours as needed.     albuterol (PROVENTIL HFA;VENTOLIN HFA) 90 mcg/actuation inhaler Inhale 2 puffs 4 (four) times a day as needed for wheezing.      aluminum-magnesium hydroxide-simethicone (MAALOX ADVANCED) 200-200-20 mg/5 mL Susp 20 mL by G-tube route 4 (four) times a day.      aspirin 81 mg chewable tablet 81 mg by G-tube route daily.     aspirin 81 MG EC tablet 81 mg by G-tube route daily.      atorvastatin (LIPITOR) 20 MG tablet 20 mg by G-tube route daily.      budesonide-formoterol (SYMBICORT) 160-4.5 mcg/actuation inhaler Inhale 2 puffs 2 (two) times a day.     calcium carbonate 500 mg/5 mL (1,250 mg/5 mL) suspension 600 mg by G-tube route daily.     cholecalciferol, vitamin D3, (VITAMIN D3 ORAL) Take by mouth daily. Vitamin D3 (D-VI-SOL)  liquid; 400U; amt: 1ml (400units); gastric tube  Once A Day;     cholecalciferol, vitamin D3, 2,000 unit cap 2,000 capsules by G-tube route daily.      cholecalciferol, vitamin D3, 400 unit/5 mL Liqd 400 Units by G-tube route daily.     DULoxetine (CYMBALTA) 60 MG capsule 60 mg by G-tube route daily.      ferrous sulfate 220 mg (44 mg iron)/5 mL solution Take by mouth daily. FERROUS SULFATE Rosina JAYNA [place]  solution; 220mg (44mg iron) / 5ml; amt: 7.4ml; gastric tube  Special Instructions: anorexia  Once A Day;     ferrous sulfate 300 mg (60 mg iron)/5 mL syrup 325 mg by G-tube route daily.     lactose-reduced food/fiber (ISOSOURCE 1.5 JASPREET ENTERAL TUBE) by Enteral Tube route. Isosource 1.5 continuous at goal rate of 40ml/hr Rosina Guo  Special Instructions: tube feeding  Goal per day 960ml/day  Every Shift;     lansoprazole (PREVACID SOLUTAB) 30  "MG disintegrating tablet 30 mg by G-tube route daily.     lidocaine (LIDODERM) 5 % Place 1 patch on the skin 2 (two) times a day. Remove & Discard patch within 12 hours or as directed by MD sienna Guo  adhesive patch,medicated; 5 %; topical  Special Instructions: apply to painful area of skin  dx: closed fracture of multiple ribs of left side  Twice A Day;     methylcellulose (CITRUCEL) 500 mg Tab Take 500 mg by mouth daily.     mirtazapine (REMERON) 15 MG tablet 7.5 mg by G-tube route at bedtime.     ondansetron (ZOFRAN-ODT) 4 MG disintegrating tablet 4 mg by G-tube route every 6 (six) hours as needed for nausea.     oxyCODONE (ROXICODONE) 5 MG immediate release tablet Take 5-10 mg by mouth every 4 (four) hours as needed for pain.     polyethylene glycol (MIRALAX) 17 gram packet 17 g by G-tube route daily as needed.      sennosides (SENNOSIDES) 8.8 mg/5 mL Syrp syrup 6.5 mg by G-tube route daily as needed.      sucralfate (CARAFATE) 100 mg/mL suspension Take 1 g by mouth 4 (four) times a day.     tiotropium (SPIRIVA) 18 mcg inhalation capsule Place 18 mcg into inhaler and inhale daily.     tiotropium (SPIRIVA) 18 mcg inhalation capsule Place 18 mcg into inhaler and inhale daily.     traZODone (DESYREL) 50 MG tablet 25 mg by G-tube route at bedtime as needed for sleep.     ALLERGIES:   Allergies   Allergen Reactions     Sulfa (Sulfonamide Antibiotics) Other (See Comments)      Ototoxicity         Penicillins Rash     DIET: NPO.  She is on tube feeding at 56 mL/h for 18 hours per day.    Vitals:    08/14/18 1608   BP: 105/69   Pulse: 74   Resp: 18   Temp: 96.8  F (36  C)   Weight: (!) 87 lb 12.8 oz (39.8 kg)   Height: 4' 11\" (1.499 m)   After being down approximately 17 pounds over the last 4 months or so, weight has stabilized on her tube feedings.  Body mass index is 17.73 kg/(m^2).    EXAMINATION:   General: Pleasant, alert, and conversant middle-aged female, looking much older than her stated " age, lying in bed, in no apparent distress.    Head: Normocephalic and atraumatic.  Significant hirsutism.  Eyes: PERRLA, sclerae clear.   ENT: Slightly dry oral mucosa.  Full upper and lower dentures.  She is only wearing the uppers currently.  Hearing is unimpaired.  Cardiovascular: Regular rate and rhythm with a split S1 and no appreciable murmur.  Respiratory: Lung sounds are diminished, particularly in the bases, but they are otherwise clear to auscultation bilaterally.  Abdomen: Soft and nontender.   Musculoskeletal/Extremities: Age-related degenerative joint disease.  Barrel chested with severe thoracic kyphosis.  No peripheral edema.   Neurological: Fine bilateral upper extremity tremor.  Integument: No rashes, clinically significant lesions, or skin breakdown.   Cognitive/Psychiatric: Alert and oriented she seems to present with a depressed/flat affect she does a lot of wise cracking during my visit.     DIAGNOSTICS:   No results found for this or any previous visit.  Lab Results   Component Value Date    WBC 5.4 07/06/2018    HGB 11.6 (L) 07/06/2018    HCT 36.8 07/06/2018    MCV 96 07/06/2018     07/06/2018     CrCl cannot be calculated (Patient's most recent sCr result is older than the maximum 5 days allowed.).    ASSESSMENT/Plan:      ICD-10-CM    1. Failure to thrive in adult R62.7    2. Esophageal dysphagia R13.10    3. Hernia of abdominal cavity K46.9    4. Chronic GERD K21.9    5. 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 Major depressive disorder F32.9    7. Pulmonary emphysema, unspecified emphysema type (H) J43.9    8. Bowel and bladder incontinence R32     R15.9    9. Coronary artery disease involving native coronary artery of native heart without angina pectoris I25.10      CHANGES:    None.    CARE PLAN:  The care plan has been reviewed and all orders signed. Changes to care plan, if any, as noted.  Otherwise, continue current plan of care.      The above has  been created using voice recognition software. Please be aware that this may unintentionally  produce inaccuracies and/or nonsensical sentences.       Electronically signed by: Ramana Blandon CNP

## 2021-06-19 NOTE — LETTER
Letter by Rosina Guo MD at      Author: Rosina Guo MD Service: -- Author Type: --    Filed:  Encounter Date: 11/29/2019 Status: Signed         Patient: Rosina Link   MR Number: [id number]   YOB: 1945   Date of Visit: 11/29/2019     [facility]    Facility:   [facility] [[id number]]   Code Status: DNR/DNI       Chief Complaint   Patient presents with   ? Review Of Multiple Medical Conditions     LT 11/29/19.       HPI:  Rosina is a 74 y.o. female with hx of HTN, hiatal hernia, GERD, anemia, PMR, TERESA, pancreatitis, gastric obstruction and FTT on feeding tube, residing in LT. She is seen today for routine physician follow up. Of note, she was last hospitalized 1/23/19-1/25/19 for paraesophageal hernia repair as per below.     HOSPITAL DISCHARGE SUMMARY  BRIEF HOSPITAL COURSE: This 73 y.o. female was admitted after laparoscopic reduction repair of recurrent paraesophageal hernia. Her postoperative course has been uneventful. A esophagram on postop day 1 revealed no hernia, no leak, and no obstruction. She is tolerating a full liquid diet and her pain is under control. We reinitiated tube feeding during her admission as well.    She will be transferred back to her facility. Her orders will be the same except that we will send her on a full liquid diet for 2 weeks. She will follow-up with Dr. De Jesus. Our hope was that we can discontinue tube feeding and remove the feeding tube in the next couple of weeks. Overall she is done very well and she is happy with the outcome.    PROCEDURES PERFORMED DURING HOSPITALIZATION: Laparoscopic reduction and repair of recurrent paraesophageal hernia    Today:  She had her GJ tube removed Aug 19, 2019. Was no longer using. Had gained weight and in fact has gained another 10 pounds since removal. She denies any abdominal pain. Denies shortness of breath or chest pain. Normal BMs. No urinary problems. She reports  contracture of left hand little and ring fingers, not similar to trigger finger she had injected a while ago, but unable to straighten, thinks it might be arthritis. Suggested she should see ortho for consult and she will consider. She has not been ill recently with cough cold or congestion. No new concerns per nursing.       Past Medical History:  Past Medical History:   Diagnosis Date   ? Acute encephalopathy    ? Aperistalsis of esophagus    ? Arthritis    ? Compression fracture of lumbar vertebra (H)     L2   ? COPD (chronic obstructive pulmonary disease) (H)    ? Depression    ? Dyslipidemia    ? Encephalopathy    ? Esophageal candidiasis (H)    ? Esophagitis    ? Failure to thrive in adult    ? Fall    ? GERD (gastroesophageal reflux disease)    ? Hiatal hernia    ? HLD (hyperlipidemia)    ? HTN (hypertension)    ? Hypocalcemia    ? Hypomagnesemia    ? Hypoxia    ? Insomnia    ? Lactic acidosis    ? Major depressive disorder    ? Malnutrition (H)    ? Maternal MCV (mean corpuscular volume) low    ? Microcytic anemia    ? Microcytic hypochromic anemia 01/2013   ? Osteoporosis    ? Pericardial effusion    ? PMR (polymyalgia rheumatica) (H)    ? Pneumonia    ? Pneumothorax    ? Positive urine drug screen 10/2012    meth and coke positive 10/12. negative in 11/19/12   ? Pulmonary emphysema (H)    ? Rib fracture    ? S/P laparoscopic fundoplication    ? SIRS (systemic inflammatory response syndrome) (H)    ? Thrombocytopenia (H)    ? Trigger finger     left hand, middle finger   ? Underweight    ? Urinary urgency        Medications:  Current Outpatient Medications   Medication Sig Note   ? acetaminophen (TYLENOL) 325 MG tablet Take 650 mg by mouth every 4 (four) hours as needed for pain.    ? aspirin 81 mg chewable tablet Chew 81 mg daily.           ? atorvastatin (LIPITOR) 20 MG tablet Take 20 mg by mouth daily.           ? budesonide-formoterol (SYMBICORT) 160-4.5 mcg/actuation inhaler Inhale 2 puffs 2 (two)  times a day.    ? calcium carbonate (OS-JASPREET) 600 mg calcium (1,500 mg) tablet Take 600 mg by mouth daily.    ? cholecalciferol, vitamin D3, (CHOLECALCIFEROL) 400 unit Tab Take 400 Units by mouth daily.    ? furosemide (LASIX) 40 MG tablet Take 40 mg by mouth daily.    ? omeprazole (PRILOSEC) 20 MG capsule Take 20 mg by mouth daily.    ? senna-docusate (SENNOSIDES-DOCUSATE SODIUM) 8.6-50 mg tablet Take 1 tablet by mouth 2 (two) times a day.    ? umeclidinium (INCRUSE ELLIPTA) 62.5 mcg/actuation DsDv inhaler Inhale 1 puff daily.    ? venlafaxine (EFFEXOR) 37.5 MG tablet Take 37.5 mg by mouth daily.        6/18/2019: Decreased from a 37.5 mg twice daily to 37.5 mg daily on 06/18/2019, an attempt at gradual dose reduction.       Physical Exam:   General: Patient is alert pale female, no distress.   Vitals: /80, Temp 97.3, Pulse 96, RR 18, O2 sat 94% RA.  HEENT: Head is NCAT. Eyes show no injection or icterus. Nares negative. Oropharynx well hydrated.  Neck: Supple. No tenderness or adenopathy. No JVD.  Lungs: Clear bilaterally. No wheezes.  Cardiovascular: Regular rate and rhythm, normal S1. S2.  Back: No spinal or CVA tenderness.  Abdomen: FT intact. Soft, no tenderness on exam. Bowel sounds present. No guarding rebound or rigidity.  Extremities: No LE edema is noted.  Musculoskeletal: Mild degen changes. Contracture left hand ring and little fingers.  Psych: Mood appears good.      Labs:  Lab Results   Component Value Date    WBC 8.6 02/05/2019    HGB 13.8 01/04/2019    HCT 44.0 01/04/2019    MCV 92 01/04/2019     01/04/2019     Results for orders placed or performed in visit on 01/04/19   Basic Metabolic Panel   Result Value Ref Range    Sodium 140 136 - 145 mmol/L    Potassium 4.4 3.5 - 5.0 mmol/L    Chloride 102 98 - 107 mmol/L    CO2 30 22 - 31 mmol/L    Anion Gap, Calculation 8 5 - 18 mmol/L    Glucose 83 70 - 125 mg/dL    Calcium 9.3 8.5 - 10.5 mg/dL    BUN 24 8 - 28 mg/dL    Creatinine 0.62 0.60 -  1.10 mg/dL    GFR MDRD Af Amer >60 >60 mL/min/1.73m2    GFR MDRD Non Af Amer >60 >60 mL/min/1.73m2       Assessment/Plan:  1. HTN. BPs well controlled on Lasix. Continue to monitor per protocol.  2. COPD. On Symbicort. No current respiratory concerns. No need for oxygen.  3. Depression. She is on venlafaxine.  4. Hx of Paraesophageal hernia. S/p operative repair Jan 2019. Had supplemental TF for quite some time. She however, gained weight and was also allowed oral feeding and ultimately GJ was removed Aug 2019 as was no longer using.    5. Anemia. She is no longer on iron. Last hgb at 13.8.        Electronically signed by: Rosina Guo MD

## 2021-06-19 NOTE — PROGRESS NOTES
[facility]      Facility:    [facility] [[id number]]  Code Status: DNR/DNI       Chief Complaint/Reason for Visit:  Chief Complaint   Patient presents with     H & P     Re-admit to TCU-COPD, traumatic pneumothorax, FTT, malnutrition. (H & P 7/16/18).       HPI:   Rosina is a 73 y.o. female sent to the hospital from TCU on 7/7/18 after a fall.     Hospital Course:   Rosina Link is a 72 y.o. female with a history of anemia,  GERD, HTN, Hiatal hernia, pericardial effusion, positive drug screen, osteoperosis, compression fracture of L2 Lumbar vertibra, PMR, renal artery stenosis, pancreatitis and low back pain who was admitted 7/7/2018 after a fall. Pt resides in [facility] center. The patient was getting out of bed against orders earlier today in order to walk to the bathroom for a bowel movement. On the way to the bathroom, the patient got dizzy and fell, hitting the side of the garbage can. Patient has experienced dizzy spells which have caused falls frequently before. CT CHEST/ABDOMEN/PELVIS showed displaced left 9th and 10th rib fractures with moderate-sized pneumothorax, and increased moderate pericardial effusion. Chest tube was placed in the ED. Trauma surgery evaluated the patient. Chest tube removed 7/10/2018. Patient remained stable off oxygen with no shortness of breath.      Echocardiogram done as pericardial effusion had possibly increased in size by CT on admission. Echocardiogram showed no significant change compared to previous. Follow up with Cardiology in 4 weeks was recommended.      UGI done, shows persistent partial functional obstruction at the level of the diaphragmatic hiatus. Strict NPO and tube feedings continued. She will follow up with Dr. De Jesus in 2-4 weeks for further esophageal evaluation.     She did have some loose stools prior to discharge, likely secondary to stool softeners given.     Palliative care consulted to assist with pain management.      Patient was discharged to TCU in stable condition on 7/12/18, and patient and her daughter were in agreement with the above plan. Maritza, daughter, was updated by phone.     Overall stabilized and discharged to TCU on 7/12/18 for PT, OT, nursing cares, medical management and monitoring.     Today:        Past Medical History:  Past Medical History:   Diagnosis Date     Acute encephalopathy      Aperistalsis of esophagus      Arthritis      Compression fracture of lumbar vertebra (H)     L2     COPD (chronic obstructive pulmonary disease) (H)      Depression      Dyslipidemia      Encephalopathy      Esophageal candidiasis (H)      Failure to thrive in adult      Fall      GERD (gastroesophageal reflux disease)      Hiatal hernia      HLD (hyperlipidemia)      HTN (hypertension)      Hypocalcemia      Hypomagnesemia      Hypoxia      Insomnia      Lactic acidosis      Major depressive disorder      Malnutrition (H)      Maternal MCV (mean corpuscular volume) low      Microcytic anemia      Microcytic hypochromic anemia 01/2013     Osteoporosis      Pericardial effusion      PMR (polymyalgia rheumatica) (H)      Pneumonia      Pneumothorax      Positive urine drug screen 10/2012    meth and coke positive 10/12. negative in 11/19/12     Pulmonary emphysema (H)      Rib fracture      S/P laparoscopic fundoplication      SIRS (systemic inflammatory response syndrome) (H)      Thrombocytopenia (H)      Trigger finger     left hand, middle finger     Underweight      Urinary urgency            Surgical History:  Past Surgical History:   Procedure Laterality Date     CARPAL TUNNEL RELEASE Right      CHOLECYSTECTOMY  01/09/2015     ESOPHAGOGASTRODUODENOSCOPY       HIATAL HERNIA REPAIR  02/15/2016    lap with mesh     HYSTERECTOMY  1990     REPAIR PARA ESOPHAGEAL PLACEMENT OF GASTRIC J TUBE       TRIGGER FINGER RELEASE Left     left middle finger       Family History:   Family History   Problem Relation Age of Onset      Diabetes Mother      Cancer Mother      lung     Hypertension Mother      Emphysema Father      Heart disease Father      Hypertension Father      No Medical Problems Brother      Breast cancer Maternal Aunt        Social History:    Social History     Social History     Marital status: Single     Spouse name: N/A     Number of children: N/A     Years of education: N/A     Social History Main Topics     Smoking status: Former Smoker     Packs/day: 1.50     Years: 30.00     Types: Cigarettes     Quit date: 8/10/1991     Smokeless tobacco: Never Used     Alcohol use No     Drug use: No     Sexual activity: Not Currently     Other Topics Concern     Not on file     Social History Narrative        Medications:  Current Outpatient Prescriptions   Medication Sig     acetaminophen (TYLENOL) 650 mg/20.3 mL Soln 650 mg by G-tube route every 4 (four) hours as needed.     albuterol (PROVENTIL HFA;VENTOLIN HFA) 90 mcg/actuation inhaler Inhale 2 puffs 4 (four) times a day as needed for wheezing.      aluminum-magnesium hydroxide-simethicone (MAALOX ADVANCED) 200-200-20 mg/5 mL Susp 20 mL by G-tube route 4 (four) times a day.      aspirin 81 mg chewable tablet 81 mg by G-tube route daily.     aspirin 81 MG EC tablet 81 mg by G-tube route daily.      atorvastatin (LIPITOR) 20 MG tablet 20 mg by G-tube route daily.      budesonide-formoterol (SYMBICORT) 160-4.5 mcg/actuation inhaler Inhale 2 puffs 2 (two) times a day.     calcium carbonate 500 mg/5 mL (1,250 mg/5 mL) suspension 600 mg by G-tube route daily.     cholecalciferol, vitamin D3, (VITAMIN D3 ORAL) Take by mouth daily. Vitamin D3 (D-VI-SOL)  liquid; 400U; amt: 1ml (400units); gastric tube  Once A Day;     cholecalciferol, vitamin D3, 2,000 unit cap 2,000 capsules by G-tube route daily.      cholecalciferol, vitamin D3, 400 unit/5 mL Liqd 400 Units by G-tube route daily.     docusate sodium (COLACE) 50 mg/5 mL oral liquid 50 mg by G-tube route daily.     DULoxetine  (CYMBALTA) 60 MG capsule 60 mg by G-tube route daily.      ferrous sulfate 220 mg (44 mg iron)/5 mL solution Take by mouth daily. FERROUS SULFATE Rosina Guo  solution; 220mg (44mg iron) / 5ml; amt: 7.4ml; gastric tube  Special Instructions: anorexia  Once A Day;     ferrous sulfate 300 mg (60 mg iron)/5 mL syrup 325 mg by G-tube route daily.     lactose-reduced food/fiber (ISOSOURCE 1.5 JASPREET ENTERAL TUBE) by Enteral Tube route. Isosource 1.5 continuous at goal rate of 40ml/hr Rosina Guo  Special Instructions: tube feeding  Goal per day 960ml/day  Every Shift;     lansoprazole (PREVACID SOLUTAB) 30 MG disintegrating tablet 30 mg by G-tube route daily.     lidocaine (LIDODERM) 5 % Place 1 patch on the skin 2 (two) times a day. Remove & Discard patch within 12 hours or as directed by MD    lidocaine Rosina Guo  adhesive patch,medicated; 5 %; topical  Special Instructions: apply to painful area of skin  dx: closed fracture of multiple ribs of left side  Twice A Day;     mirtazapine (REMERON) 15 MG tablet 7.5 mg by G-tube route at bedtime.     ondansetron (ZOFRAN-ODT) 4 MG disintegrating tablet 4 mg by G-tube route every 6 (six) hours as needed for nausea.     oxyCODONE (ROXICODONE) 5 MG immediate release tablet Take 5-10 mg by mouth every 4 (four) hours as needed for pain.     polyethylene glycol (MIRALAX) 17 gram packet 17 g by G-tube route daily as needed.      sennosides (SENNOSIDES) 8.8 mg/5 mL Syrp syrup 6.5 mg by G-tube route daily as needed.      tiotropium (SPIRIVA) 18 mcg inhalation capsule Place 18 mcg into inhaler and inhale daily.     tiotropium (SPIRIVA) 18 mcg inhalation capsule Place 18 mcg into inhaler and inhale daily.     traZODone (DESYREL) 50 MG tablet 25 mg by G-tube route at bedtime as needed for sleep.       Allergies:  Allergies   Allergen Reactions     Sulfa (Sulfonamide Antibiotics) Other (See Comments)      Ototoxicity         Penicillins Rash       Review of  Systems:  Pertinent items are noted in HPI.    Physical Exam:   General: Patient is alert, cachectic femael, on oxygen, no distress.   Vitals: /70, Temp97 , Pulse 82, RR 20, on O2.  HEENT: Head is NCAT. Eyes show no injection or icterus. Nares negative. Oropharynx well hydrated.  Neck: Supple. No tenderness or adenopathy. No JVD.  Lungs: Clear bilaterally. No wheezes.  Cardiovascular: Regular rate and rhythm, normal S1, S2.  Back: No spinal or CVA tenderness. Resticitve ches twall.  Abdomen: Feeding tube. Soft. Bowel sounds present. No guarding rebound or rigidity.  : Deferred.  Extremities: Mild edema is noted.  Musculoskeletal: Age related degen changes.   Psych: Mood appears pretty good.      Labs:    Lab Results   Component Value Date    WBC 5.4 07/06/2018    HGB 11.6 (L) 07/06/2018    HCT 36.8 07/06/2018    MCV 96 07/06/2018     07/06/2018         Assessment/Plan:  1. Traumatic pneumothorax. Had chest tube in the hospital.  2. COPD. O2 dependent.  3. Malnutrition. FTT. Has feeding tube. NPO.  4. Anemia. On iron.  5. Depression. Cont meds.  6. HTN. No current meds.  7. Code status is DNR/DNI.    Total time greater than 50 minutes, greater than 50% counseling and coordination of care, time spent in interview and examination of patient, review of records, discussion with nursing staff.        Electronically signed by: Rosina Guo MD

## 2021-06-19 NOTE — PROGRESS NOTES
"[facility]    Name:   Rosina Link  : 1945  Facility:   [facility] [[id number]]   Room: 243  Code Status: FULL CODE -   Fac type:   SNF (Skilled Nursing Facility, TCU) -     CHIEF COMPLAINT / REASON FOR VISIT:  Chief Complaint   Patient presents with     Review Of Multiple Medical Conditions     TCU follow-up after hospitalization for hiatal hernia with GERD and esophagitis as well as failure to thrive.     [facility] from 18 until 18  [facility]CU from 18 until 03/10/18  [facility] from 18 until 18    HPI: Rosina is a 72 y.o. female with a history of severe COPD, depression, GERD, and insomnia.  She underwent surgery in 2016 for a large hiatal hernia (total gastric herniation) that included laparoscopic repair with mesh and Gorge fundoplication.        Hospitalization 2018: She had suffered a 60 pound unintentional weight loss prior to that, dropping from about 160 pounds down to 95 pounds.  There was a brief period of weight gain after that, but she eventually returned to the lower weight.  She developed progressive weakness and inability to eat much.  She would take several bites and then feel full.  She was so weak upon her admission to [facility] in 2018 that she could hardly walk.      She had had a CT scan on 18 that showed of her stomach back in her chest.  She was supposed to see Dr. De Jesus at on 18 to review pictures and to discuss options.  The CT scan described \"small amount of scarring and residual consolidation in the paramedial right lower lobe at the site of resolving pneumonia.\"  Chest x-rays performed on 18 showed \"right lower lobe pneumonia with small parapneumonic effusion.  The amount of infiltrate in the right lower lobe appeared increased from the CT examination.\"  She was admitted for community-acquired pneumonia.  She had no fevers or " leukocytosis.      Hospitalization June 2018: More recently, she was seen by Dr. Vasuqez on 05/18/18 for dysphagia, nausea, and early satiety with weight loss.  An EGD with general anesthesia was planned at some point with consideration for repair of hiatal hernia and redo partial fundoplication.    She presented to [facility] ER on 06/05/18 with weakness, anorexia, and constipation.  Constipation eventually resolved without changes to appetite.  Psych was consulted with concern that depression may be contributory, and she was started on low-dose mirtazapine.  Megace was started as an appetite stimulant, and a CT showed a large recurrent hiatal hernia.  A GJ tube placement was suggested, but the patient was felt not to be a candidate for GJ tube per IR due to anatomy.  Therefore, GI and surgery were consulted, and she underwent laparoscopic partial fundoplication takedown repair paraesophageal hernia and placement of gastric jejunostomy tube placement; upper endoscopy by Dr. De Jesus on 06/12/18.  She was also found to have esophageal candidiasis and was given fluconazole.  The PICC line was placed on 06/13/18 to start TPN.    Overnight (06/14/18 to 06/15/18), rapid response was called due to hypoxemia and tachycardia.  A CT of the chest was negative for pulmonary embolism but showed aspiration pneumonitis.  She was treated with IV antibiotics which ended on 06/23/18.    She underwent EGD and advancement of GJ feeding tube on 06/21/18.  Abdominal x-rays on 06/22/18 showed GJ tube malpositioned, looped in the fundus.  This J-tube repositioned itself, so it is now being used again.      Of note: An echocardiogram on 06/15/18 showed moderate pericardial effusion (known pericardial effusion 08/20/17 and 12/20/17).  Cardiology felt likely malnutrition with low protein causing capillary leak as a probable etiology.  Recommendations for observation with no further workup, but a repeat echocardiogram on 06/29/18 showed  "no change in no hemodynamic effects of effusion.      Other issues: She does have COPD and is on a variety of inhalers.      CURRENT ISSUES    Since her last stay in the [facility] (4 months ago), she has dropped 17 pounds.  She is quite cachectic with a BMI of 16.52.   Admitted NPO with tube feedings at 30 mL/h, food is being introduced (regular diet, thin liquids, small portions).  Isosource 1.5 is now being administered at 56 mL/h for 18 hours, starting at 0300 and ending at 2100 per resident request.  Medications continue to be given via feeding tube.  She was to remain NPO until UGI is performed.  She was also followed by speech therapy in the hospital, and her swallow function was deemed intact. She has a follow-up appointment on 07/09/18.    GERD: This continues to be a real issue with her.  She complains of heartburn while being on lansoprazole 30 mg daily.  She does have orders for Maalox, but it is written as as needed, and she has not been taking it.  We are going to schedule that while awake, keeping with 4 times daily dosing.  She is experiencing occasional nausea and vomiting and does have orders for ondansetron.    Depression: She tells me that she is \"not eating, not drinking, not doing nothing.\"  When asked if she is feeling sad, she tells me, \"kind of, yeah.\"  She is on mirtazapine and duloxetine.    Urinary incontinence: She does have stress incontinence and is checked by nursing staff during the night.  They turn on the light, and this bothers her, interrupting her sleep.    Overall, she looks ill and clearly does not feel well.  She complains of esophageal burning.  At this time, she is getting a PPI and as needed Maalox.  I had considered initiating sucralfate, but we will will defer to Dr. De Jesus.    ROS: She sleeps well only at times.  She would like something to help her sleep.  She is already receiving trazodone 25 mg nightly.  We will monitor to see if dose adjustment is " necessary.  She denies any headaches or chest pains, coughing or congestion, nausea or vomiting,  dizziness or dyspnea, dysuria, difficulty chewing or swallowing, integumentary issues.  She is anxious to be discharged.    Past Medical History:   Diagnosis Date     Acute encephalopathy      Aperistalsis of esophagus      Arthritis      COPD (chronic obstructive pulmonary disease) (H)      Depression      Esophageal candidiasis (H)      GERD (gastroesophageal reflux disease)      Hiatal hernia      HLD (hyperlipidemia)      HTN (hypertension)      Hypocalcemia      Hypomagnesemia      Hypoxia      Insomnia      Lactic acidosis      Maternal MCV (mean corpuscular volume) low      Microcytic hypochromic anemia 01/2013     Osteoporosis      Pericardial effusion      PMR (polymyalgia rheumatica) (H)      Pneumonia      Positive urine drug screen 10/2012    meth and cocaine, confirmatory testing not done, negative since then     SIRS (systemic inflammatory response syndrome) (H)      Thrombocytopenia (H)      Urinary urgency               Family History   Problem Relation Age of Onset     Diabetes Mother      Cancer Mother      lung     Hypertension Mother      Emphysema Father      Heart disease Father      Hypertension Father      No Medical Problems Brother      Breast cancer Maternal Aunt      Social History     Social History     Marital status: Single     Spouse name: N/A     Number of children: N/A     Years of education: N/A     Social History Main Topics     Smoking status: Former Smoker     Packs/day: 1.50     Years: 30.00     Quit date: 8/10/1991     Smokeless tobacco: Never Used     Alcohol use No     Drug use: No     Sexual activity: Not Currently     Other Topics Concern     Not on file     Social History Narrative     MEDICATIONS: Reviewed from the MAR, physician orders, and earlier progress notes.  Current Outpatient Prescriptions   Medication Sig     acetaminophen (TYLENOL) 650 mg/20.3 mL Soln 650 mg by  G-tube route every 4 (four) hours as needed.     albuterol (PROVENTIL HFA;VENTOLIN HFA) 90 mcg/actuation inhaler Inhale 2 puffs 4 (four) times a day as needed for wheezing.      aluminum-magnesium hydroxide-simethicone (MAALOX ADVANCED) 200-200-20 mg/5 mL Susp 20 mL by G-tube route 4 (four) times a day.      aspirin 81 MG EC tablet 81 mg by G-tube route daily.      atorvastatin (LIPITOR) 20 MG tablet 20 mg by G-tube route daily.      budesonide-formoterol (SYMBICORT) 160-4.5 mcg/actuation inhaler Inhale 2 puffs 2 (two) times a day.     calcium carbonate 500 mg/5 mL (1,250 mg/5 mL) suspension 600 mg by G-tube route daily.     cholecalciferol, vitamin D3, 2,000 unit cap 2,000 capsules by G-tube route daily.      cholecalciferol, vitamin D3, 400 unit/5 mL Liqd 400 Units by G-tube route daily.     docusate sodium (COLACE) 50 mg/5 mL oral liquid 50 mg by G-tube route daily.     DULoxetine (CYMBALTA) 60 MG capsule 60 mg by G-tube route daily.      ferrous sulfate 300 mg (60 mg iron)/5 mL syrup 325 mg by G-tube route daily.     lansoprazole (PREVACID SOLUTAB) 30 MG disintegrating tablet 30 mg by G-tube route daily.     mirtazapine (REMERON) 15 MG tablet 7.5 mg by G-tube route at bedtime.     ondansetron (ZOFRAN-ODT) 4 MG disintegrating tablet 4 mg by G-tube route every 6 (six) hours as needed for nausea.     polyethylene glycol (MIRALAX) 17 gram packet 17 g by G-tube route daily as needed.      sennosides (SENNOSIDES) 8.8 mg/5 mL Syrp syrup 6.5 mg by G-tube route daily.     tiotropium (SPIRIVA) 18 mcg inhalation capsule Place 18 mcg into inhaler and inhale daily.     traZODone (DESYREL) 50 MG tablet 25 mg by G-tube route at bedtime as needed for sleep.     ALLERGIES:   Allergies   Allergen Reactions     Sulfa (Sulfonamide Antibiotics) Other (See Comments)      Ototoxicity         Penicillins Rash     DIET: NPO.  She is on tube feeding at 35 mL/h.    Vitals:    07/05/18 1257   BP: 110/56   Pulse: 83   Resp: 20   Temp: (!)  "96  F (35.6  C)   Weight: (!) 80 lb 12.8 oz (36.7 kg)   Height: 4' 11\" (1.499 m)   She is down approximately 17 pounds over the last 4 months.  Body mass index is 16.32 kg/(m^2).    EXAMINATION:   General: Pleasant, alert, and conversant middle-aged female, lying in bed, in no apparent distress.  She has a rather scratchy voice, probably from years of smoking.  She does have a history of COPD.  The head of her bed is kept elevated.  Head: Normocephalic and atraumatic.   Eyes: PERRLA, sclerae clear.   ENT: Moist oral mucosa.  Full upper and lower dentures.  She is only wearing the uppers currently.  Hearing is unimpaired.  Cardiovascular: Regular rate and rhythm.  No appreciable murmur.  Respiratory: Diminished lung sounds with scattered rhonchi and a loose, sometimes productive cough.  She does have a history of pulmonary emphysema, but I would still like to obtain chest x-rays.  Abdomen: Soft and nontender.   Musculoskeletal/Extremities: Age-related degenerative joint disease.  Moderate to severe thoracic kyphosis.  Bilateral soft nonpitting lower extremity edema.  Integument: No rashes, clinically significant lesions, or skin breakdown.   Cognitive/Psychiatric: Alert and oriented but appears quite depressed with a very flat affect.    DIAGNOSTICS:   No results found for this or any previous visit.  No results found for: WBC, HGB, HCT, MCV, PLT  CrCl cannot be calculated (No order found.).    ASSESSMENT/Plan:      ICD-10-CM    1. Hernia of abdominal cavity K46.9    2. Chronic GERD K21.9    3. Failure to thrive in adult R62.7    4. Rhonchi R09.89    5. Pulmonary emphysema, unspecified emphysema type (H) J43.9    6. Major depressive disorder F32.9    7. Coronary artery disease without angina pectoris I25.10    8. Stress incontinence in female N39.3    9. Slow transit constipation K59.01      CHANGES:    Chest x-rays (multiple views) due to scattered rhonchi/pulmonary congestion.  Change Maalox to scheduled 4 times " daily while awake.  May shed off tube feeding during physical therapy.    CARE PLAN:  The care plan has been reviewed and all orders signed. Changes to care plan, if any, as noted.  Otherwise, continue current plan of care.  Time spent with this patient was approximately 35 minutes, with greater than 50% spent in counseling and coordination of care that included discussion with dietary and physical therapy and the ordering of chest x-rays.    The above has been created using voice recognition software. Please be aware that this may unintentionally  produce inaccuracies and/or nonsensical sentences.      Electronically signed by: Ramana Blandon, KAIDEN

## 2021-06-19 NOTE — PROGRESS NOTES
"[facility]    Name:   Rosina Link  : 1945  Facility:   [facility] [[id number]]   Room: 243  Code Status: FULL CODE -   Fac type:   SNF (Skilled Nursing Facility, TCU) -     CHIEF COMPLAINT / REASON FOR VISIT:  Chief Complaint   Patient presents with     Review Of Multiple Medical Conditions     TCU follow-up after hospitalization for hiatal hernia with GERD and esophagitis as well as adult failure to thrive.  This was followed by a second hospitalization secondary to patient fall, multiple rib fractures, and subsequent pneumothorax.     [facility] from 18 until 18  [facility] from 18 until 03/10/18  [facility] from 18 until 18  [facility] from 8018 until   [facility] from 18 until 18    Patient was last seen by me on 18.    HPI: Rosina is a 73 y.o. female with a history of severe COPD, depression, GERD, and insomnia.  She underwent surgery in 2016 for a large hiatal hernia (total gastric herniation) that included laparoscopic repair with mesh and Gorge fundoplication.        Hospitalization 2018: She had suffered a 60 pound unintentional weight loss prior to that, dropping from about 160 pounds down to 95 pounds.  There was a brief period of weight gain after that, but she eventually returned to the lower weight.  She developed progressive weakness and inability to eat much.  She would take several bites and then feel full.  She was so weak upon her admission to [facility] in 2018 that she could hardly walk.      She had had a CT scan on 18 that showed of her stomach back in her chest.  She was supposed to see Dr. De Jesus at on 18 to review pictures and to discuss options.  The CT scan described \"small amount of scarring and residual consolidation in the paramedial right lower lobe at the site of resolving pneumonia.\"  " "Chest x-rays performed on 02/16/18 showed \"right lower lobe pneumonia with small parapneumonic effusion.  The amount of infiltrate in the right lower lobe appeared increased from the CT examination.\"  She was admitted for community-acquired pneumonia.  She had no fevers or leukocytosis.      Hospitalization June 2018: More recently, she was seen by Dr. Vasquez on 05/18/18 for dysphagia, nausea, and early satiety with weight loss.  An EGD with general anesthesia was planned at some point with consideration for repair of hiatal hernia and redo partial fundoplication.    She presented to [facility] ER on 06/05/18 with weakness, anorexia, and constipation.  Constipation eventually resolved without changes to appetite.  Psych was consulted with concern that depression may be contributory, and she was started on low-dose mirtazapine.  Megace was started as an appetite stimulant, and a CT showed a large recurrent hiatal hernia.  A GJ tube placement was suggested, but the patient was felt not to be a candidate for GJ tube per IR due to anatomy.  Therefore, GI and surgery were consulted, and she underwent laparoscopic partial fundoplication takedown repair paraesophageal hernia and placement of gastric jejunostomy tube placement; upper endoscopy by Dr. Daniel De Jesus on 06/12/18.  She was also found to have esophageal candidiasis and was given fluconazole.  The PICC line was placed on 06/13/18 to start TPN.    Overnight (06/14/18 to 06/15/18), rapid response was called due to hypoxemia and tachycardia.  A CT of the chest was negative for pulmonary embolism but showed aspiration pneumonitis.  She was treated with IV antibiotics which ended on 06/23/18.    She underwent EGD and advancement of GJ feeding tube on 06/21/18.  Abdominal x-rays on 06/22/18 showed GJ tube malpositioned, looped in the fundus.  This J-tube repositioned itself, so it is now being used again.      Of note: An echocardiogram on 06/15/18 showed " moderate pericardial effusion (known pericardial effusion 08/20/17 and 12/20/17).  Cardiology felt likely malnutrition with low protein causing capillary leak as a probable etiology.  Recommendations for observation with no further workup, but a repeat echocardiogram on 06/29/18 showed no change in no hemodynamic effects of effusion.      Hospitalization in July 2018: On 07/7/18, she was getting out of bed early in the day and decided to walk to the bathroom on her own rather than summoned assistance.  On the way to her bathroom, she became dizzy, fell, and struck the side of the garbage can.  She had experienced dizzy spells which have caused falls frequently before.  A CT of the chest, abdomen, and pelvis showed displaced left ninth and 10th rib fractures with a moderate sized pneumothorax and increased moderate pericardial effusion.  A chest tube was placed in the ED.  Trauma surgery evaluated the patient, and the chest tube was removed on 07/10/18.  She remained stable off oxygen with no dyspnea.    Another echocardiogram was done, as the pericardial effusion had possibly increased in size per the CT on admission.  It showed no significant change compared to previously, and there are recommendations for her to follow-up with cardiology in 4 weeks.    A UGI was done, showing persistent partial functional obstruction at the level of the diaphragmatic hiatus, and a strict n.p.o. and tube feedings continued.  She is to follow-up with Dr. De Jesus on 08/10/18 for further esophageal evaluation.  She was subsequently discharged back here on 07/12/18.      CURRENT ISSUES    Failure to thrive/NPO status: Since her stay at the [facility] in February/March 2018, she had dropped 17 pounds.  She is quite cachectic with a BMI of 16-17.   Admitted NPO with tube feedings at 30 mL/h, food was being introduced (regular diet, thin liquids, small portions).  Isosource 1.5 is now being administered at 56 mL/h for 18  "hours, starting at 1500 and ending at 0900.  Medications continue to be given via feeding tube. She was seen by speech therapy here and appeared to be doing okay; however, we are going to follow guidelines set by Dr. De Jesus at this juncture.      As noted, she was to remain NPO until UGI is performed.  She was also followed by speech therapy in the hospital, and her swallow function was deemed intact. She had a follow-up appointment on 07/09/18, but she remains NPO.  Her next appointment with Dr. De Jesus is on 08/10/18.  She is anxious to start eating again.    On 07/23/18 staff report that she became confused and agitated, requesting to be taken back to her old room and to the \"real [place]\".  When addressed with patient today, she admits to feeling disoriented, says \"I thought I was imagining things and not at [place]\".  Today she is oriented and appropriate, pleasant and conversant throughout the visit.     GERD: She reports no particular issues at this time.  She previously complained of heartburn despite being on lansoprazole 30 mg daily.  She did have orders for Maalox, but it was written as as needed, and she was not taking it.  We scheduled that while awake, keeping with 4 times daily dosing.  She is current denies nausea and vomiting but does have orders for ondansetron PRN.    Depression: She previously told me that she is \"not eating, not drinking, not doing nothing.\"  When asked if she is feeling sad, she tells me, \"sometimes\"  She is on mirtazapine and duloxetine.    COPD: She does have COPD and is on a variety of inhalers. Denies dyspnea or coughing.    Urinary and bowel incontinence: She does have stress incontinence and is checked by nursing staff during the night.  They turn on the light, and this bothers her, interrupting her sleep. Of late, she has developed loose, incontinent stools. Nursing staff report that she had 3 large loose BM's yesterday 7/23/18 and today during our visit she was " "incontinent x1. Her tube feeding formula has been recently changed, and despite the loose stools, she was receiving scheduled Colace and PRN senna and MiraLAX.  When addressed with staff, this was attributed to poor communication during shift report, so we discontinued all of her bowel medications.  The problem still persists.  In fact, prior to my visit today, she requested that I wait while she was cleaned up.  She had another loose stool during the visit.      ROS: She sleeps well only at times. When asked if she sleeps well, she reports \"kind of yes, kind of no\".  She is receiving trazodone 25 mg nightly.  We will keep monitoring to see if dose adjustment is necessary.  She denies any headaches or chest pains, coughing or congestion, nausea or vomiting,  dizziness or dyspnea, dysuria, difficulty chewing or swallowing, integumentary issues.      Past Medical History:   Diagnosis Date     Acute encephalopathy      Aperistalsis of esophagus      Arthritis      Compression fracture of lumbar vertebra (H)     L2     COPD (chronic obstructive pulmonary disease) (H)      Depression      Dyslipidemia      Encephalopathy      Esophageal candidiasis (H)      Failure to thrive in adult      Fall      GERD (gastroesophageal reflux disease)      Hiatal hernia      HLD (hyperlipidemia)      HTN (hypertension)      Hypocalcemia      Hypomagnesemia      Hypoxia      Insomnia      Lactic acidosis      Major depressive disorder      Malnutrition (H)      Maternal MCV (mean corpuscular volume) low      Microcytic anemia      Microcytic hypochromic anemia 01/2013     Osteoporosis      Pericardial effusion      PMR (polymyalgia rheumatica) (H)      Pneumonia      Pneumothorax      Positive urine drug screen 10/2012    meth and coke positive 10/12. negative in 11/19/12     Pulmonary emphysema (H)      Rib fracture      S/P laparoscopic fundoplication      SIRS (systemic inflammatory response syndrome) (H)      Thrombocytopenia (H)  "     Trigger finger     left hand, middle finger     Underweight      Urinary urgency               Family History   Problem Relation Age of Onset     Diabetes Mother      Cancer Mother      lung     Hypertension Mother      Emphysema Father      Heart disease Father      Hypertension Father      No Medical Problems Brother      Breast cancer Maternal Aunt      Social History     Social History     Marital status: Single     Spouse name: N/A     Number of children: N/A     Years of education: N/A     Social History Main Topics     Smoking status: Former Smoker     Packs/day: 1.50     Years: 30.00     Types: Cigarettes     Quit date: 8/10/1991     Smokeless tobacco: Never Used     Alcohol use No     Drug use: No     Sexual activity: Not Currently     Other Topics Concern     Not on file     Social History Narrative     MEDICATIONS: Reviewed from the MAR, physician orders, and earlier progress notes.  Current Outpatient Prescriptions   Medication Sig     acetaminophen (TYLENOL) 650 mg/20.3 mL Soln 650 mg by G-tube route every 4 (four) hours as needed.     albuterol (PROVENTIL HFA;VENTOLIN HFA) 90 mcg/actuation inhaler Inhale 2 puffs 4 (four) times a day as needed for wheezing.      aluminum-magnesium hydroxide-simethicone (MAALOX ADVANCED) 200-200-20 mg/5 mL Susp 20 mL by G-tube route 4 (four) times a day.      aspirin 81 mg chewable tablet 81 mg by G-tube route daily.     aspirin 81 MG EC tablet 81 mg by G-tube route daily.      atorvastatin (LIPITOR) 20 MG tablet 20 mg by G-tube route daily.      budesonide-formoterol (SYMBICORT) 160-4.5 mcg/actuation inhaler Inhale 2 puffs 2 (two) times a day.     calcium carbonate 500 mg/5 mL (1,250 mg/5 mL) suspension 600 mg by G-tube route daily.     cholecalciferol, vitamin D3, (VITAMIN D3 ORAL) Take by mouth daily. Vitamin D3 (D-VI-SOL)  liquid; 400U; amt: 1ml (400units); gastric tube  Once A Day;     cholecalciferol, vitamin D3, 2,000 unit cap 2,000 capsules by G-tube route  daily.      cholecalciferol, vitamin D3, 400 unit/5 mL Liqd 400 Units by G-tube route daily.     DULoxetine (CYMBALTA) 60 MG capsule 60 mg by G-tube route daily.      ferrous sulfate 220 mg (44 mg iron)/5 mL solution Take by mouth daily. FERROUS SULFATE Rosina Guo  solution; 220mg (44mg iron) / 5ml; amt: 7.4ml; gastric tube  Special Instructions: anorexia  Once A Day;     ferrous sulfate 300 mg (60 mg iron)/5 mL syrup 325 mg by G-tube route daily.     lactose-reduced food/fiber (ISOSOURCE 1.5 JASPREET ENTERAL TUBE) by Enteral Tube route. Isosource 1.5 continuous at goal rate of 40ml/hr Rosina Guo  Special Instructions: tube feeding  Goal per day 960ml/day  Every Shift;     lansoprazole (PREVACID SOLUTAB) 30 MG disintegrating tablet 30 mg by G-tube route daily.     lidocaine (LIDODERM) 5 % Place 1 patch on the skin 2 (two) times a day. Remove & Discard patch within 12 hours or as directed by MD sienna Guo  adhesive patch,medicated; 5 %; topical  Special Instructions: apply to painful area of skin  dx: closed fracture of multiple ribs of left side  Twice A Day;     mirtazapine (REMERON) 15 MG tablet 7.5 mg by G-tube route at bedtime.     ondansetron (ZOFRAN-ODT) 4 MG disintegrating tablet 4 mg by G-tube route every 6 (six) hours as needed for nausea.     oxyCODONE (ROXICODONE) 5 MG immediate release tablet Take 5-10 mg by mouth every 4 (four) hours as needed for pain.     polyethylene glycol (MIRALAX) 17 gram packet 17 g by G-tube route daily as needed.      sennosides (SENNOSIDES) 8.8 mg/5 mL Syrp syrup 6.5 mg by G-tube route daily as needed.      tiotropium (SPIRIVA) 18 mcg inhalation capsule Place 18 mcg into inhaler and inhale daily.     tiotropium (SPIRIVA) 18 mcg inhalation capsule Place 18 mcg into inhaler and inhale daily.     traZODone (DESYREL) 50 MG tablet 25 mg by G-tube route at bedtime as needed for sleep.     ALLERGIES:   Allergies   Allergen Reactions     Sulfa (Sulfonamide  "Antibiotics) Other (See Comments)      Ototoxicity         Penicillins Rash     DIET: NPO.  She is on tube feeding at 35 mL/h.    Vitals:    07/26/18 1405   BP: 105/65   Pulse: 94   Resp: 20   Temp: 96.7  F (35.9  C)   Weight: (!) 84 lb 9.6 oz (38.4 kg)   Height: 4' 11\" (1.499 m)   She is down approximately 17 pounds over the last 4 months.  Body mass index is 17.09 kg/(m^2).    EXAMINATION:   General: Pleasant, alert, and conversant middle-aged female, looking much older than her stated age, sitting in her wheelchair, in no apparent distress.  She was recently cleaned up after an incontinent stool episode and, while visiting, she had another one.  Head: Normocephalic and atraumatic.  Significant hirsutism.  Eyes: PERRLA, sclerae clear.   ENT: Slightly dry oral mucosa.  Full upper and lower dentures.  She is only wearing the uppers currently.  Hearing is unimpaired.  Cardiovascular: Regular rate and rhythm. No appreciable murmur.  Respiratory: Today, lungs are clear to auscultation bilaterally.  Abdomen: Soft and nontender.   Musculoskeletal/Extremities: Age-related degenerative joint disease.  Barrel chested with severe thoracic kyphosis.  Bilateral soft nonpitting lower extremity edema. Pain with turning in bed.  Neurological: Fine bilateral upper extremity tremor.  Integument: No rashes, clinically significant lesions, or skin breakdown.   Cognitive/Psychiatric: Alert and oriented but appears quite depressed with a very flat affect.    DIAGNOSTICS:   No results found for this or any previous visit.  Lab Results   Component Value Date    WBC 5.4 07/06/2018    HGB 11.6 (L) 07/06/2018    HCT 36.8 07/06/2018    MCV 96 07/06/2018     07/06/2018     CrCl cannot be calculated (Patient's most recent sCr result is older than the maximum 5 days allowed.).    ASSESSMENT/Plan:      ICD-10-CM    1. Failure to thrive in adult R62.7    2. 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 Pulmonary " emphysema, unspecified emphysema type (H) J43.9    4. Hernia of abdominal cavity K46.9    5. Chronic GERD K21.9    6. Major depressive disorder F32.9    7. Coronary artery disease without angina pectoris I25.10    8. Stress incontinence in female N39.3    9. Incontinence of feces, unspecified fecal incontinence type R15.9    10. Functional diarrhea K59.1      CHANGES:    Add psyllium, 1 teaspoon twice daily.    CARE PLAN:  The care plan has been reviewed and all orders signed. Changes to care plan, if any, as noted.  Otherwise, continue current plan of care.      The above has been created using voice recognition software. Please be aware that this may unintentionally  produce inaccuracies and/or nonsensical sentences.       Electronically signed by: Ramana Blandon CNP

## 2021-06-19 NOTE — PROGRESS NOTES
"[facility]    Name:   Rosina Link  : 1945  Facility:   [facility] [[id number]]   Room: 224  Code Status: DNR/DNI -   Fac type:   SNF (Skilled Nursing Facility, TCU) -     CHIEF COMPLAINT / REASON FOR VISIT:  Chief Complaint   Patient presents with     Review Of Multiple Medical Conditions     TCU follow-up after hospitalization for hiatal hernia with GERD and esophagitis, as well as adult failure to thrive.  This was followed by a second hospitalization secondary to patient fall with multiple rib fractures, resulting in a pneumothorax.     [facility] from 18 until 18  [facility] TCU from 18 until 03/10/18  [facility] from 18 until 18  [facility]CU from 8018 until   [facility] from 18 until 18    Patient was last seen by me on 18.    HPI: Rosina is a 73 y.o. female with a history of severe COPD, depression, GERD, and insomnia.  She underwent surgery in 2016 for a large hiatal hernia (total gastric herniation) that included laparoscopic repair with mesh and Gorge fundoplication.        Hospitalization 2018: She had suffered a 60 pound unintentional weight loss prior to that, dropping from about 160 pounds down to 95 pounds.  There was a brief period of weight gain after that, but she eventually returned to the lower weight.  She developed progressive weakness and inability to eat much.  She would take several bites and then feel full.  She was so weak upon her admission to [facility] in 2018 that she could hardly walk.      She had had a CT scan on 18 that showed of her stomach back in her chest.  She was supposed to see Dr. De Jesus at on 18 to review pictures and to discuss options.  The CT scan described \"small amount of scarring and residual consolidation in the paramedial right lower lobe at the site of resolving pneumonia.\"  " "Chest x-rays performed on 02/16/18 showed \"right lower lobe pneumonia with small parapneumonic effusion.  The amount of infiltrate in the right lower lobe appeared increased from the CT examination.\"  She was admitted for community-acquired pneumonia.  She had no fevers or leukocytosis.      Hospitalization June 2018: More recently, she was seen by Dr. Vasquez on 05/18/18 for dysphagia, nausea, and early satiety with weight loss.  An EGD with general anesthesia was planned at some point with consideration for repair of hiatal hernia and redo partial fundoplication.    She presented to [facility] ER on 06/05/18 with weakness, anorexia, and constipation.  Constipation eventually resolved without changes to appetite.  Psych was consulted with concern that depression may be contributory, and she was started on low-dose mirtazapine.  Megace was started as an appetite stimulant, and a CT showed a large recurrent hiatal hernia.  A GJ tube placement was suggested, but the patient was felt not to be a candidate for GJ tube per IR due to anatomy.  Therefore, GI and surgery were consulted, and she underwent laparoscopic partial fundoplication takedown repair paraesophageal hernia and placement of gastric jejunostomy tube placement; upper endoscopy by Dr. Daniel De Jesus on 06/12/18.  She was also found to have esophageal candidiasis and was given fluconazole.  The PICC line was placed on 06/13/18 to start TPN.    Overnight (06/14/18 to 06/15/18), rapid response was called due to hypoxemia and tachycardia.  A CT of the chest was negative for pulmonary embolism but showed aspiration pneumonitis.  She was treated with IV antibiotics which ended on 06/23/18.    She underwent EGD and advancement of GJ feeding tube on 06/21/18.  Abdominal x-rays on 06/22/18 showed GJ tube malpositioned, looped in the fundus.  This J-tube repositioned itself, so it is now being used again.      Of note: An echocardiogram on 06/15/18 showed " moderate pericardial effusion (known pericardial effusion 08/20/17 and 12/20/17).  Cardiology felt likely malnutrition with low protein causing capillary leak as a probable etiology.  Recommendations for observation with no further workup, but a repeat echocardiogram on 06/29/18 showed no change in no hemodynamic effects of effusion.      Hospitalization in July 2018: On 07/7/18, she was getting out of bed early in the day and decided to walk to the bathroom on her own rather than summoned assistance.  On the way to her bathroom, she became dizzy, fell, and struck the side of the garbage can.  She had experienced dizzy spells which have caused falls frequently before.  A CT of the chest, abdomen, and pelvis showed displaced left ninth and 10th rib fractures with a moderate sized pneumothorax and increased moderate pericardial effusion.  A chest tube was placed in the ED.  Trauma surgery evaluated the patient, and the chest tube was removed on 07/10/18.  She remained stable off oxygen with no dyspnea.    Another echocardiogram was done, as the pericardial effusion had possibly increased in size per the CT on admission.  It showed no significant change compared to previously, and there are recommendations for her to follow-up with cardiology in 4 weeks.    A UGI was done, showing persistent partial functional obstruction at the level of the diaphragmatic hiatus, and a strict n.p.o. and tube feedings continued.  She is to follow-up with Dr. De Jesus on 08/10/18 for further esophageal evaluation.  She was subsequently discharged back here on 07/12/18.      CURRENT ISSUES    Failure to thrive/NPO status: Since her stay at the [facility] TCU in February/March 2018, she had dropped 17 pounds.  She is quite cachectic with a BMI of 16-17.   Admitted NPO with tube feedings at 30 mL/h, food was being introduced (regular diet, thin liquids, small portions).  Isosource 1.5 is now being administered at 56 mL/h for 18  "hours, starting at 1500 and ending at 0900.  Medications continue to be given via feeding tube. She was seen by speech therapy here and appeared to be doing okay; however, we are going to follow guidelines set by Dr. De Jesus at this juncture.  With her current tube feeding, her weight has been stable.    As noted, she was to remain NPO until UGI is performed.  She was also followed by speech therapy in the hospital, and her swallow function was deemed intact. She had a follow-up appointment on 07/09/18, but she remains NPO.  Her next appointment with Dr. De Jesus is on 08/10/18.  She is anxious to start eating again.    Confusion: On 07/23/18 staff report that she became confused and agitated, requesting to be taken back to her old room and to the \"real [place]\".  When addressed with patient, she admited to feeling disoriented, says \"I thought I was imagining things and not at [place]\".  This problem has resolved.  Today she is oriented and appropriate, pleasant and conversant throughout the visit.     GERD: She is complaining of heartburn despite being on lansoprazole 30 mg daily.  She previously had orders for Maalox as needed, and she was not taking it.  We scheduled that while awake, keeping with 4 times daily dosing.  Of epigastric/suprasternal heartburn that she describes as \"terrible, terrible, terrible.\"  She told me it was all right for some time, but it had returned.  We added sucralfate 1 g 4 times daily and a stool check for H pylori (negative).  This has been effective.  She tells me, \"something's helping.\"    Depression: He does admit to being depressed \"some days.\"  She is on mirtazapine and duloxetine.    COPD: She does have COPD and is on a variety of inhalers. Denies dyspnea, coughing, or chest pain.    Urinary incontinence: She does have stress incontinence and is checked by nursing staff during the night.  She stated at my last visit, \"every time he turned around, I'm peeing in my " "britches.\"  She finds it very hard to control, stating that she does not realize it until after she starts.  There is some nocturia as well.  None of this has changed since earlier visits.    Bowel incontinence: A few weeks back, she had developed loose, incontinent stools. Her tube feeding formula had been recently changed, and despite the loose stools, she was receiving scheduled Colace and PRN senna and MiraLAX.  When addressed with staff, this was attributed to poor communication during shift report, so we discontinued all of her bowel medications.  The problem persisted until we replaced psyllium with a 500 mg tab of methylcellulose daily.  Despite resolution of her loose stools, she is still incontinent of bowel.  She tells me, \"I turn on my lights, but they don't get here in time.\"    Code status: I initially had her listed as full code.  She is now DNR/DNI.    Discharge planning: According to nursing staff, she is doing better than she lets on.  She tells me her daughter's house, where she can stay, is very messy, and she is a hoarder.  For now, at least, she would prefer to stay here but, down the road, she would like to go to an assisted living facility.  She tells me that one daughter is looking into this.      ROS: She sleeps well only at times.  She is receiving trazodone 25 mg nightly.  We will keep monitoring to see if dose adjustment is necessary.  There might still be some tenderness in the left rib cage.  She does tend to sleep on her right side.  She denies any headaches or chest pains, coughing or congestion, nausea or vomiting,  dizziness or dyspnea, dysuria, difficulty chewing or swallowing, integumentary issues.      Past Medical History:   Diagnosis Date     Acute encephalopathy      Aperistalsis of esophagus      Arthritis      Compression fracture of lumbar vertebra (H)     L2     COPD (chronic obstructive pulmonary disease) (H)      Depression      Dyslipidemia      Encephalopathy      " Esophageal candidiasis (H)      Failure to thrive in adult      Fall      GERD (gastroesophageal reflux disease)      Hiatal hernia      HLD (hyperlipidemia)      HTN (hypertension)      Hypocalcemia      Hypomagnesemia      Hypoxia      Insomnia      Lactic acidosis      Major depressive disorder      Malnutrition (H)      Maternal MCV (mean corpuscular volume) low      Microcytic anemia      Microcytic hypochromic anemia 01/2013     Osteoporosis      Pericardial effusion      PMR (polymyalgia rheumatica) (H)      Pneumonia      Pneumothorax      Positive urine drug screen 10/2012    meth and coke positive 10/12. negative in 11/19/12     Pulmonary emphysema (H)      Rib fracture      S/P laparoscopic fundoplication      SIRS (systemic inflammatory response syndrome) (H)      Thrombocytopenia (H)      Trigger finger     left hand, middle finger     Underweight      Urinary urgency               Family History   Problem Relation Age of Onset     Diabetes Mother      Cancer Mother      lung     Hypertension Mother      Emphysema Father      Heart disease Father      Hypertension Father      No Medical Problems Brother      Breast cancer Maternal Aunt      Social History     Social History     Marital status: Single     Spouse name: N/A     Number of children: N/A     Years of education: N/A     Social History Main Topics     Smoking status: Former Smoker     Packs/day: 1.50     Years: 30.00     Types: Cigarettes     Quit date: 8/10/1991     Smokeless tobacco: Never Used     Alcohol use No     Drug use: No     Sexual activity: Not Currently     Other Topics Concern     Not on file     Social History Narrative     MEDICATIONS: Reviewed from the MAR, physician orders, and earlier progress notes.  Current Outpatient Prescriptions   Medication Sig     acetaminophen (TYLENOL) 650 mg/20.3 mL Soln 650 mg by G-tube route every 4 (four) hours as needed.     albuterol (PROVENTIL HFA;VENTOLIN HFA) 90 mcg/actuation inhaler Inhale  2 puffs 4 (four) times a day as needed for wheezing.      aluminum-magnesium hydroxide-simethicone (MAALOX ADVANCED) 200-200-20 mg/5 mL Susp 20 mL by G-tube route 4 (four) times a day.      aspirin 81 mg chewable tablet 81 mg by G-tube route daily.     aspirin 81 MG EC tablet 81 mg by G-tube route daily.      atorvastatin (LIPITOR) 20 MG tablet 20 mg by G-tube route daily.      budesonide-formoterol (SYMBICORT) 160-4.5 mcg/actuation inhaler Inhale 2 puffs 2 (two) times a day.     calcium carbonate 500 mg/5 mL (1,250 mg/5 mL) suspension 600 mg by G-tube route daily.     cholecalciferol, vitamin D3, (VITAMIN D3 ORAL) Take by mouth daily. Vitamin D3 (D-VI-SOL)  liquid; 400U; amt: 1ml (400units); gastric tube  Once A Day;     cholecalciferol, vitamin D3, 2,000 unit cap 2,000 capsules by G-tube route daily.      cholecalciferol, vitamin D3, 400 unit/5 mL Liqd 400 Units by G-tube route daily.     DULoxetine (CYMBALTA) 60 MG capsule 60 mg by G-tube route daily.      ferrous sulfate 220 mg (44 mg iron)/5 mL solution Take by mouth daily. FERROUS SULFATE Rosina Guo  solution; 220mg (44mg iron) / 5ml; amt: 7.4ml; gastric tube  Special Instructions: anorexia  Once A Day;     ferrous sulfate 300 mg (60 mg iron)/5 mL syrup 325 mg by G-tube route daily.     lactose-reduced food/fiber (ISOSOURCE 1.5 JASPREET ENTERAL TUBE) by Enteral Tube route. Isosource 1.5 continuous at goal rate of 40ml/hr Rosina Guo  Special Instructions: tube feeding  Goal per day 960ml/day  Every Shift;     lansoprazole (PREVACID SOLUTAB) 30 MG disintegrating tablet 30 mg by G-tube route daily.     lidocaine (LIDODERM) 5 % Place 1 patch on the skin 2 (two) times a day. Remove & Discard patch within 12 hours or as directed by MD sienna Guo  adhesive patch,medicated; 5 %; topical  Special Instructions: apply to painful area of skin  dx: closed fracture of multiple ribs of left side  Twice A Day;     methylcellulose (CITRUCEL) 500 mg  "Tab Take 500 mg by mouth daily.     mirtazapine (REMERON) 15 MG tablet 7.5 mg by G-tube route at bedtime.     ondansetron (ZOFRAN-ODT) 4 MG disintegrating tablet 4 mg by G-tube route every 6 (six) hours as needed for nausea.     oxyCODONE (ROXICODONE) 5 MG immediate release tablet Take 5-10 mg by mouth every 4 (four) hours as needed for pain.     polyethylene glycol (MIRALAX) 17 gram packet 17 g by G-tube route daily as needed.      sennosides (SENNOSIDES) 8.8 mg/5 mL Syrp syrup 6.5 mg by G-tube route daily as needed.      sucralfate (CARAFATE) 100 mg/mL suspension Take 1 g by mouth 4 (four) times a day.     tiotropium (SPIRIVA) 18 mcg inhalation capsule Place 18 mcg into inhaler and inhale daily.     tiotropium (SPIRIVA) 18 mcg inhalation capsule Place 18 mcg into inhaler and inhale daily.     traZODone (DESYREL) 50 MG tablet 25 mg by G-tube route at bedtime as needed for sleep.     ALLERGIES:   Allergies   Allergen Reactions     Sulfa (Sulfonamide Antibiotics) Other (See Comments)      Ototoxicity         Penicillins Rash     DIET: NPO.  She is on tube feeding at 56 mL/h for 18 hours per day.    Vitals:    08/09/18 1517   BP: 114/76   Pulse: 75   Resp: 20   Temp: (!) 95.7  F (35.4  C)   Weight: (!) 86 lb 6.4 oz (39.2 kg)   Height: 4' 11\" (1.499 m)   After being down approximately 17 pounds over the last 4 months or so, weight has stabilized on her tube feedings.  Body mass index is 17.45 kg/(m^2).    EXAMINATION:   General: Pleasant, alert, and conversant middle-aged female, looking much older than her stated age, lying in bed, in no apparent distress.    Head: Normocephalic and atraumatic.  Significant hirsutism.  Eyes: PERRLA, sclerae clear.   ENT: Slightly dry oral mucosa.  Full upper and lower dentures.  She is only wearing the uppers currently.  Hearing is unimpaired.  Cardiovascular: Regular rate and rhythm with a split S1 and no appreciable murmur.  Respiratory: Lung sounds are diminished, particularly in " the bases, but they are otherwise clear to auscultation bilaterally.  Abdomen: Soft and nontender.   Musculoskeletal/Extremities: Age-related degenerative joint disease.  Barrel chested with severe thoracic kyphosis.  No peripheral edema.   Neurological: Fine bilateral upper extremity tremor.  Integument: No rashes, clinically significant lesions, or skin breakdown.   Cognitive/Psychiatric: Alert and oriented she seems to present with a depressed/flat affect she does a lot of wise cracking during my visit.     DIAGNOSTICS:   No results found for this or any previous visit.  Lab Results   Component Value Date    WBC 5.4 07/06/2018    HGB 11.6 (L) 07/06/2018    HCT 36.8 07/06/2018    MCV 96 07/06/2018     07/06/2018     CrCl cannot be calculated (Patient's most recent sCr result is older than the maximum 5 days allowed.).    ASSESSMENT/Plan:      ICD-10-CM    1. Failure to thrive in adult R62.7    2. Hernia of abdominal cavity K46.9    3. Chronic GERD K21.9    4. 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 Major depressive disorder F32.9    6. Pulmonary emphysema, unspecified emphysema type (H) J43.9    7. Bowel and bladder incontinence R32     R15.9    8. Coronary artery disease involving native coronary artery of native heart without angina pectoris I25.10      CHANGES:    None.    CARE PLAN:  The care plan has been reviewed and all orders signed. Changes to care plan, if any, as noted.  Otherwise, continue current plan of care.      The above has been created using voice recognition software. Please be aware that this may unintentionally  produce inaccuracies and/or nonsensical sentences.       Electronically signed by: Ramana Blandon, CNP

## 2021-06-19 NOTE — PROGRESS NOTES
"[facility]    Name:   Rosina Link  : 1945  Facility:   [facility] [[id number]]   Room: 243  Code Status: FULL CODE -   Fac type:   SNF (Skilled Nursing Facility, TCU) -     CHIEF COMPLAINT / REASON FOR VISIT:  Chief Complaint   Patient presents with     Review Of Multiple Medical Conditions     TCU follow-up after hospitalization for hiatal hernia with GERD and esophagitis as well as adult failure to thrive. This followed by second hospitalization secondary to patient fall, rib fracture and subsequent pneumothorax.      [facility] from 18 until 18  [facility] from 18 until 03/10/18  [facility] from 18 until 18  [facility] from 8018 until   [facility] from 18 until 18    Patient was last seen by me on 18 and previously seen by Dr. Guo for a readmission visit on 18.    HPI: Rosnia is a 73 y.o. female with a history of severe COPD, depression, GERD, and insomnia.  She underwent surgery in 2016 for a large hiatal hernia (total gastric herniation) that included laparoscopic repair with mesh and Gorge fundoplication.        Hospitalization 2018: She had suffered a 60 pound unintentional weight loss prior to that, dropping from about 160 pounds down to 95 pounds.  There was a brief period of weight gain after that, but she eventually returned to the lower weight.  She developed progressive weakness and inability to eat much.  She would take several bites and then feel full.  She was so weak upon her admission to [facility] in 2018 that she could hardly walk.      She had had a CT scan on 18 that showed of her stomach back in her chest.  She was supposed to see Dr. De Jesus at on 18 to review pictures and to discuss options.  The CT scan described \"small amount of scarring and residual consolidation in the paramedial " "right lower lobe at the site of resolving pneumonia.\"  Chest x-rays performed on 02/16/18 showed \"right lower lobe pneumonia with small parapneumonic effusion.  The amount of infiltrate in the right lower lobe appeared increased from the CT examination.\"  She was admitted for community-acquired pneumonia.  She had no fevers or leukocytosis.      Hospitalization June 2018: More recently, she was seen by Dr. Vasquez on 05/18/18 for dysphagia, nausea, and early satiety with weight loss.  An EGD with general anesthesia was planned at some point with consideration for repair of hiatal hernia and redo partial fundoplication.    She presented to [facility] ER on 06/05/18 with weakness, anorexia, and constipation.  Constipation eventually resolved without changes to appetite.  Psych was consulted with concern that depression may be contributory, and she was started on low-dose mirtazapine.  Megace was started as an appetite stimulant, and a CT showed a large recurrent hiatal hernia.  A GJ tube placement was suggested, but the patient was felt not to be a candidate for GJ tube per IR due to anatomy.  Therefore, GI and surgery were consulted, and she underwent laparoscopic partial fundoplication takedown repair paraesophageal hernia and placement of gastric jejunostomy tube placement; upper endoscopy by Dr. Daniel De Jesus on 06/12/18.  She was also found to have esophageal candidiasis and was given fluconazole.  The PICC line was placed on 06/13/18 to start TPN.    Overnight (06/14/18 to 06/15/18), rapid response was called due to hypoxemia and tachycardia.  A CT of the chest was negative for pulmonary embolism but showed aspiration pneumonitis.  She was treated with IV antibiotics which ended on 06/23/18.    She underwent EGD and advancement of GJ feeding tube on 06/21/18.  Abdominal x-rays on 06/22/18 showed GJ tube malpositioned, looped in the fundus.  This J-tube repositioned itself, so it is now being used again.  "     Of note: An echocardiogram on 06/15/18 showed moderate pericardial effusion (known pericardial effusion 08/20/17 and 12/20/17).  Cardiology felt likely malnutrition with low protein causing capillary leak as a probable etiology.  Recommendations for observation with no further workup, but a repeat echocardiogram on 06/29/18 showed no change in no hemodynamic effects of effusion.      Hospitalization in July 2018: On 07/7/18, she was getting out of bed early in the day and decided to walk to the bathroom on her own rather than summoned assistance.  On the way to her bathroom, she became dizzy, fell, and struck the side of the garbage can.  She had experienced dizzy spells which have caused falls frequently before.  A CT of the chest, abdomen, and pelvis showed displaced left ninth and 10th rib fractures with a moderate sized pneumothorax and increased moderate pericardial effusion.  A chest tube was placed in the ED.  Trauma surgery evaluated the patient, and the chest tube was removed on 07/10/18.  She remained stable off oxygen with no dyspnea.    Another echocardiogram was done, as the pericardial effusion had possibly increased in size per the CT on admission.  It showed no significant change compared to previously, and there are recommendations for her to follow-up with cardiology in 4 weeks.    A UGI was done, showing persistent partial functional obstruction at the level of the diaphragmatic hiatus, and a strict n.p.o. and tube feedings continued.  She is to follow-up with Dr. De Jesus on 08/10/18 for further esophageal evaluation.  She was subsequently discharged back here on 07/12/18.      CURRENT ISSUES    Failure to thrive: Since her stay at the [facility] in February/March 2018, she had dropped 17 pounds.  She is quite cachectic with a BMI of 16-17.   Admitted NPO with tube feedings at 30 mL/h, food was being introduced (regular diet, thin liquids, small portions).  Isosource 1.5 is  "now being administered at 56 mL/h for 18 hours, starting at 1500 and ending at 0900.  Medications continue to be given via feeding tube. She was seen by speech therapy here and appeared to be doing okay; however, we are going to follow guidelines set by Dr. De Jesus at this juncture.       As noted, she was to remain NPO until UGI is performed.  She was also followed by speech therapy in the hospital, and her swallow function was deemed intact. She had a follow-up appointment on 07/09/18, but she remains NPO.     On 07/23/18 staff report that she became confused and agitated, requesting to be taken back to her old room and to the \"real [place]\".  When addressed with patient today, she admits to feeling disoriented, says \"I thought I was imagining things and not at [place]\".  Today she is oriented and appropriate, pleasant and conversant throughout the visit.     GERD: She reports this is \"fine now\".  She previously complained of heartburn despite being on lansoprazole 30 mg daily.  She did have orders for Maalox, but it was written as as needed, and she was not taking it.  We scheduled that while awake, keeping with 4 times daily dosing.  She is current denies nausea and vomiting but does have orders for ondansetron PRN.    Depression: She previously told me that she is \"not eating, not drinking, not doing nothing.\"  When asked if she is feeling sad, she tells me, \"sometimes\"  She is on mirtazapine and duloxetine.    COPD: She does have COPD and is on a variety of inhalers. Denies dyspnea or coughing.    Urinary and bowel incontinence: She does have stress incontinence and is checked by nursing staff during the night.  They turn on the light, and this bothers her, interrupting her sleep. Of late, she has developed loose, incontinent stools. Nursing staff report that she had 3 large loose BM's yesterday 7/23/18 and today during our visit she was incontinent x1. Her tube feeding formula has been recently " "changed, and despite the loose stools, she was receiving scheduled colace and PRN senna and miralax.  When addressed with staff, this was attributed to poor communication during shift report, so we will discontinue the colace, senna, and miralax at this time.       ROS: She sleeps well only at times. When asked if she sleeps well, she reports \"kind of yes, kind of no\".  She is receiving trazodone 25 mg nightly.  We will keep monitoring to see if dose adjustment is necessary.  She denies any headaches or chest pains, coughing or congestion, nausea or vomiting,  dizziness or dyspnea, dysuria, difficulty chewing or swallowing, integumentary issues.      Past Medical History:   Diagnosis Date     Acute encephalopathy      Aperistalsis of esophagus      Arthritis      Compression fracture of lumbar vertebra (H)     L2     COPD (chronic obstructive pulmonary disease) (H)      Depression      Dyslipidemia      Encephalopathy      Esophageal candidiasis (H)      Failure to thrive in adult      Fall      GERD (gastroesophageal reflux disease)      Hiatal hernia      HLD (hyperlipidemia)      HTN (hypertension)      Hypocalcemia      Hypomagnesemia      Hypoxia      Insomnia      Lactic acidosis      Major depressive disorder      Malnutrition (H)      Maternal MCV (mean corpuscular volume) low      Microcytic anemia      Microcytic hypochromic anemia 01/2013     Osteoporosis      Pericardial effusion      PMR (polymyalgia rheumatica) (H)      Pneumonia      Pneumothorax      Positive urine drug screen 10/2012    meth and coke positive 10/12. negative in 11/19/12     Pulmonary emphysema (H)      Rib fracture      S/P laparoscopic fundoplication      SIRS (systemic inflammatory response syndrome) (H)      Thrombocytopenia (H)      Trigger finger     left hand, middle finger     Underweight      Urinary urgency               Family History   Problem Relation Age of Onset     Diabetes Mother      Cancer Mother      lung     " Hypertension Mother      Emphysema Father      Heart disease Father      Hypertension Father      No Medical Problems Brother      Breast cancer Maternal Aunt      Social History     Social History     Marital status: Single     Spouse name: N/A     Number of children: N/A     Years of education: N/A     Social History Main Topics     Smoking status: Former Smoker     Packs/day: 1.50     Years: 30.00     Types: Cigarettes     Quit date: 8/10/1991     Smokeless tobacco: Never Used     Alcohol use No     Drug use: No     Sexual activity: Not Currently     Other Topics Concern     Not on file     Social History Narrative     MEDICATIONS: Reviewed from the MAR, physician orders, and earlier progress notes.  Current Outpatient Prescriptions   Medication Sig     acetaminophen (TYLENOL) 650 mg/20.3 mL Soln 650 mg by G-tube route every 4 (four) hours as needed.     albuterol (PROVENTIL HFA;VENTOLIN HFA) 90 mcg/actuation inhaler Inhale 2 puffs 4 (four) times a day as needed for wheezing.      aluminum-magnesium hydroxide-simethicone (MAALOX ADVANCED) 200-200-20 mg/5 mL Susp 20 mL by G-tube route 4 (four) times a day.      aspirin 81 mg chewable tablet 81 mg by G-tube route daily.     aspirin 81 MG EC tablet 81 mg by G-tube route daily.      atorvastatin (LIPITOR) 20 MG tablet 20 mg by G-tube route daily.      budesonide-formoterol (SYMBICORT) 160-4.5 mcg/actuation inhaler Inhale 2 puffs 2 (two) times a day.     calcium carbonate 500 mg/5 mL (1,250 mg/5 mL) suspension 600 mg by G-tube route daily.     cholecalciferol, vitamin D3, (VITAMIN D3 ORAL) Take by mouth daily. Vitamin D3 (D-VI-SOL)  liquid; 400U; amt: 1ml (400units); gastric tube  Once A Day;     cholecalciferol, vitamin D3, 2,000 unit cap 2,000 capsules by G-tube route daily.      cholecalciferol, vitamin D3, 400 unit/5 mL Liqd 400 Units by G-tube route daily.     DULoxetine (CYMBALTA) 60 MG capsule 60 mg by G-tube route daily.      ferrous sulfate 220 mg (44 mg  iron)/5 mL solution Take by mouth daily. FERROUS SULFATE Rosina Guo  solution; 220mg (44mg iron) / 5ml; amt: 7.4ml; gastric tube  Special Instructions: anorexia  Once A Day;     ferrous sulfate 300 mg (60 mg iron)/5 mL syrup 325 mg by G-tube route daily.     lactose-reduced food/fiber (ISOSOURCE 1.5 JASPREET ENTERAL TUBE) by Enteral Tube route. Isosource 1.5 continuous at goal rate of 40ml/hr Rosina Guo  Special Instructions: tube feeding  Goal per day 960ml/day  Every Shift;     lansoprazole (PREVACID SOLUTAB) 30 MG disintegrating tablet 30 mg by G-tube route daily.     lidocaine (LIDODERM) 5 % Place 1 patch on the skin 2 (two) times a day. Remove & Discard patch within 12 hours or as directed by MD sienna Guo  adhesive patch,medicated; 5 %; topical  Special Instructions: apply to painful area of skin  dx: closed fracture of multiple ribs of left side  Twice A Day;     mirtazapine (REMERON) 15 MG tablet 7.5 mg by G-tube route at bedtime.     ondansetron (ZOFRAN-ODT) 4 MG disintegrating tablet 4 mg by G-tube route every 6 (six) hours as needed for nausea.     oxyCODONE (ROXICODONE) 5 MG immediate release tablet Take 5-10 mg by mouth every 4 (four) hours as needed for pain.     polyethylene glycol (MIRALAX) 17 gram packet 17 g by G-tube route daily as needed.      sennosides (SENNOSIDES) 8.8 mg/5 mL Syrp syrup 6.5 mg by G-tube route daily as needed.      tiotropium (SPIRIVA) 18 mcg inhalation capsule Place 18 mcg into inhaler and inhale daily.     tiotropium (SPIRIVA) 18 mcg inhalation capsule Place 18 mcg into inhaler and inhale daily.     traZODone (DESYREL) 50 MG tablet 25 mg by G-tube route at bedtime as needed for sleep.     ALLERGIES:   Allergies   Allergen Reactions     Sulfa (Sulfonamide Antibiotics) Other (See Comments)      Ototoxicity         Penicillins Rash     DIET: NPO.  She is on tube feeding at 35 mL/h.    Vitals:    07/24/18 0947   BP: 101/60   Pulse: 95   Resp: 20  "  Temp: 97.1  F (36.2  C)   Weight: (!) 85 lb (38.6 kg)   Height: 4' 11\" (1.499 m)   She is down approximately 17 pounds over the last 4 months.  Body mass index is 17.17 kg/(m^2).    EXAMINATION:   General: Pleasant, alert, and conversant middle-aged female, looking much older than her stated age, lying in bed in fetal position, in no apparent distress.  She has a rather scratchy voice, probably from years of smoking.  She does have a history of COPD.    Head: Normocephalic and atraumatic.  Significant hirsutism.  Eyes: PERRLA, sclerae clear.   ENT: Slightly dry oral mucosa.  Full upper and lower dentures.  She is only wearing the uppers currently.  Hearing is unimpaired.  Cardiovascular: Regular rate and rhythm. No appreciable murmur.  Respiratory: Severely diminished lung sounds on the left (site of pneumothorax).  Otherwise, lungs are clear to auscultation bilaterally.  Abdomen: Soft and nontender.   Musculoskeletal/Extremities: Age-related degenerative joint disease.  Barrel chested with moderate to severe thoracic kyphosis.  Bilateral soft nonpitting lower extremity edema. Pain with turning in bed.  Neurological: Fine bilateral upper extremity tremor.  Integument: No rashes, clinically significant lesions, or skin breakdown.   Cognitive/Psychiatric: Alert and oriented but appears quite depressed with a very flat affect.    DIAGNOSTICS:   No results found for this or any previous visit.  Lab Results   Component Value Date    WBC 5.4 07/06/2018    HGB 11.6 (L) 07/06/2018    HCT 36.8 07/06/2018    MCV 96 07/06/2018     07/06/2018     CrCl cannot be calculated (Patient's most recent sCr result is older than the maximum 5 days allowed.).    ASSESSMENT/Plan:      ICD-10-CM    1. Failure to thrive in adult R62.7    2. 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 Pulmonary emphysema, unspecified emphysema type (H) J43.9    4. Hernia of abdominal cavity K46.9    5. Chronic GERD K21.9  "   6. Major depressive disorder F32.9    7. Coronary artery disease without angina pectoris I25.10    8. Stress incontinence in female N39.3      CHANGES:    Discontinue Colace.    CARE PLAN:  The care plan has been reviewed and all orders signed. Changes to care plan, if any, as noted.  Otherwise, continue current plan of care.      The above has been created using voice recognition software. Please be aware that this may unintentionally  produce inaccuracies and/or nonsensical sentences.      I, Ivana Garcia NP student, am scribing for and in the presence of, CARMEN Blandon CNP.     I, CARMEN Blandon CNP, personally performed the services described in this documentation, as scribed by, Ivana Garcia NP student in my presence, and it is both accurate and complete.       Electronically signed by: Ramana Blandon CNP

## 2021-06-19 NOTE — LETTER
Letter by Rosina Guo MD at      Author: Rosina Guo MD Service: -- Author Type: --    Filed:  Encounter Date: 7/25/2019 Status: (Other)         Patient: Rosina Link   MR Number: [id number]   YOB: 1945   Date of Visit: 7/25/2019     [facility]    Facility:   [facility] [[id number]]   Code Status: DNR/DNI       Chief Complaint   Patient presents with   ? Review Of Multiple Medical Conditions     LT 7/25/19.       HPI:  Rosina is a 74 y.o. female with hx of HTN, hiatal hernia, GERD, anemia, PMR, TERESA, pancreatitis, gastric obstruction and FTT on feeding tube, residing in LT. She is seen today for routine physician follow up. Of note, she was last hospitalized 1/23/19-1/25/19 for paraesophageal hernia repair as per below.     HOSPITAL DISCHARGE SUMMARY  BRIEF HOSPITAL COURSE: This 73 y.o. female was admitted after laparoscopic reduction repair of recurrent paraesophageal hernia. Her postoperative course has been uneventful. A esophagram on postop day 1 revealed no hernia, no leak, and no obstruction. She is tolerating a full liquid diet and her pain is under control. We reinitiated tube feeding during her admission as well.    She will be transferred back to her facility. Her orders will be the same except that we will send her on a full liquid diet for 2 weeks. She will follow-up with Dr. De Jesus. Our hope was that we can discontinue tube feeding and remove the feeding tube in the next couple of weeks. Overall she is done very well and she is happy with the outcome.    PROCEDURES PERFORMED DURING HOSPITALIZATION: Laparoscopic reduction and repair of recurrent paraesophageal hernia    Today:  She has been doing pretty well, has gained weight but she is actually upset a little at that thinking she is now too heavy. TF has been reduced to 4 hours and dietician following. She apparently has good oral intake. She will have follow up with her surgeon to  discuss removal of TF. She denies any abdominal pain. Denies shortness of breath or chest pain. Normal BMs. No urinary problems. She went on [place]ARY over the 4th of July holiday with her family to [place]. She ate normally, reports she didn't do too much, mostly stayed in hotel room but she enjoyed getting away.       Past Medical History:  Past Medical History:   Diagnosis Date   ? Acute encephalopathy    ? Aperistalsis of esophagus    ? Arthritis    ? Compression fracture of lumbar vertebra (H)     L2   ? COPD (chronic obstructive pulmonary disease) (H)    ? Depression    ? Dyslipidemia    ? Encephalopathy    ? Esophageal candidiasis (H)    ? Esophagitis    ? Failure to thrive in adult    ? Fall    ? GERD (gastroesophageal reflux disease)    ? Hiatal hernia    ? HLD (hyperlipidemia)    ? HTN (hypertension)    ? Hypocalcemia    ? Hypomagnesemia    ? Hypoxia    ? Insomnia    ? Lactic acidosis    ? Major depressive disorder    ? Malnutrition (H)    ? Maternal MCV (mean corpuscular volume) low    ? Microcytic anemia    ? Microcytic hypochromic anemia 01/2013   ? Osteoporosis    ? Pericardial effusion    ? PMR (polymyalgia rheumatica) (H)    ? Pneumonia    ? Pneumothorax    ? Positive urine drug screen 10/2012    meth and coke positive 10/12. negative in 11/19/12   ? Pulmonary emphysema (H)    ? Rib fracture    ? S/P laparoscopic fundoplication    ? SIRS (systemic inflammatory response syndrome) (H)    ? Thrombocytopenia (H)    ? Trigger finger     left hand, middle finger   ? Underweight    ? Urinary urgency        Medications:  Current Outpatient Medications   Medication Sig Note   ? acetaminophen (TYLENOL) 650 mg/20.3 mL Soln 650 mg by G-tube route every 4 (four) hours as needed.    ? aluminum-magnesium hydroxide 200-200 mg/5 mL suspension Take 15 mL by mouth every 6 (six) hours as needed for indigestion.    ? aspirin 81 mg chewable tablet 81 mg by G-tube route daily.    ? atorvastatin (LIPITOR) 20 MG tablet 20 mg by  G-tube route daily.     ? budesonide-formoterol (SYMBICORT) 160-4.5 mcg/actuation inhaler Inhale 2 puffs 2 (two) times a day.    ? calcium carbonate 500 mg/5 mL (1,250 mg/5 mL) suspension 600 mg by G-tube route daily.    ? cholecalciferol, vitamin D3, 400 unit/5 mL Liqd 400 Units by G-tube route daily.    ? ferrous sulfate 220 mg (44 mg iron)/5 mL solution 7.4ml by gastric tube daily.          ? furosemide (LASIX) 40 MG tablet Take 40 mg by mouth daily.    ? lactose-reduced food/fiber (ISOSOURCE 1.5 JASPREET ENTERAL TUBE) 71 mL/hr by Feeding route 2 (two) times a day.           ? methylcellulose (CITRUCEL) 500 mg Tab 500 mg by g tube at bedtime.          ? mirtazapine (REMERON) 15 MG tablet 7.5 mg by G-tube route at bedtime.    ? omeprazole (PRILOSEC) 2 mg/mL SusR suspension 20 mg by Gastrostomy Tube route daily before breakfast.    ? saliva stimulant (BIOTENE ORALBALANCE, GLYCERIN,) gel Take 1 application by mouth as needed Half inch length of gel directly on the tongue and spread thoroughly inside mouth .    ? sucralfate (CARAFATE) 100 mg/mL suspension 1 g by G-tube route 4 (four) times a day with meals and bedtime.    ? umeclidinium (INCRUSE ELLIPTA) 62.5 mcg/actuation DsDv inhaler Inhale 1 puff daily.    ? venlafaxine (EFFEXOR) 37.5 MG tablet 37.5 mg by G-tube route daily.        6/18/2019: Decreased from a 37.5 mg twice daily to 37.5 mg daily on 06/18/2019, an attempt at gradual dose reduction.       Physical Exam:   General: Patient is alert pale female, no distress.   Vitals: BP 99/63, 106/64, Temp 97.4, Pulse 76, , O2 sat 96% RA.  HEENT: Head is NCAT. Eyes show no injection or icterus. Nares negative. Oropharynx well hydrated.  Neck: Supple. No tenderness or adenopathy. No JVD.  Lungs: Clear bilaterally. No wheezes.  Cardiovascular: Regular rate and rhythm, normal S1. S2.  Back: No spinal or CVA tenderness.  Abdomen: FT intact. Soft, no tenderness on exam. Bowel sounds present. No guarding rebound or  rigidity.  Extremities: No LE edema is noted.  Musculoskeletal: Mild degen changes.   Psych: Mood appears good.      Labs:  Lab Results   Component Value Date    WBC 8.6 02/05/2019    HGB 13.8 01/04/2019    HCT 44.0 01/04/2019    MCV 92 01/04/2019     01/04/2019     Results for orders placed or performed in visit on 01/04/19   Basic Metabolic Panel   Result Value Ref Range    Sodium 140 136 - 145 mmol/L    Potassium 4.4 3.5 - 5.0 mmol/L    Chloride 102 98 - 107 mmol/L    CO2 30 22 - 31 mmol/L    Anion Gap, Calculation 8 5 - 18 mmol/L    Glucose 83 70 - 125 mg/dL    Calcium 9.3 8.5 - 10.5 mg/dL    BUN 24 8 - 28 mg/dL    Creatinine 0.62 0.60 - 1.10 mg/dL    GFR MDRD Af Amer >60 >60 mL/min/1.73m2    GFR MDRD Non Af Amer >60 >60 mL/min/1.73m2       Assessment/Plan:   1. Paraesophageal hernia. S/p operative repair Jan 2019. Continues on supplemental TF with noted weight gain. Dietary following. She plans to follow up with GI/surgeon regarding removal of TF.    2. Anemia. She is on iron. Last hgb at 13.8.   3. COPD. Stable. No current respiratory concerns.    4. HTN. On Lasix but no other BP meds.   5. Depression. Continue on duloxetine.          Electronically signed by: Rosina Guo MD

## 2021-06-19 NOTE — PROGRESS NOTES
"[facility]    Name:   Rosina Link  : 1945  Facility:   [facility] [[id number]]   Room: 239  Code Status: DNR/DNI -   Fac type:   SNF (Skilled Nursing Facility, TCU) -     CHIEF COMPLAINT / REASON FOR VISIT:  Chief Complaint   Patient presents with     Review Of Multiple Medical Conditions     TCU follow-up after hospitalization for hiatal hernia with GERD and esophagitis as well as failure to thrive.     [facility] from 18 until 18  [facility]CU from 18 until 03/10/18  [facility] from 18 until 18    HPI: Rosina is a 72 y.o. female with a history of severe COPD, depression, GERD, and insomnia.  She underwent surgery in 2016 for a large hiatal hernia (total gastric herniation) that included laparoscopic repair with mesh and Gorge fundoplication.        Hospitalization 2018: She had suffered a 60 pound unintentional weight loss prior to that, dropping from about 160 pounds down to 95 pounds.  There was a brief period of weight gain after that, but she eventually returned to the lower weight.  She developed progressive weakness and inability to eat much.  She would take several bites and then feel full.  She was so weak upon her admission to [facility] in 2018 that she could hardly walk.      She had had a CT scan on 18 that showed of her stomach back in her chest.  She was supposed to see Dr. De Jesus at on 18 to review pictures and to discuss options.  The CT scan described \"small amount of scarring and residual consolidation in the paramedial right lower lobe at the site of resolving pneumonia.\"  Chest x-rays performed on 18 showed \"right lower lobe pneumonia with small parapneumonic effusion.  The amount of infiltrate in the right lower lobe appeared increased from the CT examination.\"  She was admitted for community-acquired pneumonia.  She had no fevers or " leukocytosis.      Hospitalization June 2018: More recently, she was seen by Dr. Vasquez on 05/18/18 for dysphagia, nausea, and early satiety with weight loss.  An EGD with general anesthesia was planned at some point with consideration for repair of hiatal hernia and redo partial fundoplication.    She presented to [facility] ER on 06/05/18 with weakness, anorexia, and constipation.  Constipation eventually resolved without changes to appetite.  Psych was consulted with concern that depression may be contributory, and she was started on low-dose mirtazapine.  Megace was started as an appetite stimulant, and a CT showed a large recurrent hiatal hernia.  A GJ tube placement was suggested, but the patient was felt not to be a candidate for GJ tube per IR due to anatomy.  Therefore, GI and surgery were consulted, and she underwent laparoscopic partial fundoplication takedown repair paraesophageal hernia and placement of gastric jejunostomy tube placement; upper endoscopy by Dr. De Jesus on 06/12/18.  She was also found to have esophageal candidiasis and was given fluconazole.  The PICC line was placed on 06/13/18 to start TPN.    Overnight (06/14/18 to 06/15/18), rapid response was called due to hypoxemia and tachycardia.  A CT of the chest was negative for pulmonary embolism but showed aspiration pneumonitis.  She was treated with IV antibiotics which ended on 06/23/18.    She underwent EGD and advancement of GJ feeding tube on 06/21/18.  Abdominal x-rays on 06/22/18 showed GJ tube malpositioned, looped in the fundus.  This J-tube repositioned itself, so it is now being used again.  She remains strict n.p.o.  She will remain n.p.o. until UGI is performed.  She was also followed by speech therapy in the hospital, and her swallow function was deemed intact.  She has a follow-up appointment on 07/09/18.    Of note: An echocardiogram on 06/15/18 showed moderate pericardial effusion (known pericardial effusion  08/20/17 and 12/20/17).  Cardiology felt likely malnutrition with low protein causing capillary leak as a probable etiology.  Recommendations for observation with no further workup, but a repeat echocardiogram on 06/29/18 showed no change in no hemodynamic effects of effusion.      Other issues: She does have COPD and is on a variety of inhalers.      CURRENT ISSUES    Since her last stay in the [facility] TCU (4 months ago), she has dropped 17 pounds.  She is quite cachectic with a BMI of 16.52.  She remains n.p.o. and is currently on tube feeding at 35 mL/h.  Medications were also given in this fashion.    Overall, she looks ill and clearly does not feel well.  She complains of esophageal burning.  At this time, she is getting a PPI and as needed Maalox.  I had considered initiating sucralfate, but we will will defer to Dr. De Jesus.    ROS: She sleeps well only at times.  She would like something to help her sleep.  She is already receiving trazodone 25 mg nightly.  We will monitor to see if dose adjustment is necessary.  She denies any headaches or chest pains, coughing or congestion, nausea or vomiting, heartburn, dizziness or dyspnea, dysuria, difficulty chewing or swallowing, integumentary issues.  She is anxious to be discharged.    Past Medical History:   Diagnosis Date     Acute encephalopathy      Aperistalsis of esophagus      Arthritis      COPD (chronic obstructive pulmonary disease) (H)      Depression      Esophageal candidiasis (H)      GERD (gastroesophageal reflux disease)      Hiatal hernia      HLD (hyperlipidemia)      HTN (hypertension)      Hypocalcemia      Hypomagnesemia      Hypoxia      Insomnia      Lactic acidosis      Maternal MCV (mean corpuscular volume) low      Microcytic hypochromic anemia 01/2013     Osteoporosis      Pericardial effusion      PMR (polymyalgia rheumatica) (H)      Pneumonia      Positive urine drug screen 10/2012    meth and cocaine, confirmatory  testing not done, negative since then     SIRS (systemic inflammatory response syndrome) (H)      Thrombocytopenia (H)      Urinary urgency               Family History   Problem Relation Age of Onset     Diabetes Mother      Cancer Mother      lung     Hypertension Mother      Emphysema Father      Heart disease Father      Hypertension Father      No Medical Problems Brother      Breast cancer Maternal Aunt      Social History     Social History     Marital status: Single     Spouse name: N/A     Number of children: N/A     Years of education: N/A     Social History Main Topics     Smoking status: Former Smoker     Packs/day: 1.50     Years: 30.00     Quit date: 8/10/1991     Smokeless tobacco: Never Used     Alcohol use No     Drug use: No     Sexual activity: Not Currently     Other Topics Concern     Not on file     Social History Narrative     MEDICATIONS: Reviewed from the MAR, physician orders, and earlier progress notes.  Current Outpatient Prescriptions   Medication Sig     acetaminophen (TYLENOL) 650 mg/20.3 mL Soln 650 mg by G-tube route every 4 (four) hours as needed.     albuterol (PROVENTIL HFA;VENTOLIN HFA) 90 mcg/actuation inhaler Inhale 2 puffs 4 (four) times a day as needed for wheezing.      aluminum-magnesium hydroxide-simethicone (MAALOX ADVANCED) 200-200-20 mg/5 mL Susp 20 mL by G-tube route 4 (four) times a day as needed.      aspirin 81 MG EC tablet 81 mg by G-tube route daily.      atorvastatin (LIPITOR) 20 MG tablet 20 mg by G-tube route daily.      budesonide-formoterol (SYMBICORT) 160-4.5 mcg/actuation inhaler Inhale 2 puffs 2 (two) times a day.     calcium carbonate 500 mg/5 mL (1,250 mg/5 mL) suspension 600 mg by G-tube route daily.     cholecalciferol, vitamin D3, 2,000 unit cap 2,000 capsules by G-tube route daily.      cholecalciferol, vitamin D3, 400 unit/5 mL Liqd 400 Units by G-tube route daily.     docusate sodium (COLACE) 50 mg/5 mL oral liquid 50 mg by G-tube route daily.      "DULoxetine (CYMBALTA) 60 MG capsule 60 mg by G-tube route daily.      ferrous sulfate 300 mg (60 mg iron)/5 mL syrup 325 mg by G-tube route daily.     lansoprazole (PREVACID SOLUTAB) 30 MG disintegrating tablet 30 mg by G-tube route daily.     mirtazapine (REMERON) 15 MG tablet 7.5 mg by G-tube route at bedtime.     ondansetron (ZOFRAN-ODT) 4 MG disintegrating tablet 4 mg by G-tube route every 6 (six) hours as needed for nausea.     polyethylene glycol (MIRALAX) 17 gram packet 17 g by G-tube route daily as needed.      sennosides (SENNOSIDES) 8.8 mg/5 mL Syrp syrup 6.5 mg by G-tube route daily.     tiotropium (SPIRIVA) 18 mcg inhalation capsule Place 18 mcg into inhaler and inhale daily.     traZODone (DESYREL) 50 MG tablet 25 mg by G-tube route at bedtime as needed for sleep.     ALLERGIES:   Allergies   Allergen Reactions     Sulfa (Sulfonamide Antibiotics) Other (See Comments)      Ototoxicity         Penicillins Rash     DIET: NPO.  She is on tube feeding at 35 mL/h.    Vitals:    07/03/18 1327   BP: 110/56   Pulse: 83   Resp: 20   Temp: (!) 96  F (35.6  C)   Weight: (!) 81 lb 12.8 oz (37.1 kg)   Height: 4' 11\" (1.499 m)   She is down approximately 17 pounds over the last 4 months.  Body mass index is 16.52 kg/(m^2).    EXAMINATION:   General: Pleasant, alert, and conversant middle-aged female, sitting comfortably in her room, in no apparent distress.  She has a rather scratchy voice, probably from years of smoking.  She does have a history of COPD.  Head: Normocephalic and atraumatic.   Eyes: PERRLA, sclerae clear.   ENT: Moist oral mucosa.  Full upper and lower dentures.  She is only wearing the uppers currently.  Hearing is unimpaired.  Cardiovascular: Regular rate and rhythm.  No appreciable murmur.  Respiratory: Lungs clear to auscultation bilaterally.   Abdomen: Soft and nontender.   Musculoskeletal/Extremities: Age-related degenerative joint disease.  Moderate to severe thoracic kyphosis.  Bilateral soft " nonpitting lower extremity edema.  Integument: No rashes, clinically significant lesions, or skin breakdown.   Cognitive/Psychiatric: Alert and oriented but appears quite depressed.    DIAGNOSTICS:   No results found for this or any previous visit.  No results found for: WBC, HGB, HCT, MCV, PLT  CrCl cannot be calculated (No order found.).    ASSESSMENT/Plan:      ICD-10-CM    1. Hernia of abdominal cavity K46.9    2. Chronic GERD K21.9    3. Failure to thrive in adult R62.7    4. Major depressive disorder F32.9    5. Pulmonary emphysema, unspecified emphysema type (H) J43.9    6. Coronary artery disease without angina pectoris I25.10    7. Stress incontinence in female N39.3    8. Slow transit constipation K59.01      CHANGES:    None.    CARE PLAN:  The care plan has been reviewed and all orders signed. Changes to care plan, if any, as noted.  Otherwise, continue current plan of care.  Time spent with this patient was approximately 35 minutes, with greater than 50% spent in counseling and coordination of care that included a review of her recent hospital records as well as previous notes from her earlier stay and conversation with nursing staff regarding current issues.    The above has been created using voice recognition software. Please be aware that this may unintentionally  produce inaccuracies and/or nonsensical sentences.      Electronically signed by: Ramana Blandon CNP

## 2021-06-19 NOTE — PROGRESS NOTES
"[facility]    Name:   Rosina Link  : 1945  Facility:   [facility] [[id number]]   Room: 243  Code Status: FULL CODE -   Fac type:   SNF (Skilled Nursing Facility, TCU) -     CHIEF COMPLAINT / REASON FOR VISIT:  Chief Complaint   Patient presents with     Review Of Multiple Medical Conditions     TCU follow-up after hospitalization for hiatal hernia with GERD and esophagitis, as well as adult failure to thrive.  This was followed by a second hospitalization secondary to patient fall, multiple rib fractures, and subsequent pneumothorax.     [facility] from 18 until 18  [facility] from 18 until 03/10/18  [facility] from 18 until 18  [facility] from 8018 until   [facility] from 18 until 18    Patient was last seen by me on 18.    HPI: Rosina is a 73 y.o. female with a history of severe COPD, depression, GERD, and insomnia.  She underwent surgery in 2016 for a large hiatal hernia (total gastric herniation) that included laparoscopic repair with mesh and Gorge fundoplication.        Hospitalization 2018: She had suffered a 60 pound unintentional weight loss prior to that, dropping from about 160 pounds down to 95 pounds.  There was a brief period of weight gain after that, but she eventually returned to the lower weight.  She developed progressive weakness and inability to eat much.  She would take several bites and then feel full.  She was so weak upon her admission to [facility] in 2018 that she could hardly walk.      She had had a CT scan on 18 that showed of her stomach back in her chest.  She was supposed to see Dr. De Jesus at on 18 to review pictures and to discuss options.  The CT scan described \"small amount of scarring and residual consolidation in the paramedial right lower lobe at the site of resolving pneumonia.\"  " "Chest x-rays performed on 02/16/18 showed \"right lower lobe pneumonia with small parapneumonic effusion.  The amount of infiltrate in the right lower lobe appeared increased from the CT examination.\"  She was admitted for community-acquired pneumonia.  She had no fevers or leukocytosis.      Hospitalization June 2018: More recently, she was seen by Dr. Vasquez on 05/18/18 for dysphagia, nausea, and early satiety with weight loss.  An EGD with general anesthesia was planned at some point with consideration for repair of hiatal hernia and redo partial fundoplication.    She presented to [facility] ER on 06/05/18 with weakness, anorexia, and constipation.  Constipation eventually resolved without changes to appetite.  Psych was consulted with concern that depression may be contributory, and she was started on low-dose mirtazapine.  Megace was started as an appetite stimulant, and a CT showed a large recurrent hiatal hernia.  A GJ tube placement was suggested, but the patient was felt not to be a candidate for GJ tube per IR due to anatomy.  Therefore, GI and surgery were consulted, and she underwent laparoscopic partial fundoplication takedown repair paraesophageal hernia and placement of gastric jejunostomy tube placement; upper endoscopy by Dr. Daniel De Jseus on 06/12/18.  She was also found to have esophageal candidiasis and was given fluconazole.  The PICC line was placed on 06/13/18 to start TPN.    Overnight (06/14/18 to 06/15/18), rapid response was called due to hypoxemia and tachycardia.  A CT of the chest was negative for pulmonary embolism but showed aspiration pneumonitis.  She was treated with IV antibiotics which ended on 06/23/18.    She underwent EGD and advancement of GJ feeding tube on 06/21/18.  Abdominal x-rays on 06/22/18 showed GJ tube malpositioned, looped in the fundus.  This J-tube repositioned itself, so it is now being used again.      Of note: An echocardiogram on 06/15/18 showed " moderate pericardial effusion (known pericardial effusion 08/20/17 and 12/20/17).  Cardiology felt likely malnutrition with low protein causing capillary leak as a probable etiology.  Recommendations for observation with no further workup, but a repeat echocardiogram on 06/29/18 showed no change in no hemodynamic effects of effusion.      Hospitalization in July 2018: On 07/7/18, she was getting out of bed early in the day and decided to walk to the bathroom on her own rather than summoned assistance.  On the way to her bathroom, she became dizzy, fell, and struck the side of the garbage can.  She had experienced dizzy spells which have caused falls frequently before.  A CT of the chest, abdomen, and pelvis showed displaced left ninth and 10th rib fractures with a moderate sized pneumothorax and increased moderate pericardial effusion.  A chest tube was placed in the ED.  Trauma surgery evaluated the patient, and the chest tube was removed on 07/10/18.  She remained stable off oxygen with no dyspnea.    Another echocardiogram was done, as the pericardial effusion had possibly increased in size per the CT on admission.  It showed no significant change compared to previously, and there are recommendations for her to follow-up with cardiology in 4 weeks.    A UGI was done, showing persistent partial functional obstruction at the level of the diaphragmatic hiatus, and a strict n.p.o. and tube feedings continued.  She is to follow-up with Dr. De Jesus on 08/10/18 for further esophageal evaluation.  She was subsequently discharged back here on 07/12/18.      CURRENT ISSUES    Failure to thrive/NPO status: Since her stay at the [facility] in February/March 2018, she had dropped 17 pounds.  She is quite cachectic with a BMI of 16-17.   Admitted NPO with tube feedings at 30 mL/h, food was being introduced (regular diet, thin liquids, small portions).  Isosource 1.5 is now being administered at 56 mL/h for 18  "hours, starting at 1500 and ending at 0900.  Medications continue to be given via feeding tube. She was seen by speech therapy here and appeared to be doing okay; however, we are going to follow guidelines set by Dr. De Jesus at this juncture.      As noted, she was to remain NPO until UGI is performed.  She was also followed by speech therapy in the hospital, and her swallow function was deemed intact. She had a follow-up appointment on 07/09/18, but she remains NPO.  Her next appointment with Dr. De Jesus is on 08/10/18.  She is anxious to start eating again.    On 07/23/18 staff report that she became confused and agitated, requesting to be taken back to her old room and to the \"real [place]\".  When addressed with patient today, she admits to feeling disoriented, says \"I thought I was imagining things and not at [place]\".  Today she is oriented and appropriate, pleasant and conversant throughout the visit.     GERD: She is complaining of heartburn despite being on lansoprazole 30 mg daily.  She previously had orders for Maalox as needed, and she was not taking it.  We scheduled that while awake, keeping with 4 times daily dosing.  Of epigastric/suprasternal heartburn that she describes as \"terrible, terrible, terrible.\"  She tells me it was all right for some time, but it is now back again.  We will add sucralfate 1 g 4 times daily and check a stool sample for H pylori.  She is current denies nausea and vomiting but does have orders for ondansetron PRN.    Depression: She previously told me that she is \"not eating, not drinking, not doing nothing.\"  When asked if she is feeling sad, she tells me, \"sometimes\"  She is on mirtazapine and duloxetine.    COPD: She does have COPD and is on a variety of inhalers. Denies dyspnea or coughing.    Urinary incontinence: She does have stress incontinence and is checked by nursing staff during the night.  They turn on the light, and this bothers her, interrupting her " sleep.     Diarrhea bowel incontinence: Of late, she has developed loose, incontinent stools. Her tube feeding formula has been recently changed, and despite the loose stools, she was receiving scheduled Colace and PRN senna and MiraLAX.  When addressed with staff, this was attributed to poor communication during shift report, so we will discontinue all of her bowel medications (if not already done).  The problem still persists.  In fact, prior to my earlier visit, she requested that I wait while she was cleaned up.  She had another loose stool during the visit.  We have also ordered psyllium which appears to be ineffective.  We will replace his diet with a 500 mg tab of methylcellulose.      ROS: She sleeps well only at times.  She is receiving trazodone 25 mg nightly.  We will keep monitoring to see if dose adjustment is necessary.  She denies any headaches or chest pains, coughing or congestion, nausea or vomiting,  dizziness or dyspnea, dysuria, difficulty chewing or swallowing, integumentary issues.      Past Medical History:   Diagnosis Date     Acute encephalopathy      Aperistalsis of esophagus      Arthritis      Compression fracture of lumbar vertebra (H)     L2     COPD (chronic obstructive pulmonary disease) (H)      Depression      Dyslipidemia      Encephalopathy      Esophageal candidiasis (H)      Failure to thrive in adult      Fall      GERD (gastroesophageal reflux disease)      Hiatal hernia      HLD (hyperlipidemia)      HTN (hypertension)      Hypocalcemia      Hypomagnesemia      Hypoxia      Insomnia      Lactic acidosis      Major depressive disorder      Malnutrition (H)      Maternal MCV (mean corpuscular volume) low      Microcytic anemia      Microcytic hypochromic anemia 01/2013     Osteoporosis      Pericardial effusion      PMR (polymyalgia rheumatica) (H)      Pneumonia      Pneumothorax      Positive urine drug screen 10/2012    meth and coke positive 10/12. negative in 11/19/12      Pulmonary emphysema (H)      Rib fracture      S/P laparoscopic fundoplication      SIRS (systemic inflammatory response syndrome) (H)      Thrombocytopenia (H)      Trigger finger     left hand, middle finger     Underweight      Urinary urgency               Family History   Problem Relation Age of Onset     Diabetes Mother      Cancer Mother      lung     Hypertension Mother      Emphysema Father      Heart disease Father      Hypertension Father      No Medical Problems Brother      Breast cancer Maternal Aunt      Social History     Social History     Marital status: Single     Spouse name: N/A     Number of children: N/A     Years of education: N/A     Social History Main Topics     Smoking status: Former Smoker     Packs/day: 1.50     Years: 30.00     Types: Cigarettes     Quit date: 8/10/1991     Smokeless tobacco: Never Used     Alcohol use No     Drug use: No     Sexual activity: Not Currently     Other Topics Concern     Not on file     Social History Narrative     MEDICATIONS: Reviewed from the MAR, physician orders, and earlier progress notes.  Current Outpatient Prescriptions   Medication Sig     methylcellulose (CITRUCEL) 500 mg Tab Take 500 mg by mouth daily.     sucralfate (CARAFATE) 1 gram tablet Take 1 g by mouth 4 (four) times a day.     acetaminophen (TYLENOL) 650 mg/20.3 mL Soln 650 mg by G-tube route every 4 (four) hours as needed.     albuterol (PROVENTIL HFA;VENTOLIN HFA) 90 mcg/actuation inhaler Inhale 2 puffs 4 (four) times a day as needed for wheezing.      aluminum-magnesium hydroxide-simethicone (MAALOX ADVANCED) 200-200-20 mg/5 mL Susp 20 mL by G-tube route 4 (four) times a day.      aspirin 81 mg chewable tablet 81 mg by G-tube route daily.     aspirin 81 MG EC tablet 81 mg by G-tube route daily.      atorvastatin (LIPITOR) 20 MG tablet 20 mg by G-tube route daily.      budesonide-formoterol (SYMBICORT) 160-4.5 mcg/actuation inhaler Inhale 2 puffs 2 (two) times a day.     calcium  carbonate 500 mg/5 mL (1,250 mg/5 mL) suspension 600 mg by G-tube route daily.     cholecalciferol, vitamin D3, (VITAMIN D3 ORAL) Take by mouth daily. Vitamin D3 (D-VI-SOL)  liquid; 400U; amt: 1ml (400units); gastric tube  Once A Day;     cholecalciferol, vitamin D3, 2,000 unit cap 2,000 capsules by G-tube route daily.      cholecalciferol, vitamin D3, 400 unit/5 mL Liqd 400 Units by G-tube route daily.     DULoxetine (CYMBALTA) 60 MG capsule 60 mg by G-tube route daily.      ferrous sulfate 220 mg (44 mg iron)/5 mL solution Take by mouth daily. FERROUS SULFATE Rosina Guo  solution; 220mg (44mg iron) / 5ml; amt: 7.4ml; gastric tube  Special Instructions: anorexia  Once A Day;     ferrous sulfate 300 mg (60 mg iron)/5 mL syrup 325 mg by G-tube route daily.     lactose-reduced food/fiber (ISOSOURCE 1.5 JASPREET ENTERAL TUBE) by Enteral Tube route. Isosource 1.5 continuous at goal rate of 40ml/hr Rosina Guo  Special Instructions: tube feeding  Goal per day 960ml/day  Every Shift;     lansoprazole (PREVACID SOLUTAB) 30 MG disintegrating tablet 30 mg by G-tube route daily.     lidocaine (LIDODERM) 5 % Place 1 patch on the skin 2 (two) times a day. Remove & Discard patch within 12 hours or as directed by MD    lidocaine Rosina Guo  adhesive patch,medicated; 5 %; topical  Special Instructions: apply to painful area of skin  dx: closed fracture of multiple ribs of left side  Twice A Day;     mirtazapine (REMERON) 15 MG tablet 7.5 mg by G-tube route at bedtime.     ondansetron (ZOFRAN-ODT) 4 MG disintegrating tablet 4 mg by G-tube route every 6 (six) hours as needed for nausea.     oxyCODONE (ROXICODONE) 5 MG immediate release tablet Take 5-10 mg by mouth every 4 (four) hours as needed for pain.     polyethylene glycol (MIRALAX) 17 gram packet 17 g by G-tube route daily as needed.      sennosides (SENNOSIDES) 8.8 mg/5 mL Syrp syrup 6.5 mg by G-tube route daily as needed.      tiotropium (SPIRIVA) 18 mcg  "inhalation capsule Place 18 mcg into inhaler and inhale daily.     tiotropium (SPIRIVA) 18 mcg inhalation capsule Place 18 mcg into inhaler and inhale daily.     traZODone (DESYREL) 50 MG tablet 25 mg by G-tube route at bedtime as needed for sleep.     ALLERGIES:   Allergies   Allergen Reactions     Sulfa (Sulfonamide Antibiotics) Other (See Comments)      Ototoxicity         Penicillins Rash     DIET: NPO.  She is on tube feeding at 56 mL/h for 18 hours per day.    Vitals:    07/31/18 1205   BP: 132/69   Pulse: 84   Resp: 20   Temp: (!) 96  F (35.6  C)   Weight: (!) 84 lb 6.4 oz (38.3 kg)   Height: 4' 11\" (1.499 m)   She is down approximately 17 pounds over the last 4 months.  Body mass index is 17.05 kg/(m^2).    EXAMINATION:   General: Pleasant, alert, and conversant middle-aged female, looking much older than her stated age, sitting in her wheelchair, in no apparent distress.  She was recently cleaned up after an incontinent stool episode and, while visiting, she had another one.  Head: Normocephalic and atraumatic.  Significant hirsutism.  Eyes: PERRLA, sclerae clear.   ENT: Slightly dry oral mucosa.  Full upper and lower dentures.  She is only wearing the uppers currently.  Hearing is unimpaired.  Cardiovascular: Regular rate and rhythm. No appreciable murmur.  Respiratory: Today, lungs are clear to auscultation bilaterally.  Abdomen: Soft and nontender.   Musculoskeletal/Extremities: Age-related degenerative joint disease.  Barrel chested with severe thoracic kyphosis.  Bilateral soft nonpitting lower extremity edema. Pain with turning in bed.  Neurological: Fine bilateral upper extremity tremor.  Integument: No rashes, clinically significant lesions, or skin breakdown.   Cognitive/Psychiatric: Alert and oriented she seems to present with a depressed/flat affect she does a lot of wise cracking during my visit.     DIAGNOSTICS:   No results found for this or any previous visit.  Lab Results   Component Value " Date    WBC 5.4 07/06/2018    HGB 11.6 (L) 07/06/2018    HCT 36.8 07/06/2018    MCV 96 07/06/2018     07/06/2018     CrCl cannot be calculated (Patient's most recent sCr result is older than the maximum 5 days allowed.).    ASSESSMENT/Plan:      ICD-10-CM    1. Failure to thrive in adult R62.7    2. Hernia of abdominal cavity K46.9    3. Chronic GERD K21.9    4. Functional diarrhea K59.1    5. Major depressive disorder F32.9    6. Coronary artery disease without angina pectoris I25.10    7. Stress incontinence in female N39.3    8. Incontinence of feces, unspecified fecal incontinence type R15.9      CHANGES:    Discontinue psyllium and start methylcellulose 500 mg tab per PEG tube daily for the stools.  Add sucralfate 1 g per G-tube 4 times daily for heartburn, and send a stool sample to check for H pylori.    CARE PLAN:  The care plan has been reviewed and all orders signed. Changes to care plan, if any, as noted.  Otherwise, continue current plan of care.  Time spent with this patient was approximately 35 minutes, with greater than 50% spent in counseling and coordination of care that included an assessment of which methods we could use to alleviate her current symptoms.    The above has been created using voice recognition software. Please be aware that this may unintentionally  produce inaccuracies and/or nonsensical sentences.       Electronically signed by: Ramana Blandon CNP

## 2021-06-19 NOTE — PROGRESS NOTES
"[facility]    Name:   Rosina Link  : 1945  Facility:   [facility] [[id number]]   Room: 243  Code Status: FULL CODE -   Fac type:   SNF (Skilled Nursing Facility, TCU) -     CHIEF COMPLAINT / REASON FOR VISIT:  Chief Complaint   Patient presents with     Review Of Multiple Medical Conditions     TCU follow-up after hospitalization for hiatal hernia with GERD and esophagitis as well as adult failure to thrive. This followed by second hospitalization secondary to patient fall, rib fracture and subsequent pnemothorax.     [facility] from 18 until 18  [facility]CU from 18 until 03/10/18  [facility] from 18 until 18  [facility] from 8018 until   [facility] from 18 until 18    Patient was last seen by me on 18 and subsequently seen by Dr. Guo for a readmission visit on 18.    HPI: Rosina is a 73 y.o. female with a history of severe COPD, depression, GERD, and insomnia.  She underwent surgery in 2016 for a large hiatal hernia (total gastric herniation) that included laparoscopic repair with mesh and Gorge fundoplication.        Hospitalization 2018: She had suffered a 60 pound unintentional weight loss prior to that, dropping from about 160 pounds down to 95 pounds.  There was a brief period of weight gain after that, but she eventually returned to the lower weight.  She developed progressive weakness and inability to eat much.  She would take several bites and then feel full.  She was so weak upon her admission to [facility] in 2018 that she could hardly walk.      She had had a CT scan on 18 that showed of her stomach back in her chest.  She was supposed to see Dr. De Jesus at on 18 to review pictures and to discuss options.  The CT scan described \"small amount of scarring and residual consolidation in the paramedial " "right lower lobe at the site of resolving pneumonia.\"  Chest x-rays performed on 02/16/18 showed \"right lower lobe pneumonia with small parapneumonic effusion.  The amount of infiltrate in the right lower lobe appeared increased from the CT examination.\"  She was admitted for community-acquired pneumonia.  She had no fevers or leukocytosis.      Hospitalization June 2018: More recently, she was seen by Dr. Vasquez on 05/18/18 for dysphagia, nausea, and early satiety with weight loss.  An EGD with general anesthesia was planned at some point with consideration for repair of hiatal hernia and redo partial fundoplication.    She presented to [facility] ER on 06/05/18 with weakness, anorexia, and constipation.  Constipation eventually resolved without changes to appetite.  Psych was consulted with concern that depression may be contributory, and she was started on low-dose mirtazapine.  Megace was started as an appetite stimulant, and a CT showed a large recurrent hiatal hernia.  A GJ tube placement was suggested, but the patient was felt not to be a candidate for GJ tube per IR due to anatomy.  Therefore, GI and surgery were consulted, and she underwent laparoscopic partial fundoplication takedown repair paraesophageal hernia and placement of gastric jejunostomy tube placement; upper endoscopy by Dr. Daniel De Jesus on 06/12/18.  She was also found to have esophageal candidiasis and was given fluconazole.  The PICC line was placed on 06/13/18 to start TPN.    Overnight (06/14/18 to 06/15/18), rapid response was called due to hypoxemia and tachycardia.  A CT of the chest was negative for pulmonary embolism but showed aspiration pneumonitis.  She was treated with IV antibiotics which ended on 06/23/18.    She underwent EGD and advancement of GJ feeding tube on 06/21/18.  Abdominal x-rays on 06/22/18 showed GJ tube malpositioned, looped in the fundus.  This J-tube repositioned itself, so it is now being used again.  "     Of note: An echocardiogram on 06/15/18 showed moderate pericardial effusion (known pericardial effusion 08/20/17 and 12/20/17).  Cardiology felt likely malnutrition with low protein causing capillary leak as a probable etiology.  Recommendations for observation with no further workup, but a repeat echocardiogram on 06/29/18 showed no change in no hemodynamic effects of effusion.      Hospitalization in July 2018: On 07/7/18, she was getting out of bed early in the day and decided to walk to the bathroom on her own rather than summoned assistance.  On the way to her bathroom, she became dizzy, fell, and struck the side of the garbage can.  She had experienced dizzy spells which have caused falls frequently before.  A CT of the chest, abdomen, and pelvis showed displaced left ninth and 10th rib fractures with a moderate sized pneumothorax and increased moderate pericardial effusion.  A chest tube was placed in the ED.  Trauma surgery evaluated the patient, and the chest tube was removed on 07/10/18.  She remained stable off oxygen with no dyspnea.    Another echocardiogram was done, as the pericardial effusion had possibly increased in size per the CT on admission.  It showed no significant change compared to previously, and there are recommendations for her to follow-up with cardiology in 4 weeks.    A UGI was done, showing persistent partial functional obstruction at the level of the diaphragmatic hiatus, and a strict n.p.o. and tube feedings continued.  She is to follow-up with Dr. De Jesus on 08/10/18 for further esophageal evaluation.  She was subsequently discharged back here on 07/12/18.      CURRENT ISSUES    Failure to thrive: Since her stay at the [facility] in February/March 2018, she had dropped 17 pounds.  She is quite cachectic with a BMI of 16-17.   Admitted NPO with tube feedings at 30 mL/h, food was being introduced (regular diet, thin liquids, small portions).  Isosource 1.5 is  "now being administered at 56 mL/h for 18 hours, starting at 1500 and ending at 0900.  Medications continue to be given via feeding tube.   She was seen by speech therapy here and appeared to be doing okay; however, we are going to follow guidelines set by Dr. De Jesus at this juncture.     As noted, she was to remain NPO until UGI is performed.  She was also followed by speech therapy in the hospital, and her swallow function was deemed intact. She has a follow-up appointment on 07/09/18.    GERD: This continues to be a real issue with her.  She complains of heartburn while being on lansoprazole 30 mg daily.  She does have orders for Maalox, but it is written as as needed, and she has not been taking it.  We are going to schedule that while awake, keeping with 4 times daily dosing.  She is experiencing occasional nausea and vomiting and does have orders for ondansetron.    Depression: She tells me that she is \"not eating, not drinking, not doing nothing.\"  When asked if she is feeling sad, she tells me, \"kind of, yeah.\"  She is on mirtazapine and duloxetine.    COPD: She does have COPD and is on a variety of inhalers.    Urinary incontinence: She does have stress incontinence and is checked by nursing staff during the night.  They turn on the light, and this bothers her, interrupting her sleep.    Overall, she looks ill and clearly does not feel well.  She complains of esophageal burning.  At this time, she is getting a PPI and as needed Maalox.  I had considered initiating sucralfate, but we will will defer to Dr. De Jesus.    ROS: She sleeps well only at times.  She would like something to help her sleep.  She is already receiving trazodone 25 mg nightly.  We will monitor to see if dose adjustment is necessary.  She denies any headaches or chest pains, coughing or congestion, nausea or vomiting,  dizziness or dyspnea, dysuria, difficulty chewing or swallowing, integumentary issues.  She is anxious to be " discharged.    Past Medical History:   Diagnosis Date     Acute encephalopathy      Aperistalsis of esophagus      Arthritis      Compression fracture of lumbar vertebra (H)     L2     COPD (chronic obstructive pulmonary disease) (H)      Depression      Dyslipidemia      Encephalopathy      Esophageal candidiasis (H)      Failure to thrive in adult      Fall      GERD (gastroesophageal reflux disease)      Hiatal hernia      HLD (hyperlipidemia)      HTN (hypertension)      Hypocalcemia      Hypomagnesemia      Hypoxia      Insomnia      Lactic acidosis      Major depressive disorder      Malnutrition (H)      Maternal MCV (mean corpuscular volume) low      Microcytic anemia      Microcytic hypochromic anemia 01/2013     Osteoporosis      Pericardial effusion      PMR (polymyalgia rheumatica) (H)      Pneumonia      Pneumothorax      Positive urine drug screen 10/2012    meth and coke positive 10/12. negative in 11/19/12     Pulmonary emphysema (H)      Rib fracture      S/P laparoscopic fundoplication      SIRS (systemic inflammatory response syndrome) (H)      Thrombocytopenia (H)      Trigger finger     left hand, middle finger     Underweight      Urinary urgency               Family History   Problem Relation Age of Onset     Diabetes Mother      Cancer Mother      lung     Hypertension Mother      Emphysema Father      Heart disease Father      Hypertension Father      No Medical Problems Brother      Breast cancer Maternal Aunt      Social History     Social History     Marital status: Single     Spouse name: N/A     Number of children: N/A     Years of education: N/A     Social History Main Topics     Smoking status: Former Smoker     Packs/day: 1.50     Years: 30.00     Types: Cigarettes     Quit date: 8/10/1991     Smokeless tobacco: Never Used     Alcohol use No     Drug use: No     Sexual activity: Not Currently     Other Topics Concern     Not on file     Social History Narrative     MEDICATIONS:  Reviewed from the MAR, physician orders, and earlier progress notes.  Current Outpatient Prescriptions   Medication Sig     acetaminophen (TYLENOL) 650 mg/20.3 mL Soln 650 mg by G-tube route every 4 (four) hours as needed.     albuterol (PROVENTIL HFA;VENTOLIN HFA) 90 mcg/actuation inhaler Inhale 2 puffs 4 (four) times a day as needed for wheezing.      aluminum-magnesium hydroxide-simethicone (MAALOX ADVANCED) 200-200-20 mg/5 mL Susp 20 mL by G-tube route 4 (four) times a day.      aspirin 81 mg chewable tablet 81 mg by G-tube route daily.     aspirin 81 MG EC tablet 81 mg by G-tube route daily.      atorvastatin (LIPITOR) 20 MG tablet 20 mg by G-tube route daily.      budesonide-formoterol (SYMBICORT) 160-4.5 mcg/actuation inhaler Inhale 2 puffs 2 (two) times a day.     calcium carbonate 500 mg/5 mL (1,250 mg/5 mL) suspension 600 mg by G-tube route daily.     cholecalciferol, vitamin D3, (VITAMIN D3 ORAL) Take by mouth daily. Vitamin D3 (D-VI-SOL)  liquid; 400U; amt: 1ml (400units); gastric tube  Once A Day;     cholecalciferol, vitamin D3, 2,000 unit cap 2,000 capsules by G-tube route daily.      cholecalciferol, vitamin D3, 400 unit/5 mL Liqd 400 Units by G-tube route daily.     docusate sodium (COLACE) 50 mg/5 mL oral liquid 50 mg by G-tube route daily.     DULoxetine (CYMBALTA) 60 MG capsule 60 mg by G-tube route daily.      ferrous sulfate 220 mg (44 mg iron)/5 mL solution Take by mouth daily. FERROUS SULFATE Rosina Salcidoskcasi  solution; 220mg (44mg iron) / 5ml; amt: 7.4ml; gastric tube  Special Instructions: anorexia  Once A Day;     ferrous sulfate 300 mg (60 mg iron)/5 mL syrup 325 mg by G-tube route daily.     lactose-reduced food/fiber (ISOSOURCE 1.5 JASPREET ENTERAL TUBE) by Enteral Tube route. Isosource 1.5 continuous at goal rate of 40ml/hr Rosina Guo  Special Instructions: tube feeding  Goal per day 960ml/day  Every Shift;     lansoprazole (PREVACID SOLUTAB) 30 MG disintegrating tablet 30 mg by  "G-tube route daily.     lidocaine (LIDODERM) 5 % Place 1 patch on the skin 2 (two) times a day. Remove & Discard patch within 12 hours or as directed by MD sienna Guo  adhesive patch,medicated; 5 %; topical  Special Instructions: apply to painful area of skin  dx: closed fracture of multiple ribs of left side  Twice A Day;     mirtazapine (REMERON) 15 MG tablet 7.5 mg by G-tube route at bedtime.     ondansetron (ZOFRAN-ODT) 4 MG disintegrating tablet 4 mg by G-tube route every 6 (six) hours as needed for nausea.     oxyCODONE (ROXICODONE) 5 MG immediate release tablet Take 5-10 mg by mouth every 4 (four) hours as needed for pain.     polyethylene glycol (MIRALAX) 17 gram packet 17 g by G-tube route daily as needed.      sennosides (SENNOSIDES) 8.8 mg/5 mL Syrp syrup 6.5 mg by G-tube route daily as needed.      tiotropium (SPIRIVA) 18 mcg inhalation capsule Place 18 mcg into inhaler and inhale daily.     tiotropium (SPIRIVA) 18 mcg inhalation capsule Place 18 mcg into inhaler and inhale daily.     traZODone (DESYREL) 50 MG tablet 25 mg by G-tube route at bedtime as needed for sleep.     ALLERGIES:   Allergies   Allergen Reactions     Sulfa (Sulfonamide Antibiotics) Other (See Comments)      Ototoxicity         Penicillins Rash     DIET: NPO.  She is on tube feeding at 35 mL/h.    Vitals:    07/17/18 1410   BP: 101/65   Pulse: 96   Resp: 18   Temp: 96.6  F (35.9  C)   Weight: (!) 84 lb (38.1 kg)   Height: 4' 11\" (1.499 m)   She is down approximately 17 pounds over the last 4 months.  Body mass index is 16.97 kg/(m^2).    EXAMINATION:   General: Pleasant, alert, and conversant middle-aged female, looking much older than her stated age, up in a wheelchair, in no apparent distress.  She has a rather scratchy voice, probably from years of smoking.  She does have a history of COPD.    Head: Normocephalic and atraumatic.  Significant hirsutism.  Eyes: PERRLA, sclerae clear.   ENT: Slightly dry oral " mucosa.  Full upper and lower dentures.  She is only wearing the uppers currently.  Hearing is unimpaired.  Cardiovascular: Regular rate and rhythm (slightly tachycardic now at 92).  No appreciable murmur.  Respiratory: Severely diminished lung sounds on the left (site of pneumothorax).  Otherwise, lungs are clear to auscultation bilaterally.  Abdomen: Soft and nontender.   Musculoskeletal/Extremities: Age-related degenerative joint disease.  Barrel chested with moderate to severe thoracic kyphosis.  Bilateral soft nonpitting lower extremity edema.  Neurological: Fine bilateral upper extremity tremor.  Integument: No rashes, clinically significant lesions, or skin breakdown.   Cognitive/Psychiatric: Alert and oriented but appears quite depressed with a very flat affect.    DIAGNOSTICS:   No results found for this or any previous visit.  Lab Results   Component Value Date    WBC 5.4 07/06/2018    HGB 11.6 (L) 07/06/2018    HCT 36.8 07/06/2018    MCV 96 07/06/2018     07/06/2018     CrCl cannot be calculated (Patient's most recent sCr result is older than the maximum 5 days allowed.).    ASSESSMENT/Plan:      ICD-10-CM    1. Failure to thrive in adult R62.7    2. 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 Pulmonary emphysema, unspecified emphysema type (H) J43.9    4. Hernia of abdominal cavity K46.9    5. Chronic GERD K21.9    6. Major depressive disorder F32.9    7. Coronary artery disease without angina pectoris I25.10    8. Stress incontinence in female N39.3      CHANGES:    May use by [employer] per package directions for dry mouth, as the patient remains n.p.o.    CARE PLAN:  The care plan has been reviewed and all orders signed. Changes to care plan, if any, as noted.  Otherwise, continue current plan of care.  Time spent with this patient was approximately 35 minutes, with greater than 50% spent in counseling and coordination of care that included review of hospital records and  discussion with speech therapy.    The above has been created using voice recognition software. Please be aware that this may unintentionally  produce inaccuracies and/or nonsensical sentences.      Electronically signed by: Ramana Blandon CNP

## 2021-06-19 NOTE — LETTER
Letter by Ramana Blandon CNP at      Author: Ramana Blandon CNP Service: -- Author Type: --    Filed:  Encounter Date: 2019 Status: (Other)         Patient: Rosina Link   MR Number: [id number]   YOB: 1945   Date of Visit: 2019     [facility]    Name:   Rosina Link  : 1945  Facility:   [facility] [[id number]]   Room: 228A  Code Status: DNR/DNI -   Fac type:    (Long Term Care, LTC) -     CHIEF COMPLAINT / REASON FOR VISIT:  Chief Complaint   Patient presents with   ? FVP Care Coordination - Home Visit-Annual Assessment     [facility] from 18 until 18  [facility] from 18 until 03/10/18  [facility] from 18 until 18  [facility] from 8018 until 18  [facility] from 18 until 18    Patient was last seen by me on 2019 and subsequently seen by Dr. Guo on 2019.      HPI: Rosina is a 74 y.o. female with a history of severe COPD/pulmonary emphysema, depression, hiatal hernia/esophageal dysphagia/GERD, and insomnia.        MOST RECENT HOSPITALIZATION    Hospitalization 2019: She underwent surgery in 2016 for a large hiatal hernia (total gastric herniation) that included laparoscopic repair with mesh and Gorge fundoplication.  With recurrence, she was scheduled again for repair (and possible Jamee gastroplasty) on 19 with Dr. De Jesus.    On 19, she was admitted after laparoscopic reduction repair of a recurrent paraesophageal hernia.  Her postoperative course was uneventful.  An esophagram on POD 1 revealed no hernia, no leak, and no obstruction.  She was tolerating a full liquid diet, and her pain was under control.  Tube feeding was reinitiated during her admission as well.  Discharge orders back to [facility] included a full liquid diet for 2 weeks with subsequent follow-up  "with Dr. De Jesus.  The hope was that tube feeding could be discontinued in the near future.  Overall, she was noted to be doing well and was considered happy with the outcome.  She was readmitted on 01/25/19.      EARLIER HOSPITALIZATIONS    Hospitalization March 2018: She had suffered a 60 pound unintentional weight loss prior to that, dropping from about 160 pounds down to 95 pounds.  There was a brief period of weight gain after that, but she eventually returned to the lower weight.  She developed progressive weakness and inability to eat much.  She would take several bites and then feel full.  She was so weak upon her admission to [facility] in February 2018 that she could hardly walk.      She had had a CT scan on 02/09/18 that showed of her stomach back in her chest.  She was supposed to see Dr. De Jesus at on 03/07/18 to review pictures and to discuss options.  The CT scan described \"small amount of scarring and residual consolidation in the paramedial right lower lobe at the site of resolving pneumonia.\"  Chest x-rays performed on 02/16/18 showed \"right lower lobe pneumonia with small parapneumonic effusion.  The amount of infiltrate in the right lower lobe appeared increased from the CT examination.\"  She was admitted for community-acquired pneumonia.  She had no fevers or leukocytosis.      Hospitalization June 2018: More recently, she was seen by Dr. Vasquez on 05/18/18 for dysphagia, nausea, and early satiety with weight loss.  An EGD with general anesthesia was planned at some point with consideration for repair of hiatal hernia and redo partial fundoplication.    She presented to [facility] ER on 06/05/18 with weakness, anorexia, and constipation.  Constipation eventually resolved without changes to appetite.  Psych was consulted with concern that depression may be contributory, and she was started on low-dose mirtazapine.  Megace was started as an appetite stimulant, and a CT showed a large " recurrent hiatal hernia.  A GJ tube placement was suggested, but the patient was felt not to be a candidate for GJ tube per IR due to anatomy.  Therefore, GI and surgery were consulted, and she underwent laparoscopic partial fundoplication takedown repair paraesophageal hernia and placement of gastric jejunostomy tube placement; upper endoscopy by Dr. Daniel De Jesus on 06/12/18.  She was also found to have esophageal candidiasis and was given fluconazole.  The PICC line was placed on 06/13/18 to start TPN.    Overnight (06/14/18 to 06/15/18), rapid response was called due to hypoxemia and tachycardia.  A CT of the chest was negative for pulmonary embolism but showed aspiration pneumonitis.  She was treated with IV antibiotics which ended on 06/23/18.    She underwent EGD and advancement of GJ feeding tube on 06/21/18.  Abdominal x-rays on 06/22/18 showed GJ tube malpositioned, looped in the fundus.  This J-tube repositioned itself and was then being used again.      Of note: An echocardiogram on 06/15/18 showed moderate pericardial effusion (known pericardial effusion 08/20/17 and 12/20/17).  Cardiology felt likely malnutrition with low protein causing capillary leak as a probable etiology.  Recommendations for observation with no further workup, but a repeat echocardiogram on 06/29/18 showed no change in no hemodynamic effects of effusion.      Hospitalization in July 2018: On 07/7/18, she was getting out of bed early in the day and decided to walk to the bathroom on her own rather than summoned assistance.  On the way to her bathroom, she became dizzy, fell, and struck the side of the garbage can.  She had experienced dizzy spells which have caused falls frequently before.  A CT of the chest, abdomen, and pelvis showed displaced left ninth and 10th rib fractures with a moderate sized pneumothorax and increased moderate pericardial effusion.  A chest tube was placed in the ED.  Trauma surgery evaluated the patient,  and the chest tube was removed on 07/10/18.  She remained stable off oxygen with no dyspnea.    Another echocardiogram was done, as the pericardial effusion had possibly increased in size per the CT on admission.  It showed no significant change compared to previously, and there are recommendations for her to follow-up with cardiology in 4 weeks.    A UGI was done, showing persistent partial functional obstruction at the level of the diaphragmatic hiatus, and a strict n.p.o. and tube feedings continued.  She was subsequently discharged back here on 07/12/18.  She was to follow-up with Dr. De Jesus on 08/10/18 for further esophageal evaluation (as described in CURRENT ISSUES).      CURRENT ISSUES    GERD: She had been on both omeprazole and sucralfate.  When last seen, she had been taking standing orders Maalox frequently.  We wrote orders for 15 mL every 6 hours as needed.  Seen by Dr. Meng Michelle of [facility] on 07/15/2019, ondansetron was started 4 mg every 8 hours.  Tube feeding was decreased.    Oral intake/weight gain: Previously, early satiety was most likely a result of the long time she was not eating.  She tells me told me she could eat without choking but was getting full rather quickly.  Up until 08/02/2019, she was receiving tube feedings at night for nutritional supplementation.  Fortunately, she has been gaining weight.  When last seen in February, she weighed 98.2 pounds.  By my visit in June, she was up to 111.4 pounds, a gain of 13.2 pounds.  Currently, she is up an additional 9.4 pounds at 120.8 pounds.  Tube feeding is being held for 2 weeks to see if she can maintain her weight by oral intake alone.  We will continue with water flushes 100 mL 3 times daily.  If she does well, she will be referred to Dr. De Jesus for discontinuation of her feeding tube.    Cardiopulmonary issues: Chest x-rays on 02/05/2019 revealed mild cardiomegaly with congestive failure and superimposed  right lower lobe infiltrate.  Her white count was normal but temp slightly elevated from her baseline.  She had a nonproductive cough, wheeze on inspiration, crackles on expiration, and diminished sounds in the left lower lobe with sats of 89% on room air.  We ordered 40 mg of Lasix daily and Augmentin for 10 days.  A follow-up BMP was unremarkable.  She continues on 40 mg of Lasix.    COPD: She does have COPD/pulmonary emphysema, is barrel-chested, and is on a variety of inhalers.  She continues to deny dyspnea, coughing, or chest pain.    Urinary incontinence: She does have stress incontinence and is checked by nursing staff during the night.   She finds it very hard to control, stating that she does not realize it until after it starts.  There is some nocturia as well.  None of this has changed since admission.      ROS: No complaints whatsoever today (except for the eggs).  She tells me that her appetite is all right as long as the food is good.  She is no longer getting nauseated.  She is on trazodone 25 mg nightly and sleeps well with this.  She denies any headaches or chest pains, coughing or congestion, nausea or vomiting,  dizziness or dyspnea, dysuria, difficulty chewing or swallowing, or any integumentary issues.      Past Medical History:   Diagnosis Date   ? Acute encephalopathy    ? Aperistalsis of esophagus    ? Arthritis    ? Compression fracture of lumbar vertebra (H)     L2   ? COPD (chronic obstructive pulmonary disease) (H)    ? Depression    ? Dyslipidemia    ? Encephalopathy    ? Esophageal candidiasis (H)    ? Esophagitis    ? Failure to thrive in adult    ? Fall    ? GERD (gastroesophageal reflux disease)    ? Hiatal hernia    ? HLD (hyperlipidemia)    ? HTN (hypertension)    ? Hypocalcemia    ? Hypomagnesemia    ? Hypoxia    ? Insomnia    ? Lactic acidosis    ? Major depressive disorder    ? Malnutrition (H)    ? Maternal MCV (mean corpuscular volume) low    ? Microcytic anemia    ?  Microcytic hypochromic anemia 2013   ? Osteoporosis    ? Pericardial effusion    ? PMR (polymyalgia rheumatica) (H)    ? Pneumonia    ? Pneumothorax    ? Positive urine drug screen 10/2012    meth and coke positive 10/12. negative in 12   ? Pulmonary emphysema (H)    ? Rib fracture    ? S/P laparoscopic fundoplication    ? SIRS (systemic inflammatory response syndrome) (H)    ? Thrombocytopenia (H)    ? Trigger finger     left hand, middle finger   ? Underweight    ? Urinary urgency               Family History   Problem Relation Age of Onset   ? Diabetes Mother    ? Cancer Mother         lung   ? Hypertension Mother    ? Emphysema Father    ? Heart disease Father    ? Hypertension Father    ? No Medical Problems Brother    ? Breast cancer Maternal Aunt      Social History     Socioeconomic History   ? Marital status: Single     Spouse name: Not on file   ? Number of children: Not on file   ? Years of education: Not on file   ? Highest education level: Not on file   Occupational History   ? Not on file   Social Needs   ? Financial resource strain: Not on file   ? Food insecurity:     Worry: Not on file     Inability: Not on file   ? Transportation needs:     Medical: Not on file     Non-medical: Not on file   Tobacco Use   ? Smoking status: Former Smoker     Packs/day: 1.50     Years: 30.00     Pack years: 45.00     Types: Cigarettes     Last attempt to quit: 8/10/1991     Years since quittin.0   ? Smokeless tobacco: Never Used   Substance and Sexual Activity   ? Alcohol use: No   ? Drug use: No   ? Sexual activity: Not Currently   Lifestyle   ? Physical activity:     Days per week: Not on file     Minutes per session: Not on file   ? Stress: Not on file   Relationships   ? Social connections:     Talks on phone: Not on file     Gets together: Not on file     Attends Religion service: Not on file     Active member of club or organization: Not on file     Attends meetings of clubs or organizations: Not  on file     Relationship status: Not on file   ? Intimate partner violence:     Fear of current or ex partner: Not on file     Emotionally abused: Not on file     Physically abused: Not on file     Forced sexual activity: Not on file   Other Topics Concern   ? Not on file   Social History Narrative   ? Not on file     MEDICATIONS: Reviewed from the MAR, physician orders, and earlier progress notes.     Current Outpatient Medications   Medication Sig   ? ondansetron (ZOFRAN) 4 MG tablet Take 4 mg by mouth every 8 (eight) hours.   ? acetaminophen (TYLENOL) 650 mg/20.3 mL Soln 650 mg by G-tube route every 4 (four) hours as needed.   ? aluminum-magnesium hydroxide 200-200 mg/5 mL suspension Take 15 mL by mouth every 6 (six) hours as needed for indigestion.   ? aspirin 81 mg chewable tablet 81 mg by G-tube route daily.   ? atorvastatin (LIPITOR) 20 MG tablet 20 mg by G-tube route daily.    ? budesonide-formoterol (SYMBICORT) 160-4.5 mcg/actuation inhaler Inhale 2 puffs 2 (two) times a day.   ? calcium carbonate 500 mg/5 mL (1,250 mg/5 mL) suspension 600 mg by G-tube route daily.   ? cholecalciferol, vitamin D3, 400 unit/5 mL Liqd 400 Units by G-tube route daily.   ? ferrous sulfate 220 mg (44 mg iron)/5 mL solution 7.4ml by gastric tube daily.         ? furosemide (LASIX) 40 MG tablet Take 40 mg by mouth daily.   ? lactose-reduced food/fiber (ISOSOURCE 1.5 JASPREET ENTERAL TUBE) 71 mL/hr by Feeding route 2 (two) times a day.          ? methylcellulose (CITRUCEL) 500 mg Tab 500 mg by g tube at bedtime.         ? mirtazapine (REMERON) 15 MG tablet 7.5 mg by G-tube route at bedtime.   ? omeprazole (PRILOSEC) 2 mg/mL SusR suspension 20 mg by Gastrostomy Tube route daily before breakfast.   ? saliva stimulant (BIOTENE ORALBALANCE, GLYCERIN,) gel Take 1 application by mouth as needed Half inch length of gel directly on the tongue and spread thoroughly inside mouth .   ? umeclidinium (INCRUSE ELLIPTA) 62.5 mcg/actuation DsDv inhaler  "Inhale 1 puff daily.   ? venlafaxine (EFFEXOR) 37.5 MG tablet 37.5 mg by G-tube route daily.            ALLERGIES:   Allergies   Allergen Reactions   ? Sulfa (Sulfonamide Antibiotics) Other (See Comments)      Ototoxicity       ? Penicillins Rash     DIET: Regular diet, mechanical soft texture, thin liquids.  She is still receiving tube feeding supplementally as well as an oral supplement.    Vitals:    08/08/19 1252   BP: 123/82   Pulse: 96   Resp: 20   Temp: (!) 95.4  F (35.2  C)   SpO2: 93%   Weight: 120 lb 12.8 oz (54.8 kg)   Height: 4' 11\" (1.499 m)   Weight gain of 13.2 pounds with oral intake and tube feeding at night, along with oral nutritional supplement.  Body mass index is 24.4 kg/m .    EXAMINATION:   General: Pleasant, alert, and conversant middle-aged female, sitting on her bed and eating breakfast, looking much older than her stated age, in no apparent distress.  She is complaining about the eggs.  Head: Normocephalic and atraumatic.  Significant hirsutism.  Eyes: PERRLA, sclerae clear.   ENT: Slightly dry oral mucosa.  Full upper and lower dentures.  She is only wearing the uppers currently.  Hearing is unimpaired.  Cardiovascular: Regular rate and rhythm with no appreciable murmur.  Respiratory: Lung sounds are clear to auscultation bilaterally.   Abdomen: Soft and nontender.   Musculoskeletal/Extremities: Age-related degenerative joint disease.  Barrel chested with severe thoracic kyphosis.  No peripheral edema.   Neurological: Occasional fine bilateral upper extremity tremor (not noticeable today).  Integument: No rashes, clinically significant lesions, or skin breakdown.   Cognitive/Psychiatric: Alert and oriented.  To affect is euthymic/upbeat.    DIAGNOSTICS: .  Results for orders placed or performed in visit on 01/04/19   Basic Metabolic Panel   Result Value Ref Range    Sodium 140 136 - 145 mmol/L    Potassium 4.4 3.5 - 5.0 mmol/L    Chloride 102 98 - 107 mmol/L    CO2 30 22 - 31 mmol/L    " Anion Gap, Calculation 8 5 - 18 mmol/L    Glucose 83 70 - 125 mg/dL    Calcium 9.3 8.5 - 10.5 mg/dL    BUN 24 8 - 28 mg/dL    Creatinine 0.62 0.60 - 1.10 mg/dL    GFR MDRD Af Amer >60 >60 mL/min/1.73m2    GFR MDRD Non Af Amer >60 >60 mL/min/1.73m2     Lab Results   Component Value Date    WBC 8.6 02/05/2019    HGB 13.8 01/04/2019    HCT 44.0 01/04/2019    MCV 92 01/04/2019     01/04/2019     CrCl cannot be calculated (Patient's most recent lab result is older than the maximum 5 days allowed.).    ASSESSMENT/Plan:      ICD-10-CM    1. History of esophageal dysphagia Z87.19    2. History of abdominal hernia Z87.19    3. Major depressive disorder in full remission, unspecified whether recurrent (H) F32.5    4. Pulmonary emphysema, unspecified emphysema type (H) J43.9    5. Bowel and bladder incontinence R32     R15.9    6. Coronary artery disease involving native coronary artery of native heart without angina pectoris I25.10      CHANGES:  On 08/16/2019, if weight has been maintained over the prior 2 weeks with adjust p.o. intake, may refer to Dr. De Jesus for discontinuation of tube feeding.    Case Management:  I have reviewed the facility/SNF care plan/MDS which was done 05/14/2019, including the falls risk, nutrition and pain screening. I also reviewed the current immunizations, and preventive care.. Future cancer screening is not clinically indicated secondary to age/goals of care.   Patient's desire to return to the community is present, but is not able due to care needs .    Advance Directive Discussion:    I reviewed the current advanced directives as reflected in EPIC and the facility chart. I did review the advance directives with the resident.     Team Discussion:  I communicated with the appropriate disciplines involved with the Plan of Care:   Nursing   and Dietitian      Patient Goal:  Patient's goal is comfort.    Information reviewed:  Medications, vital signs, orders, and nursing notes.      CARE  PLAN:  The care plan has been reviewed and all orders signed.  Changes to care plan, if any, as noted.  Otherwise, continue current plan of care.  Total time spent with this patient with approximately 35 minutes, with greater than 50% spent in counseling and coordination of care that included discussions/review with nursing staff and dietary as well as other issues described above.    The above has been created using voice recognition software. Please be aware that this may unintentionally  produce inaccuracies and/or nonsensical sentences.       Electronically signed by: Ramana Blandon CNP

## 2021-06-19 NOTE — PROGRESS NOTES
"[facility]    Name:   Rosina Link  : 1945  Facility:   [facility] [[id number]]   Room: 224  Code Status: FULL CODE -   Fac type:   SNF (Skilled Nursing Facility, TCU) -     CHIEF COMPLAINT / REASON FOR VISIT:  Chief Complaint   Patient presents with     Review Of Multiple Medical Conditions     TCU follow-up after hospitalization for hiatal hernia with GERD and esophagitis, as well as adult failure to thrive.  This was followed by a second hospitalization secondary to patient fall, multiple rib fractures, and subsequent pneumothorax.     [facility] from 18 until 18  [facility]CU from 18 until 03/10/18  [facility] from 18 until 18  [facility] from 8018 until   [facility] from 18 until 18    Patient was last seen by me on 18.    HPI: Rosina is a 73 y.o. female with a history of severe COPD, depression, GERD, and insomnia.  She underwent surgery in 2016 for a large hiatal hernia (total gastric herniation) that included laparoscopic repair with mesh and Gorge fundoplication.        Hospitalization 2018: She had suffered a 60 pound unintentional weight loss prior to that, dropping from about 160 pounds down to 95 pounds.  There was a brief period of weight gain after that, but she eventually returned to the lower weight.  She developed progressive weakness and inability to eat much.  She would take several bites and then feel full.  She was so weak upon her admission to [facility] in 2018 that she could hardly walk.      She had had a CT scan on 18 that showed of her stomach back in her chest.  She was supposed to see Dr. De Jesus at on 18 to review pictures and to discuss options.  The CT scan described \"small amount of scarring and residual consolidation in the paramedial right lower lobe at the site of resolving pneumonia.\"  " "Chest x-rays performed on 02/16/18 showed \"right lower lobe pneumonia with small parapneumonic effusion.  The amount of infiltrate in the right lower lobe appeared increased from the CT examination.\"  She was admitted for community-acquired pneumonia.  She had no fevers or leukocytosis.      Hospitalization June 2018: More recently, she was seen by Dr. Vasquez on 05/18/18 for dysphagia, nausea, and early satiety with weight loss.  An EGD with general anesthesia was planned at some point with consideration for repair of hiatal hernia and redo partial fundoplication.    She presented to [facility] ER on 06/05/18 with weakness, anorexia, and constipation.  Constipation eventually resolved without changes to appetite.  Psych was consulted with concern that depression may be contributory, and she was started on low-dose mirtazapine.  Megace was started as an appetite stimulant, and a CT showed a large recurrent hiatal hernia.  A GJ tube placement was suggested, but the patient was felt not to be a candidate for GJ tube per IR due to anatomy.  Therefore, GI and surgery were consulted, and she underwent laparoscopic partial fundoplication takedown repair paraesophageal hernia and placement of gastric jejunostomy tube placement; upper endoscopy by Dr. Daniel De Jesus on 06/12/18.  She was also found to have esophageal candidiasis and was given fluconazole.  The PICC line was placed on 06/13/18 to start TPN.    Overnight (06/14/18 to 06/15/18), rapid response was called due to hypoxemia and tachycardia.  A CT of the chest was negative for pulmonary embolism but showed aspiration pneumonitis.  She was treated with IV antibiotics which ended on 06/23/18.    She underwent EGD and advancement of GJ feeding tube on 06/21/18.  Abdominal x-rays on 06/22/18 showed GJ tube malpositioned, looped in the fundus.  This J-tube repositioned itself, so it is now being used again.      Of note: An echocardiogram on 06/15/18 showed " moderate pericardial effusion (known pericardial effusion 08/20/17 and 12/20/17).  Cardiology felt likely malnutrition with low protein causing capillary leak as a probable etiology.  Recommendations for observation with no further workup, but a repeat echocardiogram on 06/29/18 showed no change in no hemodynamic effects of effusion.      Hospitalization in July 2018: On 07/7/18, she was getting out of bed early in the day and decided to walk to the bathroom on her own rather than summoned assistance.  On the way to her bathroom, she became dizzy, fell, and struck the side of the garbage can.  She had experienced dizzy spells which have caused falls frequently before.  A CT of the chest, abdomen, and pelvis showed displaced left ninth and 10th rib fractures with a moderate sized pneumothorax and increased moderate pericardial effusion.  A chest tube was placed in the ED.  Trauma surgery evaluated the patient, and the chest tube was removed on 07/10/18.  She remained stable off oxygen with no dyspnea.    Another echocardiogram was done, as the pericardial effusion had possibly increased in size per the CT on admission.  It showed no significant change compared to previously, and there are recommendations for her to follow-up with cardiology in 4 weeks.    A UGI was done, showing persistent partial functional obstruction at the level of the diaphragmatic hiatus, and a strict n.p.o. and tube feedings continued.  She is to follow-up with Dr. De Jesus on 08/10/18 for further esophageal evaluation.  She was subsequently discharged back here on 07/12/18.      CURRENT ISSUES    Failure to thrive/NPO status: Since her stay at the [facility]CU in February/March 2018, she had dropped 17 pounds.  She is quite cachectic with a BMI of 16-17.   Admitted NPO with tube feedings at 30 mL/h, food was being introduced (regular diet, thin liquids, small portions).  Isosource 1.5 is now being administered at 56 mL/h for 18  "hours, starting at 1500 and ending at 0900.  Medications continue to be given via feeding tube. She was seen by speech therapy here and appeared to be doing okay; however, we are going to follow guidelines set by Dr. De Jesus at this juncture.      As noted, she was to remain NPO until UGI is performed.  She was also followed by speech therapy in the hospital, and her swallow function was deemed intact. She had a follow-up appointment on 07/09/18, but she remains NPO.  Her next appointment with Dr. De Jesus is on 08/10/18.  She is anxious to start eating again.    On 07/23/18 staff report that she became confused and agitated, requesting to be taken back to her old room and to the \"real [place]\".  When addressed with patient, she admited to feeling disoriented, says \"I thought I was imagining things and not at [facility]\".  Today she is oriented and appropriate, pleasant and conversant throughout the visit.     GERD: She is complaining of heartburn despite being on lansoprazole 30 mg daily.  She previously had orders for Maalox as needed, and she was not taking it.  We scheduled that while awake, keeping with 4 times daily dosing.  Of epigastric/suprasternal heartburn that she describes as \"terrible, terrible, terrible.\"  She tells me it was all right for some time, but it is now back again.  We added sucralfate 1 g 4 times daily and a stool check for H pylori.  This has been effective.  She tells me, \"something's helping.\"    Depression: She previously told me that she is \"not eating, not drinking, not doing nothing.\"  When asked if she is feeling sad, she tells me, \"sometimes\"  She is on mirtazapine and duloxetine.    COPD: She does have COPD and is on a variety of inhalers. Denies dyspnea, coughing, or chest pain.    Urinary incontinence: She does have stress incontinence and is checked by nursing staff during the night.  She tells me, \"every time he turned around, I'm peeing in my britches.\"  She finds it " very hard to control, stating that she does not realize it until after she starts.  There is some nocturia as well.    Diarrhea bowel incontinence: Of late, she has developed loose, incontinent stools. Her tube feeding formula has been recently changed, and despite the loose stools, she was receiving scheduled Colace and PRN senna and MiraLAX.  When addressed with staff, this was attributed to poor communication during shift report, so we discontinued all of her bowel medications.  The problem, however, persists.  In fact, prior to my earlier visit, she requested that I wait while she was cleaned up.  She had another loose stool during the visit.  We had also ordered psyllium which appeared to be ineffective, will be replaced it with a 500 mg tab of methylcellulose.  We will monitor its efficacy.       ROS: She sleeps well only at times.  She is receiving trazodone 25 mg nightly.  We will keep monitoring to see if dose adjustment is necessary.  She denies any headaches or chest pains, coughing or congestion, nausea or vomiting,  dizziness or dyspnea, dysuria, difficulty chewing or swallowing, integumentary issues.      Past Medical History:   Diagnosis Date     Acute encephalopathy      Aperistalsis of esophagus      Arthritis      Compression fracture of lumbar vertebra (H)     L2     COPD (chronic obstructive pulmonary disease) (H)      Depression      Dyslipidemia      Encephalopathy      Esophageal candidiasis (H)      Failure to thrive in adult      Fall      GERD (gastroesophageal reflux disease)      Hiatal hernia      HLD (hyperlipidemia)      HTN (hypertension)      Hypocalcemia      Hypomagnesemia      Hypoxia      Insomnia      Lactic acidosis      Major depressive disorder      Malnutrition (H)      Maternal MCV (mean corpuscular volume) low      Microcytic anemia      Microcytic hypochromic anemia 01/2013     Osteoporosis      Pericardial effusion      PMR (polymyalgia rheumatica) (H)      Pneumonia       Pneumothorax      Positive urine drug screen 10/2012    meth and coke positive 10/12. negative in 11/19/12     Pulmonary emphysema (H)      Rib fracture      S/P laparoscopic fundoplication      SIRS (systemic inflammatory response syndrome) (H)      Thrombocytopenia (H)      Trigger finger     left hand, middle finger     Underweight      Urinary urgency               Family History   Problem Relation Age of Onset     Diabetes Mother      Cancer Mother      lung     Hypertension Mother      Emphysema Father      Heart disease Father      Hypertension Father      No Medical Problems Brother      Breast cancer Maternal Aunt      Social History     Social History     Marital status: Single     Spouse name: N/A     Number of children: N/A     Years of education: N/A     Social History Main Topics     Smoking status: Former Smoker     Packs/day: 1.50     Years: 30.00     Types: Cigarettes     Quit date: 8/10/1991     Smokeless tobacco: Never Used     Alcohol use No     Drug use: No     Sexual activity: Not Currently     Other Topics Concern     Not on file     Social History Narrative     MEDICATIONS: Reviewed from the MAR, physician orders, and earlier progress notes.  Current Outpatient Prescriptions   Medication Sig     sucralfate (CARAFATE) 100 mg/mL suspension Take 1 g by mouth 4 (four) times a day.     acetaminophen (TYLENOL) 650 mg/20.3 mL Soln 650 mg by G-tube route every 4 (four) hours as needed.     albuterol (PROVENTIL HFA;VENTOLIN HFA) 90 mcg/actuation inhaler Inhale 2 puffs 4 (four) times a day as needed for wheezing.      aluminum-magnesium hydroxide-simethicone (MAALOX ADVANCED) 200-200-20 mg/5 mL Susp 20 mL by G-tube route 4 (four) times a day.      aspirin 81 mg chewable tablet 81 mg by G-tube route daily.     aspirin 81 MG EC tablet 81 mg by G-tube route daily.      atorvastatin (LIPITOR) 20 MG tablet 20 mg by G-tube route daily.      budesonide-formoterol (SYMBICORT) 160-4.5 mcg/actuation inhaler  Inhale 2 puffs 2 (two) times a day.     calcium carbonate 500 mg/5 mL (1,250 mg/5 mL) suspension 600 mg by G-tube route daily.     cholecalciferol, vitamin D3, (VITAMIN D3 ORAL) Take by mouth daily. Vitamin D3 (D-VI-SOL)  liquid; 400U; amt: 1ml (400units); gastric tube  Once A Day;     cholecalciferol, vitamin D3, 2,000 unit cap 2,000 capsules by G-tube route daily.      cholecalciferol, vitamin D3, 400 unit/5 mL Liqd 400 Units by G-tube route daily.     DULoxetine (CYMBALTA) 60 MG capsule 60 mg by G-tube route daily.      ferrous sulfate 220 mg (44 mg iron)/5 mL solution Take by mouth daily. FERROUS SULFATE Rosina Guo  solution; 220mg (44mg iron) / 5ml; amt: 7.4ml; gastric tube  Special Instructions: anorexia  Once A Day;     ferrous sulfate 300 mg (60 mg iron)/5 mL syrup 325 mg by G-tube route daily.     lactose-reduced food/fiber (ISOSOURCE 1.5 JASPREET ENTERAL TUBE) by Enteral Tube route. Isosource 1.5 continuous at goal rate of 40ml/hr Rosina Guo  Special Instructions: tube feeding  Goal per day 960ml/day  Every Shift;     lansoprazole (PREVACID SOLUTAB) 30 MG disintegrating tablet 30 mg by G-tube route daily.     lidocaine (LIDODERM) 5 % Place 1 patch on the skin 2 (two) times a day. Remove & Discard patch within 12 hours or as directed by MD sienna Guo  adhesive patch,medicated; 5 %; topical  Special Instructions: apply to painful area of skin  dx: closed fracture of multiple ribs of left side  Twice A Day;     methylcellulose (CITRUCEL) 500 mg Tab Take 500 mg by mouth daily.     mirtazapine (REMERON) 15 MG tablet 7.5 mg by G-tube route at bedtime.     ondansetron (ZOFRAN-ODT) 4 MG disintegrating tablet 4 mg by G-tube route every 6 (six) hours as needed for nausea.     oxyCODONE (ROXICODONE) 5 MG immediate release tablet Take 5-10 mg by mouth every 4 (four) hours as needed for pain.     polyethylene glycol (MIRALAX) 17 gram packet 17 g by G-tube route daily as needed.       "sennosides (SENNOSIDES) 8.8 mg/5 mL Syrp syrup 6.5 mg by G-tube route daily as needed.      tiotropium (SPIRIVA) 18 mcg inhalation capsule Place 18 mcg into inhaler and inhale daily.     tiotropium (SPIRIVA) 18 mcg inhalation capsule Place 18 mcg into inhaler and inhale daily.     traZODone (DESYREL) 50 MG tablet 25 mg by G-tube route at bedtime as needed for sleep.     ALLERGIES:   Allergies   Allergen Reactions     Sulfa (Sulfonamide Antibiotics) Other (See Comments)      Ototoxicity         Penicillins Rash     DIET: NPO.  She is on tube feeding at 56 mL/h for 18 hours per day.    Vitals:    08/02/18 1123   BP: 110/72   Pulse: 80   Resp: 20   Temp: 96.8  F (36  C)   Weight: (!) 83 lb 6.4 oz (37.8 kg)   Height: 4' 11\" (1.499 m)   She is down approximately 17 pounds over the last 4 months.  Body mass index is 16.84 kg/(m^2).    EXAMINATION:   General: Pleasant, alert, and conversant middle-aged female, looking much older than her stated age, lying in bed, in no apparent distress.    Head: Normocephalic and atraumatic.  Significant hirsutism.  Eyes: PERRLA, sclerae clear.   ENT: Slightly dry oral mucosa.  Full upper and lower dentures.  She is only wearing the uppers currently.  Hearing is unimpaired.  Cardiovascular: Regular rate and rhythm. No appreciable murmur.  Respiratory: Today, lungs are clear to auscultation bilaterally.  Abdomen: Soft and nontender.   Musculoskeletal/Extremities: Age-related degenerative joint disease.  Barrel chested with severe thoracic kyphosis.  No peripheral edema.   Neurological: Fine bilateral upper extremity tremor.  Integument: No rashes, clinically significant lesions, or skin breakdown.   Cognitive/Psychiatric: Alert and oriented she seems to present with a depressed/flat affect she does a lot of wise cracking during my visit.     DIAGNOSTICS:   No results found for this or any previous visit.  Lab Results   Component Value Date    WBC 5.4 07/06/2018    HGB 11.6 (L) 07/06/2018 "    HCT 36.8 07/06/2018    MCV 96 07/06/2018     07/06/2018     CrCl cannot be calculated (Patient's most recent sCr result is older than the maximum 5 days allowed.).    ASSESSMENT/Plan:      ICD-10-CM    1. Failure to thrive in adult R62.7    2. Hernia of abdominal cavity K46.9    3. Chronic GERD K21.9    4. Functional diarrhea K59.1    5. Major depressive disorder F32.9    6. Coronary artery disease without angina pectoris I25.10    7. Stress incontinence in female N39.3      CHANGES:    None.    CARE PLAN:  The care plan has been reviewed and all orders signed. Changes to care plan, if any, as noted.  Otherwise, continue current plan of care.      The above has been created using voice recognition software. Please be aware that this may unintentionally  produce inaccuracies and/or nonsensical sentences.       Electronically signed by: Ramana Blandon CNP

## 2021-06-19 NOTE — LETTER
Letter by Ramana Blandon CNP at      Author: Ramana Blandon CNP Service: -- Author Type: --    Filed:  Encounter Date: 2019 Status: (Other)         Patient: Rosina Link   MR Number: [id number]   YOB: 1945   Date of Visit: 2019     [facility]    Name:   Rosina Link  : 1945  Facility:   [facility] [[id number]]   Room: 228A  Code Status: DNR/DNI -   Fac type:    (Long Term Care, LTC) -     CHIEF COMPLAINT / REASON FOR VISIT:  Chief Complaint   Patient presents with   ? Review Of Multiple Medical Conditions     [facility] from 18 until 18  [facility] from 18 until 03/10/18  [facility] from 18 until 18  [facility] from 8018 until   [facility] from 18 until 18    Patient was last seen by me on 19 for a preop examination prior to hiatal hernia repair and cholecystic gastroplasty.  She was subsequently seen by Dr. Guo for a readmission visit on 19.    HPI: Rosina is a 73 y.o. female with a history of severe COPD/pulmonary emphysema, depression, hiatal hernia/esophageal dysphagia/GERD, and insomnia.        MOST RECENT HOSPITALIZATION    Hospitalization 2019: She underwent surgery in 2016 for a large hiatal hernia (total gastric herniation) that included laparoscopic repair with mesh and Gorge fundoplication.  With recurrence, she was scheduled again for repair (and possible Jamee gastroplasty) on 19 with Dr. De Jesus.    On 19, she was admitted after laparoscopic reduction repair of a recurrent paraesophageal hernia.  Her postoperative course was uneventful.  An esophagram on POD 1 revealed no hernia, no leak, and no obstruction.  She was tolerating a full liquid diet, and her pain was under control.  Tube feeding was reinitiated during her admission as well.  Discharge orders back to  "[facility] included a full liquid diet for 2 weeks with subsequent follow-up with Dr. De Jesus.  The hope was that tube feeding could be discontinued in the near future.  Overall, she was noted to be doing well and was considered happy with the outcome.  She was readmitted on 01/25/19.      EARLIER HOSPITALIZATIONS    Hospitalization March 2018: She had suffered a 60 pound unintentional weight loss prior to that, dropping from about 160 pounds down to 95 pounds.  There was a brief period of weight gain after that, but she eventually returned to the lower weight.  She developed progressive weakness and inability to eat much.  She would take several bites and then feel full.  She was so weak upon her admission to [facility] in February 2018 that she could hardly walk.      She had had a CT scan on 02/09/18 that showed of her stomach back in her chest.  She was supposed to see Dr. De Jesus at on 03/07/18 to review pictures and to discuss options.  The CT scan described \"small amount of scarring and residual consolidation in the paramedial right lower lobe at the site of resolving pneumonia.\"  Chest x-rays performed on 02/16/18 showed \"right lower lobe pneumonia with small parapneumonic effusion.  The amount of infiltrate in the right lower lobe appeared increased from the CT examination.\"  She was admitted for community-acquired pneumonia.  She had no fevers or leukocytosis.      Hospitalization June 2018: More recently, she was seen by Dr. Vasquez on 05/18/18 for dysphagia, nausea, and early satiety with weight loss.  An EGD with general anesthesia was planned at some point with consideration for repair of hiatal hernia and redo partial fundoplication.    She presented to [facility] ER on 06/05/18 with weakness, anorexia, and constipation.  Constipation eventually resolved without changes to appetite.  Psych was consulted with concern that depression may be contributory, and she was started on " low-dose mirtazapine.  Megace was started as an appetite stimulant, and a CT showed a large recurrent hiatal hernia.  A GJ tube placement was suggested, but the patient was felt not to be a candidate for GJ tube per IR due to anatomy.  Therefore, GI and surgery were consulted, and she underwent laparoscopic partial fundoplication takedown repair paraesophageal hernia and placement of gastric jejunostomy tube placement; upper endoscopy by Dr. Daniel De Jesus on 06/12/18.  She was also found to have esophageal candidiasis and was given fluconazole.  The PICC line was placed on 06/13/18 to start TPN.    Overnight (06/14/18 to 06/15/18), rapid response was called due to hypoxemia and tachycardia.  A CT of the chest was negative for pulmonary embolism but showed aspiration pneumonitis.  She was treated with IV antibiotics which ended on 06/23/18.    She underwent EGD and advancement of GJ feeding tube on 06/21/18.  Abdominal x-rays on 06/22/18 showed GJ tube malpositioned, looped in the fundus.  This J-tube repositioned itself and was then being used again.      Of note: An echocardiogram on 06/15/18 showed moderate pericardial effusion (known pericardial effusion 08/20/17 and 12/20/17).  Cardiology felt likely malnutrition with low protein causing capillary leak as a probable etiology.  Recommendations for observation with no further workup, but a repeat echocardiogram on 06/29/18 showed no change in no hemodynamic effects of effusion.      Hospitalization in July 2018: On 07/7/18, she was getting out of bed early in the day and decided to walk to the bathroom on her own rather than summoned assistance.  On the way to her bathroom, she became dizzy, fell, and struck the side of the garbage can.  She had experienced dizzy spells which have caused falls frequently before.  A CT of the chest, abdomen, and pelvis showed displaced left ninth and 10th rib fractures with a moderate sized pneumothorax and increased moderate  pericardial effusion.  A chest tube was placed in the ED.  Trauma surgery evaluated the patient, and the chest tube was removed on 07/10/18.  She remained stable off oxygen with no dyspnea.    Another echocardiogram was done, as the pericardial effusion had possibly increased in size per the CT on admission.  It showed no significant change compared to previously, and there are recommendations for her to follow-up with cardiology in 4 weeks.    A UGI was done, showing persistent partial functional obstruction at the level of the diaphragmatic hiatus, and a strict n.p.o. and tube feedings continued.  She was subsequently discharged back here on 07/12/18.  She was to follow-up with Dr. De Jesus on 08/10/18 for further esophageal evaluation (as described in CURRENT ISSUES).      CURRENT ISSUES    Early satiety/weight gain: Most likely a result of the long time she was not eating.  She tells me she can eat without choking but gets full rather quickly.  She still receives tube feeding at night for nutritional supplementation.  Fortunately, she has been gaining weight.  When last seen in February, she weighed 98.2 pounds.  Over the last 4 months, she is now up to 111.4 pounds, a gain of 13.2 pounds.    Fall: She did fall yesterday, twisting around from her walker to sit in the chair.  There was no apparent injury.    GERD: She has been on both omeprazole and sucralfate.  Lately, she has been taking how standing orders Maalox frequently.  We will write orders for 15 mL every 6 hours as needed.    Cardiopulmonary issues: Chest x-rays on 02/05/2019 revealed mild cardiomegaly with congestive failure and superimposed right lower lobe infiltrate.  Her white count was normal but temp slightly elevated from her baseline.  She had a nonproductive cough, wheeze on inspiration, crackles on expiration, and diminished sounds in the left lower lobe with sats of 89% on room air.  We ordered 40 mg of Lasix daily and Augmentin for 10 days.   A follow-up BMP was unremarkable.  She continues on 40 mg of Lasix.    COPD: She does have COPD/pulmonary emphysema, is barrel-chested, and is on a variety of inhalers.  She continues to deny dyspnea, coughing, or chest pain.    Urinary incontinence: She does have stress incontinence and is checked by nursing staff during the night.   She finds it very hard to control, stating that she does not realize it until after it starts.  There is some nocturia as well.  None of this has changed since admission.      ROS: No complaints whatsoever today. She is on trazodone 25 mg nightly and sleeps well with this.  She denies any headaches or chest pains, coughing or congestion, nausea or vomiting,  dizziness or dyspnea, dysuria, difficulty chewing or swallowing, or any integumentary issues.      Past Medical History:   Diagnosis Date   ? Acute encephalopathy    ? Aperistalsis of esophagus    ? Arthritis    ? Compression fracture of lumbar vertebra (H)     L2   ? COPD (chronic obstructive pulmonary disease) (H)    ? Depression    ? Dyslipidemia    ? Encephalopathy    ? Esophageal candidiasis (H)    ? Esophagitis    ? Failure to thrive in adult    ? Fall    ? GERD (gastroesophageal reflux disease)    ? Hiatal hernia    ? HLD (hyperlipidemia)    ? HTN (hypertension)    ? Hypocalcemia    ? Hypomagnesemia    ? Hypoxia    ? Insomnia    ? Lactic acidosis    ? Major depressive disorder    ? Malnutrition (H)    ? Maternal MCV (mean corpuscular volume) low    ? Microcytic anemia    ? Microcytic hypochromic anemia 01/2013   ? Osteoporosis    ? Pericardial effusion    ? PMR (polymyalgia rheumatica) (H)    ? Pneumonia    ? Pneumothorax    ? Positive urine drug screen 10/2012    meth and coke positive 10/12. negative in 11/19/12   ? Pulmonary emphysema (H)    ? Rib fracture    ? S/P laparoscopic fundoplication    ? SIRS (systemic inflammatory response syndrome) (H)    ? Thrombocytopenia (H)    ? Trigger finger     left hand, middle finger    ? Underweight    ? Urinary urgency               Family History   Problem Relation Age of Onset   ? Diabetes Mother    ? Cancer Mother         lung   ? Hypertension Mother    ? Emphysema Father    ? Heart disease Father    ? Hypertension Father    ? No Medical Problems Brother    ? Breast cancer Maternal Aunt      Social History     Socioeconomic History   ? Marital status: Single     Spouse name: Not on file   ? Number of children: Not on file   ? Years of education: Not on file   ? Highest education level: Not on file   Occupational History   ? Not on file   Social Needs   ? Financial resource strain: Not on file   ? Food insecurity:     Worry: Not on file     Inability: Not on file   ? Transportation needs:     Medical: Not on file     Non-medical: Not on file   Tobacco Use   ? Smoking status: Former Smoker     Packs/day: 1.50     Years: 30.00     Pack years: 45.00     Types: Cigarettes     Last attempt to quit: 8/10/1991     Years since quittin.8   ? Smokeless tobacco: Never Used   Substance and Sexual Activity   ? Alcohol use: No   ? Drug use: No   ? Sexual activity: Not Currently   Lifestyle   ? Physical activity:     Days per week: Not on file     Minutes per session: Not on file   ? Stress: Not on file   Relationships   ? Social connections:     Talks on phone: Not on file     Gets together: Not on file     Attends Sikh service: Not on file     Active member of club or organization: Not on file     Attends meetings of clubs or organizations: Not on file     Relationship status: Not on file   ? Intimate partner violence:     Fear of current or ex partner: Not on file     Emotionally abused: Not on file     Physically abused: Not on file     Forced sexual activity: Not on file   Other Topics Concern   ? Not on file   Social History Narrative   ? Not on file     MEDICATIONS: Reviewed from the MAR, physician orders, and earlier progress notes.   Updated by me today (2019) with a decrease in  venlafaxine (and other changes not previously made) reflected below.  Current Outpatient Medications   Medication Sig   ? aluminum-magnesium hydroxide 200-200 mg/5 mL suspension Take 15 mL by mouth every 6 (six) hours as needed for indigestion.   ? sucralfate (CARAFATE) 100 mg/mL suspension 1 g by G-tube route 4 (four) times a day with meals and bedtime.   ? acetaminophen (TYLENOL) 650 mg/20.3 mL Soln 650 mg by G-tube route every 4 (four) hours as needed.   ? aspirin 81 mg chewable tablet 81 mg by G-tube route daily.   ? atorvastatin (LIPITOR) 20 MG tablet 20 mg by G-tube route daily.    ? budesonide-formoterol (SYMBICORT) 160-4.5 mcg/actuation inhaler Inhale 2 puffs 2 (two) times a day.   ? calcium carbonate 500 mg/5 mL (1,250 mg/5 mL) suspension 600 mg by G-tube route daily.   ? cholecalciferol, vitamin D3, 400 unit/5 mL Liqd 400 Units by G-tube route daily.   ? ferrous sulfate 220 mg (44 mg iron)/5 mL solution 7.4ml by gastric tube daily.         ? furosemide (LASIX) 40 MG tablet Take 40 mg by mouth daily.   ? lactose-reduced food/fiber (ISOSOURCE 1.5 JASPREET ENTERAL TUBE) 71 mL/hr by Feeding route 2 (two) times a day.          ? methylcellulose (CITRUCEL) 500 mg Tab 500 mg by g tube at bedtime.         ? mirtazapine (REMERON) 15 MG tablet 7.5 mg by G-tube route at bedtime.   ? omeprazole (PRILOSEC) 2 mg/mL SusR suspension 20 mg by Gastrostomy Tube route daily before breakfast.   ? saliva stimulant (BIOTENE ORALBALANCE, GLYCERIN,) gel Take 1 application by mouth as needed Half inch length of gel directly on the tongue and spread thoroughly inside mouth .   ? umeclidinium (INCRUSE ELLIPTA) 62.5 mcg/actuation DsDv inhaler Inhale 1 puff daily.   ? venlafaxine (EFFEXOR) 37.5 MG tablet 37.5 mg by G-tube route daily.            ALLERGIES:   Allergies   Allergen Reactions   ? Sulfa (Sulfonamide Antibiotics) Other (See Comments)      Ototoxicity       ? Penicillins Rash     DIET: Regular diet, mechanical soft texture, thin  "liquids.  She is still receiving tube feeding supplementally as well as an oral supplement.    Vitals:    06/18/19 1031   BP: 122/78   Pulse: 88   Resp: 18   Temp: 97  F (36.1  C)   SpO2: 96%   Weight: 111 lb 6.4 oz (50.5 kg)   Height: 4' 11\" (1.499 m)   Weight gain of 13.2 pounds with oral intake and tube feeding at night, along with oral nutritional supplement.  Body mass index is 22.5 kg/m .    EXAMINATION:   General: Pleasant, alert, and conversant middle-aged female, lying in bed, looking much older than her stated age, in no apparent distress.  Head: Normocephalic and atraumatic.  Significant hirsutism.  Eyes: PERRLA, sclerae clear.   ENT: Slightly dry oral mucosa.  Full upper and lower dentures.  She is only wearing the uppers currently.  Hearing is unimpaired.  Cardiovascular: Sinus tachycardia with no appreciable murmur.  Respiratory: Lung sounds are diminished due to severe kyphosis but otherwise clear.   Abdomen: Soft and nontender.   Musculoskeletal/Extremities: Age-related degenerative joint disease.  Barrel chested with severe thoracic kyphosis.  No peripheral edema.   Neurological: Occasional fine bilateral upper extremity tremor (not noticeable today).  Integument: No rashes, clinically significant lesions, or skin breakdown.   Cognitive/Psychiatric: Alert and oriented.  To affect is euthymic/upbeat.    DIAGNOSTICS: .  Results for orders placed or performed in visit on 01/04/19   Basic Metabolic Panel   Result Value Ref Range    Sodium 140 136 - 145 mmol/L    Potassium 4.4 3.5 - 5.0 mmol/L    Chloride 102 98 - 107 mmol/L    CO2 30 22 - 31 mmol/L    Anion Gap, Calculation 8 5 - 18 mmol/L    Glucose 83 70 - 125 mg/dL    Calcium 9.3 8.5 - 10.5 mg/dL    BUN 24 8 - 28 mg/dL    Creatinine 0.62 0.60 - 1.10 mg/dL    GFR MDRD Af Amer >60 >60 mL/min/1.73m2    GFR MDRD Non Af Amer >60 >60 mL/min/1.73m2     Lab Results   Component Value Date    WBC 8.6 02/05/2019    HGB 13.8 01/04/2019    HCT 44.0 01/04/2019    " MCV 92 01/04/2019     01/04/2019     CrCl cannot be calculated (Patient's most recent lab result is older than the maximum 5 days allowed.).    ASSESSMENT/Plan:      ICD-10-CM    1. History of esophageal dysphagia Z87.19     Resolved following surgery.   2. Early satiety R68.81     Suggesting that she eats several small meals.   3. Major depressive disorder in full remission, unspecified whether recurrent (H) F32.5     Doing well.  Decreasing her venlafaxine.   4. Pulmonary emphysema, unspecified emphysema type (H) J43.9    5. Bowel and bladder incontinence R32     R15.9     No change.   6. Coronary artery disease involving native coronary artery of native heart without angina pectoris I25.10      CHANGES:  Decrease venlafaxine from 37.5 mg twice daily to 37.5 mg daily.  No change to mirtazapine at this time.   Maalox 15 mL every 6 hours as needed.    CARE PLAN:  The care plan has been reviewed and all orders signed.  Changes to care plan, if any, as noted.  Otherwise, continue current plan of care.    The above has been created using voice recognition software. Please be aware that this may unintentionally  produce inaccuracies and/or nonsensical sentences.       Electronically signed by: Ramana Blandon CNP

## 2021-06-20 NOTE — LETTER
Letter by Elif Carter CNP at      Author: Elif Carter CNP Service: -- Author Type: --    Filed:  Encounter Date: 4/17/2020 Status: (Other)         Patient: Rosina Link   MR Number: [id number]   YOB: 1945   Date of Visit: 4/17/2020     [facility]    Facility:   [facility] [[id number]]   Code Status: DNR/DNI on Hospice      CHIEF COMPLAINT/REASON FOR VISIT:  Chief Complaint   Patient presents with   ? Review Of Multiple Medical Conditions     COPD, constipation and failure to thrive       HISTORY:      HPI: Rosina is a 74 y.o. female who has a past medical history of COPD, dyslipidemia, HTN, anemia, PMR, TERESA, pancreatitis, chronic pericardial effusion, depression, and insomnia. She is on Hospice for her COPD as she did not want progressive treatments during her last hospitalization.     Today, she is seen lying in bed and states she isn't feeling well.  She is unable to elaborate on this.  Nursing reports that often times she becomes constipated and needs a suppository and then she feels better afterward.  The suppository was given to her earlier this morning.  She has been weaned off of her nasal cannula oxygen.  She states her breathing is well.  I did not examine her lungs due to the COVID crisis and social distancing however she did appear to be respiratorily comfortable without any significant issues. She continues to be failure to thrive and does not eat much.  She is on the same dose of venlafaxine and does not feel this needs to change.       Past Medical History:   Diagnosis Date   ? Acute encephalopathy    ? Acute on chronic respiratory failure (H)    ? Aperistalsis of esophagus    ? Arthritis    ? Compression fracture of lumbar vertebra (H)     L2   ? COPD (chronic obstructive pulmonary disease) (H)    ? Depression    ? Dyslipidemia    ? Encephalopathy    ? Esophageal candidiasis (H)    ? Esophagitis    ? Failure to thrive in adult    ? Fall     ? GERD (gastroesophageal reflux disease)    ? Hiatal hernia    ? HLD (hyperlipidemia)    ? HTN (hypertension)    ? Hypocalcemia    ? Hypomagnesemia    ? Hypoxia    ? Insomnia    ? Lactic acidosis    ? Major depressive disorder    ? Malnutrition (H)    ? Maternal MCV (mean corpuscular volume) low    ? Microcytic anemia    ? Microcytic hypochromic anemia 2013   ? Mucus plugging of bronchi    ? Osteoporosis    ? Pericardial effusion    ? PMR (polymyalgia rheumatica) (H)    ? Pneumonia    ? Pneumothorax    ? Positive urine drug screen 10/2012    meth and coke positive 10/12. negative in 12   ? Pulmonary emphysema (H)    ? Rib fracture    ? S/P laparoscopic fundoplication    ? SIRS (systemic inflammatory response syndrome) (H)    ? Thrombocytopenia (H)    ? Trigger finger     left hand, middle finger   ? Underweight    ? Urinary urgency              Family History   Problem Relation Age of Onset   ? Diabetes Mother    ? Cancer Mother         lung   ? Hypertension Mother    ? Emphysema Father    ? Heart disease Father    ? Hypertension Father    ? No Medical Problems Brother    ? Breast cancer Maternal Aunt      Social History     Socioeconomic History   ? Marital status: Single     Spouse name: Not on file   ? Number of children: Not on file   ? Years of education: Not on file   ? Highest education level: Not on file   Occupational History   ? Not on file   Social Needs   ? Financial resource strain: Not on file   ? Food insecurity     Worry: Not on file     Inability: Not on file   ? Transportation needs     Medical: Not on file     Non-medical: Not on file   Tobacco Use   ? Smoking status: Former Smoker     Packs/day: 1.50     Years: 30.00     Pack years: 45.00     Types: Cigarettes     Last attempt to quit: 8/10/1991     Years since quittin.7   ? Smokeless tobacco: Never Used   Substance and Sexual Activity   ? Alcohol use: No   ? Drug use: No   ? Sexual activity: Not Currently   Lifestyle   ? Physical  activity     Days per week: Not on file     Minutes per session: Not on file   ? Stress: Not on file   Relationships   ? Social connections     Talks on phone: Not on file     Gets together: Not on file     Attends Mandaen service: Not on file     Active member of club or organization: Not on file     Attends meetings of clubs or organizations: Not on file     Relationship status: Not on file   ? Intimate partner violence     Fear of current or ex partner: Not on file     Emotionally abused: Not on file     Physically abused: Not on file     Forced sexual activity: Not on file   Other Topics Concern   ? Not on file   Social History Narrative   ? Not on file         Review of Systems   Patient denies fever, chills, headache, lightheadedness, dizziness, rhinorrhea, cough, congestion, shortness of breath, chest pain, palpitations, abdominal pain, n/v, diarrhea, change in appetite, dysuria, frequency, burning or pain with urination.  Other than stated in HPI all other review of systems is negative.       Physical Exam   Vital signs: /78, heart rate 100, temp 95.5, weight down 6 pounds in 1 month at 110 pounds.  GENERAL APPEARANCE: Thin, frail elderly woman in no acute distress.  HEENT: normocephalic, atraumatic  PERRL, sclerae anicteric, conjunctivae clear and moist, EOM intact  LUNGS:respiratory effort normal.  MSK: Muscle strength and tone were normal.  EXTREMITIES: No cyanosis, clubbing or edema.  NEURO: Alert and oriented x 3. . Face is symmetric.  SKIN: Inspection of the skin reveals no rashes, ulcerations or petechiae.  PSYCH: Flat affect      LABS:   No results found for this or any previous visit (from the past 240 hour(s)).    Case Management:  Patient has the desire to be in her own home but requires nursing care at LT level    Information reviewed:  Medications, vital signs, orders, and nursing notes.    ASSESSMENT:      ICD-10-CM    1. Chronic obstructive pulmonary disease, unspecified COPD type (H)   J44.9    2. Coronary artery disease involving native coronary artery of native heart without angina pectoris  I25.10    3. Essential hypertension  I10    4. Failure to thrive in adult  R62.7    5. Depression, unspecified depression type  F32.9        PLAN:    COPD: Stable, continue with inhalers, and oxygen as needed.  Is on hospice medications for air hunger morphine does not ask for this very frequently at this time.    CAD: On no treatments for this due to being on hospice aspirin and statins have been discontinued.    Hypertension: Blood pressures are controlled on no antihypertensives.    Failure to thrive: On hospice continue to encourage fluids and meals.    Depression: Continue with venlafaxine 37.5 mg per patient request.    Electronically signed by: Elif Carter CNP

## 2021-06-20 NOTE — LETTER
Letter by Elif Carter CNP at      Author: Elif Carter CNP Service: -- Author Type: --    Filed:  Encounter Date: 9/1/2020 Status: (Other)         Patient: Rosina Link   MR Number: [id number]   YOB: 1945   Date of Visit: 9/1/2020     [facility]    Facility:   [facility] [[id number]]   Code Status: DNR[facility]DNI [facility]      CHIEF COMPLAINT/REASON FOR VISIT:  Chief Complaint   Patient presents with   ? Problem Visit     herpes zoster, pain       HISTORY:      HPI: Rosina is a 75 y.o. female who has a past medical history of COPD, dyslipidemia, HTN, anemia, PMR, TERESA, pancreatitis, chronic pericardial effusion, depression, and insomnia. She has a history of pancreatitis, gastric obstruction and feeding tube (removed 8/2019) She is on Hospice for her COPD as she did not want progressive treatments during her last hospitalization.     Today, nursing requests that I see her for Herpes Zoster and pain.  Upon exam she does have a dried raised rash on her neck without any papules.  It is red in color.  Yesterday, nursing had called our office due to patient's itching and atarax was started.  Unfortunately, her insurance does not cover Atarax and so she never used this.  She denies any itching today but mostly burning pain.  Valtrex should be done on 9/3.     Of note: she is to discharge from Hospice soon. Awaiting notification from Hospice and then we will need to make some medication adjustments per her needs.     Review of Systems   Patient denies fever, chills, headache, lightheadedness, dizziness, rhinorrhea, cough, congestion, shortness of breath, chest pain, palpitations, abdominal pain, n/v, diarrhea, constipation, change in appetite, dysuria, frequency, burning or pain with urination.  Other than stated in HPI all other review of systems is negative.         Physical Exam   Vital signs: Hr 83, resp 16, temp 98.9    Thorough physical exam not  completed in order to maintain social distancing during the COVID pandemic.    Patient lying in bed in no acute distress.  Head is atraumatic and normocephalic and EOM is intact.  Respiratory effort is normal and nonlabored.  No lower extremity edema. Right neck with red, raised, dry/crusty rash, no drainage or papules.  She is alert and oriented x3 and face is symmetric.  Range of motion to bilateral upper and lower extremities is equal.  Mood euthymic.          LABS:   No results found for this or any previous visit (from the past 240 hour(s)).    ASSESSMENT:      ICD-10-CM    1. Neuralgia  M79.2    2. Herpes zoster without complication  B02.9        PLAN:    Neuralgia: will dc Atarax and start on Lidoderm gel 4% three times a day, recommend adding moisture to the skin to help with dryness which may cause itching.      Shingles; improving, finish Valtrex this week. Discussed with IC nurse and she will be top of the list for a Zostavax vaccine.     Electronically signed by: Elif Carter CNP

## 2021-06-20 NOTE — LETTER
Letter by Elif Carter CNP at      Author: Elif Carter CNP Service: -- Author Type: --    Filed:  Encounter Date: 6/17/2020 Status: (Other)         Patient: Rosina Link   MR Number: [id number]   YOB: 1945   Date of Visit: 6/17/2020     [facility]    Facility:   [facility] [[id number]]   Code Status: DNR/DNI on Hospice      CHIEF COMPLAINT/REASON FOR VISIT:  Chief Complaint   Patient presents with   ? Problem Visit     pain management, COPD       HISTORY:      HPI: Rosina is a 74 y.o. female who has a past medical history of COPD, dyslipidemia, HTN, anemia, PMR, TERESA, pancreatitis, chronic pericardial effusion, depression, and insomnia. She is on Hospice for her COPD as she did not want progressive treatments during her last hospitalization.     Today, nursing request that I evaluate her pain management regimen.  Upon exam she is laying in her bed and she appears to be in good health.  Respiratory effort is normal and she denies any shortness of breath or issues with breathing.  She has had no illness symptoms of recent.  She reports a good appetite and is eating crackers when I visit her.  She has had a 5 pound weight gain in the past month.  She is on [facility] hospice who will be planning a video visit in the near future.  Upon review it is noted that she has 2 morphine orders active.  She has not used any of these medications in the past month.        Review of Systems   Patient denies fever, chills, headache, lightheadedness, dizziness, rhinorrhea, cough, congestion, shortness of breath, chest pain, palpitations, abdominal pain, n/v, diarrhea, change in appetite, dysuria, frequency, burning or pain with urination.  Other than stated in HPI all other review of systems is negative.       Physical Exam   Vital signs: /73, heart rate 75, respirations 17, temp 96.8.    Thorough physical exam not completed in order to maintain social distancing during  the COVID pandemic.    Patient lying in bed in no acute distress.  Head is atraumatic and normocephalic and EOM is intact.  Respiratory effort is normal and nonlabored.  No lower extremity edema.  She is alert and oriented x3 and face is symmetric.  Range of motion to bilateral upper and lower extremities is equal.  Mood euthymic.        LABS:   No results found for this or any previous visit (from the past 240 hour(s)).    ASSESSMENT:      ICD-10-CM    1. Chronic obstructive pulmonary disease, unspecified COPD type (H)  J44.9    2. Coronary artery disease involving native coronary artery of native heart without angina pectoris  I25.10    3. Essential hypertension  I10    4. Failure to thrive in adult  R62.7    5. Major depressive disorder in full remission, unspecified whether recurrent (H)  F32.5        PLAN:    At this time her status has shown great improvement since signing onto hospice.  Her weight is improved and her vital signs are stable.  She has had no changes with her blood pressures or CAD after discontinuing those medications.  She is on an inhaler with stable COPD without exacerbation.  Her depression is stable on her current dose of venlafaxine.  I will discontinue morphine 15 mg every 3 hours as needed at this time we will continue the hospice dose of morphine 10 mg every 3 hours as needed and atropine.  It is likely that she no longer will require hospice and these medications will be eventually discontinued.  At this time she is happy with the care that she is getting and will wait for hospice re-evaluation.    Electronically signed by: Elif Carter CNP

## 2021-06-20 NOTE — PROGRESS NOTES
"[facility]    Name:   Rosina Link  : 1945  Facility:   [facility] [[id number]]   Room: 224  Code Status: DNR/DNI -   Fac type:   SNF (Skilled Nursing Facility, TCU) -     CHIEF COMPLAINT / REASON FOR VISIT:  Chief Complaint   Patient presents with     Review Of Multiple Medical Conditions     TCU follow-up after hospitalization for hiatal hernia with GERD and esophagitis, as well as adult failure to thrive.  This was followed by a second hospitalization secondary to patient fall with multiple left rib fractures, resulting in a pneumothorax.     [facility] from 18 until 18  [facility]CU from 18 until 03/10/18  [facility] from 18 until 18  [facility] from 8018 until   [facility] from 18 until 18    Patient was last seen by me on 18.    HPI: Rosina is a 73 y.o. female with a history of severe COPD/pulmonary emphysema, depression, hiatal hernia/GERD, and insomnia.  She underwent surgery in 2016 for a large hiatal hernia (total gastric herniation) that included laparoscopic repair with mesh and Gorge fundoplication.        Hospitalization 2018: She had suffered a 60 pound unintentional weight loss prior to that, dropping from about 160 pounds down to 95 pounds.  There was a brief period of weight gain after that, but she eventually returned to the lower weight.  She developed progressive weakness and inability to eat much.  She would take several bites and then feel full.  She was so weak upon her admission to [facility] in 2018 that she could hardly walk.      She had had a CT scan on 18 that showed of her stomach back in her chest.  She was supposed to see Dr. De Jesus at on 18 to review pictures and to discuss options.  The CT scan described \"small amount of scarring and residual consolidation in the paramedial right lower " "lobe at the site of resolving pneumonia.\"  Chest x-rays performed on 02/16/18 showed \"right lower lobe pneumonia with small parapneumonic effusion.  The amount of infiltrate in the right lower lobe appeared increased from the CT examination.\"  She was admitted for community-acquired pneumonia.  She had no fevers or leukocytosis.      Hospitalization June 2018: More recently, she was seen by Dr. Vasquez on 05/18/18 for dysphagia, nausea, and early satiety with weight loss.  An EGD with general anesthesia was planned at some point with consideration for repair of hiatal hernia and redo partial fundoplication.    She presented to [facility] ER on 06/05/18 with weakness, anorexia, and constipation.  Constipation eventually resolved without changes to appetite.  Psych was consulted with concern that depression may be contributory, and she was started on low-dose mirtazapine.  Megace was started as an appetite stimulant, and a CT showed a large recurrent hiatal hernia.  A GJ tube placement was suggested, but the patient was felt not to be a candidate for GJ tube per IR due to anatomy.  Therefore, GI and surgery were consulted, and she underwent laparoscopic partial fundoplication takedown repair paraesophageal hernia and placement of gastric jejunostomy tube placement; upper endoscopy by Dr. Daniel De Jesus on 06/12/18.  She was also found to have esophageal candidiasis and was given fluconazole.  The PICC line was placed on 06/13/18 to start TPN.    Overnight (06/14/18 to 06/15/18), rapid response was called due to hypoxemia and tachycardia.  A CT of the chest was negative for pulmonary embolism but showed aspiration pneumonitis.  She was treated with IV antibiotics which ended on 06/23/18.    She underwent EGD and advancement of GJ feeding tube on 06/21/18.  Abdominal x-rays on 06/22/18 showed GJ tube malpositioned, looped in the fundus.  This J-tube repositioned itself, so it is now being used again.      Of note: " An echocardiogram on 06/15/18 showed moderate pericardial effusion (known pericardial effusion 08/20/17 and 12/20/17).  Cardiology felt likely malnutrition with low protein causing capillary leak as a probable etiology.  Recommendations for observation with no further workup, but a repeat echocardiogram on 06/29/18 showed no change in no hemodynamic effects of effusion.      Hospitalization in July 2018: On 07/7/18, she was getting out of bed early in the day and decided to walk to the bathroom on her own rather than summoned assistance.  On the way to her bathroom, she became dizzy, fell, and struck the side of the garbage can.  She had experienced dizzy spells which have caused falls frequently before.  A CT of the chest, abdomen, and pelvis showed displaced left ninth and 10th rib fractures with a moderate sized pneumothorax and increased moderate pericardial effusion.  A chest tube was placed in the ED.  Trauma surgery evaluated the patient, and the chest tube was removed on 07/10/18.  She remained stable off oxygen with no dyspnea.    Another echocardiogram was done, as the pericardial effusion had possibly increased in size per the CT on admission.  It showed no significant change compared to previously, and there are recommendations for her to follow-up with cardiology in 4 weeks.    A UGI was done, showing persistent partial functional obstruction at the level of the diaphragmatic hiatus, and a strict n.p.o. and tube feedings continued.  She is to follow-up with Dr. De Jesus on 08/10/18 for further esophageal evaluation.  She was subsequently discharged back here on 07/12/18.      CURRENT ISSUES    Failure to thrive/esophageal dysphagia/NPO status: Since her stay at the [facility] TCU in February/March 2018, she had dropped 17 pounds.  She is quite cachectic with a BMI of 16-17.   Admitted NPO with tube feedings at 30 mL/h, food was being introduced (regular diet, thin liquids, small portions).   "Isosource 1.5 is now being administered at 56 mL/h for 18 hours, starting at 1500 and ending at 0900.  Medications continue to be given via feeding tube. She was seen by speech therapy here and appeared to be doing okay; however, we are going to follow guidelines set by Dr. De Jesus at this juncture.  With her current tube feeding, her weight has varied a pound or so in either direction.    As noted, she is to remain NPO until UGI is performed.  She was also followed by speech therapy in the hospital, and her swallow function was deemed intact. She had a follow-up appointment on 07/09/18, but she remains NPO.  She did have a follow-up appointment with Dr. De Jesus on 08/10/18 and was given a new diagnosis of dysphagia, related to her paraesophageal hernia.  More tests are going to be performed, including an upper GI with barium swallow (performed on 08/23/18) and esophageal manometry (scheduled for 09/06/18) .  She is to remain strict NPO. with tube feedings through the J port.  She was told that surgery would not be appropriate for her but dilatation might.    GERD: She has been complaining of heartburn despite being on lansoprazole 30 mg daily.  She previously had orders for Maalox as needed, and she was not taking it.  We scheduled that while awake, keeping with 4 times daily dosing.  Epigastric/suprasternal heartburn was described as \"terrible, terrible, terrible.\"  She told me it was all right for some time, but it had returned.  We added sucralfate 1 g 4 times daily and a stool check for H pylori (negative).  This has been quite effective, and there are no further complaints    Depression: She does admit to being depressed \"some days.\"  She is on mirtazapine and duloxetine.    She is quite upbeat and joking today.    COPD: She does have COPD/pulmonary emphysema, is barrel-chested, and is on a variety of inhalers.      She denies dyspnea, coughing, or chest pain.    Urinary incontinence: She does have stress " "incontinence and is checked by nursing staff during the night.  She stated at an earlier visit, \"every time he turned around, I'm peeing in my britches.\"  She finds it very hard to control, stating that she does not realize it until after it starts.  There is some nocturia as well.  None of this has changed since earlier visits.    Bowel incontinence: A few weeks back, she had developed loose, incontinent stools. Her tube feeding formula had been recently changed, and despite the loose stools, she was receiving scheduled Colace and PRN senna and MiraLAX.  When addressed with staff, this was attributed to poor communication during shift report, so we discontinued all of her bowel medications.  The problem persisted until we replaced psyllium with a 500 mg tab of methylcellulose daily.  Despite resolution of her loose stools, she is still incontinent of bowel.  She previously stated, \"I turn on my lights, but they don't get here in time.\"    Rib fractures: No longer has any significant left-sided rib cage pain (receiving Lidoderm patches for some tenderness), although she typically sleeps on her right side.    Confusion (brief episode): On 07/23/18 staff report that she became confused and agitated, requesting to be taken back to her old room and to the \"real [facility]\".  When addressed with patient, she admited to feeling disoriented, says \"I thought I was imagining things and not at [facility]\".  This problem has resolved, and she is oriented and appropriate, pleasant and conversant throughout each visit.     Discharge planning: According to nursing staff, she is doing better than she lets on.  She tells me her daughter's house, where she can stay, is very messy, and she is a hoarder.  For now, at least, she would prefer to stay here but, down the road, she would like to go to an assisted living facility.  She has told me that one daughter is looking into this.    Right now, nursing staff tell me she needs to be " up and out of her room more often.      ROS: No complaints whatsoever today, other than about frequent loose stools, something that will resolve when she can take food orally.  She sleeps well only at times.  She is on trazodone 25 mg nightly.  She denies any headaches or chest pains, coughing or congestion, nausea or vomiting,  dizziness or dyspnea, dysuria, difficulty chewing or swallowing, integumentary issues.      Past Medical History:   Diagnosis Date     Acute encephalopathy      Aperistalsis of esophagus      Arthritis      Compression fracture of lumbar vertebra (H)     L2     COPD (chronic obstructive pulmonary disease) (H)      Depression      Dyslipidemia      Encephalopathy      Esophageal candidiasis (H)      Failure to thrive in adult      Fall      GERD (gastroesophageal reflux disease)      Hiatal hernia      HLD (hyperlipidemia)      HTN (hypertension)      Hypocalcemia      Hypomagnesemia      Hypoxia      Insomnia      Lactic acidosis      Major depressive disorder      Malnutrition (H)      Maternal MCV (mean corpuscular volume) low      Microcytic anemia      Microcytic hypochromic anemia 01/2013     Osteoporosis      Pericardial effusion      PMR (polymyalgia rheumatica) (H)      Pneumonia      Pneumothorax      Positive urine drug screen 10/2012    meth and coke positive 10/12. negative in 11/19/12     Pulmonary emphysema (H)      Rib fracture      S/P laparoscopic fundoplication      SIRS (systemic inflammatory response syndrome) (H)      Thrombocytopenia (H)      Trigger finger     left hand, middle finger     Underweight      Urinary urgency               Family History   Problem Relation Age of Onset     Diabetes Mother      Cancer Mother      lung     Hypertension Mother      Emphysema Father      Heart disease Father      Hypertension Father      No Medical Problems Brother      Breast cancer Maternal Aunt      Social History     Social History     Marital status: Single     Spouse  name: N/A     Number of children: N/A     Years of education: N/A     Social History Main Topics     Smoking status: Former Smoker     Packs/day: 1.50     Years: 30.00     Types: Cigarettes     Quit date: 8/10/1991     Smokeless tobacco: Never Used     Alcohol use No     Drug use: No     Sexual activity: Not Currently     Other Topics Concern     Not on file     Social History Narrative     MEDICATIONS: Reviewed from the MAR, physician orders, and earlier progress notes.  Current Outpatient Prescriptions   Medication Sig     acetaminophen (TYLENOL) 650 mg/20.3 mL Soln 650 mg by G-tube route every 4 (four) hours as needed.     albuterol (PROVENTIL HFA;VENTOLIN HFA) 90 mcg/actuation inhaler Inhale 2 puffs 4 (four) times a day as needed for wheezing.      aluminum-magnesium hydroxide-simethicone (MAALOX ADVANCED) 200-200-20 mg/5 mL Susp 20 mL by G-tube route 4 (four) times a day.      aspirin 81 mg chewable tablet 81 mg by G-tube route daily.     aspirin 81 MG EC tablet 81 mg by G-tube route daily.      atorvastatin (LIPITOR) 20 MG tablet 20 mg by G-tube route daily.      budesonide-formoterol (SYMBICORT) 160-4.5 mcg/actuation inhaler Inhale 2 puffs 2 (two) times a day.     calcium carbonate 500 mg/5 mL (1,250 mg/5 mL) suspension 600 mg by G-tube route daily.     cholecalciferol, vitamin D3, (VITAMIN D3 ORAL) Take by mouth daily. Vitamin D3 (D-VI-SOL)  liquid; 400U; amt: 1ml (400units); gastric tube  Once A Day;     cholecalciferol, vitamin D3, 2,000 unit cap 2,000 capsules by G-tube route daily.      cholecalciferol, vitamin D3, 400 unit/5 mL Liqd 400 Units by G-tube route daily.     DULoxetine (CYMBALTA) 60 MG capsule 60 mg by G-tube route daily.      ferrous sulfate 220 mg (44 mg iron)/5 mL solution Take by mouth daily. FERROUS SULFATE Rosina M Karlinski  solution; 220mg (44mg iron) / 5ml; amt: 7.4ml; gastric tube  Special Instructions: anorexia  Once A Day;     ferrous sulfate 300 mg (60 mg iron)/5 mL syrup 325 mg  by G-tube route daily.     lactose-reduced food/fiber (ISOSOURCE 1.5 JASPREET ENTERAL TUBE) by Enteral Tube route. Isosource 1.5 continuous at goal rate of 40ml/hr Rosina Guo  Special Instructions: tube feeding  Goal per day 960ml/day  Every Shift;     lansoprazole (PREVACID SOLUTAB) 30 MG disintegrating tablet 30 mg by G-tube route daily.     lidocaine (LIDODERM) 5 % Place 1 patch on the skin 2 (two) times a day. Remove & Discard patch within 12 hours or as directed by MD sienna Guo  adhesive patch,medicated; 5 %; topical  Special Instructions: apply to painful area of skin  dx: closed fracture of multiple ribs of left side  Twice A Day;     methylcellulose (CITRUCEL) 500 mg Tab Take 500 mg by mouth daily.     mirtazapine (REMERON) 15 MG tablet 7.5 mg by G-tube route at bedtime.     ondansetron (ZOFRAN-ODT) 4 MG disintegrating tablet 4 mg by G-tube route every 6 (six) hours as needed for nausea.     oxyCODONE (ROXICODONE) 5 MG immediate release tablet Take 5-10 mg by mouth every 4 (four) hours as needed for pain.     polyethylene glycol (MIRALAX) 17 gram packet 17 g by G-tube route daily as needed.      sennosides (SENNOSIDES) 8.8 mg/5 mL Syrp syrup 6.5 mg by G-tube route daily as needed.      sucralfate (CARAFATE) 100 mg/mL suspension Take 1 g by mouth 4 (four) times a day.     tiotropium (SPIRIVA) 18 mcg inhalation capsule Place 18 mcg into inhaler and inhale daily.     tiotropium (SPIRIVA) 18 mcg inhalation capsule Place 18 mcg into inhaler and inhale daily.     traZODone (DESYREL) 50 MG tablet 25 mg by G-tube route at bedtime as needed for sleep.     ALLERGIES:   Allergies   Allergen Reactions     Sulfa (Sulfonamide Antibiotics) Other (See Comments)      Ototoxicity         Penicillins Rash     DIET: NPO.  She is on tube feeding at 56 mL/h for 18 hours per day.    Vitals:    08/30/18 1044   BP: 108/76   Pulse: 74   Resp: 20   Temp: (!) 96.5  F (35.8  C)   SpO2: 91%   Weight: (!) 87 lb 12.8  "oz (39.8 kg)   Height: 4' 11\" (1.499 m)   After being down approximately 17 pounds over the last 4 months or so, weight has been relatively stable on her tube feedings.  Body mass index is 17.73 kg/(m^2).    EXAMINATION:   General: Pleasant, alert, and conversant middle-aged female, looking much older than her stated age, resting in bed and playing [employer] on her smart phone, in no apparent distress.    Head: Normocephalic and atraumatic.  Significant hirsutism.  Eyes: PERRLA, sclerae clear.   ENT: Slightly dry oral mucosa.  Full upper and lower dentures.  She is only wearing the uppers currently.  Hearing is unimpaired.  Cardiovascular: Regular rate and rhythm with no appreciable murmur.  Respiratory: Lung sounds are diminished due to severe kyphosis but otherwise clear.   Abdomen: Soft and nontender.   Musculoskeletal/Extremities: Age-related degenerative joint disease.  Barrel chested with severe thoracic kyphosis.  No peripheral edema.   Neurological: Occasional fine bilateral upper extremity tremor (not noticeable today).  Integument: No rashes, clinically significant lesions, or skin breakdown.   Cognitive/Psychiatric: Alert and oriented she seems to present with a depressed/flat affect she does a lot of wise cracking during my visit.     DIAGNOSTICS:   No results found for this or any previous visit.  Lab Results   Component Value Date    WBC 5.4 07/06/2018    HGB 11.6 (L) 07/06/2018    HCT 36.8 07/06/2018    MCV 96 07/06/2018     07/06/2018     CrCl cannot be calculated (Patient's most recent sCr result is older than the maximum 5 days allowed.).    ASSESSMENT/Plan:      ICD-10-CM    1. Failure to thrive in adult R62.7    2. Esophageal dysphagia R13.10    3. Hernia of abdominal cavity K46.9    4. Chronic GERD K21.9    5. 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 Major depressive disorder F32.9    7. Pulmonary emphysema, unspecified emphysema type (H) J43.9    8. Bowel and " bladder incontinence R32     R15.9    9. Coronary artery disease involving native coronary artery of native heart without angina pectoris I25.10      CHANGES:    None.    CARE PLAN:  The care plan has been reviewed and all orders signed. Changes to care plan, if any, as noted.  Otherwise, continue current plan of care.      The above has been created using voice recognition software. Please be aware that this may unintentionally  produce inaccuracies and/or nonsensical sentences.       Electronically signed by: Ramana Blandon CNP

## 2021-06-20 NOTE — PROGRESS NOTES
[facility]    DATE:2018    NAME:  Rosina Link             :  1945  MRN: [id number]  CODE STATUS:  DNR/DNI    FACILITY:  [facility] [[id number]]       ROOM:   224    CHIEF COMPLAINT/REASON FOR VISIT:  Chief Complaint   Patient presents with     Problem Visit     Pnuemonthorax secondary to fall and multiple rib fractures     HISTORY OF PRESENT ILLNESS: Rosina Link is a 73 y.o. female who presented to the hospital on 18.  She was getting out of bed early in the day and decided to walk to the bathroom on her own rather than summoned assistance.  On the way to her bathroom, she became dizzy, fell, and struck the side of the garbage can.  She had experienced dizzy spells which have caused falls frequently before.  A CT of the chest, abdomen, and pelvis showed displaced left ninth and 10th rib fractures with a moderate sized pneumothorax and increased moderate pericardial effusion.  A chest tube was placed in the ED.  Trauma surgery evaluated the patient, and the chest tube was removed on 07/10/18.  She remained stable off oxygen with no dyspnea.    Another echocardiogram was done, as the pericardial effusion had possibly increased in size per the CT on admission.  It showed no significant change compared to previously, and there are recommendations for her to follow-up with cardiology in 4 weeks.    A UGI was done, showing persistent partial functional obstruction at the level of the diaphragmatic hiatus, and a strict n.p.o. and tube feedings continued.  She was subsequently discharged back here on 18.  She was to follow-up with Dr. De Jesus on 08/10/18 for further esophageal evaluation.    Today, she is seen at the bedside.  She is has dysphagia and is NPO with tube feedings through her J port.   Isosource 1.5 is now being administered at 56 mL/h for 18 hours, starting at 1500 and ending at 0900. She has an esophageal manometry scheduled in October.  She appears to be in a good mood but is on Duloxetine and Mirtazapine for Depression.    Past Medical History:   Diagnosis Date     Acute encephalopathy      Aperistalsis of esophagus      Arthritis      Compression fracture of lumbar vertebra (H)     L2     COPD (chronic obstructive pulmonary disease) (H)      Depression      Dyslipidemia      Encephalopathy      Esophageal candidiasis (H)      Failure to thrive in adult      Fall      GERD (gastroesophageal reflux disease)      Hiatal hernia      HLD (hyperlipidemia)      HTN (hypertension)      Hypocalcemia      Hypomagnesemia      Hypoxia      Insomnia      Lactic acidosis      Major depressive disorder      Malnutrition (H)      Maternal MCV (mean corpuscular volume) low      Microcytic anemia      Microcytic hypochromic anemia 01/2013     Osteoporosis      Pericardial effusion      PMR (polymyalgia rheumatica) (H)      Pneumonia      Pneumothorax      Positive urine drug screen 10/2012    meth and coke positive 10/12. negative in 11/19/12     Pulmonary emphysema (H)      Rib fracture      S/P laparoscopic fundoplication      SIRS (systemic inflammatory response syndrome) (H)      Thrombocytopenia (H)      Trigger finger     left hand, middle finger     Underweight      Urinary urgency      Past Surgical History:   Procedure Laterality Date     CARPAL TUNNEL RELEASE Right      CHOLECYSTECTOMY  01/09/2015     ESOPHAGOGASTRODUODENOSCOPY       HIATAL HERNIA REPAIR  02/15/2016    lap with mesh     HYSTERECTOMY  1990     REPAIR PARA ESOPHAGEAL PLACEMENT OF GASTRIC J TUBE       TRIGGER FINGER RELEASE Left     left middle finger     Family History   Problem Relation Age of Onset     Diabetes Mother      Cancer Mother      lung     Hypertension Mother      Emphysema Father      Heart disease Father      Hypertension Father      No Medical Problems Brother      Breast cancer Maternal Aunt      Social History     Social History     Marital status: Single     Spouse name:  N/A     Number of children: N/A     Years of education: N/A     Occupational History     Not on file.     Social History Main Topics     Smoking status: Former Smoker     Packs/day: 1.50     Years: 30.00     Types: Cigarettes     Quit date: 8/10/1991     Smokeless tobacco: Never Used     Alcohol use No     Drug use: No     Sexual activity: Not Currently     Other Topics Concern     Not on file     Social History Narrative     Allergies   Allergen Reactions     Sulfa (Sulfonamide Antibiotics) Other (See Comments)      Ototoxicity         Penicillins Rash     Current Outpatient Prescriptions   Medication Sig Dispense Refill     acetaminophen (TYLENOL) 650 mg/20.3 mL Soln 650 mg by G-tube route every 4 (four) hours as needed.       albuterol (PROVENTIL HFA;VENTOLIN HFA) 90 mcg/actuation inhaler Inhale 2 puffs 4 (four) times a day as needed for wheezing.        aluminum-magnesium hydroxide-simethicone (MAALOX ADVANCED) 200-200-20 mg/5 mL Susp 20 mL by G-tube route 4 (four) times a day.        aspirin 81 mg chewable tablet 81 mg by G-tube route daily.       aspirin 81 MG EC tablet 81 mg by G-tube route daily.        atorvastatin (LIPITOR) 20 MG tablet 20 mg by G-tube route daily.        budesonide-formoterol (SYMBICORT) 160-4.5 mcg/actuation inhaler Inhale 2 puffs 2 (two) times a day.       calcium carbonate 500 mg/5 mL (1,250 mg/5 mL) suspension 600 mg by G-tube route daily.       cholecalciferol, vitamin D3, (VITAMIN D3 ORAL) Take by mouth daily. Vitamin D3 (D-VI-SOL)  liquid; 400U; amt: 1ml (400units); gastric tube  Once A Day;       cholecalciferol, vitamin D3, 2,000 unit cap 2,000 capsules by G-tube route daily.        cholecalciferol, vitamin D3, 400 unit/5 mL Liqd 400 Units by G-tube route daily.       DULoxetine (CYMBALTA) 60 MG capsule 60 mg by G-tube route daily.        ferrous sulfate 220 mg (44 mg iron)/5 mL solution Take by mouth daily. FERROUS SULFATE Rosina M [place]  solution; 220mg (44mg iron) / 5ml;  amt: 7.4ml; gastric tube  Special Instructions: anorexia  Once A Day;       ferrous sulfate 300 mg (60 mg iron)/5 mL syrup 325 mg by G-tube route daily.       lactose-reduced food/fiber (ISOSOURCE 1.5 JASPREET ENTERAL TUBE) by Enteral Tube route. Isosource 1.5 continuous at goal rate of 40ml/hr Rosina Guo  Special Instructions: tube feeding  Goal per day 960ml/day  Every Shift;       lansoprazole (PREVACID SOLUTAB) 30 MG disintegrating tablet 30 mg by G-tube route daily.       lidocaine (LIDODERM) 5 % Place 1 patch on the skin 2 (two) times a day. Remove & Discard patch within 12 hours or as directed by MD sienna Guo  adhesive patch,medicated; 5 %; topical  Special Instructions: apply to painful area of skin  dx: closed fracture of multiple ribs of left side  Twice A Day;       methylcellulose (CITRUCEL) 500 mg Tab Take 500 mg by mouth daily.       mirtazapine (REMERON) 15 MG tablet 7.5 mg by G-tube route at bedtime.       morphine 100 mg/5 mL (20 mg/mL) concentrated solution Take 5 mg by mouth every 4 (four) hours as needed for pain.       ondansetron (ZOFRAN-ODT) 4 MG disintegrating tablet 4 mg by G-tube route every 6 (six) hours as needed for nausea.       oxyCODONE (ROXICODONE) 5 MG immediate release tablet Take 5-10 mg by mouth every 4 (four) hours as needed for pain.       polyethylene glycol (MIRALAX) 17 gram packet 17 g by G-tube route daily as needed.        sennosides (SENNOSIDES) 8.8 mg/5 mL Syrp syrup 6.5 mg by G-tube route daily as needed.        sucralfate (CARAFATE) 100 mg/mL suspension Take 1 g by mouth 4 (four) times a day.       tiotropium (SPIRIVA) 18 mcg inhalation capsule Place 18 mcg into inhaler and inhale daily.       tiotropium (SPIRIVA) 18 mcg inhalation capsule Place 18 mcg into inhaler and inhale daily.       traZODone (DESYREL) 50 MG tablet 25 mg by G-tube route at bedtime as needed for sleep.       No current facility-administered medications for this visit.         REVIEW OF SYSTEMS:    Currently, no fever, chills, or rigors. Does not have any visual or hearing problems. Denies any chest pain, headaches, palpitations, lightheadedness, dizziness, shortness of breath, or cough. Appetite is good. Denies any GERD symptoms. Denies any difficulty with swallowing, nausea, or vomiting.  Denies any abdominal pain, diarrhea or constipation. Denies any urinary symptoms. No insomnia. No active bleeding. No rash.       PHYSICAL EXAMINATION:  Vitals:    09/30/18 2231   BP: 103/51   Pulse: 77   Resp: 18   Temp: 97.2  F (36.2  C)   SpO2: 94%       GENERAL: Awake, Alert, oriented x3, not in any form of acute distress, answers questions appropriately, follows simple commands, conversant  HEENT: Head is normocephalic with normal hair distribution. No evidence of trauma. Ears: No acute purulent discharge. Eyes: Conjunctivae pink with no scleral jaundice. Nose: Normal mucosa and septum. NECK: Supple with no cervical or supraclavicular lymphadenopathy. Trachea is midline.   CHEST: No tenderness or deformity, no crepitus  LUNG: Clear to auscultation with good chest expansion. There are no crackles or wheezes, normal AP diameter.  BACK: No kyphosis of the thoracic spine. Symmetric, no curvature, ROM normal, no CVA tenderness, no spinal tenderness   CVS: There is good S1  S2, there are no murmurs, rubs, gallops, or heaves, rhythm is regular,  2+ pulses symmetric in all extremities.  ABDOMEN: Globular and soft, nontender to palpation, non distended, no masses, no organomegaly, good bowel sounds, no rebound or guarding, no peritoneal signs.   EXTREMITIES:  Full range of motion on both upper and lower extremities, there is no tenderness to palpation, no pedal edema, no cyanosis or clubbing, no calf tenderness.  Pulses equal in all extremities, normal cap refill, no joint swelling.  SKIN: Warm and dry, no erythema noted.  Skin color, texture, no rashes or lesions.  NEUROLOGICAL: The patient is  oriented to person, place and time. Strength and sensation are grossly intact. Face is symmetric.    LABS:      Lab Results   Component Value Date    WBC 5.4 07/06/2018    HGB 11.6 (L) 07/06/2018    HCT 36.8 07/06/2018    MCV 96 07/06/2018     07/06/2018       ASSESSMENT/PLAN:    1. Esophageal dysphagia - NPO. Isosource 1.5 is now being administered at 56 mL/h for 18 hours, starting at 1500 and ending at 0900.  Esophageal manometry scheduled in October.   2. Failure to thrive in adult - See above   3. Chronic GERD -   Lansoprazole 30 mg daily,  Maalox  4 times daily dosing, and Sucralfate 1 g 4 times daily a   4. Major depressive disorder - Mood stable on Mirtazapine and Duloxetine   5. Multiple fractures of ribs of left side with nonunion - Improving, continue Lidoderm patches         Electronically signed by:  Jodie Drew CNP    35 minutes TT of which 50% was spent in counseling and coordination of care of the above plan.    Time spent in interview and examination of patient, review of available records, and discussion with nursing staff. Continue care plan, efforts at therapy, and monitor nutritional status.

## 2021-06-20 NOTE — LETTER
Letter by Rosina Guo MD at      Author: Rosina Guo MD Service: -- Author Type: --    Filed:  Encounter Date: 3/30/2020 Status: (Other)         Patient: Rosina Link   MR Number: [id number]   YOB: 1945   Date of Visit: 3/30/2020     [facility]      Facility:    [facility] [[id number]]  Code Status: DNR/DNI       Chief Complaint/Reason for Visit:  Chief Complaint   Patient presents with   ? H & P     Re-admit to LTC for COPD with respiratory failure, now on Hospice.        HPI:   Rosina is a 74 y.o. female who resides in LTC at [facility]. She has hx of COPD, HTN, hiatal hernia, GERD, anemia, PMR, TERESA, pancreatitis, gastric obstruction and FTT previously on feeding tube (discontinued 8/2019). She was sent to the hospital on 2/28/20 from a planned routine cardiology visit due to cough, dyspnea and hypoxia.    DISCHARGE SUMMARY   [facility]  Hospital Course:   Rosina Link is a 74 y.o. female with a history of COPD, dyslipidemia, chronic pericardial effusion which is not hemodynamically significant who presented to cardiology clinic for routine follow-up on 2/28/2020 and complained of 2 weeks of nonproductive cough with associated mild rhinorrhea. She was diagnosed with hypoxemic respiratory failure caused by Rhinovirus (positive on respiratory multiplex), started on HFNC O2 supplementation.     Bronch 3/1/20 opened L upper lobe and her O2/breathing improved somewhat, however she was unable to wean further than HFNC and taken for repeat bronch 3/4/2020 that showed extensive mucus plugging. Post-bronch CXR showed expanded lung.     Developed acute hypoxic respiratory failure 3/6/2020 requiring CPAP, was also briefly unresponsive. Patient has not wanted to comply with additional therapies (compressive vest, metanebs, ambulation/HOB elevation, etc) and has struggled even with nasal cannula let alone CPAP mask. Patient was  "not able to comprehend the risks/benefits of her decisions therefore [facility] deemed her to not have capacity and discussed with daughter, pulmonology and RN who were in agreement that we may be running out of options. Consulted palliative care to discuss goals of care and address end-of-life cares with her daughter and comfort cares given her refusal to conventional treatments and knowing previous bronchoscopy has not been effective for her. She underwent BAL which was nonspecific.      Hospital course:    After Care Conference with family and patient, pursued comfort cares but not initially withdrawing high flow oxygen until requested    Comfort care orders placed.     taken off HFNC and started MS Contin     Hospital Problems Addressed:    Principal Problem:  COPD exacerbation with hypoxic respiratory failure  Mucus plugging of bronchi  Complete left lower lobe atelectasis and partial left upper lobe atelectasis    Stop aggressive cares    Comfort care orders placed    HFNC O2 stopped    Added oral PRN morphine     Active Problems:  History of longstanding Pericardial effusion    Stable. Comfort cares     Major depressive disorder in partial remission    Continue home effexor     ACP    DNR    Daughter Maritza is health care agent     Return to SNF with Hospice Care     She returned to LTC on 3/16/20 on [facility].      Today:  She denies shortness of breath today and in fact is not using supplemental oxygen. No fever or cough reported. She denies chest pain. She is followed by Hospice. She is comfortable, denies pain. Simply states \"I'm ok\", tends to minimize concerns. No new concerns nursing. Her appetite is fair. No abdominal pain. No urinary sx. No new vision or hearing problems.       Past Medical History:  Past Medical History:   Diagnosis Date   ? Acute encephalopathy    ? Acute on chronic respiratory failure (H)    ? Aperistalsis of esophagus    ? Arthritis    ? Compression fracture of lumbar vertebra (H)  "    L2   ? COPD (chronic obstructive pulmonary disease) (H)    ? Depression    ? Dyslipidemia    ? Encephalopathy    ? Esophageal candidiasis (H)    ? Esophagitis    ? Failure to thrive in adult    ? Fall    ? GERD (gastroesophageal reflux disease)    ? Hiatal hernia    ? HLD (hyperlipidemia)    ? HTN (hypertension)    ? Hypocalcemia    ? Hypomagnesemia    ? Hypoxia    ? Insomnia    ? Lactic acidosis    ? Major depressive disorder    ? Malnutrition (H)    ? Maternal MCV (mean corpuscular volume) low    ? Microcytic anemia    ? Microcytic hypochromic anemia 01/2013   ? Mucus plugging of bronchi    ? Osteoporosis    ? Pericardial effusion    ? PMR (polymyalgia rheumatica) (H)    ? Pneumonia    ? Pneumothorax    ? Positive urine drug screen 10/2012    meth and coke positive 10/12. negative in 11/19/12   ? Pulmonary emphysema (H)    ? Rib fracture    ? S/P laparoscopic fundoplication    ? SIRS (systemic inflammatory response syndrome) (H)    ? Thrombocytopenia (H)    ? Trigger finger     left hand, middle finger   ? Underweight    ? Urinary urgency            Surgical History:  Past Surgical History:   Procedure Laterality Date   ? CARPAL TUNNEL RELEASE Right    ? CHOLECYSTECTOMY  01/09/2015   ? ESOPHAGOGASTRODUODENOSCOPY     ? HIATAL HERNIA REPAIR  02/15/2016    lap with mesh   ? HIATAL HERNIA REPAIR  01/23/2019    Laparoscopic repair (reduction and repair) of recurrent hiatal hernia,    ? HYSTERECTOMY  1990   ? IR GJ TUBE REPLACEMENT  12/13/2018   ? IR GJ TUBE REPLACEMENT  3/15/2019   ? IR GJ TUBE REPLACEMENT  3/25/2019   ? IR GJ TUBE REPLACEMENT  6/24/2019   ? IR TUBE REMOVAL  8/19/2019   ? REPAIR PARA ESOPHAGEAL PLACEMENT OF GASTRIC J TUBE     ? TRIGGER FINGER RELEASE Left     left middle finger       Family History:   Family History   Problem Relation Age of Onset   ? Diabetes Mother    ? Cancer Mother         lung   ? Hypertension Mother    ? Emphysema Father    ? Heart disease Father    ? Hypertension Father    ?  No Medical Problems Brother    ? Breast cancer Maternal Aunt        Social History:    Social History     Socioeconomic History   ? Marital status: Single     Spouse name: Not on file   ? Number of children: Not on file   ? Years of education: Not on file   ? Highest education level: Not on file   Occupational History   ? Not on file   Social Needs   ? Financial resource strain: Not on file   ? Food insecurity     Worry: Not on file     Inability: Not on file   ? Transportation needs     Medical: Not on file     Non-medical: Not on file   Tobacco Use   ? Smoking status: Former Smoker     Packs/day: 1.50     Years: 30.00     Pack years: 45.00     Types: Cigarettes     Last attempt to quit: 8/10/1991     Years since quittin.6   ? Smokeless tobacco: Never Used   Substance and Sexual Activity   ? Alcohol use: No   ? Drug use: No   ? Sexual activity: Not Currently   Lifestyle   ? Physical activity     Days per week: Not on file     Minutes per session: Not on file   ? Stress: Not on file   Relationships   ? Social connections     Talks on phone: Not on file     Gets together: Not on file     Attends Restorationism service: Not on file     Active member of club or organization: Not on file     Attends meetings of clubs or organizations: Not on file     Relationship status: Not on file   ? Intimate partner violence     Fear of current or ex partner: Not on file     Emotionally abused: Not on file     Physically abused: Not on file     Forced sexual activity: Not on file   Other Topics Concern   ? Not on file   Social History Narrative   ? Not on file       Medications:  Current Outpatient Medications   Medication Sig Note   ? atropine 1 % ophthalmic solution Place 1-4 drops under the tongue every 4 (four) hours as needed.    ? bisacodyL (DULCOLAX) 10 mg suppository Insert 10 mg into the rectum daily as needed.    ? budesonide-formoterol (SYMBICORT) 160-4.5 mcg/actuation inhaler Inhale 2 puffs 2 (two) times a day.    ?  calcium carbonate (OS-JASPREET) 600 mg calcium (1,500 mg) tablet Take 600 mg by mouth daily.    ? codeine-guaiFENesin (GUAIFENESIN AC)  mg/5 mL liquid Take 15 mL by mouth every 4 (four) hours as needed for cough.    ? melatonin 3 mg Tab tablet Take 3 mg by mouth at bedtime as needed.    ? morphine (MS CONTIN) 15 MG 12 hr tablet Take 15 mg by mouth every 3 (three) hours as needed for pain.    ? morphine 100 mg/5 mL (20 mg/mL) concentrated solution Take 10 mg by mouth every hour as needed for pain.    ? omeprazole (PRILOSEC) 10 MG capsule Take 10 mg by mouth daily before breakfast. 1/7/2020: Reduction from 20 mg to 10 mg daily on 01/07/2020 in attempt to taper off.   ? ondansetron (ZOFRAN-ODT) 4 MG disintegrating tablet Take 4 mg by mouth every 6 (six) hours as needed for nausea.    ? polyethylene glycol (MIRALAX) 17 gram packet Take 17 g by mouth daily as needed.    ? senna-docusate (SENNOSIDES-DOCUSATE SODIUM) 8.6-50 mg tablet Take 1 tablet by mouth 2 (two) times a day.    ? venlafaxine (EFFEXOR) 37.5 MG tablet Take 37.5 mg by mouth daily.        6/18/2019: Decreased from a 37.5 mg twice daily to 37.5 mg daily on 06/18/2019, an attempt at gradual dose reduction.       Allergies:  Allergies   Allergen Reactions   ? Sulfa (Sulfonamide Antibiotics) Other (See Comments)      Ototoxicity       ? Penicillins Rash        Review of Systems:  Pertinent items are noted in HPI.      Physical Exam:   General: Patient is alert, somewhat pale appearing female, no distress, not on oxygen.   Vitals: /80, Temp 96.4, Pulse 96, RR 18, O2 sat 94%RA.  HEENT: Head is NCAT. Eyes show no injection or icterus. Nares negative. Oropharynx well hydrated.  Neck: Supple. No tenderness or adenopathy. No JVD.  Lungs: Diminished. No wheezes.  Cardiovascular: Regular rate and rhythm, normal S1, S2.  Back: No spinal or CVA tenderness.  Abdomen: Soft, no tenderness on exam. Bowel sounds present. No guarding rebound or rigidity.  :  Deferred.  Extremities: No LE edema is noted.  Musculoskeletal: Age related degen changes.   Skin: Warm and dry.   Psych: Mood appears good.      Labs:  Outside hospital.      Assessment/Plan:  1. Respiratory failure. Acute on chronic with complex hospitalization, ultimately with goals of care discussion concluding for Hospice cares and return to LTC.   2. COPD. Continue on Symbicort. She actually is no longer requiring supplemental oxygen.  3. Pericardial effusion. Has been followed by cardiology, no intervention.  4. Depression. Continue on Venlafaxine.  5. HTN. Noted per records. Not on BP meds and BPs have been satisfactory.  6. Anemia. Hx of anemia though hgb within normal range during hospital stay.  7. Debilitation. Overall frailty, multiple medical concerns, respiratory failure, not very responsive to inpatient treatment and patient not interested in more aggressive cares.  8. Code status is DNR/DNI/Hospice.         Electronically signed by: Rosina Guo MD

## 2021-06-20 NOTE — LETTER
Letter by Rosina Guo MD at      Author: Rosina Guo MD Service: -- Author Type: --    Filed:  Encounter Date: 5/18/2020 Status: (Other)         Patient: Rosina Link   MR Number: [id number]   YOB: 1945   Date of Visit: 5/18/2020     [facility]    Facility:   [facility] [[id number]]   Code Status: DNR/DNI       Chief Complaint   Patient presents with   ? Review Of Multiple Medical Conditions     LTC 5/18/2020.       HPI:   Rosina is a 74 y.o. female who resides in LTC at [facility]. She has hx of COPD, HTN, hiatal hernia, GERD, anemia, PMR, TERESA, pancreatitis, gastric obstruction and FTT previously on feeding tube (discontinued 8/2019). She was sent to the hospital on 2/28/20 from a planned routine cardiology visit due to cough, dyspnea and hypoxia.    DISCHARGE SUMMARY   [facility]  Hospital Course:   Rosina Link is a 74 y.o. female with a history of COPD, dyslipidemia, chronic pericardial effusion which is not hemodynamically significant who presented to cardiology clinic for routine follow-up on 2/28/2020 and complained of 2 weeks of nonproductive cough with associated mild rhinorrhea. She was diagnosed with hypoxemic respiratory failure caused by Rhinovirus (positive on respiratory multiplex), started on HFNC O2 supplementation.     Bronch 3/1/20 opened L upper lobe and her O2/breathing improved somewhat, however she was unable to wean further than HFNC and taken for repeat bronch 3/4/2020 that showed extensive mucus plugging. Post-bronch CXR showed expanded lung.     Developed acute hypoxic respiratory failure 3/6/2020 requiring CPAP, was also briefly unresponsive. Patient has not wanted to comply with additional therapies (compressive vest, metanebs, ambulation/HOB elevation, etc) and has struggled even with nasal cannula let alone CPAP mask. Patient was not able to comprehend the risks/benefits of her decisions  therefore [facility] deemed her to not have capacity and discussed with daughter, pulmonology and RN who were in agreement that we may be running out of options. Consulted palliative care to discuss goals of care and address end-of-life cares with her daughter and comfort cares given her refusal to conventional treatments and knowing previous bronchoscopy has not been effective for her. She underwent BAL which was nonspecific.      Hospital course:    After Care Conference with family and patient, pursued comfort cares but not initially withdrawing high flow oxygen until requested    Comfort care orders placed.     taken off HFNC and started MS Contin     Hospital Problems Addressed:    Principal Problem:  COPD exacerbation with hypoxic respiratory failure  Mucus plugging of bronchi  Complete left lower lobe atelectasis and partial left upper lobe atelectasis    Stop aggressive cares    Comfort care orders placed    HFNC O2 stopped    Added oral PRN morphine     Active Problems:  History of longstanding Pericardial effusion    Stable. Comfort cares     Major depressive disorder in partial remission    Continue home effexor     ACP    DNR    Daughter Maritza is health care agent     Return to SNF with Hospice Care     She returned to LTC on 3/16/20 on [facility].      Today:  Seen for routine physician follow up today. She has been doing well per her report. Not on oxygen. Continues on symbicort. Denies shortness of breath, chest pain, no hypoxia. No cough or fever. Doesn't get out of bed much, states it hurts to be up in chair. She continues on Hospice. Scheduled meds include omeprazole, Ca, senna and Effexor, all other meds are prn for comfort, symptom control. Her appetite is fair. She has lost weight, expected with decline. She is on supplements and dietician evaluates as needed. No abdominal pain. No urinary sx. No open areas of skin.       Past Medical History:  Past Medical History:   Diagnosis Date   ? Acute  encephalopathy    ? Acute on chronic respiratory failure (H)    ? Aperistalsis of esophagus    ? Arthritis    ? Compression fracture of lumbar vertebra (H)     L2   ? COPD (chronic obstructive pulmonary disease) (H)    ? Depression    ? Dyslipidemia    ? Encephalopathy    ? Esophageal candidiasis (H)    ? Esophagitis    ? Failure to thrive in adult    ? Fall    ? GERD (gastroesophageal reflux disease)    ? Hiatal hernia    ? HLD (hyperlipidemia)    ? HTN (hypertension)    ? Hypocalcemia    ? Hypomagnesemia    ? Hypoxia    ? Insomnia    ? Lactic acidosis    ? Major depressive disorder    ? Malnutrition (H)    ? Maternal MCV (mean corpuscular volume) low    ? Microcytic anemia    ? Microcytic hypochromic anemia 01/2013   ? Mucus plugging of bronchi    ? Osteoporosis    ? Pericardial effusion    ? PMR (polymyalgia rheumatica) (H)    ? Pneumonia    ? Pneumothorax    ? Positive urine drug screen 10/2012    meth and coke positive 10/12. negative in 11/19/12   ? Pulmonary emphysema (H)    ? Rib fracture    ? S/P laparoscopic fundoplication    ? SIRS (systemic inflammatory response syndrome) (H)    ? Thrombocytopenia (H)    ? Trigger finger     left hand, middle finger   ? Underweight    ? Urinary urgency        Medications:  Current Outpatient Medications   Medication Sig Note   ? atropine 1 % ophthalmic solution Place 1-4 drops under the tongue every 4 (four) hours as needed.    ? bisacodyL (DULCOLAX) 10 mg suppository Insert 10 mg into the rectum daily as needed.    ? budesonide-formoterol (SYMBICORT) 160-4.5 mcg/actuation inhaler Inhale 2 puffs 2 (two) times a day.    ? calcium carbonate (OS-JASPREET) 600 mg calcium (1,500 mg) tablet Take 600 mg by mouth daily.    ? codeine-guaiFENesin (GUAIFENESIN AC)  mg/5 mL liquid Take 15 mL by mouth every 4 (four) hours as needed for cough.    ? melatonin 3 mg Tab tablet Take 3 mg by mouth at bedtime as needed.    ? morphine (MS CONTIN) 15 MG 12 hr tablet Take 15 mg by mouth every  3 (three) hours as needed for pain.    ? morphine 100 mg/5 mL (20 mg/mL) concentrated solution Take 10 mg by mouth every hour as needed for pain.    ? omeprazole (PRILOSEC) 10 MG capsule Take 10 mg by mouth daily before breakfast. 1/7/2020: Reduction from 20 mg to 10 mg daily on 01/07/2020 in attempt to taper off.   ? ondansetron (ZOFRAN-ODT) 4 MG disintegrating tablet Take 4 mg by mouth every 6 (six) hours as needed for nausea.    ? polyethylene glycol (MIRALAX) 17 gram packet Take 17 g by mouth daily as needed.    ? senna-docusate (SENNOSIDES-DOCUSATE SODIUM) 8.6-50 mg tablet Take 1 tablet by mouth 2 (two) times a day.    ? venlafaxine (EFFEXOR) 37.5 MG tablet Take 37.5 mg by mouth daily.        6/18/2019: Decreased from a 37.5 mg twice daily to 37.5 mg daily on 06/18/2019, an attempt at gradual dose reduction.       Physical Exam:   Note: COVID-19 pandemic precautions in place. Physical exam performed with social distancing considerations.  General: Patient is alert pale appearing female, no distress, not on oxygen.   Vitals: /84, Temp 96.7, Pulse 83, RR 17, O2 sat 94%RA.  HEENT: Head is NCAT. Eyes show no injection or icterus. Nares negative. Oropharynx well hydrated.  Neck: No JVD.  Lungs: Non labored respirations.   Abdomen: Soft.  : Deferred.  Extremities: No LE edema is noted.  Musculoskeletal: Age related degen changes.   Skin: Warm and dry.   Psych: Mood appears pretty good.      Labs:  Outside hospital.      Assessment/Plan:  1. COPD. Continue on Symbicort. She does not require supplemental oxygen. Hospitalization Feb/March 2020 for acute on chronic respiratory failure, complex hospitalization, returned to LTC on Hospice. Respiratory status currently stable.   2. Pericardial effusion. Had been followed by cardiology, no intervention. No complaints of shortness of breath, chest pain, no hypoxia.   3. Depression. Continue on Venlafaxine.  4. HTN. Noted per records. BPs satisfactory, not on  medications for BP.   5. Anemia. Hx of anemia though hgb within normal range during hospital stay and not checked since due to Hospice designation.          Electronically signed by: Rosina Guo MD

## 2021-06-20 NOTE — LETTER
Letter by Elif Carter CNP at      Author: Elif Carter CNP Service: -- Author Type: --    Filed:  Encounter Date: 3/19/2020 Status: (Other)         Patient: Rosina Link   MR Number: [id number]   YOB: 1945   Date of Visit: 3/19/2020     Code Status:  DNR/DNI on [facility]  Visit Type: Review Of Multiple Medical Conditions (recent hospitalization for COPD exacerbation, pericardial effusion. )     Facility:  [facility] [[id number]]      Facility Type:  (Long Term Care, LTC)    History of Present Illness:   Hospital Admission Date: 2/28/2020 Hospital Discharge Date: 3/16/2020      Rosina Link is a 74 y.o. female who has a past medical history of COPD, dyslipidemia, chronic pericardial effusion, depression, and insomnia.  She was recently hospitalized at [facility] after following up at the cardiology clinic for routine visit and she complained of a nonproductive cough with mild rhinorrhea.  She was found to be hypoxemic and respiratory failure secondary to rhinovirus and was started on O2 supplementation.  She developed bronchitis with COPD exacerbation needing multiple bronchoscopies for mucous plugging.  On 3/6 she was found unresponsive briefly.  She did not want to continue with compressive vest or meta nebs or therapy and was struggling with her nasal cannula and a CPAP mask and so goals of care were discussed and palliative care was consulted.  Given her refusal to conventional treatments and knowing that her previous bronchoscopy had not been effective she was placed on comfort cares and signed onto hospice.  She was taken off of high flow nasal cannula and started on MS Contin.  She eventually stabilized and was transferred back to the long-term care facility with hospice services.    Today, I see her and she appears very comfortable breathing comfortably on 2 L nasal cannula.  She states that her mood waxes and wanes she has good days and bad days.  For her  mood she does have Effexor.  She does not appear to have any agitation and appears very comfortable.  She does have a budesonide/formoterol inhaler which at this point I think she can still manage.  She denies any pain or discomforts.    Past Medical History:   Diagnosis Date   ? Acute encephalopathy    ? Acute on chronic respiratory failure (H)    ? Aperistalsis of esophagus    ? Arthritis    ? Compression fracture of lumbar vertebra (H)     L2   ? COPD (chronic obstructive pulmonary disease) (H)    ? Depression    ? Dyslipidemia    ? Encephalopathy    ? Esophageal candidiasis (H)    ? Esophagitis    ? Failure to thrive in adult    ? Fall    ? GERD (gastroesophageal reflux disease)    ? Hiatal hernia    ? HLD (hyperlipidemia)    ? HTN (hypertension)    ? Hypocalcemia    ? Hypomagnesemia    ? Hypoxia    ? Insomnia    ? Lactic acidosis    ? Major depressive disorder    ? Malnutrition (H)    ? Maternal MCV (mean corpuscular volume) low    ? Microcytic anemia    ? Microcytic hypochromic anemia 01/2013   ? Mucus plugging of bronchi    ? Osteoporosis    ? Pericardial effusion    ? PMR (polymyalgia rheumatica) (H)    ? Pneumonia    ? Pneumothorax    ? Positive urine drug screen 10/2012    meth and coke positive 10/12. negative in 11/19/12   ? Pulmonary emphysema (H)    ? Rib fracture    ? S/P laparoscopic fundoplication    ? SIRS (systemic inflammatory response syndrome) (H)    ? Thrombocytopenia (H)    ? Trigger finger     left hand, middle finger   ? Underweight    ? Urinary urgency      Past Surgical History:   Procedure Laterality Date   ? CARPAL TUNNEL RELEASE Right    ? CHOLECYSTECTOMY  01/09/2015   ? ESOPHAGOGASTRODUODENOSCOPY     ? HIATAL HERNIA REPAIR  02/15/2016    lap with mesh   ? HIATAL HERNIA REPAIR  01/23/2019    Laparoscopic repair (reduction and repair) of recurrent hiatal hernia,    ? HYSTERECTOMY  1990   ? IR GJ TUBE REPLACEMENT  12/13/2018   ? IR GJ TUBE REPLACEMENT  3/15/2019   ? IR GJ TUBE  REPLACEMENT  3/25/2019   ? IR GJ TUBE REPLACEMENT  2019   ? IR TUBE REMOVAL  2019   ? REPAIR PARA ESOPHAGEAL PLACEMENT OF GASTRIC J TUBE     ? TRIGGER FINGER RELEASE Left     left middle finger     Family History   Problem Relation Age of Onset   ? Diabetes Mother    ? Cancer Mother         lung   ? Hypertension Mother    ? Emphysema Father    ? Heart disease Father    ? Hypertension Father    ? No Medical Problems Brother    ? Breast cancer Maternal Aunt      Social History     Socioeconomic History   ? Marital status: Single     Spouse name: Not on file   ? Number of children: Not on file   ? Years of education: Not on file   ? Highest education level: Not on file   Occupational History   ? Not on file   Social Needs   ? Financial resource strain: Not on file   ? Food insecurity     Worry: Not on file     Inability: Not on file   ? Transportation needs     Medical: Not on file     Non-medical: Not on file   Tobacco Use   ? Smoking status: Former Smoker     Packs/day: 1.50     Years: 30.00     Pack years: 45.00     Types: Cigarettes     Last attempt to quit: 8/10/1991     Years since quittin.6   ? Smokeless tobacco: Never Used   Substance and Sexual Activity   ? Alcohol use: No   ? Drug use: No   ? Sexual activity: Not Currently   Lifestyle   ? Physical activity     Days per week: Not on file     Minutes per session: Not on file   ? Stress: Not on file   Relationships   ? Social connections     Talks on phone: Not on file     Gets together: Not on file     Attends [employer] service: Not on file     Active member of club or organization: Not on file     Attends meetings of clubs or organizations: Not on file     Relationship status: Not on file   ? Intimate partner violence     Fear of current or ex partner: Not on file     Emotionally abused: Not on file     Physically abused: Not on file     Forced sexual activity: Not on file   Other Topics Concern   ? Not on file   Social History Narrative   ? Not  on file       Current Outpatient Medications   Medication Sig Dispense Refill   ? atropine 1 % ophthalmic solution Place 1-4 drops under the tongue every 4 (four) hours as needed.     ? bisacodyL (DULCOLAX) 10 mg suppository Insert 10 mg into the rectum daily as needed.     ? budesonide-formoterol (SYMBICORT) 160-4.5 mcg/actuation inhaler Inhale 2 puffs 2 (two) times a day.     ? calcium carbonate (OS-JASPREET) 600 mg calcium (1,500 mg) tablet Take 600 mg by mouth daily.     ? codeine-guaiFENesin (GUAIFENESIN AC)  mg/5 mL liquid Take 15 mL by mouth every 4 (four) hours as needed for cough.     ? melatonin 3 mg Tab tablet Take 3 mg by mouth at bedtime as needed.     ? morphine (MS CONTIN) 15 MG 12 hr tablet Take 15 mg by mouth every 3 (three) hours as needed for pain.     ? morphine 100 mg/5 mL (20 mg/mL) concentrated solution Take 10 mg by mouth every hour as needed for pain.     ? omeprazole (PRILOSEC) 10 MG capsule Take 10 mg by mouth daily before breakfast.     ? ondansetron (ZOFRAN-ODT) 4 MG disintegrating tablet Take 4 mg by mouth every 6 (six) hours as needed for nausea.     ? polyethylene glycol (MIRALAX) 17 gram packet Take 17 g by mouth daily as needed.     ? senna-docusate (SENNOSIDES-DOCUSATE SODIUM) 8.6-50 mg tablet Take 1 tablet by mouth 2 (two) times a day.     ? venlafaxine (EFFEXOR) 37.5 MG tablet Take 37.5 mg by mouth daily.              No current facility-administered medications for this visit.      Allergies   Allergen Reactions   ? Sulfa (Sulfonamide Antibiotics) Other (See Comments)      Ototoxicity       ? Penicillins Rash     Immunization History   Administered Date(s) Administered   ? Influenza, inj, historic,unspecified 10/31/2018, 11/11/2019       Post Discharge Medication Reconciliation Status: discharge medications reconciled, continue medications without change    Review of Systems   Patient denies fever, chills, headache, lightheadedness, dizziness, rhinorrhea, cough, congestion,  shortness of breath, chest pain, palpitations, abdominal pain, n/v, diarrhea, constipation, change in appetite, dysuria, frequency, burning or pain with urination.  Other than stated in HPI all other review of systems is negative.         Physical Exam   Vital signs: /69, heart rate 89, respiratory 16, temp 97.5, 96% on 2 L nasal cannula.  GENERAL APPEARANCE: Well developed, well nourished, in no acute distress.  HEENT: normocephalic, atraumatic  PERRL, sclerae anicteric, conjunctivae clear and moist, EOM intact  LUNGS: Lung sounds CTA, no adventitious sounds, respiratory effort normal.  CARD: RRR, S1, S2, without murmurs, gallops, rubs,   ABD: Soft and nontender with normal bowel sounds.   MSK: Muscle strength and tone were normal.  EXTREMITIES: No cyanosis, clubbing or edema.  NEURO: Alert with mild cognitive impairment,. Face is symmetric.  SKIN: Inspection of the skin reveals no rashes, ulcerations or petechiae.  PSYCH: euthymic            Labs:  No results found for this or any previous visit (from the past 240 hour(s)).      Assessment:  1. Pulmonary emphysema, unspecified emphysema type (H)     2. Coronary artery disease involving native coronary artery of native heart without angina pectoris     3. Essential hypertension     4. Chronic obstructive pulmonary disease, unspecified COPD type (H)     5. Major depressive disorder in full remission, unspecified whether recurrent (H)     6. Failure to thrive in adult         Plan:     At this time will continue with comfort cares.  Would consider switching her Symbicort inhaler to nebulizer treatments if she becomes significantly short of breath or if I find that she is unable to draw and the MDI.  Because she does not exhibit any terminal agitation will discontinue Haldol at this time and consider reinstating this when and if she is closer to end-of-life.  Continue with morphine for air hunger.  For her depression continue to monitor.  I did discuss with her  and she is not open to increasing her antidepressant at this time will continue to observe for symptoms.  Supplements for her failure to thrive.    Electronically signed by: Elif Carter CNP

## 2021-06-20 NOTE — LETTER
Letter by Rosina Guo MD at      Author: Rosina Guo MD Service: -- Author Type: --    Filed:  Encounter Date: 8/26/2020 Status: (Other)         Patient: Rosina Link   MR Number: [id number]   YOB: 1945   Date of Visit: 8/26/2020     [facility]    Facility:   [facility] [[id number]]   Code Status: DNR/DNI       Chief Complaint   Patient presents with   ? Problem Visit     LT 8/26/2020. Shingles.       HPI:   Rosina is a 75 y.o. female who resides in LTC at [facility]. She has hx of COPD, HTN, hiatal hernia, GERD, anemia, PMR, TERESA, pancreatitis, gastric obstruction and FTT previously on feeding tube (discontinued 8/2019). She was sent to the hospital on 2/28/20 from a planned routine cardiology visit due to cough, dyspnea and hypoxia.    DISCHARGE SUMMARY   [facility]  Hospital Course:   Rosina Link is a 74 y.o. female with a history of COPD, dyslipidemia, chronic pericardial effusion which is not hemodynamically significant who presented to cardiology clinic for routine follow-up on 2/28/2020 and complained of 2 weeks of nonproductive cough with associated mild rhinorrhea. She was diagnosed with hypoxemic respiratory failure caused by Rhinovirus (positive on respiratory multiplex), started on HFNC O2 supplementation.     Bronch 3/1/20 opened L upper lobe and her O2/breathing improved somewhat, however she was unable to wean further than HFNC and taken for repeat bronch 3/4/2020 that showed extensive mucus plugging. Post-bronch CXR showed expanded lung.     Developed acute hypoxic respiratory failure 3/6/2020 requiring CPAP, was also briefly unresponsive. Patient has not wanted to comply with additional therapies (compressive vest, metanebs, ambulation/HOB elevation, etc) and has struggled even with nasal cannula let alone CPAP mask. Patient was not able to comprehend the risks/benefits of her decisions therefore [facility]  deemed her to not have capacity and discussed with daughter, pulmonology and RN who were in agreement that we may be running out of options. Consulted palliative care to discuss goals of care and address end-of-life cares with her daughter and comfort cares given her refusal to conventional treatments and knowing previous bronchoscopy has not been effective for her. She underwent BAL which was nonspecific.      Hospital course:    After Care Conference with family and patient, pursued comfort cares but not initially withdrawing high flow oxygen until requested    Comfort care orders placed.     taken off HFNC and started MS Contin     Hospital Problems Addressed:    Principal Problem:  COPD exacerbation with hypoxic respiratory failure  Mucus plugging of bronchi  Complete left lower lobe atelectasis and partial left upper lobe atelectasis    Stop aggressive cares    Comfort care orders placed    HFNC O2 stopped    Added oral PRN morphine     Active Problems:  History of longstanding Pericardial effusion    Stable. Comfort cares     Major depressive disorder in partial remission    Continue home effexor     ACP    DNR    Daughter Maritza is health care agent     Return to SNF with Hospice Care     She returned to LTC on 3/16/20 on [facility].      Today:  She is seen today for reports of rash and pain. She has had pain on the right side of her neck for a few days with some pain in to her right arm. Today had some itching and nursing noted new development of red rash concerning for shingles. She was therefore started on Valtrex. She states it is itchy, not particularly painful at this time but does have the aforementioned pain in her neck. She is on Hospice and has prn order for morphine or staff can use house prn acetaminophen. She otherwise states she is doing okay. She notes she does not walk, dose not feel that she can any longer due to weakness. No dizziness. She has been on Hospice since March. Per report,  Hospice was here today and staff were told they would be signing her off Hospice soon. Her Hospice dx is COPD with respiratory failure and depression. She currently does not have any respiratoy concerns. No shortness of breath or wheezing. No fever or cough. She is on budesonide/formoterol MDI.       Past Medical History:  Past Medical History:   Diagnosis Date   ? Acute encephalopathy    ? Acute on chronic respiratory failure (H)    ? Aperistalsis of esophagus    ? Arthritis    ? Compression fracture of lumbar vertebra (H)     L2   ? COPD (chronic obstructive pulmonary disease) (H)    ? Depression    ? Dyslipidemia    ? Encephalopathy    ? Esophageal candidiasis (H)    ? Esophagitis    ? Failure to thrive in adult    ? Fall    ? GERD (gastroesophageal reflux disease)    ? Hiatal hernia    ? HLD (hyperlipidemia)    ? HTN (hypertension)    ? Hypocalcemia    ? Hypomagnesemia    ? Hypoxia    ? Insomnia    ? Lactic acidosis    ? Major depressive disorder    ? Malnutrition (H)    ? Maternal MCV (mean corpuscular volume) low    ? Microcytic anemia    ? Microcytic hypochromic anemia 01/2013   ? Mucus plugging of bronchi    ? Osteoporosis    ? Pericardial effusion    ? PMR (polymyalgia rheumatica) (H)    ? Pneumonia    ? Pneumothorax    ? Positive urine drug screen 10/2012    meth and coke positive 10/12. negative in 11/19/12   ? Pulmonary emphysema (H)    ? Rib fracture    ? S/P laparoscopic fundoplication    ? SIRS (systemic inflammatory response syndrome) (H)    ? Thrombocytopenia (H)    ? Trigger finger     left hand, middle finger   ? Underweight    ? Urinary urgency        Medications:  Current Outpatient Medications   Medication Sig Note   ? atropine 1 % ophthalmic solution Place 1-4 drops under the tongue every 4 (four) hours as needed.    ? bisacodyL (DULCOLAX) 10 mg suppository Insert 10 mg into the rectum daily as needed.    ? budesonide-formoterol (SYMBICORT) 160-4.5 mcg/actuation inhaler Inhale 2 puffs 2 (two)  times a day.    ? calcium carbonate (OS-JASPREET) 600 mg calcium (1,500 mg) tablet Take 600 mg by mouth daily.    ? codeine-guaiFENesin (GUAIFENESIN AC)  mg/5 mL liquid Take 15 mL by mouth every 4 (four) hours as needed for cough.    ? hydrOXYzine HCL (ATARAX) 10 MG tablet Take 10 mg by mouth 3 (three) times a day as needed for itching.    ? lidocaine (LIDODERM TOP) Apply 4 % topically 3 (three) times a day as needed.    ? melatonin 3 mg Tab tablet Take 3 mg by mouth at bedtime as needed.    ? morphine 100 mg/5 mL (20 mg/mL) concentrated solution Take 10 mg by mouth every hour as needed for pain.    ? omeprazole (PRILOSEC) 10 MG capsule Take 10 mg by mouth daily before breakfast. 1/7/2020: Reduction from 20 mg to 10 mg daily on 01/07/2020 in attempt to taper off.   ? ondansetron (ZOFRAN-ODT) 4 MG disintegrating tablet Take 4 mg by mouth every 6 (six) hours as needed for nausea.    ? polyethylene glycol (MIRALAX) 17 gram packet Take 17 g by mouth daily as needed.    ? senna-docusate (SENNOSIDES-DOCUSATE SODIUM) 8.6-50 mg tablet Take 1 tablet by mouth 2 (two) times a day.    ? valACYclovir (VALTREX) 1000 MG tablet Take 1,000 mg by mouth 3 (three) times a day. (for 7 days for herpes zoster of the right neck/axilla/arm)    ? venlafaxine (EFFEXOR) 37.5 MG tablet Take 37.5 mg by mouth daily.        6/18/2019: Decreased from a 37.5 mg twice daily to 37.5 mg daily on 06/18/2019, an attempt at gradual dose reduction.       Physical Exam:  Note: COVID-19 pandemic precautions in place. Physical exam performed with social distancing considerations.  General: Patient is alert pale appearing female, no distress, not on oxygen.   Vitals: /84, Temp 96.7, Pulse 83, RR 17, O2 sat 94%RA.  HEENT: Head is NCAT. Eyes show no injection or icterus. Nares negative. Oropharynx well hydrated.  Neck: No JVD.  Lungs: Non labored respirations.   Abdomen: Soft.  : Deferred.  Extremities: No LE edema is noted.  Musculoskeletal: Age  related degen changes.   Skin: Red blotchy rash, somewhat thickened and irregular bumpy appearance though no pustules or vesicles at this time. Distribution right lateral neck to anterior upper chest, mid medial upper arm, does not cross midline.  Psych: Mood appears pretty good.      Labs:  Outside hospital.      Assessment/Plan:  1. Shingles. Start Valtrex 1 gm three times a day for 7 days. Notify facility infection control nurse.   2. COPD. Continue on Symbicort. She does not require supplemental oxygen. Hospitalization Feb/March 2020 for acute on chronic respiratory failure, complex hospitalization, returned to LTC on Hospice. Respiratory status currently stable. Reports are she may be discharged from Hospice soon, no details.   3. Depression. Continue on Venlafaxine.  4. HTN. Noted per records. BPs satisfactory, not on medications for BP.   5. Anemia. Hx of anemia though hgb within normal range during hospital stay and not checked since due to Hospice designation.              Electronically signed by: Rosina Guo MD

## 2021-06-20 NOTE — PROGRESS NOTES
[facility]    Name:   Rosina Link  : 1945  Facility:   [facility] [[id number]]   Room: 224  Code Status: DNR/DNI -   Fac type:   SNF (Skilled Nursing Facility, TCU) -     CHIEF COMPLAINT / REASON FOR VISIT:  Chief Complaint   Patient presents with     Review Of Multiple Medical Conditions     TCU follow-up after hospitalization for hiatal hernia with GERD and esophageal dysphagia/esophagitis, as well as adult failure to thrive.  This was followed by a second hospitalization secondary to a fall that resulted in multiple left rib fractures and a pneumothorax.     [facility] from 18 until 18  [facility] TCU from 18 until 03/10/18  [facility] from 18 until 18  [facility] TCU from 8018 until   [facility] from 18 until 18    Patient was last seen by me on 18 and subsequently seen by Jodie Monge CNP, on 18..    HPI: Rosina is a 73 y.o. female with a history of severe COPD/pulmonary emphysema, depression, hiatal hernia/GERD, and insomnia.  She underwent surgery in 2016 for a large hiatal hernia (total gastric herniation) that included laparoscopic repair with mesh and Gorge fundoplication.        Hospitalization 2018: She had suffered a 60 pound unintentional weight loss prior to that, dropping from about 160 pounds down to 95 pounds.  There was a brief period of weight gain after that, but she eventually returned to the lower weight.  She developed progressive weakness and inability to eat much.  She would take several bites and then feel full.  She was so weak upon her admission to [facility] in 2018 that she could hardly walk.      She had had a CT scan on 18 that showed of her stomach back in her chest.  She was supposed to see Dr. De Jesus at on 18 to review pictures and to discuss options.  The CT scan described  "\"small amount of scarring and residual consolidation in the paramedial right lower lobe at the site of resolving pneumonia.\"  Chest x-rays performed on 02/16/18 showed \"right lower lobe pneumonia with small parapneumonic effusion.  The amount of infiltrate in the right lower lobe appeared increased from the CT examination.\"  She was admitted for community-acquired pneumonia.  She had no fevers or leukocytosis.      Hospitalization June 2018: More recently, she was seen by Dr. Vasquez on 05/18/18 for dysphagia, nausea, and early satiety with weight loss.  An EGD with general anesthesia was planned at some point with consideration for repair of hiatal hernia and redo partial fundoplication.    She presented to [facility] ER on 06/05/18 with weakness, anorexia, and constipation.  Constipation eventually resolved without changes to appetite.  Psych was consulted with concern that depression may be contributory, and she was started on low-dose mirtazapine.  Megace was started as an appetite stimulant, and a CT showed a large recurrent hiatal hernia.  A GJ tube placement was suggested, but the patient was felt not to be a candidate for GJ tube per IR due to anatomy.  Therefore, GI and surgery were consulted, and she underwent laparoscopic partial fundoplication takedown repair paraesophageal hernia and placement of gastric jejunostomy tube placement; upper endoscopy by Dr. Daniel De Jesus on 06/12/18.  She was also found to have esophageal candidiasis and was given fluconazole.  The PICC line was placed on 06/13/18 to start TPN.    Overnight (06/14/18 to 06/15/18), rapid response was called due to hypoxemia and tachycardia.  A CT of the chest was negative for pulmonary embolism but showed aspiration pneumonitis.  She was treated with IV antibiotics which ended on 06/23/18.    She underwent EGD and advancement of GJ feeding tube on 06/21/18.  Abdominal x-rays on 06/22/18 showed GJ tube malpositioned, looped in the " fundus.  This J-tube repositioned itself and was then being used again.      Of note: An echocardiogram on 06/15/18 showed moderate pericardial effusion (known pericardial effusion 08/20/17 and 12/20/17).  Cardiology felt likely malnutrition with low protein causing capillary leak as a probable etiology.  Recommendations for observation with no further workup, but a repeat echocardiogram on 06/29/18 showed no change in no hemodynamic effects of effusion.      Hospitalization in July 2018: On 07/7/18, she was getting out of bed early in the day and decided to walk to the bathroom on her own rather than summoned assistance.  On the way to her bathroom, she became dizzy, fell, and struck the side of the garbage can.  She had experienced dizzy spells which have caused falls frequently before.  A CT of the chest, abdomen, and pelvis showed displaced left ninth and 10th rib fractures with a moderate sized pneumothorax and increased moderate pericardial effusion.  A chest tube was placed in the ED.  Trauma surgery evaluated the patient, and the chest tube was removed on 07/10/18.  She remained stable off oxygen with no dyspnea.    Another echocardiogram was done, as the pericardial effusion had possibly increased in size per the CT on admission.  It showed no significant change compared to previously, and there are recommendations for her to follow-up with cardiology in 4 weeks.    A UGI was done, showing persistent partial functional obstruction at the level of the diaphragmatic hiatus, and a strict n.p.o. and tube feedings continued.  She was subsequently discharged back here on 07/12/18.  She was to follow-up with Dr. De Jesus on 08/10/18 for further esophageal evaluation (as described in CURRENT ISSUES).      TCU ISSUES    Failure to thrive/esophageal dysphagia/NPO status: Since her stay at the [facility] TCU in February/March 2018, she had dropped 17 pounds.  She is quite cachectic with a BMI of 16-17.    "Admitted NPO with tube feedings at 30 mL/h, food was being introduced (regular diet, thin liquids, small portions).  Isosource 1.5 is now being administered at 56 mL/h for 18 hours, starting at 1500 and ending at 0900.  Medications continue to be given via feeding tube. She was seen by speech therapy here and appeared to be doing okay; however, we are continuing to follow guidelines set by Dr. De Jesus at this juncture.  With her current tube feeding, her weight has varied a pound or so in either direction.    As noted, she is to remain NPO until UGI has been completed and recommendations made.  She was also followed by speech therapy in the hospital, and her swallow function was deemed intact. She had a follow-up appointment on 07/09/18 but remained NPO.  At a follow-up appointment with Dr. De Jesus on 08/10/18, she was given a new diagnosis of dysphagia, related to her paraesophageal hernia.  More tests were performed, including an upper GI with barium swallow (on 08/23/18) and esophageal manometry (performed yesterday, 10/01/18) .  She is to remain strict NPO, pending recommendations, with tube feedings through the J port.  She was told that surgery would not be appropriate for her but dilatation might.    She will need to wait a while for results of her esophageal manometry, possibly in 2 weeks.  She is currently running a low-grade fever (99  F), but there is only some concern should her temp be >101  F.  Today, her right nare is sore following the procedure.  She is not feeling that well today.    GERD: She has been complaining of heartburn despite being on lansoprazole 30 mg daily.  She previously had orders for Maalox as needed, and she was not taking it.  We scheduled that while awake, keeping with 4 times daily dosing.  Epigastric/suprasternal heartburn was described as \"terrible, terrible, terrible.\"  She told me it had been all right for some time but had returned.  We added sucralfate 1 g 4 times daily " "and a stool check for H pylori (negative).  This has been quite effective, and there are no further complaints    Depression: She does admit to being depressed \"some days.\"  She i continues s on mirtazapine and duloxetine.    COPD: She does have COPD/pulmonary emphysema, is barrel-chested, and is on a variety of inhalers.  She continues to deny dyspnea, coughing, or chest pain.    Urinary incontinence: She does have stress incontinence and is checked by nursing staff during the night.   She finds it very hard to control, stating that she does not realize it until after it starts.  There is some nocturia as well.  None of this has changed since earlier visits.    Bowel incontinence: Several weeks back, she developed loose, incontinent stools. Her tube feeding formula had been recently changed, and despite the loose stools, she was receiving scheduled Colace and PRN senna and MiraLAX.  When addressed with staff, this was attributed to poor communication during shift report, so we discontinued all of her bowel medications.  The problem persisted until we replaced psyllium with a 500 mg tab of methylcellulose daily.  Despite resolution of her loose stools, she remains incontinent of bowel.      Rib fractures: No longer has any significant left-sided rib cage pain (receiving Lidoderm patches for some tenderness), although she tends to sleep on her right side.    Pain management: She does have occasional right leg pain that she describes feeling like a cramp.  When she stated that it would normally help her (when given orally), but by tube she claims to not be getting the full effect, we started her on sublingual Roxanol.  We simply held the oxycodone rather than discontinue it.  As yet, it is uncertain how effective this change is.  She occasionally has pain around the site of her PEG tube if it is not taped down correctly, but for the most part she is pain-free.    Confusion (brief episode and resolved): On 07/23/18 " "staff report that she became confused and agitated, requesting to be taken back to her old room and to the \"real [facility]\".  When addressed with patient, she admited to feeling disoriented, says \"I thought I was imagining things and not at [facility]\".  This problem has resolved and has not recurred since.    Discharge planning: According to nursing staff, she is doing better than she lets on.  She tells me her daughter's house, where she can stay, is very messy, and she is a hoarder.  For now, at least, she would prefer to stay here but, down the road, she would like to go to an assisted living facility.  She has told me that one daughter is looking into this.  She does need to be up and about more often; however, she likes sitting on her bed and playing games on her smart phone.      ROS: No complaints whatsoever today, other than about frequent loose stools (most likely due to her tube feedings), something that will resolve when she can take food orally.  She sleeps well only at times.  She is on trazodone 25 mg nightly.  She denies any headaches or chest pains, coughing or congestion, nausea or vomiting,  dizziness or dyspnea, dysuria, difficulty chewing or swallowing, integumentary issues.      Past Medical History:   Diagnosis Date     Acute encephalopathy      Aperistalsis of esophagus      Arthritis      Compression fracture of lumbar vertebra (H)     L2     COPD (chronic obstructive pulmonary disease) (H)      Depression      Dyslipidemia      Encephalopathy      Esophageal candidiasis (H)      Failure to thrive in adult      Fall      GERD (gastroesophageal reflux disease)      Hiatal hernia      HLD (hyperlipidemia)      HTN (hypertension)      Hypocalcemia      Hypomagnesemia      Hypoxia      Insomnia      Lactic acidosis      Major depressive disorder      Malnutrition (H)      Maternal MCV (mean corpuscular volume) low      Microcytic anemia      Microcytic hypochromic anemia 01/2013     " Osteoporosis      Pericardial effusion      PMR (polymyalgia rheumatica) (H)      Pneumonia      Pneumothorax      Positive urine drug screen 10/2012    meth and coke positive 10/12. negative in 11/19/12     Pulmonary emphysema (H)      Rib fracture      S/P laparoscopic fundoplication      SIRS (systemic inflammatory response syndrome) (H)      Thrombocytopenia (H)      Trigger finger     left hand, middle finger     Underweight      Urinary urgency               Family History   Problem Relation Age of Onset     Diabetes Mother      Cancer Mother      lung     Hypertension Mother      Emphysema Father      Heart disease Father      Hypertension Father      No Medical Problems Brother      Breast cancer Maternal Aunt      Social History     Social History     Marital status: Single     Spouse name: N/A     Number of children: N/A     Years of education: N/A     Social History Main Topics     Smoking status: Former Smoker     Packs/day: 1.50     Years: 30.00     Types: Cigarettes     Quit date: 8/10/1991     Smokeless tobacco: Never Used     Alcohol use No     Drug use: No     Sexual activity: Not Currently     Other Topics Concern     Not on file     Social History Narrative     MEDICATIONS: Reviewed from the MAR, physician orders, and earlier progress notes.    Current Outpatient Prescriptions   Medication Sig     acetaminophen (TYLENOL) 650 mg/20.3 mL Soln 650 mg by G-tube route every 4 (four) hours as needed.     albuterol (PROVENTIL HFA;VENTOLIN HFA) 90 mcg/actuation inhaler Inhale 2 puffs 4 (four) times a day as needed for wheezing.      aluminum-magnesium hydroxide-simethicone (MAALOX ADVANCED) 200-200-20 mg/5 mL Susp 20 mL by G-tube route 4 (four) times a day.      aspirin 81 mg chewable tablet 81 mg by G-tube route daily.     aspirin 81 MG EC tablet 81 mg by G-tube route daily.      atorvastatin (LIPITOR) 20 MG tablet 20 mg by G-tube route daily.      budesonide-formoterol (SYMBICORT) 160-4.5 mcg/actuation  inhaler Inhale 2 puffs 2 (two) times a day.     calcium carbonate 500 mg/5 mL (1,250 mg/5 mL) suspension 600 mg by G-tube route daily.     cholecalciferol, vitamin D3, (VITAMIN D3 ORAL) Take by mouth daily. Vitamin D3 (D-VI-SOL)  liquid; 400U; amt: 1ml (400units); gastric tube  Once A Day;     cholecalciferol, vitamin D3, 2,000 unit cap 2,000 capsules by G-tube route daily.      cholecalciferol, vitamin D3, 400 unit/5 mL Liqd 400 Units by G-tube route daily.     DULoxetine (CYMBALTA) 60 MG capsule 60 mg by G-tube route daily.      ferrous sulfate 220 mg (44 mg iron)/5 mL solution Take by mouth daily. FERROUS SULFATE Rosina Guo  solution; 220mg (44mg iron) / 5ml; amt: 7.4ml; gastric tube  Special Instructions: anorexia  Once A Day;     ferrous sulfate 300 mg (60 mg iron)/5 mL syrup 325 mg by G-tube route daily.     lactose-reduced food/fiber (ISOSOURCE 1.5 JASPREET ENTERAL TUBE) by Enteral Tube route. Isosource 1.5 continuous at goal rate of 40ml/hr Rosina Guo  Special Instructions: tube feeding  Goal per day 960ml/day  Every Shift;     lansoprazole (PREVACID SOLUTAB) 30 MG disintegrating tablet 30 mg by G-tube route daily.     lidocaine (LIDODERM) 5 % Place 1 patch on the skin 2 (two) times a day. Remove & Discard patch within 12 hours or as directed by MD sienna Guo  adhesive patch,medicated; 5 %; topical  Special Instructions: apply to painful area of skin  dx: closed fracture of multiple ribs of left side  Twice A Day;     methylcellulose (CITRUCEL) 500 mg Tab Take 500 mg by mouth daily.     mirtazapine (REMERON) 15 MG tablet 7.5 mg by G-tube route at bedtime.     morphine 100 mg/5 mL (20 mg/mL) concentrated solution Take 5 mg by mouth every 4 (four) hours as needed for pain.     ondansetron (ZOFRAN-ODT) 4 MG disintegrating tablet 4 mg by G-tube route every 6 (six) hours as needed for nausea.     oxyCODONE (ROXICODONE) 5 MG immediate release tablet Take 5-10 mg by mouth every 4 (four)  "hours as needed for pain.     polyethylene glycol (MIRALAX) 17 gram packet 17 g by G-tube route daily as needed.      sennosides (SENNOSIDES) 8.8 mg/5 mL Syrp syrup 6.5 mg by G-tube route daily as needed.      sucralfate (CARAFATE) 100 mg/mL suspension Take 1 g by mouth 4 (four) times a day.     tiotropium (SPIRIVA) 18 mcg inhalation capsule Place 18 mcg into inhaler and inhale daily.     tiotropium (SPIRIVA) 18 mcg inhalation capsule Place 18 mcg into inhaler and inhale daily.     traZODone (DESYREL) 50 MG tablet 25 mg by G-tube route at bedtime as needed for sleep.     ALLERGIES:   Allergies   Allergen Reactions     Sulfa (Sulfonamide Antibiotics) Other (See Comments)      Ototoxicity         Penicillins Rash     DIET: NPO.  She is on tube feeding at 56 mL/h for 18 hours per day.    Vitals:    10/02/18 0834   BP: 118/69   Pulse: 100   Resp: 20   Temp: 99.1  F (37.3  C)   SpO2: 94%   Weight: (!) 88 lb 6.4 oz (40.1 kg)   Height: 4' 11\" (1.499 m)   After being down approximately 17 pounds over the last 4 months or so, weight has been relatively stable on her tube feedings.  Body mass index is 17.85 kg/(m^2).    EXAMINATION:   General: Pleasant, alert, and conversant middle-aged female, looking much older than her stated age, sitting up in bed and playing a game on her smart phone, in no apparent distress.    Head: Normocephalic and atraumatic.  Significant hirsutism.  Eyes: PERRLA, sclerae clear.   ENT: Slightly dry oral mucosa.  Full upper and lower dentures.  She is only wearing the uppers currently.  Hearing is unimpaired.  Cardiovascular: Sinus tachycardia with no appreciable murmur.  Respiratory: Lung sounds are diminished due to severe kyphosis but otherwise clear.   Abdomen: Soft and nontender.   Musculoskeletal/Extremities: Age-related degenerative joint disease.  Barrel chested with severe thoracic kyphosis.  No peripheral edema.   Neurological: Occasional fine bilateral upper extremity tremor (not " noticeable today).  Integument: No rashes, clinically significant lesions, or skin breakdown.   Cognitive/Psychiatric: Alert and oriented.  Today, she is obviously not feeling well following the procedure.     DIAGNOSTICS:   No results found for this or any previous visit.  Lab Results   Component Value Date    WBC 5.4 07/06/2018    HGB 11.6 (L) 07/06/2018    HCT 36.8 07/06/2018    MCV 96 07/06/2018     07/06/2018     CrCl cannot be calculated (Patient's most recent sCr result is older than the maximum 5 days allowed.).    ASSESSMENT/Plan:      ICD-10-CM    1. Esophageal dysphagia R13.10    2. Hernia of abdominal cavity K46.9    3. Failure to thrive in adult R62.7    4. Chronic GERD K21.9    5. 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 Major depressive disorder F32.9    7. Pulmonary emphysema, unspecified emphysema type (H) J43.9    8. Bowel and bladder incontinence R32     R15.9    9. Coronary artery disease involving native coronary artery of native heart without angina pectoris I25.10      CHANGES:    None.    CARE PLAN:  The care plan has been reviewed and all orders signed. Changes to care plan, if any, as noted.  Otherwise, continue current plan of care.      The above has been created using voice recognition software. Please be aware that this may unintentionally  produce inaccuracies and/or nonsensical sentences.       Electronically signed by: Ramana Blandon CNP

## 2021-06-20 NOTE — PROGRESS NOTES
"[facility]    Name:   Rosina Link  : 1945  Facility:   [facility] [[id number]]   Room: 224  Code Status: DNR/DNI -   Fac type:   SNF (Skilled Nursing Facility, TCU) -     CHIEF COMPLAINT / REASON FOR VISIT:  Chief Complaint   Patient presents with     Review Of Multiple Medical Conditions     TCU follow-up after hospitalization for hiatal hernia with GERD and esophagitis, as well as adult failure to thrive.  This was followed by a second hospitalization secondary to patient fall with multiple left rib fractures, resulting in a pneumothorax.     [facility] from 18 until 18  [facility] from 18 until 03/10/18  [facility] from 18 until 18  [facility] from 8018 until   [facility] from 18 until 18    Patient was last seen by me on 18.    HPI: Rosina is a 73 y.o. female with a history of severe COPD/pulmonary emphysema, depression, hiatal hernia/GERD, and insomnia.  She underwent surgery in 2016 for a large hiatal hernia (total gastric herniation) that included laparoscopic repair with mesh and Gorge fundoplication.        Hospitalization 2018: She had suffered a 60 pound unintentional weight loss prior to that, dropping from about 160 pounds down to 95 pounds.  There was a brief period of weight gain after that, but she eventually returned to the lower weight.  She developed progressive weakness and inability to eat much.  She would take several bites and then feel full.  She was so weak upon her admission to [facility] in 2018 that she could hardly walk.      She had had a CT scan on 18 that showed of her stomach back in her chest.  She was supposed to see Dr. De Jesus at on 18 to review pictures and to discuss options.  The CT scan described \"small amount of scarring and residual consolidation in the paramedial right lower " "lobe at the site of resolving pneumonia.\"  Chest x-rays performed on 02/16/18 showed \"right lower lobe pneumonia with small parapneumonic effusion.  The amount of infiltrate in the right lower lobe appeared increased from the CT examination.\"  She was admitted for community-acquired pneumonia.  She had no fevers or leukocytosis.      Hospitalization June 2018: More recently, she was seen by Dr. Vasquez on 05/18/18 for dysphagia, nausea, and early satiety with weight loss.  An EGD with general anesthesia was planned at some point with consideration for repair of hiatal hernia and redo partial fundoplication.    She presented to [facility] ER on 06/05/18 with weakness, anorexia, and constipation.  Constipation eventually resolved without changes to appetite.  Psych was consulted with concern that depression may be contributory, and she was started on low-dose mirtazapine.  Megace was started as an appetite stimulant, and a CT showed a large recurrent hiatal hernia.  A GJ tube placement was suggested, but the patient was felt not to be a candidate for GJ tube per IR due to anatomy.  Therefore, GI and surgery were consulted, and she underwent laparoscopic partial fundoplication takedown repair paraesophageal hernia and placement of gastric jejunostomy tube placement; upper endoscopy by Dr. Daniel De Jesus on 06/12/18.  She was also found to have esophageal candidiasis and was given fluconazole.  The PICC line was placed on 06/13/18 to start TPN.    Overnight (06/14/18 to 06/15/18), rapid response was called due to hypoxemia and tachycardia.  A CT of the chest was negative for pulmonary embolism but showed aspiration pneumonitis.  She was treated with IV antibiotics which ended on 06/23/18.    She underwent EGD and advancement of GJ feeding tube on 06/21/18.  Abdominal x-rays on 06/22/18 showed GJ tube malpositioned, looped in the fundus.  This J-tube repositioned itself, so it is now being used again.      Of note: " An echocardiogram on 06/15/18 showed moderate pericardial effusion (known pericardial effusion 08/20/17 and 12/20/17).  Cardiology felt likely malnutrition with low protein causing capillary leak as a probable etiology.  Recommendations for observation with no further workup, but a repeat echocardiogram on 06/29/18 showed no change in no hemodynamic effects of effusion.      Hospitalization in July 2018: On 07/7/18, she was getting out of bed early in the day and decided to walk to the bathroom on her own rather than summoned assistance.  On the way to her bathroom, she became dizzy, fell, and struck the side of the garbage can.  She had experienced dizzy spells which have caused falls frequently before.  A CT of the chest, abdomen, and pelvis showed displaced left ninth and 10th rib fractures with a moderate sized pneumothorax and increased moderate pericardial effusion.  A chest tube was placed in the ED.  Trauma surgery evaluated the patient, and the chest tube was removed on 07/10/18.  She remained stable off oxygen with no dyspnea.    Another echocardiogram was done, as the pericardial effusion had possibly increased in size per the CT on admission.  It showed no significant change compared to previously, and there are recommendations for her to follow-up with cardiology in 4 weeks.    A UGI was done, showing persistent partial functional obstruction at the level of the diaphragmatic hiatus, and a strict n.p.o. and tube feedings continued.  She is to follow-up with Dr. De Jesus on 08/10/18 for further esophageal evaluation.  She was subsequently discharged back here on 07/12/18.      CURRENT ISSUES    Failure to thrive/esophageal dysphagia/NPO status: Since her stay at the [facility] in February/March 2018, she had dropped 17 pounds.  She is quite cachectic with a BMI of 16-17.   Admitted NPO with tube feedings at 30 mL/h, food was being introduced (regular diet, thin liquids, small portions).   "Isosource 1.5 is now being administered at 56 mL/h for 18 hours, starting at 1500 and ending at 0900.  Medications continue to be given via feeding tube. She was seen by speech therapy here and appeared to be doing okay; however, we are going to follow guidelines set by Dr. De Jesus at this juncture.  With her current tube feeding, her weight has varied a pounds or so in either direction.    As noted, she was to remain NPO until UGI is performed.  She was also followed by speech therapy in the hospital, and her swallow function was deemed intact. She had a follow-up appointment on 07/09/18, but she remains NPO.  She did have a follow-up appointment with Dr. De Jesus on 08/10/18 and was given a new diagnosis of dysphagia, related to her paraesophageal hernia.  More tests are going to be performed, including esophageal manometry (09/06/18) and upper GI with barium swallow today (08/23/18).  She is to remain strict n.p.o. with tube feedings through the J port.  She was told that surgery would not be appropriate for her but dilatation might.    GERD: She is complaining of heartburn despite being on lansoprazole 30 mg daily.  She previously had orders for Maalox as needed, and she was not taking it.  We scheduled that while awake, keeping with 4 times daily dosing.  Of epigastric/suprasternal heartburn that she describes as \"terrible, terrible, terrible.\"  She told me it was all right for some time, but it had returned.  We added sucralfate 1 g 4 times daily and a stool check for H pylori (negative).  This has been quite effective, and there are no further complaints    Depression: She does admit to being depressed \"some days.\"  She is on mirtazapine and duloxetine.    COPD: She does have COPD/pulmonary emphysema, is barrel-chested, and is on a variety of inhalers.  She denies dyspnea, coughing, or chest pain.    Urinary incontinence: She does have stress incontinence and is checked by nursing staff during the night.  She " "stated at an earlier visit, \"every time he turned around, I'm peeing in my britches.\"  She finds it very hard to control, stating that she does not realize it until after it starts.  There is some nocturia as well.  None of this has changed since earlier visits.    Bowel incontinence: A few weeks back, she had developed loose, incontinent stools. Her tube feeding formula had been recently changed, and despite the loose stools, she was receiving scheduled Colace and PRN senna and MiraLAX.  When addressed with staff, this was attributed to poor communication during shift report, so we discontinued all of her bowel medications.  The problem persisted until we replaced psyllium with a 500 mg tab of methylcellulose daily.  Despite resolution of her loose stools, she is still incontinent of bowel.  She previously told me, \"I turn on my lights, but they don't get here in time.\"    Confusion (brief episode): On 07/23/18 staff report that she became confused and agitated, requesting to be taken back to her old room and to the \"real [facility]\".  When addressed with patient, she admited to feeling disoriented, says \"I thought I was imagining things and not at [facility]\".  This problem has resolved.  Today she is oriented and appropriate, pleasant and conversant throughout the visit.     Rib fractures: No longer has any significant left-sided rib cage pain (receiving Lidoderm patches for some tenderness), although she typically sleeps on her right side.    Code status: Initially listed as full code, she is now DNR/DNI.    Discharge planning: According to nursing staff, she is doing better than she lets on.  She tells me her daughter's house, where she can stay, is very messy, and she is a hoarder.  For now, at least, she would prefer to stay here but, down the road, she would like to go to an assisted living facility.  She has told me that one daughter is looking into this.      ROS: Today, she is complaining of back pain, " more so when she is sitting or in the wheelchair.  I suspect this is due to weak muscles and may resolve with physical therapy.  I told her to mention this to the therapist.      She sleeps well only at times.  She is on trazodone 25 mg nightly.      She denies any headaches or chest pains, coughing or congestion, nausea or vomiting,  dizziness or dyspnea, dysuria, difficulty chewing or swallowing, integumentary issues.      Past Medical History:   Diagnosis Date     Acute encephalopathy      Aperistalsis of esophagus      Arthritis      Compression fracture of lumbar vertebra (H)     L2     COPD (chronic obstructive pulmonary disease) (H)      Depression      Dyslipidemia      Encephalopathy      Esophageal candidiasis (H)      Failure to thrive in adult      Fall      GERD (gastroesophageal reflux disease)      Hiatal hernia      HLD (hyperlipidemia)      HTN (hypertension)      Hypocalcemia      Hypomagnesemia      Hypoxia      Insomnia      Lactic acidosis      Major depressive disorder      Malnutrition (H)      Maternal MCV (mean corpuscular volume) low      Microcytic anemia      Microcytic hypochromic anemia 01/2013     Osteoporosis      Pericardial effusion      PMR (polymyalgia rheumatica) (H)      Pneumonia      Pneumothorax      Positive urine drug screen 10/2012    meth and coke positive 10/12. negative in 11/19/12     Pulmonary emphysema (H)      Rib fracture      S/P laparoscopic fundoplication      SIRS (systemic inflammatory response syndrome) (H)      Thrombocytopenia (H)      Trigger finger     left hand, middle finger     Underweight      Urinary urgency               Family History   Problem Relation Age of Onset     Diabetes Mother      Cancer Mother      lung     Hypertension Mother      Emphysema Father      Heart disease Father      Hypertension Father      No Medical Problems Brother      Breast cancer Maternal Aunt      Social History     Social History     Marital status: Single      Spouse name: N/A     Number of children: N/A     Years of education: N/A     Social History Main Topics     Smoking status: Former Smoker     Packs/day: 1.50     Years: 30.00     Types: Cigarettes     Quit date: 8/10/1991     Smokeless tobacco: Never Used     Alcohol use No     Drug use: No     Sexual activity: Not Currently     Other Topics Concern     Not on file     Social History Narrative     MEDICATIONS: Reviewed from the MAR, physician orders, and earlier progress notes.  Current Outpatient Prescriptions   Medication Sig     acetaminophen (TYLENOL) 650 mg/20.3 mL Soln 650 mg by G-tube route every 4 (four) hours as needed.     albuterol (PROVENTIL HFA;VENTOLIN HFA) 90 mcg/actuation inhaler Inhale 2 puffs 4 (four) times a day as needed for wheezing.      aluminum-magnesium hydroxide-simethicone (MAALOX ADVANCED) 200-200-20 mg/5 mL Susp 20 mL by G-tube route 4 (four) times a day.      aspirin 81 mg chewable tablet 81 mg by G-tube route daily.     aspirin 81 MG EC tablet 81 mg by G-tube route daily.      atorvastatin (LIPITOR) 20 MG tablet 20 mg by G-tube route daily.      budesonide-formoterol (SYMBICORT) 160-4.5 mcg/actuation inhaler Inhale 2 puffs 2 (two) times a day.     calcium carbonate 500 mg/5 mL (1,250 mg/5 mL) suspension 600 mg by G-tube route daily.     cholecalciferol, vitamin D3, (VITAMIN D3 ORAL) Take by mouth daily. Vitamin D3 (D-VI-SOL)  liquid; 400U; amt: 1ml (400units); gastric tube  Once A Day;     cholecalciferol, vitamin D3, 2,000 unit cap 2,000 capsules by G-tube route daily.      cholecalciferol, vitamin D3, 400 unit/5 mL Liqd 400 Units by G-tube route daily.     DULoxetine (CYMBALTA) 60 MG capsule 60 mg by G-tube route daily.      ferrous sulfate 220 mg (44 mg iron)/5 mL solution Take by mouth daily. FERROUS SULFATE Rosina M Karlinski  solution; 220mg (44mg iron) / 5ml; amt: 7.4ml; gastric tube  Special Instructions: anorexia  Once A Day;     ferrous sulfate 300 mg (60 mg iron)/5 mL syrup  325 mg by G-tube route daily.     lactose-reduced food/fiber (ISOSOURCE 1.5 JASPREET ENTERAL TUBE) by Enteral Tube route. Isosource 1.5 continuous at goal rate of 40ml/hr Rosina Guo  Special Instructions: tube feeding  Goal per day 960ml/day  Every Shift;     lansoprazole (PREVACID SOLUTAB) 30 MG disintegrating tablet 30 mg by G-tube route daily.     lidocaine (LIDODERM) 5 % Place 1 patch on the skin 2 (two) times a day. Remove & Discard patch within 12 hours or as directed by MD sienna Guo  adhesive patch,medicated; 5 %; topical  Special Instructions: apply to painful area of skin  dx: closed fracture of multiple ribs of left side  Twice A Day;     methylcellulose (CITRUCEL) 500 mg Tab Take 500 mg by mouth daily.     mirtazapine (REMERON) 15 MG tablet 7.5 mg by G-tube route at bedtime.     ondansetron (ZOFRAN-ODT) 4 MG disintegrating tablet 4 mg by G-tube route every 6 (six) hours as needed for nausea.     oxyCODONE (ROXICODONE) 5 MG immediate release tablet Take 5-10 mg by mouth every 4 (four) hours as needed for pain.     polyethylene glycol (MIRALAX) 17 gram packet 17 g by G-tube route daily as needed.      sennosides (SENNOSIDES) 8.8 mg/5 mL Syrp syrup 6.5 mg by G-tube route daily as needed.      sucralfate (CARAFATE) 100 mg/mL suspension Take 1 g by mouth 4 (four) times a day.     tiotropium (SPIRIVA) 18 mcg inhalation capsule Place 18 mcg into inhaler and inhale daily.     tiotropium (SPIRIVA) 18 mcg inhalation capsule Place 18 mcg into inhaler and inhale daily.     traZODone (DESYREL) 50 MG tablet 25 mg by G-tube route at bedtime as needed for sleep.     ALLERGIES:   Allergies   Allergen Reactions     Sulfa (Sulfonamide Antibiotics) Other (See Comments)      Ototoxicity         Penicillins Rash     DIET: NPO.  She is on tube feeding at 56 mL/h for 18 hours per day.    Vitals:    08/23/18 1500   BP: 97/58   Pulse: 76   Resp: 18   Temp: 96.7  F (35.9  C)   Weight: (!) 87 lb 9.6 oz (39.7  "kg)   Height: 4' 11\" (1.499 m)   After being down approximately 17 pounds over the last 4 months or so, weight has been relatively stable on her tube feedings.  Body mass index is 17.69 kg/(m^2).    EXAMINATION:   General: Pleasant, alert, and conversant middle-aged female, looking much older than her stated age, up in a wheelchair, in no apparent distress.    Head: Normocephalic and atraumatic.  Significant hirsutism.  Eyes: PERRLA, sclerae clear.   ENT: Slightly dry oral mucosa.  Full upper and lower dentures.  She is only wearing the uppers currently.  Hearing is unimpaired.  Cardiovascular: Regular rate and rhythm with no appreciable murmur.  Respiratory: Lung sounds are diminished due to severe kyphosis but otherwise clear.   Abdomen: Soft and nontender.   Musculoskeletal/Extremities: Age-related degenerative joint disease.  Barrel chested with severe thoracic kyphosis.  No peripheral edema.   Neurological: Fine bilateral upper extremity tremor.  Integument: No rashes, clinically significant lesions, or skin breakdown.   Cognitive/Psychiatric: Alert and oriented she seems to present with a depressed/flat affect she does a lot of wise cracking during my visit.     DIAGNOSTICS:   No results found for this or any previous visit.  Lab Results   Component Value Date    WBC 5.4 07/06/2018    HGB 11.6 (L) 07/06/2018    HCT 36.8 07/06/2018    MCV 96 07/06/2018     07/06/2018     CrCl cannot be calculated (Patient's most recent sCr result is older than the maximum 5 days allowed.).    ASSESSMENT/Plan:      ICD-10-CM    1. Failure to thrive in adult R62.7    2. Esophageal dysphagia R13.10    3. Hernia of abdominal cavity K46.9    4. Chronic GERD K21.9    5. 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 Major depressive disorder F32.9    7. Pulmonary emphysema, unspecified emphysema type (H) J43.9    8. Bowel and bladder incontinence R32     R15.9    9. Coronary artery disease involving " native coronary artery of native heart without angina pectoris I25.10      CHANGES:    None.    CARE PLAN:  The care plan has been reviewed and all orders signed. Changes to care plan, if any, as noted.  Otherwise, continue current plan of care.      The above has been created using voice recognition software. Please be aware that this may unintentionally  produce inaccuracies and/or nonsensical sentences.       Electronically signed by: Ramana Blandon CNP

## 2021-06-20 NOTE — PROGRESS NOTES
"[facility]    Name:   Rosina Link  : 1945  Facility:   [facility] [[id number]]   Room: 224  Code Status: DNR/DNI -   Fac type:   SNF (Skilled Nursing Facility, TCU) -     CHIEF COMPLAINT / REASON FOR VISIT:  Chief Complaint   Patient presents with     Review Of Multiple Medical Conditions     TCU follow-up after hospitalization for hiatal hernia with GERD and esophagitis, as well as adult failure to thrive.  This was followed by a second hospitalization secondary to patient fall with multiple rib fractures, resulting in a pneumothorax.     [facility] from 18 until 18  [facility] TCU from 18 until 03/10/18  [facility] from 18 until 18  [facility] TCU from 8018 until   [facility] from 18 until 18    Patient was last seen by me on 18.    HPI: Rosina is a 73 y.o. female with a history of severe COPD/pulmonary emphysema, depression, hiatal hernia/GERD, and insomnia.  She underwent surgery in 2016 for a large hiatal hernia (total gastric herniation) that included laparoscopic repair with mesh and Gorge fundoplication.        Hospitalization 2018: She had suffered a 60 pound unintentional weight loss prior to that, dropping from about 160 pounds down to 95 pounds.  There was a brief period of weight gain after that, but she eventually returned to the lower weight.  She developed progressive weakness and inability to eat much.  She would take several bites and then feel full.  She was so weak upon her admission to [facility] in 2018 that she could hardly walk.      She had had a CT scan on 18 that showed of her stomach back in her chest.  She was supposed to see Dr. De Jesus at on 18 to review pictures and to discuss options.  The CT scan described \"small amount of scarring and residual consolidation in the paramedial right lower lobe at " "the site of resolving pneumonia.\"  Chest x-rays performed on 02/16/18 showed \"right lower lobe pneumonia with small parapneumonic effusion.  The amount of infiltrate in the right lower lobe appeared increased from the CT examination.\"  She was admitted for community-acquired pneumonia.  She had no fevers or leukocytosis.      Hospitalization June 2018: More recently, she was seen by Dr. Vasquez on 05/18/18 for dysphagia, nausea, and early satiety with weight loss.  An EGD with general anesthesia was planned at some point with consideration for repair of hiatal hernia and redo partial fundoplication.    She presented to [facility] ER on 06/05/18 with weakness, anorexia, and constipation.  Constipation eventually resolved without changes to appetite.  Psych was consulted with concern that depression may be contributory, and she was started on low-dose mirtazapine.  Megace was started as an appetite stimulant, and a CT showed a large recurrent hiatal hernia.  A GJ tube placement was suggested, but the patient was felt not to be a candidate for GJ tube per IR due to anatomy.  Therefore, GI and surgery were consulted, and she underwent laparoscopic partial fundoplication takedown repair paraesophageal hernia and placement of gastric jejunostomy tube placement; upper endoscopy by Dr. Daniel De Jesus on 06/12/18.  She was also found to have esophageal candidiasis and was given fluconazole.  The PICC line was placed on 06/13/18 to start TPN.    Overnight (06/14/18 to 06/15/18), rapid response was called due to hypoxemia and tachycardia.  A CT of the chest was negative for pulmonary embolism but showed aspiration pneumonitis.  She was treated with IV antibiotics which ended on 06/23/18.    She underwent EGD and advancement of GJ feeding tube on 06/21/18.  Abdominal x-rays on 06/22/18 showed GJ tube malpositioned, looped in the fundus.  This J-tube repositioned itself, so it is now being used again.      Of note: An " echocardiogram on 06/15/18 showed moderate pericardial effusion (known pericardial effusion 08/20/17 and 12/20/17).  Cardiology felt likely malnutrition with low protein causing capillary leak as a probable etiology.  Recommendations for observation with no further workup, but a repeat echocardiogram on 06/29/18 showed no change in no hemodynamic effects of effusion.      Hospitalization in July 2018: On 07/7/18, she was getting out of bed early in the day and decided to walk to the bathroom on her own rather than summoned assistance.  On the way to her bathroom, she became dizzy, fell, and struck the side of the garbage can.  She had experienced dizzy spells which have caused falls frequently before.  A CT of the chest, abdomen, and pelvis showed displaced left ninth and 10th rib fractures with a moderate sized pneumothorax and increased moderate pericardial effusion.  A chest tube was placed in the ED.  Trauma surgery evaluated the patient, and the chest tube was removed on 07/10/18.  She remained stable off oxygen with no dyspnea.    Another echocardiogram was done, as the pericardial effusion had possibly increased in size per the CT on admission.  It showed no significant change compared to previously, and there are recommendations for her to follow-up with cardiology in 4 weeks.    A UGI was done, showing persistent partial functional obstruction at the level of the diaphragmatic hiatus, and a strict n.p.o. and tube feedings continued.  She is to follow-up with Dr. De Jesus on 08/10/18 for further esophageal evaluation.  She was subsequently discharged back here on 07/12/18.      CURRENT ISSUES    Failure to thrive/esophageal dysphagia/NPO status: Since her stay at the [facility] in February/March 2018, she had dropped 17 pounds.  She is quite cachectic with a BMI of 16-17.   Admitted NPO with tube feedings at 30 mL/h, food was being introduced (regular diet, thin liquids, small portions).   "Isosource 1.5 is now being administered at 56 mL/h for 18 hours, starting at 1500 and ending at 0900.  Medications continue to be given via feeding tube. She was seen by speech therapy here and appeared to be doing okay; however, we are going to follow guidelines set by Dr. De Jesus at this juncture.  With her current tube feeding, her weight has been stable.    As noted, she was to remain NPO until UGI is performed.  She was also followed by speech therapy in the hospital, and her swallow function was deemed intact. She had a follow-up appointment on 07/09/18, but she remains NPO.  She did have a follow-up appointment with Dr. De Jesus is on 08/10/18 and was given a new diagnosis of dysphagia, related to her paraesophageal hernia.  More tests are going to be performed, including esophageal manometry (09/06/18) and upper GI with barium swallow (08/23/18).  She is to remain strict n.p.o. with tube feedings through the J port.  She was told that surgery would not be appropriate for her but dilatation might.    GERD: She is complaining of heartburn despite being on lansoprazole 30 mg daily.  She previously had orders for Maalox as needed, and she was not taking it.  We scheduled that while awake, keeping with 4 times daily dosing.  Of epigastric/suprasternal heartburn that she describes as \"terrible, terrible, terrible.\"  She told me it was all right for some time, but it had returned.  We added sucralfate 1 g 4 times daily and a stool check for H pylori (negative).  This has been quite effective, and there are no further complaints    Depression: She does admit to being depressed \"some days.\"  She is on mirtazapine and duloxetine.    COPD: She does have COPD/pulmonary emphysema, is barrel-chested, and is on a variety of inhalers.  She denies dyspnea, coughing, or chest pain.    Urinary incontinence: She does have stress incontinence and is checked by nursing staff during the night.  She stated at an earlier visit, " "\"every time he turned around, I'm peeing in my britches.\"  She finds it very hard to control, stating that she does not realize it until after it starts.  There is some nocturia as well.  None of this has changed since earlier visits.    Bowel incontinence: A few weeks back, she had developed loose, incontinent stools. Her tube feeding formula had been recently changed, and despite the loose stools, she was receiving scheduled Colace and PRN senna and MiraLAX.  When addressed with staff, this was attributed to poor communication during shift report, so we discontinued all of her bowel medications.  The problem persisted until we replaced psyllium with a 500 mg tab of methylcellulose daily.  Despite resolution of her loose stools, she is still incontinent of bowel.  She previously told me, \"I turn on my lights, but they don't get here in time.\"    Confusion (brief episode): On 07/23/18 staff report that she became confused and agitated, requesting to be taken back to her old room and to the \"real [place]\".  When addressed with patient, she admited to feeling disoriented, says \"I thought I was imagining things and not at [place]\".  This problem has resolved.  Today she is oriented and appropriate, pleasant and conversant throughout the visit.     Rib fractures: No longer has any left-sided rib cage pain, although she typically sleeps on her right side.    Code status: Initially listed as full code, she is now DNR/DNI.    Discharge planning: According to nursing staff, she is doing better than she lets on.  She tells me her daughter's house, where she can stay, is very messy, and she is a hoarder.  For now, at least, she would prefer to stay here but, down the road, she would like to go to an assisted living facility.  She has told me that one daughter is looking into this.      ROS: She sleeps well only at times.  She is on trazodone 25 mg nightly. She does tend to sleep on her right side.  She denies any " headaches or chest pains, coughing or congestion, nausea or vomiting,  dizziness or dyspnea, dysuria, difficulty chewing or swallowing, integumentary issues.      Past Medical History:   Diagnosis Date     Acute encephalopathy      Aperistalsis of esophagus      Arthritis      Compression fracture of lumbar vertebra (H)     L2     COPD (chronic obstructive pulmonary disease) (H)      Depression      Dyslipidemia      Encephalopathy      Esophageal candidiasis (H)      Failure to thrive in adult      Fall      GERD (gastroesophageal reflux disease)      Hiatal hernia      HLD (hyperlipidemia)      HTN (hypertension)      Hypocalcemia      Hypomagnesemia      Hypoxia      Insomnia      Lactic acidosis      Major depressive disorder      Malnutrition (H)      Maternal MCV (mean corpuscular volume) low      Microcytic anemia      Microcytic hypochromic anemia 01/2013     Osteoporosis      Pericardial effusion      PMR (polymyalgia rheumatica) (H)      Pneumonia      Pneumothorax      Positive urine drug screen 10/2012    meth and coke positive 10/12. negative in 11/19/12     Pulmonary emphysema (H)      Rib fracture      S/P laparoscopic fundoplication      SIRS (systemic inflammatory response syndrome) (H)      Thrombocytopenia (H)      Trigger finger     left hand, middle finger     Underweight      Urinary urgency               Family History   Problem Relation Age of Onset     Diabetes Mother      Cancer Mother      lung     Hypertension Mother      Emphysema Father      Heart disease Father      Hypertension Father      No Medical Problems Brother      Breast cancer Maternal Aunt      Social History     Social History     Marital status: Single     Spouse name: N/A     Number of children: N/A     Years of education: N/A     Social History Main Topics     Smoking status: Former Smoker     Packs/day: 1.50     Years: 30.00     Types: Cigarettes     Quit date: 8/10/1991     Smokeless tobacco: Never Used     Alcohol use  No     Drug use: No     Sexual activity: Not Currently     Other Topics Concern     Not on file     Social History Narrative     MEDICATIONS: Reviewed from the MAR, physician orders, and earlier progress notes.  Current Outpatient Prescriptions   Medication Sig     acetaminophen (TYLENOL) 650 mg/20.3 mL Soln 650 mg by G-tube route every 4 (four) hours as needed.     albuterol (PROVENTIL HFA;VENTOLIN HFA) 90 mcg/actuation inhaler Inhale 2 puffs 4 (four) times a day as needed for wheezing.      aluminum-magnesium hydroxide-simethicone (MAALOX ADVANCED) 200-200-20 mg/5 mL Susp 20 mL by G-tube route 4 (four) times a day.      aspirin 81 mg chewable tablet 81 mg by G-tube route daily.     aspirin 81 MG EC tablet 81 mg by G-tube route daily.      atorvastatin (LIPITOR) 20 MG tablet 20 mg by G-tube route daily.      budesonide-formoterol (SYMBICORT) 160-4.5 mcg/actuation inhaler Inhale 2 puffs 2 (two) times a day.     calcium carbonate 500 mg/5 mL (1,250 mg/5 mL) suspension 600 mg by G-tube route daily.     cholecalciferol, vitamin D3, (VITAMIN D3 ORAL) Take by mouth daily. Vitamin D3 (D-VI-SOL)  liquid; 400U; amt: 1ml (400units); gastric tube  Once A Day;     cholecalciferol, vitamin D3, 2,000 unit cap 2,000 capsules by G-tube route daily.      cholecalciferol, vitamin D3, 400 unit/5 mL Liqd 400 Units by G-tube route daily.     DULoxetine (CYMBALTA) 60 MG capsule 60 mg by G-tube route daily.      ferrous sulfate 220 mg (44 mg iron)/5 mL solution Take by mouth daily. FERROUS SULFATE Rosina Salcidoskcasi  solution; 220mg (44mg iron) / 5ml; amt: 7.4ml; gastric tube  Special Instructions: anorexia  Once A Day;     ferrous sulfate 300 mg (60 mg iron)/5 mL syrup 325 mg by G-tube route daily.     lactose-reduced food/fiber (ISOSOURCE 1.5 JASPREET ENTERAL TUBE) by Enteral Tube route. Isosource 1.5 continuous at goal rate of 40ml/hr Rosina Guo  Special Instructions: tube feeding  Goal per day 960ml/day  Every Shift;      "lansoprazole (PREVACID SOLUTAB) 30 MG disintegrating tablet 30 mg by G-tube route daily.     lidocaine (LIDODERM) 5 % Place 1 patch on the skin 2 (two) times a day. Remove & Discard patch within 12 hours or as directed by MD sienna Guo  adhesive patch,medicated; 5 %; topical  Special Instructions: apply to painful area of skin  dx: closed fracture of multiple ribs of left side  Twice A Day;     methylcellulose (CITRUCEL) 500 mg Tab Take 500 mg by mouth daily.     mirtazapine (REMERON) 15 MG tablet 7.5 mg by G-tube route at bedtime.     ondansetron (ZOFRAN-ODT) 4 MG disintegrating tablet 4 mg by G-tube route every 6 (six) hours as needed for nausea.     oxyCODONE (ROXICODONE) 5 MG immediate release tablet Take 5-10 mg by mouth every 4 (four) hours as needed for pain.     polyethylene glycol (MIRALAX) 17 gram packet 17 g by G-tube route daily as needed.      sennosides (SENNOSIDES) 8.8 mg/5 mL Syrp syrup 6.5 mg by G-tube route daily as needed.      sucralfate (CARAFATE) 100 mg/mL suspension Take 1 g by mouth 4 (four) times a day.     tiotropium (SPIRIVA) 18 mcg inhalation capsule Place 18 mcg into inhaler and inhale daily.     tiotropium (SPIRIVA) 18 mcg inhalation capsule Place 18 mcg into inhaler and inhale daily.     traZODone (DESYREL) 50 MG tablet 25 mg by G-tube route at bedtime as needed for sleep.     ALLERGIES:   Allergies   Allergen Reactions     Sulfa (Sulfonamide Antibiotics) Other (See Comments)      Ototoxicity         Penicillins Rash     DIET: NPO.  She is on tube feeding at 56 mL/h for 18 hours per day.    Vitals:    08/21/18 1341   BP: 98/58   Pulse: 75   Resp: 20   Temp: (!) 96.3  F (35.7  C)   Weight: (!) 88 lb 9.6 oz (40.2 kg)   Height: 4' 11\" (1.499 m)   After being down approximately 17 pounds over the last 4 months or so, weight has been relatively stable on her tube feedings.  Body mass index is 17.9 kg/(m^2).    EXAMINATION:   General: Pleasant, alert, and conversant " middle-aged female, looking much older than her stated age, up in a wheelchair, in no apparent distress.    Head: Normocephalic and atraumatic.  Significant hirsutism.  Eyes: PERRLA, sclerae clear.   ENT: Slightly dry oral mucosa.  Full upper and lower dentures.  She is only wearing the uppers currently.  Hearing is unimpaired.  Cardiovascular: Regular rate and rhythm with no appreciable murmur.  Respiratory: Lung sounds are diminished in the bases but otherwise clear (an improvement).   Abdomen: Soft and nontender.   Musculoskeletal/Extremities: Age-related degenerative joint disease.  Barrel chested with severe thoracic kyphosis.  No peripheral edema.   Neurological: Fine bilateral upper extremity tremor.  Integument: No rashes, clinically significant lesions, or skin breakdown.   Cognitive/Psychiatric: Alert and oriented she seems to present with a depressed/flat affect she does a lot of wise cracking during my visit.     DIAGNOSTICS:   No results found for this or any previous visit.  Lab Results   Component Value Date    WBC 5.4 07/06/2018    HGB 11.6 (L) 07/06/2018    HCT 36.8 07/06/2018    MCV 96 07/06/2018     07/06/2018     CrCl cannot be calculated (Patient's most recent sCr result is older than the maximum 5 days allowed.).    ASSESSMENT/Plan:      ICD-10-CM    1. Failure to thrive in adult R62.7    2. Esophageal dysphagia R13.10    3. Hernia of abdominal cavity K46.9    4. Chronic GERD K21.9    5. 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 Major depressive disorder F32.9    7. Pulmonary emphysema, unspecified emphysema type (H) J43.9    8. Bowel and bladder incontinence R32     R15.9    9. Coronary artery disease involving native coronary artery of native heart without angina pectoris I25.10      CHANGES:    None.    CARE PLAN:  The care plan has been reviewed and all orders signed. Changes to care plan, if any, as noted.  Otherwise, continue current plan of care.       The above has been created using voice recognition software. Please be aware that this may unintentionally  produce inaccuracies and/or nonsensical sentences.       Electronically signed by: Ramana Blandon CNP

## 2021-06-20 NOTE — LETTER
Letter by Ramana Blandon CNP at      Author: Ramana Blandon CNP Service: -- Author Type: --    Filed:  Encounter Date: 2020 Status: Signed         Patient: Rosina Link   MR Number: [id number]   YOB: 1945   Date of Visit: 2020     [facility]    Name:   Rosina Link  : 1945  Facility:   [facility] [[id number]]   Room: 228A  Code Status: DNR/DNI -   Fac type:    (Long Term Care, LTC) -     CHIEF COMPLAINT / REASON FOR VISIT:  Chief Complaint   Patient presents with   ? FVP Care Coordination - Regulatory   ? Problem Visit     Recent fall with head contusion and chest pain.     [facility] from 18 until 18  [facility]CU from 18 until 03/10/18  [facility] from 18 until 18  [facility] from 8018 until 18  [facility] from 18 until 18    Patient was last seen by me on 10/24/2019 and subsequently seen by Dr. Guo on 2019.      HPI: Rosina is a 74 y.o. female with a history of severe COPD/pulmonary emphysema, depression, hiatal hernia/esophageal dysphagia/GERD (resolved surgically), and insomnia.      Initially here in the TCU.  For a while, she was existing on tube feedings due to esophageal dysphagia and massive hiatal hernia.  Eventual surgery resolve this issue.    CURRENT ISSUES    Problem -- fall with injury: Yesterday, she was reaching for something from bed and rolled out, suffering a mid forehead contusion, tiny laceration of the left temple (Steri-Stripped), and now complaining of suprasternal pain.  It is quite uncomfortable, hurts when breathing, and we will obtain chest x-rays.    Medication changes: Accompanied by nausea, all has resolved after hernia repair.  We discontinued ondansetron.  With discontinuation of tube feeding, she initially had difficulty eating but, as oral intake improved -- and her PEG tube was  discontinued -- she has slowly gained weight.  We discontinued mirtazapine.  Saliva stimulant was also no longer needed and was discontinued as well.  She does have COPD/pulmonary emphysema, is barrel-chested, and is on a variety of inhalers.  She continues to deny dyspnea, coughing, or chest pain.  We discontinued Incruse Ellipta due to nonuse.    She has a history of GERD due to issues of esophageal dysphagia and hiatal hernia.  With these items resolved, she continues on 20 mg of omeprazole daily, and we are going to decrease the dose to 10 mg daily with the possibility of eventually discontinuing.    Cardiopulmonary issues/COPD: Chest x-rays on 02/05/2019 revealed mild cardiomegaly with congestive failure and superimposed right lower lobe infiltrate.  Her white count was normal but temp slightly elevated from her baseline.  She had a nonproductive cough, wheeze on inspiration, crackles on expiration, and diminished sounds in the left lower lobe with sats of 89% on room air.  We ordered 40 mg of Lasix daily and Augmentin for 10 days.  A follow-up BMP was unremarkable.  She continues on 40 mg of Lasix.  She does suffer from COPD/pulmonary emphysema from years of smoking.    Urinary incontinence: She does have stress incontinence and is checked by nursing staff during the night.   She finds it very hard to control, stating that she does not realize it until after it starts.  There is some nocturia as well.  None of this has changed since admission.      ROS: She is no longer getting nauseated.  She is on trazodone 25 mg nightly and sleeps well with this.  She denies any headaches or chest pains, coughing or congestion, nausea or vomiting,  dizziness or dyspnea, dysuria, difficulty chewing or swallowing, or any integumentary issues.        HOSPITALIZATIONS    Hospitalization March 2018: She had suffered a 60 pound unintentional weight loss prior to that, dropping from about 160 pounds down to 95 pounds.  There was a  "brief period of weight gain after that, but she eventually returned to the lower weight.  She developed progressive weakness and inability to eat much.  She would take several bites and then feel full.  She was so weak upon her admission to [facility] in February 2018 that she could hardly walk.      She had had a CT scan on 02/09/18 that showed her stomach back in her chest.  She was supposed to see Dr. De Jesus at on 03/07/18 to review pictures and to discuss options.  The CT scan described \"small amount of scarring and residual consolidation in the paramedial right lower lobe at the site of resolving pneumonia.\"  Chest x-rays performed on 02/16/18 showed \"right lower lobe pneumonia with small parapneumonic effusion.  The amount of infiltrate in the right lower lobe appeared increased from the CT examination.\"  She was admitted for community-acquired pneumonia.  She had no fevers or leukocytosis.    Hospitalization June 2018: More recently, she was seen by Dr. Vasquez on 05/18/18 for dysphagia, nausea, and early satiety with weight loss.  An EGD with general anesthesia was planned at some point with consideration for repair of hiatal hernia and redo partial fundoplication.    She presented to [facility] ER on 06/05/18 with weakness, anorexia, and constipation.  Constipation eventually resolved without changes to appetite.  Psych was consulted with concern that depression may be contributory, and she was started on low-dose mirtazapine.  Megace was started as an appetite stimulant, and a CT showed a large recurrent hiatal hernia.  A GJ tube placement was suggested, but the patient was felt not to be a candidate for GJ tube per IR due to anatomy.  Therefore, GI and surgery were consulted, and she underwent laparoscopic partial fundoplication takedown repair paraesophageal hernia and placement of gastric jejunostomy tube placement; upper endoscopy by Dr. Daniel De Jesus on 06/12/18.  She was also found to have " esophageal candidiasis and was given fluconazole.  The PICC line was placed on 06/13/18 to start TPN.    Overnight (06/14/18 to 06/15/18), rapid response was called due to hypoxemia and tachycardia.  A CT of the chest was negative for pulmonary embolism but showed aspiration pneumonitis.  She was treated with IV antibiotics which ended on 06/23/18.    She underwent EGD and advancement of GJ feeding tube on 06/21/18.  Abdominal x-rays on 06/22/18 showed GJ tube malpositioned, looped in the fundus.  This J-tube repositioned itself and was then being used again.      Of note: An echocardiogram on 06/15/18 showed moderate pericardial effusion (known pericardial effusion 08/20/17 and 12/20/17).  Cardiology felt likely malnutrition with low protein causing capillary leak as a probable etiology.  Recommendations for observation with no further workup, but a repeat echocardiogram on 06/29/18 showed no change in no hemodynamic effects of effusion.    Hospitalization in July 2018: On 07/7/18, she was getting out of bed early in the day and decided to walk to the bathroom on her own rather than summoned assistance.  On the way to her bathroom, she became dizzy, fell, and struck the side of the garbage can.  She had experienced dizzy spells which have caused falls frequently before.  A CT of the chest, abdomen, and pelvis showed displaced left ninth and 10th rib fractures with a moderate sized pneumothorax and increased moderate pericardial effusion.  A chest tube was placed in the ED.  Trauma surgery evaluated the patient, and the chest tube was removed on 07/10/18.  She remained stable off oxygen with no dyspnea.    Another echocardiogram was done, as the pericardial effusion had possibly increased in size per the CT on admission.  It showed no significant change compared to previously, and there are recommendations for her to follow-up with cardiology in 4 weeks.    A UGI was done, showing persistent partial functional  obstruction at the level of the diaphragmatic hiatus, and a strict n.p.o. and tube feedings continued.  She was subsequently discharged back here on 07/12/18.  She was to follow-up with Dr. De Jesus on 08/10/18 for further esophageal evaluation (as described in CURRENT ISSUES).    Hospitalization January 2019: She underwent surgery in February 2016 for a large hiatal hernia (total gastric herniation) that included laparoscopic repair with mesh and Gorge fundoplication.  With recurrence, she was scheduled again for repair (and possible Jamee gastroplasty) on 01/23/19 with Dr. De Jesus.    On 01/23/19, she was admitted after laparoscopic reduction repair of a recurrent paraesophageal hernia.  Her postoperative course was uneventful.  An esophagram on POD 1 revealed no hernia, no leak, and no obstruction.  She was tolerating a full liquid diet, and her pain was under control.  Tube feeding was reinitiated during her admission as well.  Discharge orders back to [facility] included a full liquid diet for 2 weeks with subsequent follow-up with Dr. De Jesus.  The hope was that tube feeding could be discontinued in the near future.  Overall, she was noted to be doing well and was considered happy with the outcome.  She was readmitted on 01/25/19.    Past Medical History:   Diagnosis Date   ? Acute encephalopathy    ? Aperistalsis of esophagus    ? Arthritis    ? Compression fracture of lumbar vertebra (H)     L2   ? COPD (chronic obstructive pulmonary disease) (H)    ? Depression    ? Dyslipidemia    ? Encephalopathy    ? Esophageal candidiasis (H)    ? Esophagitis    ? Failure to thrive in adult    ? Fall    ? GERD (gastroesophageal reflux disease)    ? Hiatal hernia    ? HLD (hyperlipidemia)    ? HTN (hypertension)    ? Hypocalcemia    ? Hypomagnesemia    ? Hypoxia    ? Insomnia    ? Lactic acidosis    ? Major depressive disorder    ? Malnutrition (H)    ? Maternal MCV (mean corpuscular volume) low    ?  Microcytic anemia    ? Microcytic hypochromic anemia 2013   ? Osteoporosis    ? Pericardial effusion    ? PMR (polymyalgia rheumatica) (H)    ? Pneumonia    ? Pneumothorax    ? Positive urine drug screen 10/2012    meth and coke positive 10/12. negative in 12   ? Pulmonary emphysema (H)    ? Rib fracture    ? S/P laparoscopic fundoplication    ? SIRS (systemic inflammatory response syndrome) (H)    ? Thrombocytopenia (H)    ? Trigger finger     left hand, middle finger   ? Underweight    ? Urinary urgency               Family History   Problem Relation Age of Onset   ? Diabetes Mother    ? Cancer Mother         lung   ? Hypertension Mother    ? Emphysema Father    ? Heart disease Father    ? Hypertension Father    ? No Medical Problems Brother    ? Breast cancer Maternal Aunt      Social History     Socioeconomic History   ? Marital status: Single     Spouse name: Not on file   ? Number of children: Not on file   ? Years of education: Not on file   ? Highest education level: Not on file   Occupational History   ? Not on file   Social Needs   ? Financial resource strain: Not on file   ? Food insecurity:     Worry: Not on file     Inability: Not on file   ? Transportation needs:     Medical: Not on file     Non-medical: Not on file   Tobacco Use   ? Smoking status: Former Smoker     Packs/day: 1.50     Years: 30.00     Pack years: 45.00     Types: Cigarettes     Last attempt to quit: 8/10/1991     Years since quittin.4   ? Smokeless tobacco: Never Used   Substance and Sexual Activity   ? Alcohol use: No   ? Drug use: No   ? Sexual activity: Not Currently   Lifestyle   ? Physical activity:     Days per week: Not on file     Minutes per session: Not on file   ? Stress: Not on file   Relationships   ? Social connections:     Talks on phone: Not on file     Gets together: Not on file     Attends Jewish service: Not on file     Active member of club or organization: Not on file     Attends meetings of  "clubs or organizations: Not on file     Relationship status: Not on file   ? Intimate partner violence:     Fear of current or ex partner: Not on file     Emotionally abused: Not on file     Physically abused: Not on file     Forced sexual activity: Not on file   Other Topics Concern   ? Not on file   Social History Narrative   ? Not on file     MEDICATIONS: Reviewed from the MAR, physician orders, and earlier progress notes.   Updated by me today (01/07/2020) with discontinuation of Incruse Ellipta and a decrease in omeprazole reflected below.  Current Outpatient Medications   Medication Sig   ? omeprazole (PRILOSEC) 10 MG capsule Take 10 mg by mouth daily before breakfast.   ? acetaminophen (TYLENOL) 325 MG tablet Take 650 mg by mouth every 4 (four) hours as needed for pain.   ? atorvastatin (LIPITOR) 20 MG tablet Take 20 mg by mouth daily.          ? budesonide-formoterol (SYMBICORT) 160-4.5 mcg/actuation inhaler Inhale 2 puffs 2 (two) times a day.   ? calcium carbonate (OS-JASPREET) 600 mg calcium (1,500 mg) tablet Take 600 mg by mouth daily.   ? cholecalciferol, vitamin D3, (CHOLECALCIFEROL) 400 unit Tab Take 400 Units by mouth daily.   ? furosemide (LASIX) 40 MG tablet Take 40 mg by mouth daily.   ? senna-docusate (SENNOSIDES-DOCUSATE SODIUM) 8.6-50 mg tablet Take 1 tablet by mouth 2 (two) times a day.   ? venlafaxine (EFFEXOR) 37.5 MG tablet Take 37.5 mg by mouth daily.            ALLERGIES:   Allergies   Allergen Reactions   ? Sulfa (Sulfonamide Antibiotics) Other (See Comments)      Ototoxicity       ? Penicillins Rash     DIET: Regular diet, mechanical soft texture, thin liquids.  She is still receiving tube feeding supplementally as well as an oral supplement.    Vitals:    01/07/20 1028   BP: 116/77   Pulse: 77   Resp: 19   Temp: 97.9  F (36.6  C)   SpO2: 93%   Weight: 127 lb 3.2 oz (57.7 kg)   Height: 4' 11\" (1.499 m)   She is put on a good deal of weight over the last several months, now stable.  Body mass " index is 25.69 kg/m .    EXAMINATION:   General: Pleasant, alert, and conversant middle-aged female, observed after a long walk with her walker, looking a bit winded.  She has clearly put on a good deal of weight.  Head: Normocephalic.  Contusion/abrasion of the mid forehead, small laceration of the left temple (with Steri-Strips).  Significant hirsutism.  Eyes: PERRLA, sclerae clear.   ENT: Slightly dry oral mucosa.  Full upper and lower dentures.  She is only wearing the uppers currently.  Hearing is unimpaired.  Cardiovascular: Regular rate and rhythm with no appreciable murmur.  Respiratory: Lung sounds are clear to auscultation bilaterally.   Abdomen: Soft and nontender.   Musculoskeletal/Extremities: Age-related degenerative joint disease.  Barrel chested with severe thoracic kyphosis.  No peripheral edema.  Chest is tender to palpation suprasternally.  Neurological: Occasional fine bilateral upper extremity tremor (not noticeable today).  Integument: No rashes, clinically significant lesions, or skin breakdown.   Cognitive/Psychiatric: Alert and oriented.  Affect is euthymic.    DIAGNOSTICS: .  Results for orders placed or performed in visit on 12/17/19   Basic Metabolic Panel   Result Value Ref Range    Sodium 139 136 - 145 mmol/L    Potassium 3.5 3.5 - 5.0 mmol/L    Chloride 100 98 - 107 mmol/L    CO2 31 22 - 31 mmol/L    Anion Gap, Calculation 8 5 - 18 mmol/L    Glucose 99 70 - 125 mg/dL    Calcium 9.0 8.5 - 10.5 mg/dL    BUN 16 8 - 28 mg/dL    Creatinine 0.82 0.60 - 1.10 mg/dL    GFR MDRD Af Amer >60 >60 mL/min/1.73m2    GFR MDRD Non Af Amer >60 >60 mL/min/1.73m2     Lab Results   Component Value Date    WBC 8.6 02/05/2019    HGB 13.8 01/04/2019    HCT 44.0 01/04/2019    MCV 92 01/04/2019     01/04/2019     CrCl cannot be calculated (Patient's most recent lab result is older than the maximum 5 days allowed.).    ASSESSMENT/Plan:      ICD-10-CM    1. Pulmonary emphysema, unspecified emphysema type  (H) J43.9    2. Major depressive disorder in full remission, unspecified whether recurrent (H) F32.5    3. Bowel and bladder incontinence R32     R15.9    4. Chest wall pain R07.89    5. Contusion of other part of head, initial encounter S00.83XA    6. Coronary artery disease involving native coronary artery of native heart without angina pectoris I25.10    7. History of esophageal dysphagia Z87.19    8. History of abdominal hernia Z87.19      CASE MANAGEMENT:  I have reviewed the facility/SNF care plan/MDS which was done 01/05/2020, including the falls risk, nutrition and pain screening. I also reviewed the current immunizations, and preventive care. Future cancer screening is not clinically indicated secondary to age/goals of care.   Patient's desire to return to the community is not present.    Information reviewed:  Medications, vital signs, orders, and nursing notes.    Total 30 minutes of which 50% was spent counseling and coordination of care of the above plan.    CHANGES:  1.  AP chest x-ray.  2.  Decrease omeprazole from 20 mg daily to 10 mg daily.    CARE PLAN:  The care plan has been reviewed and all orders signed.  Changes to care plan, if any, as noted.  Otherwise, continue current plan of care.      The above has been created using voice recognition software. Please be aware that this may unintentionally  produce inaccuracies and/or nonsensical sentences.       Electronically signed by: Ramana Blandon CNP

## 2021-06-20 NOTE — LETTER
Letter by Elif Carter CNP at      Author: Elif Carter CNP Service: -- Author Type: --    Filed:  Encounter Date: 10/2/2020 Status: (Other)         Patient: Rosina Link   MR Number: [id number]   YOB: 1945   Date of Visit: 10/2/2020     Assessment and Plan:     Patient has been advised of split billing requirements and indicates understanding: Yes        The patient's current medical problems were reviewed.      The following health maintenance schedule was reviewed      Health Maintenance   Topic Date Due   ? DEPRESSION ACTION PLAN  1945   ? HEPATITIS C SCREENING  1945   ? SPIROMETRY  1945   ? COPD ACTION PLAN  1945   ? TD 18+ HE  07/12/1963   ? ADVANCE CARE PLANNING  07/12/1963   ? COLORECTAL CANCER SCREENING  07/12/1963   ? LIPID  07/12/1990   ? ZOSTER VACCINES (1 of 2) 07/12/1995   ? MEDICARE ANNUAL WELLNESS VISIT  07/12/2010   ? Pneumococcal Vaccine: 65+ Years (1 of 1 - PPSV23) 07/12/2010   ? DXA SCAN  07/12/2010   ? FALL RISK ASSESSMENT  07/12/2010   ? INFLUENZA VACCINE RULE BASED (1) 08/01/2020   ? Pneumococcal Vaccine: Pediatrics (0 to 5 Years) and At-Risk Patients (6 to 64 Years)  Aged Out   ? HEPATITIS B VACCINES  Aged Out        Subjective:   Chief Complaint: Rosina Link is an 75 y.o. female here for an Annual Wellness visit.   HPI:  Patient has a PMH of COPD, dyslipidemia, HTN, anemia, PMR, TERESA, pancreatitis, chronic pericardial effusion, depression, and insomnia. She has a history of pancreatitis, gastric obstruction and feeding tube (removed 8/2019).  She was on Hospice 4/2020 thru 9/2020 for COPD.  She was hospitalized back in March 2020 for COPD exacerbation and had indicated she did not want to do aggressive care and so was signed onto Hospice.  Her respiratory status improved and she required no changes with medications and eventually weaned off of supplemental O2 and so Hospice discharge her from care.      Today, she reports feeling generally well  and has no complaints other than progressive LE weakness and not able to ambulate.  She spends most of her time in her bed and is incontinent of urine.     Review of Systems:   Patient denies fever, chills, headache, lightheadedness, dizziness, rhinorrhea, cough, congestion, shortness of breath, chest pain, palpitations, abdominal pain, n/v, diarrhea, constipation, change in appetite, dysuria, frequency, burning or pain with urination.  Other than stated in HPI all other review of systems is negative.         Patient Care Team:  Elif Carter CNP as PCP - General (Nurse Practitioner)  Home, [facility] Nursing Home Facility (Generic Provider)  Elif Carter CNP as Assigned PCP     Patient Active Problem List   Diagnosis   ? Pulmonary emphysema (H)   ? Hernia of abdominal cavity   ? Slow transit constipation   ? Chronic GERD   ? Coronary artery disease without angina pectoris   ? Major depressive disorder   ? Stress incontinence in female   ? Failure to thrive in adult   ? Bowel incontinence   ? Bowel and bladder incontinence   ? Esophageal dysphagia   ? History of abdominal hernia   ? Early satiety   ? History of esophageal dysphagia   ? Weakness of both lower extremities     Past Medical History:   Diagnosis Date   ? Acute encephalopathy    ? Acute on chronic respiratory failure (H)    ? Aperistalsis of esophagus    ? Arthritis    ? Compression fracture of lumbar vertebra (H)     L2   ? COPD (chronic obstructive pulmonary disease) (H)    ? Depression    ? Dyslipidemia    ? Encephalopathy    ? Esophageal candidiasis (H)    ? Esophagitis    ? Failure to thrive in adult    ? Fall    ? GERD (gastroesophageal reflux disease)    ? Hiatal hernia    ? HLD (hyperlipidemia)    ? HTN (hypertension)    ? Hypocalcemia    ? Hypomagnesemia    ? Hypoxia    ? Insomnia    ? Lactic acidosis    ? Major depressive disorder    ? Malnutrition (H)    ? Maternal MCV (mean corpuscular volume) low    ?  Microcytic anemia    ? Microcytic hypochromic anemia 01/2013   ? Mucus plugging of bronchi    ? Osteoporosis    ? Pericardial effusion    ? PMR (polymyalgia rheumatica) (H)    ? Pneumonia    ? Pneumothorax    ? Positive urine drug screen 10/2012    meth and coke positive 10/12. negative in 11/19/12   ? Pulmonary emphysema (H)    ? Rib fracture    ? S/P laparoscopic fundoplication    ? SIRS (systemic inflammatory response syndrome) (H)    ? Thrombocytopenia (H)    ? Trigger finger     left hand, middle finger   ? Underweight    ? Urinary urgency       Past Surgical History:   Procedure Laterality Date   ? CARPAL TUNNEL RELEASE Right    ? CHOLECYSTECTOMY  01/09/2015   ? ESOPHAGOGASTRODUODENOSCOPY     ? HIATAL HERNIA REPAIR  02/15/2016    lap with mesh   ? HIATAL HERNIA REPAIR  01/23/2019    Laparoscopic repair (reduction and repair) of recurrent hiatal hernia,    ? HYSTERECTOMY  1990   ? IR GJ TUBE REPLACEMENT  12/13/2018   ? IR GJ TUBE REPLACEMENT  3/15/2019   ? IR GJ TUBE REPLACEMENT  3/25/2019   ? IR GJ TUBE REPLACEMENT  6/24/2019   ? IR TUBE REMOVAL  8/19/2019   ? REPAIR PARA ESOPHAGEAL PLACEMENT OF GASTRIC J TUBE     ? TRIGGER FINGER RELEASE Left     left middle finger      Family History   Problem Relation Age of Onset   ? Diabetes Mother    ? Cancer Mother         lung   ? Hypertension Mother    ? Emphysema Father    ? Heart disease Father    ? Hypertension Father    ? No Medical Problems Brother    ? Breast cancer Maternal Aunt       Social History     Socioeconomic History   ? Marital status: Single     Spouse name: Not on file   ? Number of children: Not on file   ? Years of education: Not on file   ? Highest education level: Not on file   Occupational History   ? Not on file   Social Needs   ? Financial resource strain: Not on file   ? Food insecurity     Worry: Not on file     Inability: Not on file   ? Transportation needs     Medical: Not on file     Non-medical: Not on file   Tobacco Use   ? Smoking  status: Former Smoker     Packs/day: 1.50     Years: 30.00     Pack years: 45.00     Types: Cigarettes     Quit date: 8/10/1991     Years since quittin.1   ? Smokeless tobacco: Never Used   Substance and Sexual Activity   ? Alcohol use: No   ? Drug use: No   ? Sexual activity: Not Currently   Lifestyle   ? Physical activity     Days per week: Not on file     Minutes per session: Not on file   ? Stress: Not on file   Relationships   ? Social connections     Talks on phone: Not on file     Gets together: Not on file     Attends Baptism service: Not on file     Active member of club or organization: Not on file     Attends meetings of clubs or organizations: Not on file     Relationship status: Not on file   ? Intimate partner violence     Fear of current or ex partner: Not on file     Emotionally abused: Not on file     Physically abused: Not on file     Forced sexual activity: Not on file   Other Topics Concern   ? Not on file   Social History Narrative   ? Not on file      Current Outpatient Medications   Medication Sig Dispense Refill   ? acetaminophen (TYLENOL) 325 MG tablet Take 650 mg by mouth every 4 (four) hours as needed for pain.     ? aluminum-magnesium hydroxide-simethicone (MAALOX ADVANCED) 200-200-20 mg/5 mL Susp Take 30 mL by mouth every 4 (four) hours as needed.     ? senna (SENOKOT) 8.6 mg tablet Take 1 tablet by mouth 2 (two) times a day.     ? bisacodyL (DULCOLAX) 10 mg suppository Insert 10 mg into the rectum daily as needed.     ? budesonide-formoterol (SYMBICORT) 160-4.5 mcg/actuation inhaler Inhale 2 puffs 2 (two) times a day.     ? calcium carbonate (OS-JASPREET) 600 mg calcium (1,500 mg) tablet Take 600 mg by mouth daily.     ? omeprazole (PRILOSEC) 10 MG capsule Take 10 mg by mouth daily before breakfast.     ? venlafaxine (EFFEXOR) 37.5 MG tablet Take 37.5 mg by mouth daily.              No current facility-administered medications for this visit.       Objective:   Vital Signs:   Visit  Vitals  Pulse 96   Temp 97.8  F (36.6  C)   Resp 14        PHYSICAL EXAM  In order to maintain social distancing during the COVID pandemic, a visual exam was completed.       Patient is thin, frail, elderly woman in no acute distress.  Head is AT/NC, EOM intact. Respiratory effort normal. No visible LE edema. Has minor foot contractures bilaterally. Alert and oriented, face symmetric. No visible skin issues or rashes. Mood euthymic      Assessment Results 10/2/2020   Activities of Daily Living 2-4 - Moderate impairment   Instrumental Activities of Daily Living 5-6 - Severe functional impairment   Get Up and Go Score (No Data)   Mini Cog Total Score 4   Some recent data might be hidden     A Mini-Cog score of 0-2 suggests the possibility of dementia, score of 3-5 suggests no dementia    Fall Risk not completed for medical reasons.      Identified Health Risks:     Malnutrition; Dietician involved and recommends supplements.     Weakness with functional decline: acute and likely due to being on Hospice care with poor functional goals.  PT/OT to eval and treat.     Depression: PHQ-9 negative, has a history and on antidepressant.  She recently had a gradual dose reduction and tolerated well.  Will continue on antidepressant.       Assessment and Plan:     Patient has been advised of split billing requirements and indicates understanding: Yes  Rosina was seen today for review of multiple medical conditions.    Encounter for general adult medical examination with abnormal findings    Weakness of both lower extremities        The patient's current medical problems were reviewed.    I have had an Advance Directives discussion with the patient.  The following health maintenance schedule was reviewed with the patient and provided in printed form in the after visit summary:   Health Maintenance   Topic Date Due   ? DEPRESSION ACTION PLAN  1945   ? HEPATITIS C SCREENING  1945   ? SPIROMETRY  1945   ? COPD  ACTION PLAN  1945   ? TD 18+ HE  07/12/1963   ? ADVANCE CARE PLANNING  07/12/1963   ? COLORECTAL CANCER SCREENING  07/12/1963   ? LIPID  07/12/1990   ? ZOSTER VACCINES (1 of 2) 07/12/1995   ? MEDICARE ANNUAL WELLNESS VISIT  07/12/2010   ? Pneumococcal Vaccine: 65+ Years (1 of 1 - PPSV23) 07/12/2010   ? DXA SCAN  07/12/2010   ? FALL RISK ASSESSMENT  07/12/2010   ? INFLUENZA VACCINE RULE BASED (1) 08/01/2020   ? Pneumococcal Vaccine: Pediatrics (0 to 5 Years) and At-Risk Patients (6 to 64 Years)  Aged Out   ? HEPATITIS B VACCINES  Aged Out        Subjective:   Chief Complaint: Rosina Link is an 75 y.o. female here for an Annual Wellness visit.   HPI:  COPD, dyslipidemia, HTN, anemia, PMR, TERESA, pancreatitis, chronic pericardial effusion, depression, and insomnia. She has a history of pancreatitis, gastric obstruction and feeding tube (removed 8/2019).  In March 2020, she had a severe COPD exacerbation and had indicated she did not want aggressive treatment.  She was sent back to the [facility] on [facility]ospice services for her COPD.  She did improve in status, weaning off of O2.  She has had no recent illnesses or exacerbations.  At the beginning of September she was signed off of Hospice.     Today, she states she generally feels well and has no significant complaints.  She reports that she no longer can walk due to increase weakness.  Prior to signing onto Hospice she did ambulate with a walker.  She spends a good part of her day in bed and is incontinent of urine.  Her mood is stable and she denies any depression.     Review of Systems:    Patient denies fever, chills, headache, lightheadedness, dizziness, rhinorrhea, cough, congestion, shortness of breath, chest pain, palpitations, abdominal pain, n/v, diarrhea, constipation, change in appetite, dysuria, frequency, burning or pain with urination.  Other than stated in HPI all other review of systems is negative.         Patient Care Team:  Elif Carter CNP as PCP -  General (Nurse Practitioner)  Home, [facility] Nursing as Nursing Home Facility (Generic Provider)  Elif Carter CNP as Assigned PCP     Patient Active Problem List   Diagnosis   ? Pulmonary emphysema (H)   ? Hernia of abdominal cavity   ? Slow transit constipation   ? Chronic GERD   ? Coronary artery disease without angina pectoris   ? Major depressive disorder   ? Stress incontinence in female   ? Failure to thrive in adult   ? Bowel incontinence   ? Bowel and bladder incontinence   ? Esophageal dysphagia   ? History of abdominal hernia   ? Early satiety   ? History of esophageal dysphagia   ? Weakness of both lower extremities     Past Medical History:   Diagnosis Date   ? Acute encephalopathy    ? Acute on chronic respiratory failure (H)    ? Aperistalsis of esophagus    ? Arthritis    ? Compression fracture of lumbar vertebra (H)     L2   ? COPD (chronic obstructive pulmonary disease) (H)    ? Depression    ? Dyslipidemia    ? Encephalopathy    ? Esophageal candidiasis (H)    ? Esophagitis    ? Failure to thrive in adult    ? Fall    ? GERD (gastroesophageal reflux disease)    ? Hiatal hernia    ? HLD (hyperlipidemia)    ? HTN (hypertension)    ? Hypocalcemia    ? Hypomagnesemia    ? Hypoxia    ? Insomnia    ? Lactic acidosis    ? Major depressive disorder    ? Malnutrition (H)    ? Maternal MCV (mean corpuscular volume) low    ? Microcytic anemia    ? Microcytic hypochromic anemia 01/2013   ? Mucus plugging of bronchi    ? Osteoporosis    ? Pericardial effusion    ? PMR (polymyalgia rheumatica) (H)    ? Pneumonia    ? Pneumothorax    ? Positive urine drug screen 10/2012    meth and coke positive 10/12. negative in 11/19/12   ? Pulmonary emphysema (H)    ? Rib fracture    ? S/P laparoscopic fundoplication    ? SIRS (systemic inflammatory response syndrome) (H)    ? Thrombocytopenia (H)    ? Trigger finger     left hand, middle finger   ? Underweight    ? Urinary urgency       Past  Surgical History:   Procedure Laterality Date   ? CARPAL TUNNEL RELEASE Right    ? CHOLECYSTECTOMY  2015   ? ESOPHAGOGASTRODUODENOSCOPY     ? HIATAL HERNIA REPAIR  02/15/2016    lap with mesh   ? HIATAL HERNIA REPAIR  2019    Laparoscopic repair (reduction and repair) of recurrent hiatal hernia,    ? HYSTERECTOMY     ? IR GJ TUBE REPLACEMENT  2018   ? IR GJ TUBE REPLACEMENT  3/15/2019   ? IR GJ TUBE REPLACEMENT  3/25/2019   ? IR GJ TUBE REPLACEMENT  2019   ? IR TUBE REMOVAL  2019   ? REPAIR PARA ESOPHAGEAL PLACEMENT OF GASTRIC J TUBE     ? TRIGGER FINGER RELEASE Left     left middle finger      Family History   Problem Relation Age of Onset   ? Diabetes Mother    ? Cancer Mother         lung   ? Hypertension Mother    ? Emphysema Father    ? Heart disease Father    ? Hypertension Father    ? No Medical Problems Brother    ? Breast cancer Maternal Aunt       Social History     Socioeconomic History   ? Marital status: Single     Spouse name: Not on file   ? Number of children: Not on file   ? Years of education: Not on file   ? Highest education level: Not on file   Occupational History   ? Not on file   Social Needs   ? Financial resource strain: Not on file   ? Food insecurity     Worry: Not on file     Inability: Not on file   ? Transportation needs     Medical: Not on file     Non-medical: Not on file   Tobacco Use   ? Smoking status: Former Smoker     Packs/day: 1.50     Years: 30.00     Pack years: 45.00     Types: Cigarettes     Quit date: 8/10/1991     Years since quittin.1   ? Smokeless tobacco: Never Used   Substance and Sexual Activity   ? Alcohol use: No   ? Drug use: No   ? Sexual activity: Not Currently   Lifestyle   ? Physical activity     Days per week: Not on file     Minutes per session: Not on file   ? Stress: Not on file   Relationships   ? Social connections     Talks on phone: Not on file     Gets together: Not on file     Attends [employer] service: Not on  file     Active member of club or organization: Not on file     Attends meetings of clubs or organizations: Not on file     Relationship status: Not on file   ? Intimate partner violence     Fear of current or ex partner: Not on file     Emotionally abused: Not on file     Physically abused: Not on file     Forced sexual activity: Not on file   Other Topics Concern   ? Not on file   Social History Narrative   ? Not on file      Current Outpatient Medications   Medication Sig Dispense Refill   ? acetaminophen (TYLENOL) 325 MG tablet Take 650 mg by mouth every 4 (four) hours as needed for pain.     ? aluminum-magnesium hydroxide-simethicone (MAALOX ADVANCED) 200-200-20 mg/5 mL Susp Take 30 mL by mouth every 4 (four) hours as needed.     ? senna (SENOKOT) 8.6 mg tablet Take 1 tablet by mouth 2 (two) times a day.     ? bisacodyL (DULCOLAX) 10 mg suppository Insert 10 mg into the rectum daily as needed.     ? budesonide-formoterol (SYMBICORT) 160-4.5 mcg/actuation inhaler Inhale 2 puffs 2 (two) times a day.     ? calcium carbonate (OS-JASPREET) 600 mg calcium (1,500 mg) tablet Take 600 mg by mouth daily.     ? omeprazole (PRILOSEC) 10 MG capsule Take 10 mg by mouth daily before breakfast.     ? venlafaxine (EFFEXOR) 37.5 MG tablet Take 37.5 mg by mouth daily.              No current facility-administered medications for this visit.       Objective:   Vital Signs:   Visit Vitals  Pulse 96   Temp 97.8  F (36.6  C)   Resp 14          VisionScreening:  No exam data present     PHYSICAL EXAM  In order to maintain social distancing during the COVID pandemic, a visual exam was completed.       Patient is elderly, thin female, and no acute distress.  Head is AT/NC, EOM intact. Respiratory effort normal. No visible LE edema, foot contractures. Alert and oriented, face symmetric. No visible skin issues or rashes. Mood euthymic      Assessment Results 10/2/2020   Activities of Daily Living 2-4 - Moderate impairment   Instrumental  Activities of Daily Living 5-6 - Severe functional impairment   Get Up and Go Score (No Data)   Mini Cog Total Score 4   Some recent data might be hidden     A Mini-Cog score of 0-2 suggests the possibility of dementia, score of 3-5 suggests no dementia  Fall Risk not completed for medical reasons.      Identified Health Risks:     She is at risk for lack of exercise and has been provided with information to increase physical activity for the benefit of her well-being.    Referral to Dietician for malnutrition and follow up on foods provided.     Referral to PT/OT for new onset weakness likely due to Hospice goal was not functional.     Has depression and will continue to be monitored and screened by the .

## 2021-06-20 NOTE — LETTER
Letter by Jessi Arthur CNP at      Author: Jessi Arthur CNP Service: -- Author Type: --    Filed:  Encounter Date: 1/21/2020 Status: (Other)         Patient: Rosina Link   MR Number: [id number]   YOB: 1945   Date of Visit: 1/21/2020     [facility]    Facility:   [facility] [[id number]]   Code Status: POLST AVAILABLE  PCP: Ramana Blandon CNP   Phone: [phone]   Fax: [phone]      Assessment/Plan:        Visit for Preoperative Exam.     Patient approved for surgery with general or local anesthesia. Labs will be done as indicated. Above recommendations were reviewed with the patient. Copy of the pre-op was given to the patient to bring along on the day of surgery. Follow up as needed. No Cardiology consultation or non-invasive testing.     Today Ms. Likn was evaluated for a preoperative exam prior to left 4th/5th trigger finger release on 1/28/20.  She denied any concerns at this visit.  She did note that she has a cardiac history with CAD and was agreeable to having an EKG this week prior to her procedure.  Her blood pressure has been well-controlled on her current antihypertensive regimen with furosemide for chronic diuresis.  She noted that she has a history of COPD that she manages with Symbicort without any worsening dyspnea, wheezing, or cough recently.  She was agreeable to preoperative monitoring of her BMP and CBC this week as well.  The patient denied lightheadedness, dizziness, breathing difficulty, chest pain, palpitations, constipation, urinary symptoms, numbness or tingling in extremities, and pain. Nursing staff denied any new concerns for the patient at this time and feel that they have been at their baseline functioning over the past month.     Subjective:     Scheduled Procedure: Left 4th/5th trigger finger release  Surgery Date:  1/28/20  Surgery Location:  [facility]  Surgeon:  Dr. Jorge Luis Aguirre    Current  Outpatient Medications   Medication Sig Dispense Refill   ? acetaminophen (TYLENOL) 325 MG tablet Take 650 mg by mouth every 4 (four) hours as needed for pain.     ? atorvastatin (LIPITOR) 20 MG tablet Take 20 mg by mouth daily.            ? budesonide-formoterol (SYMBICORT) 160-4.5 mcg/actuation inhaler Inhale 2 puffs 2 (two) times a day.     ? calcium carbonate (OS-JASPREET) 600 mg calcium (1,500 mg) tablet Take 600 mg by mouth daily.     ? cholecalciferol, vitamin D3, (CHOLECALCIFEROL) 400 unit Tab Take 400 Units by mouth daily.     ? furosemide (LASIX) 40 MG tablet Take 40 mg by mouth daily.     ? omeprazole (PRILOSEC) 10 MG capsule Take 10 mg by mouth daily before breakfast.     ? senna-docusate (SENNOSIDES-DOCUSATE SODIUM) 8.6-50 mg tablet Take 1 tablet by mouth 2 (two) times a day.     ? venlafaxine (EFFEXOR) 37.5 MG tablet Take 37.5 mg by mouth daily.              No current facility-administered medications for this visit.        Allergies   Allergen Reactions   ? Sulfa (Sulfonamide Antibiotics) Other (See Comments)      Ototoxicity       ? Penicillins Rash       Immunization History   Administered Date(s) Administered   ? Influenza, inj, historic,unspecified 10/31/2018       Patient Active Problem List   Diagnosis   ? Pulmonary emphysema (H)   ? Hernia of abdominal cavity   ? Slow transit constipation   ? Chronic GERD   ? Coronary artery disease without angina pectoris   ? Major depressive disorder   ? Stress incontinence in female   ? Failure to thrive in adult   ? Bowel incontinence   ? Heartburn   ? Functional diarrhea   ? Bowel and bladder incontinence   ? Esophageal dysphagia   ? History of abdominal hernia   ? Early satiety   ? History of esophageal dysphagia       Past Medical History:   Diagnosis Date   ? Acute encephalopathy    ? Aperistalsis of esophagus    ? Arthritis    ? Compression fracture of lumbar vertebra (H)     L2   ? COPD (chronic obstructive pulmonary disease) (H)    ? Depression    ?  Dyslipidemia    ? Encephalopathy    ? Esophageal candidiasis (H)    ? Esophagitis    ? Failure to thrive in adult    ? Fall    ? GERD (gastroesophageal reflux disease)    ? Hiatal hernia    ? HLD (hyperlipidemia)    ? HTN (hypertension)    ? Hypocalcemia    ? Hypomagnesemia    ? Hypoxia    ? Insomnia    ? Lactic acidosis    ? Major depressive disorder    ? Malnutrition (H)    ? Maternal MCV (mean corpuscular volume) low    ? Microcytic anemia    ? Microcytic hypochromic anemia 2013   ? Osteoporosis    ? Pericardial effusion    ? PMR (polymyalgia rheumatica) (H)    ? Pneumonia    ? Pneumothorax    ? Positive urine drug screen 10/2012    meth and coke positive 10/12. negative in 12   ? Pulmonary emphysema (H)    ? Rib fracture    ? S/P laparoscopic fundoplication    ? SIRS (systemic inflammatory response syndrome) (H)    ? Thrombocytopenia (H)    ? Trigger finger     left hand, middle finger   ? Underweight    ? Urinary urgency        Social History     Socioeconomic History   ? Marital status: Single     Spouse name: Not on file   ? Number of children: Not on file   ? Years of education: Not on file   ? Highest education level: Not on file   Occupational History   ? Not on file   Social Needs   ? Financial resource strain: Not on file   ? Food insecurity:     Worry: Not on file     Inability: Not on file   ? Transportation needs:     Medical: Not on file     Non-medical: Not on file   Tobacco Use   ? Smoking status: Former Smoker     Packs/day: 1.50     Years: 30.00     Pack years: 45.00     Types: Cigarettes     Last attempt to quit: 8/10/1991     Years since quittin.4   ? Smokeless tobacco: Never Used   Substance and Sexual Activity   ? Alcohol use: No   ? Drug use: No   ? Sexual activity: Not Currently   Lifestyle   ? Physical activity:     Days per week: Not on file     Minutes per session: Not on file   ? Stress: Not on file   Relationships   ? Social connections:     Talks on phone: Not on file      Gets together: Not on file     Attends Church service: Not on file     Active member of club or organization: Not on file     Attends meetings of clubs or organizations: Not on file     Relationship status: Not on file   ? Intimate partner violence:     Fear of current or ex partner: Not on file     Emotionally abused: Not on file     Physically abused: Not on file     Forced sexual activity: Not on file   Other Topics Concern   ? Not on file   Social History Narrative   ? Not on file       Past Surgical History:   Procedure Laterality Date   ? CARPAL TUNNEL RELEASE Right    ? CHOLECYSTECTOMY  01/09/2015   ? ESOPHAGOGASTRODUODENOSCOPY     ? HIATAL HERNIA REPAIR  02/15/2016    lap with mesh   ? HIATAL HERNIA REPAIR  01/23/2019    Laparoscopic repair (reduction and repair) of recurrent hiatal hernia,    ? HYSTERECTOMY  1990   ? IR GJ TUBE REPLACEMENT  12/13/2018   ? IR GJ TUBE REPLACEMENT  3/15/2019   ? IR GJ TUBE REPLACEMENT  3/25/2019   ? IR GJ TUBE REPLACEMENT  6/24/2019   ? IR TUBE REMOVAL  8/19/2019   ? REPAIR PARA ESOPHAGEAL PLACEMENT OF GASTRIC J TUBE     ? TRIGGER FINGER RELEASE Left     left middle finger       History of Present Illness  Recent Health  Fever: no  Chills: no  Fatigue: no  Chest Pain: no  Cough: no  Dyspnea: yes, hx COPD  Urinary Frequency: no  Nausea: no  Vomiting: no  Diarrhea: no  Abdominal Pain: no  Easy Bruising: no  Lower Extremity Swelling: no  Poor Exercise Tolerance: yes, hx multiple chronic illnesses    Most recent Health Maintenance Visit:  2 week(s) ago    Pertinent History  Prior Anesthesia: yes  Previous Anesthesia Reaction:  no  Diabetes: no  Cardiovascular Disease: yes  Pulmonary Disease: yes  Renal Disease: no  GI Disease: yes  Sleep Apnea: no  Thromboembolic Problems: no  Clotting Disorder: no  Bleeding Disorder: no  Transfusion Reaction: no  Impaired Immunity: no  Steroid use in the last 6 months: no  Frequent Aspirin use: no    Family history of no perioperative  "complications    Social history of there are no concerns regarding care after surgery    After surgery, the patient plans to recover at a nursing home.    Review of Systems  Review of Systems      **As noted in HPI    Objective:         Vitals:    01/21/20 0843   Pulse: 80   Resp: 16   SpO2: 95%   Weight: 127 lb (57.6 kg)   Height: 4' 11\" (1.499 m)   PainSc: 0-No pain       Physical Exam:  Physical Exam      Physical Examination: General appearance - alert, well appearing, and in no distress, oriented to person, place, and time and normal appearing weight  Mental status - alert, oriented to person, place, and time, normal mood, behavior, speech, dress, motor activity, and thought processes  Eyes - pupils equal and reactive, extraocular eye movements intact  Ears - bilateral TM's and external ear canals normal  Nose - normal and patent, no erythema, discharge or polyps  Mouth - mucous membranes moist, pharynx normal without lesions  Chest - clear to auscultation, no wheezes, rales or rhonchi, symmetric air entry  Heart - normal rate, regular rhythm, normal S1, S2, no murmurs, rubs, clicks or gallops  Abdomen - soft, nontender, nondistended, no masses or organomegaly  Neurological - alert, oriented, normal speech, no focal findings or movement disorder noted  Musculoskeletal - no joint tenderness, deformity or swelling  Extremities - peripheral pulses normal, no pedal edema, no clubbing or cyanosis; left 4th/5th trigger fingers contracted  Skin - normal coloration and turgor, no rashes, no suspicious skin lesions noted      Check EKG, BMP, and CBC on 1/23/20 for preoperative monitoring.  Otherwise continue current care plan for all other chronic medical conditions, as they are stable. Encouraged patient to engage in healthy lifestyle behaviors such as engaging in social activities, exercising (PT/OT), eating well, and following care plan. Follow up for routine check-up, or sooner if needed. Will continue to monitor " patient and work with nursing staff collaboratively to work toward positive patient outcomes.     Electronically signed by: Jessi Arthur, CNP

## 2021-06-20 NOTE — LETTER
Letter by Elif Carter CNP at      Author: Elif Carter CNP Service: -- Author Type: --    Filed:  Encounter Date: 7/23/2020 Status: (Other)         Patient: Rosina Link   MR Number: [id number]   YOB: 1945   Date of Visit: 7/23/2020     [facility]    Facility:   [facility] [[id number]]   Code Status: DNR/DNI on [facility]      CHIEF COMPLAINT/REASON FOR VISIT:  Chief Complaint   Patient presents with   ? Review Of Multiple Medical Conditions     COPD, GERD       HISTORY:      HPI: Rosina is a 75 y.o. female who has a past medical history of COPD, dyslipidemia, HTN, anemia, PMR, TERESA, pancreatitis, chronic pericardial effusion, depression, and insomnia. She has a history of pancreatitis, gastric obstruction and feeding tube (removed 8/2019) She is on Hospice for her COPD as she did not want progressive treatments during her last hospitalization.     Today, I see patient for review of her medical conditions.  She is currently on [facility] for her COPD.  In the past 3 months she has been very stable without any respiratory illnesses.  She has not had to use her O2.  She does spend a fair amount of time lying in bed due to having to stay in her room because of COVID restrictions.  She reports she has been able to have outside visitation with her daughter and she does enjoy those visits.  She does have multiple snacks in her room and endorses a good appetite.  She has 2lb weight gain in the past month and is 114lbs this month.  She reports she is sleeping well.  BPs are in in good range.    Last month, her maalox was discontinued due to nonuse.  Since then, nursing reports she has had reflux in the past week.  She continues on omeprazole 10mg daily.        Review of Systems   Patient denies fever, chills, headache, lightheadedness, dizziness, rhinorrhea, cough, congestion, shortness of breath, chest pain, palpitations, abdominal pain, n/v, diarrhea,  constipation, change in appetite, dysuria, frequency, burning or pain with urination.  Other than stated in HPI all other review of systems is negative.         Physical Exam   Vital signs: /65, HR 74, resp 18, temp 97.6    Thorough physical exam not completed in order to maintain social distancing during the COVID pandemic.    Patient lying in bed in no acute distress.  Head is atraumatic and normocephalic and EOM is intact.  Respiratory effort is normal and nonlabored.  No lower extremity edema.  She is alert and oriented x3 and face is symmetric.  Range of motion to bilateral upper and lower extremities is equal.  Mood euthymic.      Case Management:  Patient's desire to return to the community is present, but is not able due to care needs .    Advance Directive Discussion:    I reviewed the current advanced directives as reflected in EPIC and the facility chart. I did review the advance directives with the resident.      LABS:   No results found for this or any previous visit (from the past 240 hour(s)).    ASSESSMENT:      ICD-10-CM    1. Chronic obstructive pulmonary disease, unspecified COPD type (H)  J44.9    2. Essential hypertension  I10    3. Major depressive disorder in full remission, unspecified whether recurrent (H)  F32.5    4. Chronic GERD  K21.9        PLAN:    COPD: stable without exacerbation, continue on symbicort inhaler.  On no nebulizers as this time.     HTN: BPs stable on no medications    Depression: mood is stable, continue on venlafaxine    Chronic GERD: increased symptoms, add back in prn MAALOX and continue omeprazole 10mg daily.     Electronically signed by: Elif Carter CNP

## 2021-06-20 NOTE — PROGRESS NOTES
"[facility]    Name:   Rosina Link  : 1945  Facility:   [facility] [[id number]]   Room: 224  Code Status: DNR/DNI -   Fac type:   SNF (Skilled Nursing Facility, TCU) -     CHIEF COMPLAINT / REASON FOR VISIT:  Chief Complaint   Patient presents with     Review Of Multiple Medical Conditions     TCU follow-up after hospitalization for hiatal hernia with GERD and esophagitis, as well as adult failure to thrive.  This was followed by a second hospitalization secondary to a fall that resulted in multiple left rib fractures and a pneumothorax.     [facility] from 18 until 18  [facility] from 18 until 03/10/18  [facility] from 18 until 18  [facility] from 8018 until   [facility] from 18 until 18    Patient was last seen by me on 18.    HPI: Rosina is a 73 y.o. female with a history of severe COPD/pulmonary emphysema, depression, hiatal hernia/GERD, and insomnia.  She underwent surgery in 2016 for a large hiatal hernia (total gastric herniation) that included laparoscopic repair with mesh and Gogre fundoplication.        Hospitalization 2018: She had suffered a 60 pound unintentional weight loss prior to that, dropping from about 160 pounds down to 95 pounds.  There was a brief period of weight gain after that, but she eventually returned to the lower weight.  She developed progressive weakness and inability to eat much.  She would take several bites and then feel full.  She was so weak upon her admission to [facility] in 2018 that she could hardly walk.      She had had a CT scan on 18 that showed of her stomach back in her chest.  She was supposed to see Dr. De Jesus at on 18 to review pictures and to discuss options.  The CT scan described \"small amount of scarring and residual consolidation in the paramedial right lower lobe at " "the site of resolving pneumonia.\"  Chest x-rays performed on 02/16/18 showed \"right lower lobe pneumonia with small parapneumonic effusion.  The amount of infiltrate in the right lower lobe appeared increased from the CT examination.\"  She was admitted for community-acquired pneumonia.  She had no fevers or leukocytosis.      Hospitalization June 2018: More recently, she was seen by Dr. Vasquez on 05/18/18 for dysphagia, nausea, and early satiety with weight loss.  An EGD with general anesthesia was planned at some point with consideration for repair of hiatal hernia and redo partial fundoplication.    She presented to [facility] ER on 06/05/18 with weakness, anorexia, and constipation.  Constipation eventually resolved without changes to appetite.  Psych was consulted with concern that depression may be contributory, and she was started on low-dose mirtazapine.  Megace was started as an appetite stimulant, and a CT showed a large recurrent hiatal hernia.  A GJ tube placement was suggested, but the patient was felt not to be a candidate for GJ tube per IR due to anatomy.  Therefore, GI and surgery were consulted, and she underwent laparoscopic partial fundoplication takedown repair paraesophageal hernia and placement of gastric jejunostomy tube placement; upper endoscopy by Dr. Daniel De Jesus on 06/12/18.  She was also found to have esophageal candidiasis and was given fluconazole.  The PICC line was placed on 06/13/18 to start TPN.    Overnight (06/14/18 to 06/15/18), rapid response was called due to hypoxemia and tachycardia.  A CT of the chest was negative for pulmonary embolism but showed aspiration pneumonitis.  She was treated with IV antibiotics which ended on 06/23/18.    She underwent EGD and advancement of GJ feeding tube on 06/21/18.  Abdominal x-rays on 06/22/18 showed GJ tube malpositioned, looped in the fundus.  This J-tube repositioned itself and was then being used again.      Of note: An " echocardiogram on 06/15/18 showed moderate pericardial effusion (known pericardial effusion 08/20/17 and 12/20/17).  Cardiology felt likely malnutrition with low protein causing capillary leak as a probable etiology.  Recommendations for observation with no further workup, but a repeat echocardiogram on 06/29/18 showed no change in no hemodynamic effects of effusion.      Hospitalization in July 2018: On 07/7/18, she was getting out of bed early in the day and decided to walk to the bathroom on her own rather than summoned assistance.  On the way to her bathroom, she became dizzy, fell, and struck the side of the garbage can.  She had experienced dizzy spells which have caused falls frequently before.  A CT of the chest, abdomen, and pelvis showed displaced left ninth and 10th rib fractures with a moderate sized pneumothorax and increased moderate pericardial effusion.  A chest tube was placed in the ED.  Trauma surgery evaluated the patient, and the chest tube was removed on 07/10/18.  She remained stable off oxygen with no dyspnea.    Another echocardiogram was done, as the pericardial effusion had possibly increased in size per the CT on admission.  It showed no significant change compared to previously, and there are recommendations for her to follow-up with cardiology in 4 weeks.    A UGI was done, showing persistent partial functional obstruction at the level of the diaphragmatic hiatus, and a strict n.p.o. and tube feedings continued.  She was subsequently discharged back here on 07/12/18.  She was to follow-up with Dr. De Jesus on 08/10/18 for further esophageal evaluation (as described in CURRENT ISSUES).      CURRENT ISSUES    Failure to thrive/esophageal dysphagia/NPO status: Since her stay at the [facility] in February/March 2018, she had dropped 17 pounds.  She is quite cachectic with a BMI of 16-17.   Admitted NPO with tube feedings at 30 mL/h, food was being introduced (regular diet,  "thin liquids, small portions).  Isosource 1.5 is now being administered at 56 mL/h for 18 hours, starting at 1500 and ending at 0900.  Medications continue to be given via feeding tube. She was seen by speech therapy here and appeared to be doing okay; however, we are going to follow guidelines set by Dr. De Jesus at this juncture.  With her current tube feeding, her weight has varied a pound or so in either direction.    As noted, she is to remain NPO until UGI is performed.  She was also followed by speech therapy in the hospital, and her swallow function was deemed intact. She had a follow-up appointment on 07/09/18, but she remains NPO.  She did have a follow-up appointment with Dr. De Jesus on 08/10/18 and was given a new diagnosis of dysphagia, related to her paraesophageal hernia.  More tests are going to be performed, including an upper GI with barium swallow (performed on 08/23/18) and esophageal manometry (scheduled for 09/06/18) .  She is to remain strict NPO. with tube feedings through the J port.  She was told that surgery would not be appropriate for her but dilatation might.    Unfortunately, the last test, esophageal manometry, did not occur on the scheduled date.  Staff where the procedure was to be done noted that she was n.p.o., somehow not realizing that the procedure was to determine whether she would continue to be so.  After a lengthy drive to the appointment, she came back in a very angry mood.  They did offer to bring her back the same day, but she declined.    She complained that her back \"hurts so bad\" from the trip for the procedure that did not occur.  This test has been rescheduled, but I am uncertain whether it is for 10/01/18 or 10/31/18.  She is able to describe in reasonable detail what the procedure would entail.    GERD: She has been complaining of heartburn despite being on lansoprazole 30 mg daily.  She previously had orders for Maalox as needed, and she was not taking it.  We " "scheduled that while awake, keeping with 4 times daily dosing.  Epigastric/suprasternal heartburn was described as \"terrible, terrible, terrible.\"  She told me it was all right for some time, but it had returned.  We added sucralfate 1 g 4 times daily and a stool check for H pylori (negative).  This has been quite effective, and there are no further complaints    Depression: She does admit to being depressed \"some days.\"  She is on mirtazapine and duloxetine.    She is quite upbeat and joking today she has for the past several visits (despite problems with her last test).     COPD: She does have COPD/pulmonary emphysema, is barrel-chested, and is on a variety of inhalers.      She denies dyspnea, coughing, or chest pain.    Urinary incontinence: She does have stress incontinence and is checked by nursing staff during the night.   She finds it very hard to control, stating that she does not realize it until after it starts.  There is some nocturia as well.  None of this has changed since earlier visits.    Bowel incontinence: Several weeks back, she developed loose, incontinent stools. Her tube feeding formula had been recently changed, and despite the loose stools, she was receiving scheduled Colace and PRN senna and MiraLAX.  When addressed with staff, this was attributed to poor communication during shift report, so we discontinued all of her bowel medications.  The problem persisted until we replaced psyllium with a 500 mg tab of methylcellulose daily.  Despite resolution of her loose stools, she is still incontinent of bowel.      Rib fractures: No longer has any significant left-sided rib cage pain (receiving Lidoderm patches for some tenderness), although she typically sleeps on her right side.    Pain management: She does have occasional right leg pain that she describes feeling like a cramp.  When she stated that it would normally help her (when given orally), but by tube she claims to not be getting the " "full effect, we started her on sublingual Roxanol.  We simply held the oxycodone rather than discontinue it.  As yet, it is uncertain how effective this change is.  She occasionally has pain around the site of her PEG tube if it is not taped down correctly, but for the most part she is pain-free.    Confusion (brief episode): On 07/23/18 staff report that she became confused and agitated, requesting to be taken back to her old room and to the \"real [place]\".  When addressed with patient, she admited to feeling disoriented, says \"I thought I was imagining things and not at [place]\".  This problem has resolved and has not recurred since.    Discharge planning: According to nursing staff, she is doing better than she lets on.  She tells me her daughter's house, where she can stay, is very messy, and she is a hoarder.  For now, at least, she would prefer to stay here but, down the road, she would like to go to an assisted living facility.  She has told me that one daughter is looking into this.  She does need to be up and about more often; however, she likes sitting on her bed and playing games on her smart phone.      ROS: No complaints whatsoever today, other than about frequent loose stools (most likely due to her tube feedings), something that will resolve when she can take food orally.  She sleeps well only at times.  She is on trazodone 25 mg nightly.  She denies any headaches or chest pains, coughing or congestion, nausea or vomiting,  dizziness or dyspnea, dysuria, difficulty chewing or swallowing, integumentary issues.      Past Medical History:   Diagnosis Date     Acute encephalopathy      Aperistalsis of esophagus      Arthritis      Compression fracture of lumbar vertebra (H)     L2     COPD (chronic obstructive pulmonary disease) (H)      Depression      Dyslipidemia      Encephalopathy      Esophageal candidiasis (H)      Failure to thrive in adult      Fall      GERD (gastroesophageal reflux " disease)      Hiatal hernia      HLD (hyperlipidemia)      HTN (hypertension)      Hypocalcemia      Hypomagnesemia      Hypoxia      Insomnia      Lactic acidosis      Major depressive disorder      Malnutrition (H)      Maternal MCV (mean corpuscular volume) low      Microcytic anemia      Microcytic hypochromic anemia 01/2013     Osteoporosis      Pericardial effusion      PMR (polymyalgia rheumatica) (H)      Pneumonia      Pneumothorax      Positive urine drug screen 10/2012    meth and coke positive 10/12. negative in 11/19/12     Pulmonary emphysema (H)      Rib fracture      S/P laparoscopic fundoplication      SIRS (systemic inflammatory response syndrome) (H)      Thrombocytopenia (H)      Trigger finger     left hand, middle finger     Underweight      Urinary urgency               Family History   Problem Relation Age of Onset     Diabetes Mother      Cancer Mother      lung     Hypertension Mother      Emphysema Father      Heart disease Father      Hypertension Father      No Medical Problems Brother      Breast cancer Maternal Aunt      Social History     Social History     Marital status: Single     Spouse name: N/A     Number of children: N/A     Years of education: N/A     Social History Main Topics     Smoking status: Former Smoker     Packs/day: 1.50     Years: 30.00     Types: Cigarettes     Quit date: 8/10/1991     Smokeless tobacco: Never Used     Alcohol use No     Drug use: No     Sexual activity: Not Currently     Other Topics Concern     Not on file     Social History Narrative     MEDICATIONS: Reviewed from the MAR, physician orders, and earlier progress notes.    Current Outpatient Prescriptions   Medication Sig     acetaminophen (TYLENOL) 650 mg/20.3 mL Soln 650 mg by G-tube route every 4 (four) hours as needed.     albuterol (PROVENTIL HFA;VENTOLIN HFA) 90 mcg/actuation inhaler Inhale 2 puffs 4 (four) times a day as needed for wheezing.      aluminum-magnesium hydroxide-simethicone  (MAALOX ADVANCED) 200-200-20 mg/5 mL Susp 20 mL by G-tube route 4 (four) times a day.      aspirin 81 mg chewable tablet 81 mg by G-tube route daily.     aspirin 81 MG EC tablet 81 mg by G-tube route daily.      atorvastatin (LIPITOR) 20 MG tablet 20 mg by G-tube route daily.      budesonide-formoterol (SYMBICORT) 160-4.5 mcg/actuation inhaler Inhale 2 puffs 2 (two) times a day.     calcium carbonate 500 mg/5 mL (1,250 mg/5 mL) suspension 600 mg by G-tube route daily.     cholecalciferol, vitamin D3, (VITAMIN D3 ORAL) Take by mouth daily. Vitamin D3 (D-VI-SOL)  liquid; 400U; amt: 1ml (400units); gastric tube  Once A Day;     cholecalciferol, vitamin D3, 2,000 unit cap 2,000 capsules by G-tube route daily.      cholecalciferol, vitamin D3, 400 unit/5 mL Liqd 400 Units by G-tube route daily.     DULoxetine (CYMBALTA) 60 MG capsule 60 mg by G-tube route daily.      ferrous sulfate 220 mg (44 mg iron)/5 mL solution Take by mouth daily. FERROUS SULFATE Rosina Guo  solution; 220mg (44mg iron) / 5ml; amt: 7.4ml; gastric tube  Special Instructions: anorexia  Once A Day;     ferrous sulfate 300 mg (60 mg iron)/5 mL syrup 325 mg by G-tube route daily.     lactose-reduced food/fiber (ISOSOURCE 1.5 JASPREET ENTERAL TUBE) by Enteral Tube route. Isosource 1.5 continuous at goal rate of 40ml/hr Rosina Guo  Special Instructions: tube feeding  Goal per day 960ml/day  Every Shift;     lansoprazole (PREVACID SOLUTAB) 30 MG disintegrating tablet 30 mg by G-tube route daily.     lidocaine (LIDODERM) 5 % Place 1 patch on the skin 2 (two) times a day. Remove & Discard patch within 12 hours or as directed by MD sienna Guo  adhesive patch,medicated; 5 %; topical  Special Instructions: apply to painful area of skin  dx: closed fracture of multiple ribs of left side  Twice A Day;     methylcellulose (CITRUCEL) 500 mg Tab Take 500 mg by mouth daily.     mirtazapine (REMERON) 15 MG tablet 7.5 mg by G-tube route at  "bedtime.     morphine 100 mg/5 mL (20 mg/mL) concentrated solution Take 5 mg by mouth every 4 (four) hours as needed for pain.     ondansetron (ZOFRAN-ODT) 4 MG disintegrating tablet 4 mg by G-tube route every 6 (six) hours as needed for nausea.     oxyCODONE (ROXICODONE) 5 MG immediate release tablet Take 5-10 mg by mouth every 4 (four) hours as needed for pain.     polyethylene glycol (MIRALAX) 17 gram packet 17 g by G-tube route daily as needed.      sennosides (SENNOSIDES) 8.8 mg/5 mL Syrp syrup 6.5 mg by G-tube route daily as needed.      sucralfate (CARAFATE) 100 mg/mL suspension Take 1 g by mouth 4 (four) times a day.     tiotropium (SPIRIVA) 18 mcg inhalation capsule Place 18 mcg into inhaler and inhale daily.     tiotropium (SPIRIVA) 18 mcg inhalation capsule Place 18 mcg into inhaler and inhale daily.     traZODone (DESYREL) 50 MG tablet 25 mg by G-tube route at bedtime as needed for sleep.     ALLERGIES:   Allergies   Allergen Reactions     Sulfa (Sulfonamide Antibiotics) Other (See Comments)      Ototoxicity         Penicillins Rash     DIET: NPO.  She is on tube feeding at 56 mL/h for 18 hours per day.    Vitals:    09/18/18 1418   BP: 95/60   Pulse: 71   Resp: 20   Temp: 96.7  F (35.9  C)   SpO2: 91%   Weight: (!) 88 lb 6.4 oz (40.1 kg)   Height: 4' 11\" (1.499 m)   After being down approximately 17 pounds over the last 4 months or so, weight has been relatively stable on her tube feedings.  Body mass index is 17.85 kg/(m^2).    EXAMINATION:   General: Pleasant, alert, and conversant middle-aged female, looking much older than her stated age, sitting up in bed and playing a game on her smart phone, in no apparent distress.    Head: Normocephalic and atraumatic.  Significant hirsutism.  Eyes: PERRLA, sclerae clear.   ENT: Slightly dry oral mucosa.  Full upper and lower dentures.  She is only wearing the uppers currently.  Hearing is unimpaired.  Cardiovascular: Regular rate and rhythm with no appreciable " murmur.  Respiratory: Lung sounds are diminished due to severe kyphosis but otherwise clear.   Abdomen: Soft and nontender.   Musculoskeletal/Extremities: Age-related degenerative joint disease.  Barrel chested with severe thoracic kyphosis.  No peripheral edema.   Neurological: Occasional fine bilateral upper extremity tremor (not noticeable today).  Integument: No rashes, clinically significant lesions, or skin breakdown.   Cognitive/Psychiatric: Alert and oriented she seems to present with a depressed/flat affect she does a lot of wise cracking during my visit.     DIAGNOSTICS:   No results found for this or any previous visit.  Lab Results   Component Value Date    WBC 5.4 07/06/2018    HGB 11.6 (L) 07/06/2018    HCT 36.8 07/06/2018    MCV 96 07/06/2018     07/06/2018     CrCl cannot be calculated (Patient's most recent sCr result is older than the maximum 5 days allowed.).    ASSESSMENT/Plan:      ICD-10-CM    1. Esophageal dysphagia R13.10    2. Hernia of abdominal cavity K46.9    3. Failure to thrive in adult R62.7    4. Chronic GERD K21.9    5. 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 Major depressive disorder F32.9    7. Pulmonary emphysema, unspecified emphysema type (H) J43.9    8. Bowel and bladder incontinence R32     R15.9    9. Coronary artery disease involving native coronary artery of native heart without angina pectoris I25.10      CHANGES:    None.    CARE PLAN:  The care plan has been reviewed and all orders signed. Changes to care plan, if any, as noted.  Otherwise, continue current plan of care.      The above has been created using voice recognition software. Please be aware that this may unintentionally  produce inaccuracies and/or nonsensical sentences.       Electronically signed by: Ramana Blandon, CNP

## 2021-06-20 NOTE — PROGRESS NOTES
"[facility]    Name:   Rosina Link  : 1945  Facility:   [facility] [[id number]]   Room: 224  Code Status: DNR/DNI -   Fac type:   SNF (Skilled Nursing Facility, TCU) -     CHIEF COMPLAINT / REASON FOR VISIT:  Chief Complaint   Patient presents with     Review Of Multiple Medical Conditions     TCU follow-up after hospitalization for hiatal hernia with GERD and esophagitis, as well as adult failure to thrive.  This was followed by a second hospitalization secondary to a fall that resulted in multiple left rib fractures and a pneumothorax.     [facility] from 18 until 18  [facility]CU from 18 until 03/10/18  [facility] from 18 until 18  [facility] from 8018 until   [facility] from 18 until 18    Patient was last seen by me on 18.    HPI: Rosina is a 73 y.o. female with a history of severe COPD/pulmonary emphysema, depression, hiatal hernia/GERD, and insomnia.  She underwent surgery in 2016 for a large hiatal hernia (total gastric herniation) that included laparoscopic repair with mesh and Gorge fundoplication.        Hospitalization 2018: She had suffered a 60 pound unintentional weight loss prior to that, dropping from about 160 pounds down to 95 pounds.  There was a brief period of weight gain after that, but she eventually returned to the lower weight.  She developed progressive weakness and inability to eat much.  She would take several bites and then feel full.  She was so weak upon her admission to [facility] in 2018 that she could hardly walk.      She had had a CT scan on 18 that showed of her stomach back in her chest.  She was supposed to see Dr. De Jesus at on 18 to review pictures and to discuss options.  The CT scan described \"small amount of scarring and residual consolidation in the paramedial right lower lobe at " "the site of resolving pneumonia.\"  Chest x-rays performed on 02/16/18 showed \"right lower lobe pneumonia with small parapneumonic effusion.  The amount of infiltrate in the right lower lobe appeared increased from the CT examination.\"  She was admitted for community-acquired pneumonia.  She had no fevers or leukocytosis.      Hospitalization June 2018: More recently, she was seen by Dr. Vasquez on 05/18/18 for dysphagia, nausea, and early satiety with weight loss.  An EGD with general anesthesia was planned at some point with consideration for repair of hiatal hernia and redo partial fundoplication.    She presented to [facility] ER on 06/05/18 with weakness, anorexia, and constipation.  Constipation eventually resolved without changes to appetite.  Psych was consulted with concern that depression may be contributory, and she was started on low-dose mirtazapine.  Megace was started as an appetite stimulant, and a CT showed a large recurrent hiatal hernia.  A GJ tube placement was suggested, but the patient was felt not to be a candidate for GJ tube per IR due to anatomy.  Therefore, GI and surgery were consulted, and she underwent laparoscopic partial fundoplication takedown repair paraesophageal hernia and placement of gastric jejunostomy tube placement; upper endoscopy by Dr. Daniel De Jesus on 06/12/18.  She was also found to have esophageal candidiasis and was given fluconazole.  The PICC line was placed on 06/13/18 to start TPN.    Overnight (06/14/18 to 06/15/18), rapid response was called due to hypoxemia and tachycardia.  A CT of the chest was negative for pulmonary embolism but showed aspiration pneumonitis.  She was treated with IV antibiotics which ended on 06/23/18.    She underwent EGD and advancement of GJ feeding tube on 06/21/18.  Abdominal x-rays on 06/22/18 showed GJ tube malpositioned, looped in the fundus.  This J-tube repositioned itself and was then being used again.      Of note: An " echocardiogram on 06/15/18 showed moderate pericardial effusion (known pericardial effusion 08/20/17 and 12/20/17).  Cardiology felt likely malnutrition with low protein causing capillary leak as a probable etiology.  Recommendations for observation with no further workup, but a repeat echocardiogram on 06/29/18 showed no change in no hemodynamic effects of effusion.      Hospitalization in July 2018: On 07/7/18, she was getting out of bed early in the day and decided to walk to the bathroom on her own rather than summoned assistance.  On the way to her bathroom, she became dizzy, fell, and struck the side of the garbage can.  She had experienced dizzy spells which have caused falls frequently before.  A CT of the chest, abdomen, and pelvis showed displaced left ninth and 10th rib fractures with a moderate sized pneumothorax and increased moderate pericardial effusion.  A chest tube was placed in the ED.  Trauma surgery evaluated the patient, and the chest tube was removed on 07/10/18.  She remained stable off oxygen with no dyspnea.    Another echocardiogram was done, as the pericardial effusion had possibly increased in size per the CT on admission.  It showed no significant change compared to previously, and there are recommendations for her to follow-up with cardiology in 4 weeks.    A UGI was done, showing persistent partial functional obstruction at the level of the diaphragmatic hiatus, and a strict n.p.o. and tube feedings continued.  She was subsequently discharged back here on 07/12/18.  She was to follow-up with Dr. De Jesus on 08/10/18 for further esophageal evaluation (as described in CURRENT ISSUES).      CURRENT ISSUES    Failure to thrive/esophageal dysphagia/NPO status: Since her stay at the [facility] TCU in February/March 2018, she had dropped 17 pounds.  She is quite cachectic with a BMI of 16-17.   Admitted NPO with tube feedings at 30 mL/h, food was being introduced (regular diet,  "thin liquids, small portions).  Isosource 1.5 is now being administered at 56 mL/h for 18 hours, starting at 1500 and ending at 0900.  Medications continue to be given via feeding tube. She was seen by speech therapy here and appeared to be doing okay; however, we are going to follow guidelines set by Dr. De Jesus at this juncture.  With her current tube feeding, her weight has varied a pound or so in either direction.    As noted, she is to remain NPO until UGI is performed.  She was also followed by speech therapy in the hospital, and her swallow function was deemed intact. She had a follow-up appointment on 07/09/18, but she remains NPO.  She did have a follow-up appointment with Dr. De Jesus on 08/10/18 and was given a new diagnosis of dysphagia, related to her paraesophageal hernia.  More tests are going to be performed, including an upper GI with barium swallow (performed on 08/23/18) and esophageal manometry (scheduled for 09/06/18) .  She is to remain strict NPO. with tube feedings through the J port.  She was told that surgery would not be appropriate for her but dilatation might.    GERD: She has been complaining of heartburn despite being on lansoprazole 30 mg daily.  She previously had orders for Maalox as needed, and she was not taking it.  We scheduled that while awake, keeping with 4 times daily dosing.  Epigastric/suprasternal heartburn was described as \"terrible, terrible, terrible.\"  She told me it was all right for some time, but it had returned.  We added sucralfate 1 g 4 times daily and a stool check for H pylori (negative).  This has been quite effective, and there are no further complaints    Depression: She does admit to being depressed \"some days.\"  She is on mirtazapine and duloxetine.    She is quite upbeat and joking today she has for the past several visits.  I believe she enjoys mowing company.    COPD: She does have COPD/pulmonary emphysema, is barrel-chested, and is on a variety of " "inhalers.      She denies dyspnea, coughing, or chest pain.    Urinary incontinence: She does have stress incontinence and is checked by nursing staff during the night.  She stated at an earlier visit, \"every time he turned around, I'm peeing in my britches.\"  She finds it very hard to control, stating that she does not realize it until after it starts.  There is some nocturia as well.  None of this has changed since earlier visits.    Bowel incontinence: A few weeks back, she had developed loose, incontinent stools. Her tube feeding formula had been recently changed, and despite the loose stools, she was receiving scheduled Colace and PRN senna and MiraLAX.  When addressed with staff, this was attributed to poor communication during shift report, so we discontinued all of her bowel medications.  The problem persisted until we replaced psyllium with a 500 mg tab of methylcellulose daily.  Despite resolution of her loose stools, she is still incontinent of bowel.  She previously stated, \"I turn on my lights, but they don't get here in time.\"    Rib fractures: No longer has any significant left-sided rib cage pain (receiving Lidoderm patches for some tenderness), although she typically sleeps on her right side.    Pain management: She does have right leg pain that she describes feeling like a cramp, and I asked her about her oxycodone.  She stated that it would normally help her (when given orally), but by tube she claims she is not getting the full effect.  After further discussion, I suggested we try Roxanol, something she can take sublingually.  We will hold oxycodone for now, rather than discontinue it.  She occasionally has pain around the site of her PEG tube if it is not taped down correctly.    Confusion (brief episode): On 07/23/18 staff report that she became confused and agitated, requesting to be taken back to her old room and to the \"real [place]\".  When addressed with patient, she admited to feeling " "disoriented, says \"I thought I was imagining things and not at [place]\".  This problem has resolved, and she is oriented and appropriate, pleasant and conversant throughout each visit.     Discharge planning: According to nursing staff, she is doing better than she lets on.  She tells me her daughter's house, where she can stay, is very messy, and she is a hoarder.  For now, at least, she would prefer to stay here but, down the road, she would like to go to an assisted living facility.  She has told me that one daughter is looking into this.    Right now, nursing staff tell me she needs to be up and out of her room more often.      ROS: No complaints whatsoever today, other than about frequent loose stools (most likely due to her tube feedings), something that will resolve when she can take food orally.  She sleeps well only at times.  She is on trazodone 25 mg nightly.  She denies any headaches or chest pains, coughing or congestion, nausea or vomiting,  dizziness or dyspnea, dysuria, difficulty chewing or swallowing, integumentary issues.      Past Medical History:   Diagnosis Date     Acute encephalopathy      Aperistalsis of esophagus      Arthritis      Compression fracture of lumbar vertebra (H)     L2     COPD (chronic obstructive pulmonary disease) (H)      Depression      Dyslipidemia      Encephalopathy      Esophageal candidiasis (H)      Failure to thrive in adult      Fall      GERD (gastroesophageal reflux disease)      Hiatal hernia      HLD (hyperlipidemia)      HTN (hypertension)      Hypocalcemia      Hypomagnesemia      Hypoxia      Insomnia      Lactic acidosis      Major depressive disorder      Malnutrition (H)      Maternal MCV (mean corpuscular volume) low      Microcytic anemia      Microcytic hypochromic anemia 01/2013     Osteoporosis      Pericardial effusion      PMR (polymyalgia rheumatica) (H)      Pneumonia      Pneumothorax      Positive urine drug screen 10/2012    meth and coke " positive 10/12. negative in 11/19/12     Pulmonary emphysema (H)      Rib fracture      S/P laparoscopic fundoplication      SIRS (systemic inflammatory response syndrome) (H)      Thrombocytopenia (H)      Trigger finger     left hand, middle finger     Underweight      Urinary urgency               Family History   Problem Relation Age of Onset     Diabetes Mother      Cancer Mother      lung     Hypertension Mother      Emphysema Father      Heart disease Father      Hypertension Father      No Medical Problems Brother      Breast cancer Maternal Aunt      Social History     Social History     Marital status: Single     Spouse name: N/A     Number of children: N/A     Years of education: N/A     Social History Main Topics     Smoking status: Former Smoker     Packs/day: 1.50     Years: 30.00     Types: Cigarettes     Quit date: 8/10/1991     Smokeless tobacco: Never Used     Alcohol use No     Drug use: No     Sexual activity: Not Currently     Other Topics Concern     Not on file     Social History Narrative     MEDICATIONS: Reviewed from the MAR, physician orders, and earlier progress notes.  Updated by me today (09/04/18) with the addition of Roxanol reflected below.  Current Outpatient Prescriptions   Medication Sig     morphine 100 mg/5 mL (20 mg/mL) concentrated solution Take 5 mg by mouth every 4 (four) hours as needed for pain.     acetaminophen (TYLENOL) 650 mg/20.3 mL Soln 650 mg by G-tube route every 4 (four) hours as needed.     albuterol (PROVENTIL HFA;VENTOLIN HFA) 90 mcg/actuation inhaler Inhale 2 puffs 4 (four) times a day as needed for wheezing.      aluminum-magnesium hydroxide-simethicone (MAALOX ADVANCED) 200-200-20 mg/5 mL Susp 20 mL by G-tube route 4 (four) times a day.      aspirin 81 mg chewable tablet 81 mg by G-tube route daily.     aspirin 81 MG EC tablet 81 mg by G-tube route daily.      atorvastatin (LIPITOR) 20 MG tablet 20 mg by G-tube route daily.      budesonide-formoterol  (SYMBICORT) 160-4.5 mcg/actuation inhaler Inhale 2 puffs 2 (two) times a day.     calcium carbonate 500 mg/5 mL (1,250 mg/5 mL) suspension 600 mg by G-tube route daily.     cholecalciferol, vitamin D3, (VITAMIN D3 ORAL) Take by mouth daily. Vitamin D3 (D-VI-SOL)  liquid; 400U; amt: 1ml (400units); gastric tube  Once A Day;     cholecalciferol, vitamin D3, 2,000 unit cap 2,000 capsules by G-tube route daily.      cholecalciferol, vitamin D3, 400 unit/5 mL Liqd 400 Units by G-tube route daily.     DULoxetine (CYMBALTA) 60 MG capsule 60 mg by G-tube route daily.      ferrous sulfate 220 mg (44 mg iron)/5 mL solution Take by mouth daily. FERROUS SULFATE Rosina Guo  solution; 220mg (44mg iron) / 5ml; amt: 7.4ml; gastric tube  Special Instructions: anorexia  Once A Day;     ferrous sulfate 300 mg (60 mg iron)/5 mL syrup 325 mg by G-tube route daily.     lactose-reduced food/fiber (ISOSOURCE 1.5 JASPREET ENTERAL TUBE) by Enteral Tube route. Isosource 1.5 continuous at goal rate of 40ml/hr Rosina Guo  Special Instructions: tube feeding  Goal per day 960ml/day  Every Shift;     lansoprazole (PREVACID SOLUTAB) 30 MG disintegrating tablet 30 mg by G-tube route daily.     lidocaine (LIDODERM) 5 % Place 1 patch on the skin 2 (two) times a day. Remove & Discard patch within 12 hours or as directed by MD sienna Guo  adhesive patch,medicated; 5 %; topical  Special Instructions: apply to painful area of skin  dx: closed fracture of multiple ribs of left side  Twice A Day;     methylcellulose (CITRUCEL) 500 mg Tab Take 500 mg by mouth daily.     mirtazapine (REMERON) 15 MG tablet 7.5 mg by G-tube route at bedtime.     ondansetron (ZOFRAN-ODT) 4 MG disintegrating tablet 4 mg by G-tube route every 6 (six) hours as needed for nausea.     oxyCODONE (ROXICODONE) 5 MG immediate release tablet Take 5-10 mg by mouth every 4 (four) hours as needed for pain.     polyethylene glycol (MIRALAX) 17 gram packet 17 g by  "G-tube route daily as needed.      sennosides (SENNOSIDES) 8.8 mg/5 mL Syrp syrup 6.5 mg by G-tube route daily as needed.      sucralfate (CARAFATE) 100 mg/mL suspension Take 1 g by mouth 4 (four) times a day.     tiotropium (SPIRIVA) 18 mcg inhalation capsule Place 18 mcg into inhaler and inhale daily.     tiotropium (SPIRIVA) 18 mcg inhalation capsule Place 18 mcg into inhaler and inhale daily.     traZODone (DESYREL) 50 MG tablet 25 mg by G-tube route at bedtime as needed for sleep.     ALLERGIES:   Allergies   Allergen Reactions     Sulfa (Sulfonamide Antibiotics) Other (See Comments)      Ototoxicity         Penicillins Rash     DIET: NPO.  She is on tube feeding at 56 mL/h for 18 hours per day.    Vitals:    09/04/18 1504   BP: 96/64   Pulse: 77   Resp: 18   Temp: (!) 96.5  F (35.8  C)   Weight: (!) 88 lb 12.8 oz (40.3 kg)   Height: 4' 11\" (1.499 m)   After being down approximately 17 pounds over the last 4 months or so, weight has been relatively stable on her tube feedings.  Body mass index is 17.94 kg/(m^2).    EXAMINATION:   General: Pleasant, alert, and conversant middle-aged female, looking much older than her stated age, resting in bed and playing [employer] on her smart phone, in no apparent distress.    Head: Normocephalic and atraumatic.  Significant hirsutism.  Eyes: PERRLA, sclerae clear.   ENT: Slightly dry oral mucosa.  Full upper and lower dentures.  She is only wearing the uppers currently.  Hearing is unimpaired.  Cardiovascular: Regular rate and rhythm with no appreciable murmur.  Respiratory: Lung sounds are diminished due to severe kyphosis but otherwise clear.   Abdomen: Soft and nontender.   Musculoskeletal/Extremities: Age-related degenerative joint disease.  Barrel chested with severe thoracic kyphosis.  No peripheral edema.   Neurological: Occasional fine bilateral upper extremity tremor (not noticeable today).  Integument: No rashes, clinically significant lesions, or skin breakdown. "   Cognitive/Psychiatric: Alert and oriented she seems to present with a depressed/flat affect she does a lot of wise cracking during my visit.     DIAGNOSTICS:   No results found for this or any previous visit.  Lab Results   Component Value Date    WBC 5.4 07/06/2018    HGB 11.6 (L) 07/06/2018    HCT 36.8 07/06/2018    MCV 96 07/06/2018     07/06/2018     CrCl cannot be calculated (Patient's most recent sCr result is older than the maximum 5 days allowed.).    ASSESSMENT/Plan:      ICD-10-CM    1. Failure to thrive in adult R62.7    2. Esophageal dysphagia R13.10    3. Hernia of abdominal cavity K46.9    4. Right leg pain M79.604    5. Chronic GERD K21.9    6. Multiple fractures of ribs of left side with nonunion S22.42XK    7. Major depressive disorder F32.9    8. Pulmonary emphysema, unspecified emphysema type (H) J43.9    9. Bowel and bladder incontinence R32     R15.9    10. Coronary artery disease involving native coronary artery of native heart without angina pectoris I25.10      CHANGES:    Try Roxanol 20 mg/mL, 5 mg every 4 hours as needed.  Keep oxycodone on board for now.    CARE PLAN:  The care plan has been reviewed and all orders signed. Changes to care plan, if any, as noted.  Otherwise, continue current plan of care.  Time spent with this patient was approximately 35 minutes, with me that 50% spent in counseling and coordination of care that included assessing her current pain issues discussing with staff the consideration of using alternative pain relief.    The above has been created using voice recognition software. Please be aware that this may unintentionally  produce inaccuracies and/or nonsensical sentences.       Electronically signed by: Ramana Blandon, KAIDEN

## 2021-06-20 NOTE — PROGRESS NOTES
"[facility]    Name:   Rosina Link  : 1945  Facility:   [facility] [[id number]]   Room: 224  Code Status: DNR/DNI -   Fac type:   SNF (Skilled Nursing Facility, TCU) -     CHIEF COMPLAINT / REASON FOR VISIT:  Chief Complaint   Patient presents with     Review Of Multiple Medical Conditions     TCU follow-up after hospitalization for hiatal hernia with GERD and esophagitis, as well as adult failure to thrive.  This was followed by a second hospitalization secondary to a fall that resulted in multiple left rib fractures and a pneumothorax.     [facility] from 18 until 18  [facility] from 18 until 03/10/18  [facility] from 18 until 18  [facility] from 8018 until   [facility] from 18 until 18    Patient was last seen by me on 18.    HPI: Rosina is a 73 y.o. female with a history of severe COPD/pulmonary emphysema, depression, hiatal hernia/GERD, and insomnia.  She underwent surgery in 2016 for a large hiatal hernia (total gastric herniation) that included laparoscopic repair with mesh and Gorge fundoplication.        Hospitalization 2018: She had suffered a 60 pound unintentional weight loss prior to that, dropping from about 160 pounds down to 95 pounds.  There was a brief period of weight gain after that, but she eventually returned to the lower weight.  She developed progressive weakness and inability to eat much.  She would take several bites and then feel full.  She was so weak upon her admission to [facility] in 2018 that she could hardly walk.      She had had a CT scan on 18 that showed of her stomach back in her chest.  She was supposed to see Dr. De Jesus at on 18 to review pictures and to discuss options.  The CT scan described \"small amount of scarring and residual consolidation in the paramedial right lower lobe at " "the site of resolving pneumonia.\"  Chest x-rays performed on 02/16/18 showed \"right lower lobe pneumonia with small parapneumonic effusion.  The amount of infiltrate in the right lower lobe appeared increased from the CT examination.\"  She was admitted for community-acquired pneumonia.  She had no fevers or leukocytosis.      Hospitalization June 2018: More recently, she was seen by Dr. Vasquez on 05/18/18 for dysphagia, nausea, and early satiety with weight loss.  An EGD with general anesthesia was planned at some point with consideration for repair of hiatal hernia and redo partial fundoplication.    She presented to [facility] ER on 06/05/18 with weakness, anorexia, and constipation.  Constipation eventually resolved without changes to appetite.  Psych was consulted with concern that depression may be contributory, and she was started on low-dose mirtazapine.  Megace was started as an appetite stimulant, and a CT showed a large recurrent hiatal hernia.  A GJ tube placement was suggested, but the patient was felt not to be a candidate for GJ tube per IR due to anatomy.  Therefore, GI and surgery were consulted, and she underwent laparoscopic partial fundoplication takedown repair paraesophageal hernia and placement of gastric jejunostomy tube placement; upper endoscopy by Dr. Daniel De Jesus on 06/12/18.  She was also found to have esophageal candidiasis and was given fluconazole.  The PICC line was placed on 06/13/18 to start TPN.    Overnight (06/14/18 to 06/15/18), rapid response was called due to hypoxemia and tachycardia.  A CT of the chest was negative for pulmonary embolism but showed aspiration pneumonitis.  She was treated with IV antibiotics which ended on 06/23/18.    She underwent EGD and advancement of GJ feeding tube on 06/21/18.  Abdominal x-rays on 06/22/18 showed GJ tube malpositioned, looped in the fundus.  This J-tube repositioned itself and was then being used again.      Of note: An " echocardiogram on 06/15/18 showed moderate pericardial effusion (known pericardial effusion 08/20/17 and 12/20/17).  Cardiology felt likely malnutrition with low protein causing capillary leak as a probable etiology.  Recommendations for observation with no further workup, but a repeat echocardiogram on 06/29/18 showed no change in no hemodynamic effects of effusion.      Hospitalization in July 2018: On 07/7/18, she was getting out of bed early in the day and decided to walk to the bathroom on her own rather than summoned assistance.  On the way to her bathroom, she became dizzy, fell, and struck the side of the garbage can.  She had experienced dizzy spells which have caused falls frequently before.  A CT of the chest, abdomen, and pelvis showed displaced left ninth and 10th rib fractures with a moderate sized pneumothorax and increased moderate pericardial effusion.  A chest tube was placed in the ED.  Trauma surgery evaluated the patient, and the chest tube was removed on 07/10/18.  She remained stable off oxygen with no dyspnea.    Another echocardiogram was done, as the pericardial effusion had possibly increased in size per the CT on admission.  It showed no significant change compared to previously, and there are recommendations for her to follow-up with cardiology in 4 weeks.    A UGI was done, showing persistent partial functional obstruction at the level of the diaphragmatic hiatus, and a strict n.p.o. and tube feedings continued.  She was subsequently discharged back here on 07/12/18.  She was to follow-up with Dr. De Jesus on 08/10/18 for further esophageal evaluation (as described in CURRENT ISSUES).      CURRENT ISSUES    Failure to thrive/esophageal dysphagia/NPO status: Since her stay at the [facility] in February/March 2018, she had dropped 17 pounds.  She is quite cachectic with a BMI of 16-17.   Admitted NPO with tube feedings at 30 mL/h, food was being introduced (regular diet,  "thin liquids, small portions).  Isosource 1.5 is now being administered at 56 mL/h for 18 hours, starting at 1500 and ending at 0900.  Medications continue to be given via feeding tube. She was seen by speech therapy here and appeared to be doing okay; however, we are going to follow guidelines set by Dr. De Jesus at this juncture.  With her current tube feeding, her weight has varied a pound or so in either direction.    As noted, she is to remain NPO until UGI is performed.  She was also followed by speech therapy in the hospital, and her swallow function was deemed intact. She had a follow-up appointment on 07/09/18, but she remains NPO.  She did have a follow-up appointment with Dr. De Jesus on 08/10/18 and was given a new diagnosis of dysphagia, related to her paraesophageal hernia.  More tests are going to be performed, including an upper GI with barium swallow (performed on 08/23/18) and esophageal manometry (scheduled for 09/06/18) .  She is to remain strict NPO. with tube feedings through the J port.  She was told that surgery would not be appropriate for her but dilatation might.    Unfortunately, the last test, esophageal manometry, did not occur on the scheduled date.  Staff where the procedure was to be done noted that she was n.p.o., somehow not realizing that the procedure was to determine whether she would continue to be so.  After a lengthy drive to the appointment, she came back in a very angry mood.  They did offer to bring her back the same day, but she declined.    She complained that her back \"hurts so bad\" from the trip for the procedure that did not occur.  This test has been rescheduled, but I am uncertain whether it is for 10/01/18 or 10/31/18.    GERD: She has been complaining of heartburn despite being on lansoprazole 30 mg daily.  She previously had orders for Maalox as needed, and she was not taking it.  We scheduled that while awake, keeping with 4 times daily dosing.  " "Epigastric/suprasternal heartburn was described as \"terrible, terrible, terrible.\"  She told me it was all right for some time, but it had returned.  We added sucralfate 1 g 4 times daily and a stool check for H pylori (negative).  This has been quite effective, and there are no further complaints    Depression: She does admit to being depressed \"some days.\"  She is on mirtazapine and duloxetine.    She is quite upbeat and joking today she has for the past several visits (despite problems with her last test).     COPD: She does have COPD/pulmonary emphysema, is barrel-chested, and is on a variety of inhalers.      She denies dyspnea, coughing, or chest pain.    Urinary incontinence: She does have stress incontinence and is checked by nursing staff during the night.  She stated at an earlier visit, \"every time he turned around, I'm peeing in my britches.\"  She finds it very hard to control, stating that she does not realize it until after it starts.  There is some nocturia as well.  None of this has changed since earlier visits.    Bowel incontinence: A few weeks back, she had developed loose, incontinent stools. Her tube feeding formula had been recently changed, and despite the loose stools, she was receiving scheduled Colace and PRN senna and MiraLAX.  When addressed with staff, this was attributed to poor communication during shift report, so we discontinued all of her bowel medications.  The problem persisted until we replaced psyllium with a 500 mg tab of methylcellulose daily.  Despite resolution of her loose stools, she is still incontinent of bowel.  She previously stated, \"I turn on my lights, but they don't get here in time.\"    Rib fractures: No longer has any significant left-sided rib cage pain (receiving Lidoderm patches for some tenderness), although she typically sleeps on her right side.    Pain management: She does have right leg pain that she describes feeling like a cramp, and I asked her about " "her oxycodone.  She stated that it would normally help her (when given orally), but by tube she claims she is not getting the full effect.  After further discussion, I suggested we try Roxanol, something she can take sublingually.  We simply held the oxycodone rather than discontinue it.  As yet, it is uncertain how effective this change is.  She occasionally has pain around the site of her PEG tube if it is not taped down correctly.    Confusion (brief episode): On 07/23/18 staff report that she became confused and agitated, requesting to be taken back to her old room and to the \"real [place]\".  When addressed with patient, she admited to feeling disoriented, says \"I thought I was imagining things and not at [place]\".  This problem has resolved, and she is oriented and appropriate, pleasant and conversant throughout each visit.     Discharge planning: According to nursing staff, she is doing better than she lets on.  She tells me her daughter's house, where she can stay, is very messy, and she is a hoarder.  For now, at least, she would prefer to stay here but, down the road, she would like to go to an assisted living facility.  She has told me that one daughter is looking into this.    Right now, nursing staff tell me she needs to be up and out of her room more often.      ROS: No complaints whatsoever today, other than about frequent loose stools (most likely due to her tube feedings), something that will resolve when she can take food orally.  She sleeps well only at times.  She is on trazodone 25 mg nightly.  She denies any headaches or chest pains, coughing or congestion, nausea or vomiting,  dizziness or dyspnea, dysuria, difficulty chewing or swallowing, integumentary issues.      Past Medical History:   Diagnosis Date     Acute encephalopathy      Aperistalsis of esophagus      Arthritis      Compression fracture of lumbar vertebra (H)     L2     COPD (chronic obstructive pulmonary disease) (H)      " Depression      Dyslipidemia      Encephalopathy      Esophageal candidiasis (H)      Failure to thrive in adult      Fall      GERD (gastroesophageal reflux disease)      Hiatal hernia      HLD (hyperlipidemia)      HTN (hypertension)      Hypocalcemia      Hypomagnesemia      Hypoxia      Insomnia      Lactic acidosis      Major depressive disorder      Malnutrition (H)      Maternal MCV (mean corpuscular volume) low      Microcytic anemia      Microcytic hypochromic anemia 01/2013     Osteoporosis      Pericardial effusion      PMR (polymyalgia rheumatica) (H)      Pneumonia      Pneumothorax      Positive urine drug screen 10/2012    meth and coke positive 10/12. negative in 11/19/12     Pulmonary emphysema (H)      Rib fracture      S/P laparoscopic fundoplication      SIRS (systemic inflammatory response syndrome) (H)      Thrombocytopenia (H)      Trigger finger     left hand, middle finger     Underweight      Urinary urgency               Family History   Problem Relation Age of Onset     Diabetes Mother      Cancer Mother      lung     Hypertension Mother      Emphysema Father      Heart disease Father      Hypertension Father      No Medical Problems Brother      Breast cancer Maternal Aunt      Social History     Social History     Marital status: Single     Spouse name: N/A     Number of children: N/A     Years of education: N/A     Social History Main Topics     Smoking status: Former Smoker     Packs/day: 1.50     Years: 30.00     Types: Cigarettes     Quit date: 8/10/1991     Smokeless tobacco: Never Used     Alcohol use No     Drug use: No     Sexual activity: Not Currently     Other Topics Concern     Not on file     Social History Narrative     MEDICATIONS: Reviewed from the MAR, physician orders, and earlier progress notes.    Current Outpatient Prescriptions   Medication Sig     acetaminophen (TYLENOL) 650 mg/20.3 mL Soln 650 mg by G-tube route every 4 (four) hours as needed.     albuterol  (PROVENTIL HFA;VENTOLIN HFA) 90 mcg/actuation inhaler Inhale 2 puffs 4 (four) times a day as needed for wheezing.      aluminum-magnesium hydroxide-simethicone (MAALOX ADVANCED) 200-200-20 mg/5 mL Susp 20 mL by G-tube route 4 (four) times a day.      aspirin 81 mg chewable tablet 81 mg by G-tube route daily.     aspirin 81 MG EC tablet 81 mg by G-tube route daily.      atorvastatin (LIPITOR) 20 MG tablet 20 mg by G-tube route daily.      budesonide-formoterol (SYMBICORT) 160-4.5 mcg/actuation inhaler Inhale 2 puffs 2 (two) times a day.     calcium carbonate 500 mg/5 mL (1,250 mg/5 mL) suspension 600 mg by G-tube route daily.     cholecalciferol, vitamin D3, (VITAMIN D3 ORAL) Take by mouth daily. Vitamin D3 (D-VI-SOL)  liquid; 400U; amt: 1ml (400units); gastric tube  Once A Day;     cholecalciferol, vitamin D3, 2,000 unit cap 2,000 capsules by G-tube route daily.      cholecalciferol, vitamin D3, 400 unit/5 mL Liqd 400 Units by G-tube route daily.     DULoxetine (CYMBALTA) 60 MG capsule 60 mg by G-tube route daily.      ferrous sulfate 220 mg (44 mg iron)/5 mL solution Take by mouth daily. FERROUS SULFATE Rosina Guo  solution; 220mg (44mg iron) / 5ml; amt: 7.4ml; gastric tube  Special Instructions: anorexia  Once A Day;     ferrous sulfate 300 mg (60 mg iron)/5 mL syrup 325 mg by G-tube route daily.     lactose-reduced food/fiber (ISOSOURCE 1.5 JASPREET ENTERAL TUBE) by Enteral Tube route. Isosource 1.5 continuous at goal rate of 40ml/hr Rosina Guo  Special Instructions: tube feeding  Goal per day 960ml/day  Every Shift;     lansoprazole (PREVACID SOLUTAB) 30 MG disintegrating tablet 30 mg by G-tube route daily.     lidocaine (LIDODERM) 5 % Place 1 patch on the skin 2 (two) times a day. Remove & Discard patch within 12 hours or as directed by MD sienna Guo  adhesive patch,medicated; 5 %; topical  Special Instructions: apply to painful area of skin  dx: closed fracture of multiple ribs of  "left side  Twice A Day;     methylcellulose (CITRUCEL) 500 mg Tab Take 500 mg by mouth daily.     mirtazapine (REMERON) 15 MG tablet 7.5 mg by G-tube route at bedtime.     morphine 100 mg/5 mL (20 mg/mL) concentrated solution Take 5 mg by mouth every 4 (four) hours as needed for pain.     ondansetron (ZOFRAN-ODT) 4 MG disintegrating tablet 4 mg by G-tube route every 6 (six) hours as needed for nausea.     oxyCODONE (ROXICODONE) 5 MG immediate release tablet Take 5-10 mg by mouth every 4 (four) hours as needed for pain.     polyethylene glycol (MIRALAX) 17 gram packet 17 g by G-tube route daily as needed.      sennosides (SENNOSIDES) 8.8 mg/5 mL Syrp syrup 6.5 mg by G-tube route daily as needed.      sucralfate (CARAFATE) 100 mg/mL suspension Take 1 g by mouth 4 (four) times a day.     tiotropium (SPIRIVA) 18 mcg inhalation capsule Place 18 mcg into inhaler and inhale daily.     tiotropium (SPIRIVA) 18 mcg inhalation capsule Place 18 mcg into inhaler and inhale daily.     traZODone (DESYREL) 50 MG tablet 25 mg by G-tube route at bedtime as needed for sleep.     ALLERGIES:   Allergies   Allergen Reactions     Sulfa (Sulfonamide Antibiotics) Other (See Comments)      Ototoxicity         Penicillins Rash     DIET: NPO.  She is on tube feeding at 56 mL/h for 18 hours per day.    Vitals:    09/11/18 1321   BP: 95/80   Pulse: 76   Resp: 20   Temp: (!) 96.3  F (35.7  C)   SpO2: 92%   Weight: (!) 88 lb 12.8 oz (40.3 kg)   Height: 4' 11\" (1.499 m)   After being down approximately 17 pounds over the last 4 months or so, weight has been relatively stable on her tube feedings.  Body mass index is 17.94 kg/(m^2).    EXAMINATION:   General: Pleasant, alert, and conversant middle-aged female, looking much older than her stated age, resting in bed and playing [id number] on her smart phone, in no apparent distress.    Head: Normocephalic and atraumatic.  Significant hirsutism.  Eyes: PERRLA, sclerae clear.   ENT: Slightly dry oral " mucosa.  Full upper and lower dentures.  She is only wearing the uppers currently.  Hearing is unimpaired.  Cardiovascular: Regular rate and rhythm with no appreciable murmur.  Respiratory: Lung sounds are diminished due to severe kyphosis but otherwise clear.   Abdomen: Soft and nontender.   Musculoskeletal/Extremities: Age-related degenerative joint disease.  Barrel chested with severe thoracic kyphosis.  No peripheral edema.   Neurological: Occasional fine bilateral upper extremity tremor (not noticeable today).  Integument: No rashes, clinically significant lesions, or skin breakdown.   Cognitive/Psychiatric: Alert and oriented she seems to present with a depressed/flat affect she does a lot of wise cracking during my visit.     DIAGNOSTICS:   No results found for this or any previous visit.  Lab Results   Component Value Date    WBC 5.4 07/06/2018    HGB 11.6 (L) 07/06/2018    HCT 36.8 07/06/2018    MCV 96 07/06/2018     07/06/2018     CrCl cannot be calculated (Patient's most recent sCr result is older than the maximum 5 days allowed.).    ASSESSMENT/Plan:      ICD-10-CM    1. Esophageal dysphagia R13.10    2. Hernia of abdominal cavity K46.9    3. Failure to thrive in adult R62.7    4. Chronic GERD K21.9    5. 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 Major depressive disorder F32.9    7. Pulmonary emphysema, unspecified emphysema type (H) J43.9    8. Bowel and bladder incontinence R32     R15.9    9. Coronary artery disease involving native coronary artery of native heart without angina pectoris I25.10      CHANGES:    None.    CARE PLAN:  The care plan has been reviewed and all orders signed. Changes to care plan, if any, as noted.  Otherwise, continue current plan of care.      The above has been created using voice recognition software. Please be aware that this may unintentionally  produce inaccuracies and/or nonsensical sentences.       Electronically signed by: Ramana  Sundeep Blandon, CNP

## 2021-06-20 NOTE — PROGRESS NOTES
"[facility]    Name:   Rosina Link  : 1945  Facility:   [facility] [[id number]]   Room: 224  Code Status: DNR/DNI -   Fac type:   SNF (Skilled Nursing Facility, TCU) -     CHIEF COMPLAINT / REASON FOR VISIT:  Chief Complaint   Patient presents with     Review Of Multiple Medical Conditions     TCU follow-up after hospitalization for hiatal hernia with GERD and esophagitis, as well as adult failure to thrive.  This was followed by a second hospitalization secondary to patient fall with multiple left rib fractures, resulting in a pneumothorax.     [facility] from 18 until 18  [facility] from 18 until 03/10/18  [facility] from 18 until 18  [facility] from 8018 until   [facility] from 18 until 18    Patient was last seen by me on 18.    HPI: Rosina is a 73 y.o. female with a history of severe COPD/pulmonary emphysema, depression, hiatal hernia/GERD, and insomnia.  She underwent surgery in 2016 for a large hiatal hernia (total gastric herniation) that included laparoscopic repair with mesh and Gorge fundoplication.        Hospitalization 2018: She had suffered a 60 pound unintentional weight loss prior to that, dropping from about 160 pounds down to 95 pounds.  There was a brief period of weight gain after that, but she eventually returned to the lower weight.  She developed progressive weakness and inability to eat much.  She would take several bites and then feel full.  She was so weak upon her admission to [facility] in 2018 that she could hardly walk.      She had had a CT scan on 18 that showed of her stomach back in her chest.  She was supposed to see Dr. De Jesus at on 18 to review pictures and to discuss options.  The CT scan described \"small amount of scarring and residual consolidation in the paramedial right lower " "lobe at the site of resolving pneumonia.\"  Chest x-rays performed on 02/16/18 showed \"right lower lobe pneumonia with small parapneumonic effusion.  The amount of infiltrate in the right lower lobe appeared increased from the CT examination.\"  She was admitted for community-acquired pneumonia.  She had no fevers or leukocytosis.      Hospitalization June 2018: More recently, she was seen by Dr. Vasquez on 05/18/18 for dysphagia, nausea, and early satiety with weight loss.  An EGD with general anesthesia was planned at some point with consideration for repair of hiatal hernia and redo partial fundoplication.    She presented to [facility] ER on 06/05/18 with weakness, anorexia, and constipation.  Constipation eventually resolved without changes to appetite.  Psych was consulted with concern that depression may be contributory, and she was started on low-dose mirtazapine.  Megace was started as an appetite stimulant, and a CT showed a large recurrent hiatal hernia.  A GJ tube placement was suggested, but the patient was felt not to be a candidate for GJ tube per IR due to anatomy.  Therefore, GI and surgery were consulted, and she underwent laparoscopic partial fundoplication takedown repair paraesophageal hernia and placement of gastric jejunostomy tube placement; upper endoscopy by Dr. Daniel De Jesus on 06/12/18.  She was also found to have esophageal candidiasis and was given fluconazole.  The PICC line was placed on 06/13/18 to start TPN.    Overnight (06/14/18 to 06/15/18), rapid response was called due to hypoxemia and tachycardia.  A CT of the chest was negative for pulmonary embolism but showed aspiration pneumonitis.  She was treated with IV antibiotics which ended on 06/23/18.    She underwent EGD and advancement of GJ feeding tube on 06/21/18.  Abdominal x-rays on 06/22/18 showed GJ tube malpositioned, looped in the fundus.  This J-tube repositioned itself, so it is now being used again.      Of note: " An echocardiogram on 06/15/18 showed moderate pericardial effusion (known pericardial effusion 08/20/17 and 12/20/17).  Cardiology felt likely malnutrition with low protein causing capillary leak as a probable etiology.  Recommendations for observation with no further workup, but a repeat echocardiogram on 06/29/18 showed no change in no hemodynamic effects of effusion.      Hospitalization in July 2018: On 07/7/18, she was getting out of bed early in the day and decided to walk to the bathroom on her own rather than summoned assistance.  On the way to her bathroom, she became dizzy, fell, and struck the side of the garbage can.  She had experienced dizzy spells which have caused falls frequently before.  A CT of the chest, abdomen, and pelvis showed displaced left ninth and 10th rib fractures with a moderate sized pneumothorax and increased moderate pericardial effusion.  A chest tube was placed in the ED.  Trauma surgery evaluated the patient, and the chest tube was removed on 07/10/18.  She remained stable off oxygen with no dyspnea.    Another echocardiogram was done, as the pericardial effusion had possibly increased in size per the CT on admission.  It showed no significant change compared to previously, and there are recommendations for her to follow-up with cardiology in 4 weeks.    A UGI was done, showing persistent partial functional obstruction at the level of the diaphragmatic hiatus, and a strict n.p.o. and tube feedings continued.  She is to follow-up with Dr. De Jesus on 08/10/18 for further esophageal evaluation.  She was subsequently discharged back here on 07/12/18.      CURRENT ISSUES    Failure to thrive/esophageal dysphagia/NPO status: Since her stay at the [facility]CU in February/March 2018, she had dropped 17 pounds.  She is quite cachectic with a BMI of 16-17.   Admitted NPO with tube feedings at 30 mL/h, food was being introduced (regular diet, thin liquids, small portions).   "Isosource 1.5 is now being administered at 56 mL/h for 18 hours, starting at 1500 and ending at 0900.  Medications continue to be given via feeding tube. She was seen by speech therapy here and appeared to be doing okay; however, we are going to follow guidelines set by Dr. De Jesus at this juncture.  With her current tube feeding, her weight has varied a pound or so in either direction.    As noted, she is to remain NPO until UGI is performed.  She was also followed by speech therapy in the hospital, and her swallow function was deemed intact. She had a follow-up appointment on 07/09/18, but she remains NPO.  She did have a follow-up appointment with Dr. De Jesus on 08/10/18 and was given a new diagnosis of dysphagia, related to her paraesophageal hernia.  More tests are going to be performed, including an upper GI with barium swallow (performed on 08/23/18) and esophageal manometry (scheduled for 09/06/18) .  She is to remain strict NPO. with tube feedings through the J port.  She was told that surgery would not be appropriate for her but dilatation might.    GERD: She has been complaining of heartburn despite being on lansoprazole 30 mg daily.  She previously had orders for Maalox as needed, and she was not taking it.  We scheduled that while awake, keeping with 4 times daily dosing.  Epigastric/suprasternal heartburn was described as \"terrible, terrible, terrible.\"  She told me it was all right for some time, but it had returned.  We added sucralfate 1 g 4 times daily and a stool check for H pylori (negative).  This has been quite effective, and there are no further complaints    Depression: She does admit to being depressed \"some days.\"  She is on mirtazapine and duloxetine.    She is quite upbeat and joking today.    COPD: She does have COPD/pulmonary emphysema, is barrel-chested, and is on a variety of inhalers.      She denies dyspnea, coughing, or chest pain.    Urinary incontinence: She does have stress " "incontinence and is checked by nursing staff during the night.  She stated at an earlier visit, \"every time he turned around, I'm peeing in my britches.\"  She finds it very hard to control, stating that she does not realize it until after it starts.  There is some nocturia as well.  None of this has changed since earlier visits.    Bowel incontinence: A few weeks back, she had developed loose, incontinent stools. Her tube feeding formula had been recently changed, and despite the loose stools, she was receiving scheduled Colace and PRN senna and MiraLAX.  When addressed with staff, this was attributed to poor communication during shift report, so we discontinued all of her bowel medications.  The problem persisted until we replaced psyllium with a 500 mg tab of methylcellulose daily.  Despite resolution of her loose stools, she is still incontinent of bowel.  She previously stated, \"I turn on my lights, but they don't get here in time.\"    Rib fractures: No longer has any significant left-sided rib cage pain (receiving Lidoderm patches for some tenderness), although she typically sleeps on her right side.    Confusion (brief episode): On 07/23/18 staff report that she became confused and agitated, requesting to be taken back to her old room and to the \"real [place]\".  When addressed with patient, she admited to feeling disoriented, says \"I thought I was imagining things and not at [place]\".  This problem has resolved, and she is oriented and appropriate, pleasant and conversant throughout each visit.     Discharge planning: According to nursing staff, she is doing better than she lets on.  She tells me her daughter's house, where she can stay, is very messy, and she is a hoarder.  For now, at least, she would prefer to stay here but, down the road, she would like to go to an assisted living facility.  She has told me that one daughter is looking into this.      ROS: No complaints whatsoever today, other than " about frequent loose stools, something that will resolve when she can take food orally.  She sleeps well only at times.  She is on trazodone 25 mg nightly.  She denies any headaches or chest pains, coughing or congestion, nausea or vomiting,  dizziness or dyspnea, dysuria, difficulty chewing or swallowing, integumentary issues.      Past Medical History:   Diagnosis Date     Acute encephalopathy      Aperistalsis of esophagus      Arthritis      Compression fracture of lumbar vertebra (H)     L2     COPD (chronic obstructive pulmonary disease) (H)      Depression      Dyslipidemia      Encephalopathy      Esophageal candidiasis (H)      Failure to thrive in adult      Fall      GERD (gastroesophageal reflux disease)      Hiatal hernia      HLD (hyperlipidemia)      HTN (hypertension)      Hypocalcemia      Hypomagnesemia      Hypoxia      Insomnia      Lactic acidosis      Major depressive disorder      Malnutrition (H)      Maternal MCV (mean corpuscular volume) low      Microcytic anemia      Microcytic hypochromic anemia 01/2013     Osteoporosis      Pericardial effusion      PMR (polymyalgia rheumatica) (H)      Pneumonia      Pneumothorax      Positive urine drug screen 10/2012    meth and coke positive 10/12. negative in 11/19/12     Pulmonary emphysema (H)      Rib fracture      S/P laparoscopic fundoplication      SIRS (systemic inflammatory response syndrome) (H)      Thrombocytopenia (H)      Trigger finger     left hand, middle finger     Underweight      Urinary urgency               Family History   Problem Relation Age of Onset     Diabetes Mother      Cancer Mother      lung     Hypertension Mother      Emphysema Father      Heart disease Father      Hypertension Father      No Medical Problems Brother      Breast cancer Maternal Aunt      Social History     Social History     Marital status: Single     Spouse name: N/A     Number of children: N/A     Years of education: N/A     Social History Main  Topics     Smoking status: Former Smoker     Packs/day: 1.50     Years: 30.00     Types: Cigarettes     Quit date: 8/10/1991     Smokeless tobacco: Never Used     Alcohol use No     Drug use: No     Sexual activity: Not Currently     Other Topics Concern     Not on file     Social History Narrative     MEDICATIONS: Reviewed from the MAR, physician orders, and earlier progress notes.  Current Outpatient Prescriptions   Medication Sig     acetaminophen (TYLENOL) 650 mg/20.3 mL Soln 650 mg by G-tube route every 4 (four) hours as needed.     albuterol (PROVENTIL HFA;VENTOLIN HFA) 90 mcg/actuation inhaler Inhale 2 puffs 4 (four) times a day as needed for wheezing.      aluminum-magnesium hydroxide-simethicone (MAALOX ADVANCED) 200-200-20 mg/5 mL Susp 20 mL by G-tube route 4 (four) times a day.      aspirin 81 mg chewable tablet 81 mg by G-tube route daily.     aspirin 81 MG EC tablet 81 mg by G-tube route daily.      atorvastatin (LIPITOR) 20 MG tablet 20 mg by G-tube route daily.      budesonide-formoterol (SYMBICORT) 160-4.5 mcg/actuation inhaler Inhale 2 puffs 2 (two) times a day.     calcium carbonate 500 mg/5 mL (1,250 mg/5 mL) suspension 600 mg by G-tube route daily.     cholecalciferol, vitamin D3, (VITAMIN D3 ORAL) Take by mouth daily. Vitamin D3 (D-VI-SOL)  liquid; 400U; amt: 1ml (400units); gastric tube  Once A Day;     cholecalciferol, vitamin D3, 2,000 unit cap 2,000 capsules by G-tube route daily.      cholecalciferol, vitamin D3, 400 unit/5 mL Liqd 400 Units by G-tube route daily.     DULoxetine (CYMBALTA) 60 MG capsule 60 mg by G-tube route daily.      ferrous sulfate 220 mg (44 mg iron)/5 mL solution Take by mouth daily. FERROUS SULFATE Rosina M Karlinski  solution; 220mg (44mg iron) / 5ml; amt: 7.4ml; gastric tube  Special Instructions: anorexia  Once A Day;     ferrous sulfate 300 mg (60 mg iron)/5 mL syrup 325 mg by G-tube route daily.     lactose-reduced food/fiber (ISOSOURCE 1.5 JASPREET ENTERAL TUBE) by  "Enteral Tube route. Isosource 1.5 continuous at goal rate of 40ml/hr Rosina Guo  Special Instructions: tube feeding  Goal per day 960ml/day  Every Shift;     lansoprazole (PREVACID SOLUTAB) 30 MG disintegrating tablet 30 mg by G-tube route daily.     lidocaine (LIDODERM) 5 % Place 1 patch on the skin 2 (two) times a day. Remove & Discard patch within 12 hours or as directed by MD sienna Guo  adhesive patch,medicated; 5 %; topical  Special Instructions: apply to painful area of skin  dx: closed fracture of multiple ribs of left side  Twice A Day;     methylcellulose (CITRUCEL) 500 mg Tab Take 500 mg by mouth daily.     mirtazapine (REMERON) 15 MG tablet 7.5 mg by G-tube route at bedtime.     ondansetron (ZOFRAN-ODT) 4 MG disintegrating tablet 4 mg by G-tube route every 6 (six) hours as needed for nausea.     oxyCODONE (ROXICODONE) 5 MG immediate release tablet Take 5-10 mg by mouth every 4 (four) hours as needed for pain.     polyethylene glycol (MIRALAX) 17 gram packet 17 g by G-tube route daily as needed.      sennosides (SENNOSIDES) 8.8 mg/5 mL Syrp syrup 6.5 mg by G-tube route daily as needed.      sucralfate (CARAFATE) 100 mg/mL suspension Take 1 g by mouth 4 (four) times a day.     tiotropium (SPIRIVA) 18 mcg inhalation capsule Place 18 mcg into inhaler and inhale daily.     tiotropium (SPIRIVA) 18 mcg inhalation capsule Place 18 mcg into inhaler and inhale daily.     traZODone (DESYREL) 50 MG tablet 25 mg by G-tube route at bedtime as needed for sleep.     ALLERGIES:   Allergies   Allergen Reactions     Sulfa (Sulfonamide Antibiotics) Other (See Comments)      Ototoxicity         Penicillins Rash     DIET: NPO.  She is on tube feeding at 56 mL/h for 18 hours per day.    Vitals:    08/28/18 1336   BP: 108/76   Pulse: 74   Resp: 20   Temp: (!) 96.5  F (35.8  C)   SpO2: 92%   Weight: (!) 88 lb 6.4 oz (40.1 kg)   Height: 4' 11\" (1.499 m)   After being down approximately 17 pounds over " the last 4 months or so, weight has been relatively stable on her tube feedings.  Body mass index is 17.85 kg/(m^2).    EXAMINATION:   General: Pleasant, alert, and conversant middle-aged female, looking much older than her stated age, resting in bed and playing [employer] on her smart phone, in no apparent distress.    Head: Normocephalic and atraumatic.  Significant hirsutism.  Eyes: PERRLA, sclerae clear.   ENT: Slightly dry oral mucosa.  Full upper and lower dentures.  She is only wearing the uppers currently.  Hearing is unimpaired.  Cardiovascular: Regular rate and rhythm with no appreciable murmur.  Respiratory: Lung sounds are diminished due to severe kyphosis but otherwise clear.   Abdomen: Soft and nontender.   Musculoskeletal/Extremities: Age-related degenerative joint disease.  Barrel chested with severe thoracic kyphosis.  No peripheral edema.   Neurological: Fine bilateral upper extremity tremor.  Integument: No rashes, clinically significant lesions, or skin breakdown.   Cognitive/Psychiatric: Alert and oriented she seems to present with a depressed/flat affect she does a lot of wise cracking during my visit.     DIAGNOSTICS:   No results found for this or any previous visit.  Lab Results   Component Value Date    WBC 5.4 07/06/2018    HGB 11.6 (L) 07/06/2018    HCT 36.8 07/06/2018    MCV 96 07/06/2018     07/06/2018     CrCl cannot be calculated (Patient's most recent sCr result is older than the maximum 5 days allowed.).    ASSESSMENT/Plan:      ICD-10-CM    1. Failure to thrive in adult R62.7    2. Esophageal dysphagia R13.10    3. Hernia of abdominal cavity K46.9    4. Chronic GERD K21.9    5. 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 Major depressive disorder F32.9    7. Pulmonary emphysema, unspecified emphysema type (H) J43.9    8. Bowel and bladder incontinence R32     R15.9    9. Coronary artery disease involving native coronary artery of native heart without  angina pectoris I25.10      CHANGES:    None.    CARE PLAN:  The care plan has been reviewed and all orders signed. Changes to care plan, if any, as noted.  Otherwise, continue current plan of care.      The above has been created using voice recognition software. Please be aware that this may unintentionally  produce inaccuracies and/or nonsensical sentences.       Electronically signed by: Ramana Blandon CNP

## 2021-06-21 NOTE — LETTER
Letter by Rosina Guo MD at      Author: Rosina Guo MD Service: -- Author Type: --    Filed:  Encounter Date: 4/30/2021 Status: (Other)         Patient: Rosina Link   MR Number: [id number]   YOB: 1945   Date of Visit: 4/30/2021              [facility]    Facility:   [facility] [[id number]]   Code Status: DNR/DNI       Chief Complaint   Patient presents with   ? Review Of Multiple Medical Conditions     LTC 4/30/2021. COPD. GERD.       HPI:   Rosina is a 75 y.o. female who resides in LTC at [facility]. She has hx of COPD, HTN, hiatal hernia, GERD, anemia, PMR, TERESA, pancreatitis, gastric obstruction and FTT previously on feeding tube (discontinued 8/2019). She was sent to the hospital on 2/28/20 from a planned routine cardiology visit due to cough, dyspnea and hypoxia. She was diagnosed with hypoxemic respiratory failure, needed oxygen, had bronchoscopy which opened left upper lobe with some improvement but unable to be weaned. Repeat bronch with extensive mucous plugging. Subsequent resp failure requiring CPAP during which briefly unresponsive. She did not want to comply with treatments and ultimately moved to comfort based approach, returning to LTC on 3/16/20 on [facility]. Subsequent stabilization over time and was discharged from Hospice eff 9/11/2020.      Today:  She has lis stable. Meds reviewed, she is on Symbicort for COPD, no reported respiratory concerns. She denies fever, cough, shortness of breath or wheezing. She is on omeprazole for GERD with increase in sx in March, no complaints today. It is noted she likes to eat chips, doritos, drinks pepsi etc. She denies abdominal pain, nausea, vomiting or diarrhea. Per nurses charting refuses toileting and doesn't like to walk. It is unclear if she is able to ambulate well. She is on venlafaxine for depression, denies any mood concerns.       Past Medical History:  Past  Medical History:   Diagnosis Date   ? Acute encephalopathy    ? Acute on chronic respiratory failure (H)    ? Aperistalsis of esophagus    ? Arthritis    ? Compression fracture of lumbar vertebra (H)     L2   ? COPD (chronic obstructive pulmonary disease) (H)    ? Depression    ? Dyslipidemia    ? Encephalopathy    ? Esophageal candidiasis (H)    ? Esophagitis    ? Failure to thrive in adult    ? Fall    ? GERD (gastroesophageal reflux disease)    ? Hiatal hernia    ? HLD (hyperlipidemia)    ? HTN (hypertension)    ? Hypocalcemia    ? Hypomagnesemia    ? Hypoxia    ? Insomnia    ? Lactic acidosis    ? Major depressive disorder    ? Malnutrition (H)    ? Maternal MCV (mean corpuscular volume) low    ? Microcytic anemia    ? Microcytic hypochromic anemia 01/2013   ? Mucus plugging of bronchi    ? Osteoporosis    ? Pericardial effusion    ? PMR (polymyalgia rheumatica) (H)    ? Pneumonia    ? Pneumothorax    ? Positive urine drug screen 10/2012    meth and coke positive 10/12. negative in 11/19/12   ? Pulmonary emphysema (H)    ? Rib fracture    ? S/P laparoscopic fundoplication    ? SIRS (systemic inflammatory response syndrome) (H)    ? Thrombocytopenia (H)    ? Trigger finger     left hand, middle finger   ? Underweight    ? Urinary urgency        Medications:  Current Outpatient Medications   Medication Sig Note   ? acetaminophen (TYLENOL) 325 MG tablet Take 650 mg by mouth every 4 (four) hours as needed for pain.    ? aluminum-magnesium hydroxide-simethicone (MAALOX ADVANCED) 200-200-20 mg/5 mL Susp Take 30 mL by mouth every 4 (four) hours as needed.    ? bisacodyL (DULCOLAX) 10 mg suppository Insert 10 mg into the rectum daily as needed.    ? budesonide-formoterol (SYMBICORT) 160-4.5 mcg/actuation inhaler Inhale 2 puffs 2 (two) times a day.    ? calcium carbonate (OS-JASPREET) 600 mg calcium (1,500 mg) tablet Take 600 mg by mouth daily.    ? omeprazole (PRILOSEC) 10 MG capsule Take 20 mg by mouth daily before  breakfast.  1/7/2020: Reduction from 20 mg to 10 mg daily on 01/07/2020 in attempt to taper off.   ? senna (SENOKOT) 8.6 mg tablet Take 1 tablet by mouth 2 (two) times a day.    ? venlafaxine (EFFEXOR) 37.5 MG tablet Take 37.5 mg by mouth daily.        6/18/2019: Decreased from a 37.5 mg twice daily to 37.5 mg daily on 06/18/2019, an attempt at gradual dose reduction.       Physical Exam:  Note: COVID-19 pandemic precautions in place. Physical exam performed with social distancing considerations.  General: Patient is alert female, no distress.  Vitals: /56, Temp 97.3, Pulse 73, RR 18, O2 sat 93%RA.  HEENT: Head is NCAT. Eyes show no injection or icterus. Nares negative. Oropharynx well hydrated.  Neck: No JVD.  Lungs: Non labored respirations.   : Deferred.  Extremities: No LE edema is noted.  Musculoskeletal: Age related degen changes.   Psych: Mood appears pretty good.      Labs:  Lab Results   Component Value Date    WBC 5.7 01/23/2020    HGB 13.8 01/23/2020    HCT 43.2 01/23/2020    MCV 90 01/23/2020     01/23/2020     Results for orders placed or performed in visit on 12/17/19   Basic Metabolic Panel   Result Value Ref Range    Sodium 139 136 - 145 mmol/L    Potassium 3.5 3.5 - 5.0 mmol/L    Chloride 100 98 - 107 mmol/L    CO2 31 22 - 31 mmol/L    Anion Gap, Calculation 8 5 - 18 mmol/L    Glucose 99 70 - 125 mg/dL    Calcium 9.0 8.5 - 10.5 mg/dL    BUN 16 8 - 28 mg/dL    Creatinine 0.82 0.60 - 1.10 mg/dL    GFR MDRD Af Amer >60 >60 mL/min/1.73m2    GFR MDRD Non Af Amer >60 >60 mL/min/1.73m2         Assessment/Plan:  1. COPD. Continue on Symbicort. She does not require supplemental oxygen. Respiratory status currently stable.   2. Depression. Continue on Venlafaxine.  3. HTN. BPs are satisfactory, not on medications for HTN.   4. GERD. Sx improved with increased omeprazole. Dietary indiscretion contributing.   5. Anemia. Hx of anemia, last checked Jan 2020 at 13.8.    Overall she appears stable,  no new orders needed.         Electronically signed by: Rosina Guo MD

## 2021-06-21 NOTE — PROGRESS NOTES
[facility]    Name:   Rosina Link  : 1945  Facility:   [facility] [[id number]]   Room: 224  Code Status: DNR/DNI -   Fac type:   NF (Long Term Care, LTC) -     CHIEF COMPLAINT / REASON FOR VISIT:  Chief Complaint   Patient presents with     Review Of Multiple Medical Conditions     Follow-up to admission to LTC (transfer from TCU).  She was initially hospitalized for hiatal hernia with GERD and esophageal dysphagia/esophagitis, as well as failure to thrive.  This was followed by a second hospitalization secondary to a fall that resulted in multiple rib fractures and a pneumothorax.     [facility] from 18 until 18  [facility] from 18 until 03/10/18  [facility] from 18 until 18  [facility] from 8018 until   [facility] from 18 until 18    Patient was last seen by me on 10/02/18 and subsequently seen by Dr. Guo on 10/24/18, and admission visit to LTC after a lengthy stay in the TCU.    HPI: Rosina is a 73 y.o. female with a history of severe COPD/pulmonary emphysema, depression, hiatal hernia/GERD, and insomnia.  She underwent surgery in 2016 for a large hiatal hernia (total gastric herniation) that included laparoscopic repair with mesh and Gorge fundoplication.        Hospitalization 2018: She had suffered a 60 pound unintentional weight loss prior to that, dropping from about 160 pounds down to 95 pounds.  There was a brief period of weight gain after that, but she eventually returned to the lower weight.  She developed progressive weakness and inability to eat much.  She would take several bites and then feel full.  She was so weak upon her admission to [facility] in 2018 that she could hardly walk.      She had had a CT scan on 18 that showed of her stomach back in her chest.  She was supposed to see Dr. De Jesus at on 18  "to review pictures and to discuss options.  The CT scan described \"small amount of scarring and residual consolidation in the paramedial right lower lobe at the site of resolving pneumonia.\"  Chest x-rays performed on 02/16/18 showed \"right lower lobe pneumonia with small parapneumonic effusion.  The amount of infiltrate in the right lower lobe appeared increased from the CT examination.\"  She was admitted for community-acquired pneumonia.  She had no fevers or leukocytosis.      Hospitalization June 2018: More recently, she was seen by Dr. Vasquez on 05/18/18 for dysphagia, nausea, and early satiety with weight loss.  An EGD with general anesthesia was planned at some point with consideration for repair of hiatal hernia and redo partial fundoplication.    She presented to [facility] ER on 06/05/18 with weakness, anorexia, and constipation.  Constipation eventually resolved without changes to appetite.  Psych was consulted with concern that depression may be contributory, and she was started on low-dose mirtazapine.  Megace was started as an appetite stimulant, and a CT showed a large recurrent hiatal hernia.  A GJ tube placement was suggested, but the patient was felt not to be a candidate for GJ tube per IR due to anatomy.  Therefore, GI and surgery were consulted, and she underwent laparoscopic partial fundoplication takedown repair paraesophageal hernia and placement of gastric jejunostomy tube placement; upper endoscopy by Dr. Daniel De Jesus on 06/12/18.  She was also found to have esophageal candidiasis and was given fluconazole.  The PICC line was placed on 06/13/18 to start TPN.    Overnight (06/14/18 to 06/15/18), rapid response was called due to hypoxemia and tachycardia.  A CT of the chest was negative for pulmonary embolism but showed aspiration pneumonitis.  She was treated with IV antibiotics which ended on 06/23/18.    She underwent EGD and advancement of GJ feeding tube on 06/21/18.  Abdominal " x-rays on 06/22/18 showed GJ tube malpositioned, looped in the fundus.  This J-tube repositioned itself and was then being used again.      Of note: An echocardiogram on 06/15/18 showed moderate pericardial effusion (known pericardial effusion 08/20/17 and 12/20/17).  Cardiology felt likely malnutrition with low protein causing capillary leak as a probable etiology.  Recommendations for observation with no further workup, but a repeat echocardiogram on 06/29/18 showed no change in no hemodynamic effects of effusion.      Hospitalization in July 2018: On 07/7/18, she was getting out of bed early in the day and decided to walk to the bathroom on her own rather than summoned assistance.  On the way to her bathroom, she became dizzy, fell, and struck the side of the garbage can.  She had experienced dizzy spells which have caused falls frequently before.  A CT of the chest, abdomen, and pelvis showed displaced left ninth and 10th rib fractures with a moderate sized pneumothorax and increased moderate pericardial effusion.  A chest tube was placed in the ED.  Trauma surgery evaluated the patient, and the chest tube was removed on 07/10/18.  She remained stable off oxygen with no dyspnea.    Another echocardiogram was done, as the pericardial effusion had possibly increased in size per the CT on admission.  It showed no significant change compared to previously, and there are recommendations for her to follow-up with cardiology in 4 weeks.    A UGI was done, showing persistent partial functional obstruction at the level of the diaphragmatic hiatus, and a strict n.p.o. and tube feedings continued.  She was subsequently discharged back here on 07/12/18.  She was to follow-up with Dr. De Jesus on 08/10/18 for further esophageal evaluation (as described in CURRENT ISSUES).      CURRENT ISSUES    Failure to thrive/esophageal dysphagia/NPO status: This is the main reason why she is here.  Since her stay at the [facility] in February/March 2018, she had dropped 17 pounds.  She is quite cachectic with a BMI of 16-17.   Admitted NPO with tube feedings at 30 mL/h, food was being introduced (regular diet, thin liquids, small portions).  Isosource 1.5 is now being administered at 56 mL/h for 18 hours, starting at 1500 and ending at 0900.  Medications continue to be given via feeding tube. She was seen by speech therapy here and appeared to be doing okay; however, we are continuing to follow guidelines set by Dr. De Jesus at this juncture.  With her current tube feeding, her weight has varied a pound or so in either direction.    As noted, she is to remain NPO until UGI has been completed and recommendations made.  She was also followed by speech therapy in the hospital, and her swallow function was deemed intact. She had a follow-up appointment on 07/09/18 but remained NPO.  At a follow-up appointment with Dr. De Jesus on 08/10/18, she was given a new diagnosis of dysphagia, related to her paraesophageal hernia.  More tests were performed, including an upper GI with barium swallow (on 08/23/18) and esophageal manometry (performed yesterday, 10/01/18) .  She is to remain strict NPO, pending recommendations, with tube feedings through the J port (It become clogged and was successfully replaced today).  She was told that surgery would not be appropriate for her but dilatation might.    She went to have an EGD done yesterday, but it was canceled because they wanted it done in a hospital due to her high risk.  Apparently, if achalasia is diagnosed, Botox can be used, and it will be readily available in the hospital.  That appointment is now scheduled for 11/12/18.    GERD: She has been complaining of heartburn despite being on lansoprazole 30 mg daily.  She previously had orders for Maalox as needed, and she was not taking it.  We scheduled that while awake, keeping with 4 times daily dosing.  Epigastric/suprasternal heartburn was  "described as \"terrible, terrible, terrible.\"  She told me it had been all right for some time but had returned.  We added sucralfate 1 g 4 times daily and a stool check for H pylori (negative).  This has been quite effective, and there are no further complaints.    Depression: She does admit to being depressed \"some days.\"  She continues on mirtazapine and duloxetine and seems to be doing well on these.    COPD: She does have COPD/pulmonary emphysema, is barrel-chested, and is on a variety of inhalers.  She continues to deny dyspnea, coughing, or chest pain.    Urinary incontinence: She does have stress incontinence and is checked by nursing staff during the night.   She finds it very hard to control, stating that she does not realize it until after it starts.  There is some nocturia as well.  None of this has changed since earlier visits.    Bowel incontinence: Several weeks back, she developed loose, incontinent stools. Her tube feeding formula had been recently changed, and despite the loose stools, she was receiving scheduled Colace and PRN senna and MiraLAX.  When addressed with staff, this was attributed to poor communication during shift report, so we discontinued all of her bowel medications.  The problem persisted until we replaced psyllium with a 500 mg tab of methylcellulose daily.  Despite resolution of her loose stools, she remains incontinent of bowel.      Pain management: She does have occasional right leg pain that she describes feeling like a cramp.  When she stated that it would normally help her (when given orally), but by tube she claims to not be getting the full effect, we started her on sublingual Roxanol.  We simply held the oxycodone initially, but it was eventually discontinued.  She also occasionally has pain around the site of her PEG tube if it is not taped down correctly, but for the most part she is pain-free.      ROS: No complaints whatsoever today, other than about frequent loose " stools (most likely due to her tube feedings), something that will resolve when she can take food orally.  She sleeps well only at times.  She is on trazodone 25 mg nightly.  She denies any headaches or chest pains, coughing or congestion, nausea or vomiting,  dizziness or dyspnea, dysuria, difficulty chewing or swallowing, integumentary issues.      Past Medical History:   Diagnosis Date     Acute encephalopathy      Aperistalsis of esophagus      Arthritis      Compression fracture of lumbar vertebra (H)     L2     COPD (chronic obstructive pulmonary disease) (H)      Depression      Dyslipidemia      Encephalopathy      Esophageal candidiasis (H)      Failure to thrive in adult      Fall      GERD (gastroesophageal reflux disease)      Hiatal hernia      HLD (hyperlipidemia)      HTN (hypertension)      Hypocalcemia      Hypomagnesemia      Hypoxia      Insomnia      Lactic acidosis      Major depressive disorder      Malnutrition (H)      Maternal MCV (mean corpuscular volume) low      Microcytic anemia      Microcytic hypochromic anemia 01/2013     Osteoporosis      Pericardial effusion      PMR (polymyalgia rheumatica) (H)      Pneumonia      Pneumothorax      Positive urine drug screen 10/2012    meth and coke positive 10/12. negative in 11/19/12     Pulmonary emphysema (H)      Rib fracture      S/P laparoscopic fundoplication      SIRS (systemic inflammatory response syndrome) (H)      Thrombocytopenia (H)      Trigger finger     left hand, middle finger     Underweight      Urinary urgency               Family History   Problem Relation Age of Onset     Diabetes Mother      Cancer Mother      lung     Hypertension Mother      Emphysema Father      Heart disease Father      Hypertension Father      No Medical Problems Brother      Breast cancer Maternal Aunt      Social History     Social History     Marital status: Single     Spouse name: N/A     Number of children: N/A     Years of education: N/A      Social History Main Topics     Smoking status: Former Smoker     Packs/day: 1.50     Years: 30.00     Types: Cigarettes     Quit date: 8/10/1991     Smokeless tobacco: Never Used     Alcohol use No     Drug use: No     Sexual activity: Not Currently     Other Topics Concern     Not on file     Social History Narrative     MEDICATIONS: Reviewed from the MAR, physician orders, and earlier progress notes.    Current Outpatient Prescriptions   Medication Sig     acetaminophen (TYLENOL) 650 mg/20.3 mL Soln 650 mg by G-tube route every 4 (four) hours as needed.     albuterol (PROVENTIL HFA;VENTOLIN HFA) 90 mcg/actuation inhaler Inhale 2 puffs 4 (four) times a day as needed for wheezing.      aluminum-magnesium hydroxide-simethicone (MAALOX ADVANCED) 200-200-20 mg/5 mL Susp 20 mL by G-tube route 4 (four) times a day.      aspirin 81 mg chewable tablet 81 mg by G-tube route daily.     atorvastatin (LIPITOR) 20 MG tablet 20 mg by G-tube route daily.      budesonide-formoterol (SYMBICORT) 160-4.5 mcg/actuation inhaler Inhale 2 puffs 2 (two) times a day.     calcium carbonate 500 mg/5 mL (1,250 mg/5 mL) suspension 600 mg by G-tube route daily.     cholecalciferol, vitamin D3, (VITAMIN D3 ORAL) Take by mouth daily. Vitamin D3 (D-VI-SOL)  liquid; 400U; amt: 1ml (400units); gastric tube  Once A Day;     cholecalciferol, vitamin D3, 400 unit/5 mL Liqd 400 Units by G-tube route daily.     DULoxetine (CYMBALTA) 60 MG capsule 60 mg by G-tube route daily.      ferrous sulfate 220 mg (44 mg iron)/5 mL solution Take by mouth daily. FERROUS SULFATE Rosina M Karlinski  solution; 220mg (44mg iron) / 5ml; amt: 7.4ml; gastric tube  Special Instructions: anorexia  Once A Day;     lactose-reduced food/fiber (ISOSOURCE 1.5 JASPREET ENTERAL TUBE) by Enteral Tube route. Isosource 1.5 continuous at goal rate of 40ml/hr Rosina M [place]  Special Instructions: tube feeding  Goal per day 960ml/day  Every Shift;     lansoprazole (PREVACID SOLUTAB) 30  "MG disintegrating tablet 30 mg by G-tube route daily.     methylcellulose (CITRUCEL) 500 mg Tab Take 500 mg by mouth daily.     mirtazapine (REMERON) 15 MG tablet 7.5 mg by G-tube route at bedtime.     morphine 100 mg/5 mL (20 mg/mL) concentrated solution Take 5 mg by mouth every 4 (four) hours as needed for pain.     ondansetron (ZOFRAN-ODT) 4 MG disintegrating tablet 4 mg by G-tube route every 6 (six) hours as needed for nausea.     polyethylene glycol (MIRALAX) 17 gram packet 17 g by G-tube route daily as needed.      sennosides (SENNOSIDES) 8.8 mg/5 mL Syrp syrup 6.5 mg by G-tube route daily as needed.      sucralfate (CARAFATE) 100 mg/mL suspension Take 1 g by mouth 4 (four) times a day.     tiotropium (SPIRIVA) 18 mcg inhalation capsule Place 18 mcg into inhaler and inhale daily.     ALLERGIES:   Allergies   Allergen Reactions     Sulfa (Sulfonamide Antibiotics) Other (See Comments)      Ototoxicity         Penicillins Rash     DIET: NPO.  She is on tube feeding at 56 mL/h for 18 hours per day.    Vitals:    11/08/18 1459   BP: 106/62   Pulse: 69   Resp: 20   Temp: 97.7  F (36.5  C)   SpO2: 95%   Weight: (!) 92 lb 12.8 oz (42.1 kg)   Height: 4' 11\" (1.499 m)   After being down approximately 17 pounds over the last 4 months or so, weight has been relatively stable on her tube feedings.  Body mass index is 18.74 kg/(m^2).    EXAMINATION:   General: Pleasant, alert, and conversant middle-aged female, looking much older than her stated age, sitting in a wheelchair, getting ready to be transferred to the hospital for a GJ tube replacement, in no apparent distress.    Head: Normocephalic and atraumatic.  Significant hirsutism.  Eyes: PERRLA, sclerae clear.   ENT: Slightly dry oral mucosa.  Full upper and lower dentures.  She is only wearing the uppers currently.  Hearing is unimpaired.  Cardiovascular: Sinus tachycardia with no appreciable murmur.  Respiratory: Lung sounds are diminished due to severe kyphosis but " otherwise clear.   Abdomen: Soft and nontender.   Musculoskeletal/Extremities: Age-related degenerative joint disease.  Barrel chested with severe thoracic kyphosis.  No peripheral edema.   Neurological: Occasional fine bilateral upper extremity tremor (not noticeable today).  Integument: No rashes, clinically significant lesions, or skin breakdown.   Cognitive/Psychiatric: Alert and oriented.  Today, she is obviously not feeling well following the procedure.     DIAGNOSTICS:   No results found for this or any previous visit.  Lab Results   Component Value Date    WBC 5.4 07/06/2018    HGB 11.6 (L) 07/06/2018    HCT 36.8 07/06/2018    MCV 96 07/06/2018     07/06/2018     CrCl cannot be calculated (Patient's most recent sCr result is older than the maximum 5 days allowed.).    ASSESSMENT/Plan:      ICD-10-CM    1. Esophageal dysphagia R13.10    2. Hernia of abdominal cavity K46.9    3. Failure to thrive in adult R62.7    4. Chronic GERD K21.9    5. Major depressive disorder F32.9    6. Pulmonary emphysema, unspecified emphysema type (H) J43.9    7. Bowel and bladder incontinence R32     R15.9    8. Coronary artery disease involving native coronary artery of native heart without angina pectoris I25.10      CHANGES:    None.    CARE PLAN:  The care plan has been reviewed and all orders signed. Changes to care plan, if any, as noted.  Otherwise, continue current plan of care.      The above has been created using voice recognition software. Please be aware that this may unintentionally  produce inaccuracies and/or nonsensical sentences.       Electronically signed by: Ramana Blandon CNP

## 2021-06-21 NOTE — LETTER
Letter by Elif Carter CNP at      Author: Elif Carter CNP Service: -- Author Type: --    Filed:  Encounter Date: 12/2/2020 Status: (Other)         [facility]  [address] [place] [id number]                                  December 2, 2020    Patient: Rosina Link   MR Number: [id number]   YOB: 1945   Date of Visit: 12/2/2020     Dear Dr. Home:    Thank you for referring Rosina iLnk to me for evaluation. Below are the relevant portions of my assessment and plan of care.    If you have questions, please do not hesitate to call me. I look forward to following Rosina along with you.    Sincerely,        Elif Carter CNP          CC  No Recipients  Elif Carter CNP  12/2/2020  1:05 PM  Signed  Code Status:  DNI  Visit Type: Problem Visit (unresponsive episode, n/v)     Facility:  [facility] [[id number]]        Facility Type:  (Long Term Care, LTC)    History of Present Illness: Rosina Link is a 75 y.o. female with a PMH of COPD, dyslipidemia, HTN, anemia, PMR, TERESA, pancreatitis, chronic pericardial effusion, depression, and insomnia. She has a history of pancreatitis, gastric obstruction and feeding tube (removed 8/2019).  She was on Hospice 4/2020 thru 9/2020 for COPD.  She was hospitalized back in March 2020 for COPD exacerbation and had indicated she did not want to do aggressive care and so was signed onto Hospice.  Her respiratory status improved and she required no changes with medications and eventually weaned off of supplemental O2 and so Hospice discharge her from care.    Today, I am requested to see her after an unresponsive episode in the bathtub.  Nursing reports that she was typical self this morning until she had an reported episode of dizziness in the tub and her eyes fluttered and she did not respond.  Nursing states the episode was only seconds long and then she responded and was A&O x 3.  They were able to  get her to her bed and she had a BM and vital signs were 108/72, , resp 21 and temp 97.0 and O2 sats were 92% on RA.  She complained of nausea and eventually had multiple episodes of small amounts of emesis that was brown/yellow thin.  Nursing reports she doesn't usually eat breakfast in the morning and her typical meals consist of high calorie snacks and Dr. Pepper.  LS are CTA and I don't hear any arrhythmias.     Review of Systems   Patient denies cough, congestion, shortness of breath, chest pain, palpitations,  change in appetite, dysuria, frequency, burning or pain with urination.  Other than stated in HPI all other review of systems is negative.         Physical Exam   In order to maintain social distancing during the COVID pandemic, a visual exam was completed.     Vitals:    12/02/20 1041   BP: 116/73   Pulse: 76   Resp: 20   Temp: 97.6  F (36.4  C)   SpO2: 94%        Patient is a thin, elderly woman that is diaphoretic. Head is AT/NC, EOM intact. Respiratory effort normal. Lungs are CTA. Heart: regular rhythm, rapid pulse, S1, S2, no murmur, gallop or rub.  No visible LE edema. Alert and oriented, face symmetric. No visible skin issues or rashes.       Labs:  No results found for this or any previous visit (from the past 240 hour(s)).      Assessment:  1. Syncope, unspecified syncope type     2. Acute gastritis without hemorrhage, unspecified gastritis type         Plan:  Syncope: unsure of etiology, vitals normalized approximately 30 minutes after incident.      Acute gastritis: likely related to something she ate.  She was given ODT zofran during my visit and symptoms resolved within 30 minutes.  Recommend a BRAT diet today and be sure lots of fluids.          Electronically signed by: Elif Carter, CNP

## 2021-06-21 NOTE — LETTER
Letter by Elif Carter CNP at      Author: Elif Carter CNP Service: -- Author Type: --    Filed:  Encounter Date: 3/17/2021 Status: (Other)         [facility] [address] [address]                                  March 17, 2021    Patient: Rosina Link   MR Number: [id number]   YOB: 1945   Date of Visit: 3/17/2021     Dear Dr. Home:    Thank you for referring Rosina Link to me for evaluation. Below are the relevant portions of my assessment and plan of care.    If you have questions, please do not hesitate to call me. I look forward to following Rosina along with you.    Sincerely,        Elif Carter CNP          CC  No Recipients  Elif Carter CNP  3/17/2021  1:15 PM  Signed  [facility]    Facility:   [facility] [[id number]]   Code Status: DNI       CHIEF COMPLAINT/REASON FOR VISIT:  Chief Complaint   Patient presents with   ? Problem Visit     sore throat, reflux       HISTORY:      HPI: Rosina is a 75 y.o. female who has a past medical history of COPD, dyslipidemia, HTN, anemia, PMR, TERESA, pancreatitis, chronic pericardial effusion, depression, and insomnia. She has a history of pancreatitis, gastric obstruction and feeding tube (removed 8/2019). She was on Hospice for her COPD as she did not want progressive treatments during her last hospitalization however her status improved at the [facility] and she was signed off of Hospice on 9/11/2020.      Today, I see her in follow up from a call received last week in which she had a sore throat.  I had recently decreased her omeprazole and she was tolerating until this call.  I did increase the omeprazole to 20mg daily.  Today, she is lying in bed with 3 empty Doritos chips around her and Pepsi on the bedside table.  She reports that her sore throat was momentary and is no longer.  Examination of throat and neck were normal.  Nursing reports she  never used the chloraseptic spray.      Current Outpatient Medications on File Prior to Visit   Medication Sig Dispense Refill   ? acetaminophen (TYLENOL) 325 MG tablet Take 650 mg by mouth every 4 (four) hours as needed for pain.     ? aluminum-magnesium hydroxide-simethicone (MAALOX ADVANCED) 200-200-20 mg/5 mL Susp Take 30 mL by mouth every 4 (four) hours as needed.     ? bisacodyL (DULCOLAX) 10 mg suppository Insert 10 mg into the rectum daily as needed.     ? budesonide-formoterol (SYMBICORT) 160-4.5 mcg/actuation inhaler Inhale 2 puffs 2 (two) times a day.     ? calcium carbonate (OS-JASPREET) 600 mg calcium (1,500 mg) tablet Take 600 mg by mouth daily.     ? omeprazole (PRILOSEC) 10 MG capsule Take 20 mg by mouth daily before breakfast.      ? senna (SENOKOT) 8.6 mg tablet Take 1 tablet by mouth 2 (two) times a day.     ? venlafaxine (EFFEXOR) 37.5 MG tablet Take 37.5 mg by mouth daily.              No current facility-administered medications on file prior to visit.             Review of Systems   Patient denies fever, chills, headache, lightheadedness, dizziness, rhinorrhea, cough, congestion, shortness of breath, chest pain, palpitations, abdominal pain, n/v, diarrhea, constipation, change in appetite, dysuria, frequency, burning or pain with urination.  Other than stated in HPI all other review of systems is negative.         Physical Exam   In order to maintain social distancing during the COVID pandemic, a visual exam was completed.     Vitals:    03/17/21 1039   BP: 124/75   Pulse: 72   Resp: 19   Temp: 97.5  F (36.4  C)   SpO2: 93%        Patient is a thin, frail elderly woman in no acute distress.  Head is AT/NC, EOM intact. Mouth and throat without erythema Respiratory effort normal. No visible LE edema. Alert and oriented, face symmetric. No visible skin issues or rashes. Mood euthymic          LABS:   No results found for this or any previous visit (from the past 240 hour(s)).      ASSESSMENT:       ICD-10-CM    1. Sore throat  J02.9    2. Chronic GERD  K21.9        PLAN:    Sore throat: 2/2 GERD resolved with increased omeprazole.     GERD: counseled patient to sit up while eating and to no lie done for 2 hours after eating.  Recommending eating junk food in more moderation.          Electronically signed by: Elif Carter CNP

## 2021-06-21 NOTE — LETTER
Letter by Rosina Guo MD at      Author: Rosina Guo MD Service: -- Author Type: --    Filed:  Encounter Date: 12/31/2020 Status: (Other)         Patient: Rosina Link   MR Number: [id number]   YOB: 1945   Date of Visit: 12/31/2020     [facility]    Facility:   [facility] [[id number]]   Code Status: DNI       Chief Complaint   Patient presents with   ? Review Of Multiple Medical Conditions     LTC 12/31/2020. COPD.        HPI:   Rosina is a 75 y.o. female who resides in LTC at [facility]. She has hx of COPD, HTN, hiatal hernia, GERD, anemia, PMR, TERESA, pancreatitis, gastric obstruction and FTT previously on feeding tube (discontinued 8/2019). She was sent to the hospital on 2/28/20 from a planned routine cardiology visit due to cough, dyspnea and hypoxia. She was diagnosed with hypoxemic respiratory failure, needed oxygen, had bronchoscopy which opened left upper lobe with some improvement but unable to be weaned. Repeat bronch with extensive mucous plugging. Subsequent resp failure requiring CPAP during which briefly unresponsive. She did not want to comply with treatments and ultimately moved to comfort based approach, returning to LTC on 3/16/20 on [facility]. Subsequent stabilization over time and was discharged from Hospice eff 9/11/2020.      Today:  She was seen by NP on 12/2/2020 after reported unresponsive episode. Per note, sx abated fairly quickly, unknown etiology. There is no nursing note in the facility EMR after that episode, until 12/20/2020, suggesting stability and no further concerns after that episode. Today patient has no complaints. She did have some nausea and loose bowel movement yesterday. No issues today. No fever, cough or congestion. She has consistently tested negative on facility wide COVID-19 surveillance testing. Appetite per usual. No open areas of skin. She is non ambulatory, does not get out of  bed. She has COPD, respiratory status stable on Symbicort. She is on Venlafaxine for depression.         Past Medical History:  Past Medical History:   Diagnosis Date   ? Acute encephalopathy    ? Acute on chronic respiratory failure (H)    ? Aperistalsis of esophagus    ? Arthritis    ? Compression fracture of lumbar vertebra (H)     L2   ? COPD (chronic obstructive pulmonary disease) (H)    ? Depression    ? Dyslipidemia    ? Encephalopathy    ? Esophageal candidiasis (H)    ? Esophagitis    ? Failure to thrive in adult    ? Fall    ? GERD (gastroesophageal reflux disease)    ? Hiatal hernia    ? HLD (hyperlipidemia)    ? HTN (hypertension)    ? Hypocalcemia    ? Hypomagnesemia    ? Hypoxia    ? Insomnia    ? Lactic acidosis    ? Major depressive disorder    ? Malnutrition (H)    ? Maternal MCV (mean corpuscular volume) low    ? Microcytic anemia    ? Microcytic hypochromic anemia 01/2013   ? Mucus plugging of bronchi    ? Osteoporosis    ? Pericardial effusion    ? PMR (polymyalgia rheumatica) (H)    ? Pneumonia    ? Pneumothorax    ? Positive urine drug screen 10/2012    meth and coke positive 10/12. negative in 11/19/12   ? Pulmonary emphysema (H)    ? Rib fracture    ? S/P laparoscopic fundoplication    ? SIRS (systemic inflammatory response syndrome) (H)    ? Thrombocytopenia (H)    ? Trigger finger     left hand, middle finger   ? Underweight    ? Urinary urgency        Medications:  Current Outpatient Medications   Medication Sig Note   ? acetaminophen (TYLENOL) 325 MG tablet Take 650 mg by mouth every 4 (four) hours as needed for pain.    ? aluminum-magnesium hydroxide-simethicone (MAALOX ADVANCED) 200-200-20 mg/5 mL Susp Take 30 mL by mouth every 4 (four) hours as needed.    ? bisacodyL (DULCOLAX) 10 mg suppository Insert 10 mg into the rectum daily as needed.    ? budesonide-formoterol (SYMBICORT) 160-4.5 mcg/actuation inhaler Inhale 2 puffs 2 (two) times a day.    ? calcium carbonate (OS-JASPREET) 600 mg  calcium (1,500 mg) tablet Take 600 mg by mouth daily.    ? omeprazole (PRILOSEC) 10 MG capsule Take 10 mg by mouth daily before breakfast. 1/7/2020: Reduction from 20 mg to 10 mg daily on 01/07/2020 in attempt to taper off.   ? ondansetron (ZOFRAN-ODT) 4 MG disintegrating tablet Take 4 mg by mouth 3 (three) times a day as needed for nausea.    ? senna (SENOKOT) 8.6 mg tablet Take 1 tablet by mouth 2 (two) times a day.    ? venlafaxine (EFFEXOR) 37.5 MG tablet Take 37.5 mg by mouth daily.        6/18/2019: Decreased from a 37.5 mg twice daily to 37.5 mg daily on 06/18/2019, an attempt at gradual dose reduction.       Physical Exam:  Note: COVID-19 pandemic precautions in place. Physical exam performed with social distancing considerations.  General: Patient is alert pale appearing female, no distress, not on oxygen.   Vitals: /79, Temp 98, Pulse 77, RR 18, O2 sat 92%RA.  HEENT: Head is NCAT. Eyes show no injection or icterus. Nares negative. Oropharynx well hydrated.  Neck: No JVD.  Lungs: Non labored respirations.   Abdomen: Soft.  : Deferred.  Extremities: No LE edema is noted.  Musculoskeletal: Age related degen changes.   Psych: Mood appears pretty good.      Labs:  Lab Results   Component Value Date    WBC 5.7 01/23/2020    HGB 13.8 01/23/2020    HCT 43.2 01/23/2020    MCV 90 01/23/2020     01/23/2020     Results for orders placed or performed in visit on 12/17/19   Basic Metabolic Panel   Result Value Ref Range    Sodium 139 136 - 145 mmol/L    Potassium 3.5 3.5 - 5.0 mmol/L    Chloride 100 98 - 107 mmol/L    CO2 31 22 - 31 mmol/L    Anion Gap, Calculation 8 5 - 18 mmol/L    Glucose 99 70 - 125 mg/dL    Calcium 9.0 8.5 - 10.5 mg/dL    BUN 16 8 - 28 mg/dL    Creatinine 0.82 0.60 - 1.10 mg/dL    GFR MDRD Af Amer >60 >60 mL/min/1.73m2    GFR MDRD Non Af Amer >60 >60 mL/min/1.73m2         Assessment/Plan:  1. COPD. Continue on Symbicort. She does not require supplemental oxygen. Hospitalization  Feb/March 2020 for acute on chronic respiratory failure, complex hospitalization, returned to LTC on Hospice though subsequently discharged eff 9/11/2020. Respiratory status currently stable.   2. Depression. Continue on Venlafaxine.  3. HTN. BPs are satisfactory, not on medications for HTN.   4. Anemia. Hx of anemia, last checked Jan 2020 at 13.8.          Electronically signed by: Rosina Guo MD

## 2021-06-21 NOTE — PROGRESS NOTES
[facility]      Facility:    [facility] [[id number]]  Code Status: DNR/DNI       Chief Complaint/Reason for Visit:  Chief Complaint   Patient presents with     H & P     New admit to LTC, transfer ffrom TCU status.        HPI:   Rosina is a 73 y.o. female who is seen as new admission to LTC following TCU therapies. She was re-admitted to [facility] in July 2018 as per info below.    HPI (TCU Re admit):   Rosina is a 73 y.o. female sent to the hospital from TCU on 7/7/18 after a fall.     Hospital Course:   Rosina Link is a 72 y.o. female with a history of anemia,  GERD, HTN, Hiatal hernia, pericardial effusion, positive drug screen, osteopetrosis, compression fracture of L2 Lumbar vertibra, PMR, renal artery stenosis, pancreatitis and low back pain who was admitted 7/7/2018 after a fall. Pt resides in [facility]. The patient was getting out of bed against orders earlier today in order to walk to the bathroom for a bowel movement. On the way to the bathroom, the patient got dizzy and fell, hitting the side of the garbage can. Patient has experienced dizzy spells which have caused falls frequently before. CT CHEST/ABDOMEN/PELVIS showed displaced left 9th and 10th rib fractures with moderate-sized pneumothorax, and increased moderate pericardial effusion. Chest tube was placed in the ED. Trauma surgery evaluated the patient. Chest tube removed 7/10/2018. Patient remained stable off oxygen with no shortness of breath.      Echocardiogram done as pericardial effusion had possibly increased in size by CT on admission. Echocardiogram showed no significant change compared to previous. Follow up with Cardiology in 4 weeks was recommended.      UGI done, shows persistent partial functional obstruction at the level of the diaphragmatic hiatus. Strict NPO and tube feedings continued. She will follow up with Dr. De Jesus in 2-4 weeks for further esophageal evaluation.     She did have  some loose stools prior to discharge, likely secondary to stool softeners given.     Palliative care consulted to assist with pain management.     Patient was discharged to TCU in stable condition on 7/12/18, and patient and her daughter were in agreement with the above plan. Maritza, daughter, was updated by phone.     Overall stabilized and discharged to TCU on 7/12/18 for PT, OT, nursing cares, medical management and monitoring.     Today:  She is now LTC status. She reports she is waiting for information from her surgeon to see if there is anything that can be done. She is NPO on tube feeding. She states she will not live on tube feeding forever. She is not having pain, just occ at tube site. She in fact had to get the tube changed today so just came back from the hospital. No fever, cough or congestion. She has hx of COPD, was on oxygen previously but does not need any longer. She denies diarrhea. No shortness of breath or chest pain. No new vision or hearing problems.       Past Medical History:  Past Medical History:   Diagnosis Date     Acute encephalopathy      Aperistalsis of esophagus      Arthritis      Compression fracture of lumbar vertebra (H)     L2     COPD (chronic obstructive pulmonary disease) (H)      Depression      Dyslipidemia      Encephalopathy      Esophageal candidiasis (H)      Failure to thrive in adult      Fall      GERD (gastroesophageal reflux disease)      Hiatal hernia      HLD (hyperlipidemia)      HTN (hypertension)      Hypocalcemia      Hypomagnesemia      Hypoxia      Insomnia      Lactic acidosis      Major depressive disorder      Malnutrition (H)      Maternal MCV (mean corpuscular volume) low      Microcytic anemia      Microcytic hypochromic anemia 01/2013     Osteoporosis      Pericardial effusion      PMR (polymyalgia rheumatica) (H)      Pneumonia      Pneumothorax      Positive urine drug screen 10/2012    meth and coke positive 10/12. negative in 11/19/12      Pulmonary emphysema (H)      Rib fracture      S/P laparoscopic fundoplication      SIRS (systemic inflammatory response syndrome) (H)      Thrombocytopenia (H)      Trigger finger     left hand, middle finger     Underweight      Urinary urgency            Surgical History:  Past Surgical History:   Procedure Laterality Date     CARPAL TUNNEL RELEASE Right      CHOLECYSTECTOMY  01/09/2015     ESOPHAGOGASTRODUODENOSCOPY       HIATAL HERNIA REPAIR  02/15/2016    lap with mesh     HYSTERECTOMY  1990     REPAIR PARA ESOPHAGEAL PLACEMENT OF GASTRIC J TUBE       TRIGGER FINGER RELEASE Left     left middle finger       Family History:   Family History   Problem Relation Age of Onset     Diabetes Mother      Cancer Mother      lung     Hypertension Mother      Emphysema Father      Heart disease Father      Hypertension Father      No Medical Problems Brother      Breast cancer Maternal Aunt        Social History:    Social History     Social History     Marital status: Single     Spouse name: N/A     Number of children: N/A     Years of education: N/A     Social History Main Topics     Smoking status: Former Smoker     Packs/day: 1.50     Years: 30.00     Types: Cigarettes     Quit date: 8/10/1991     Smokeless tobacco: Never Used     Alcohol use No     Drug use: No     Sexual activity: Not Currently     Other Topics Concern     Not on file     Social History Narrative       Medications:  Current Outpatient Prescriptions   Medication Sig     acetaminophen (TYLENOL) 650 mg/20.3 mL Soln 650 mg by G-tube route every 4 (four) hours as needed.     albuterol (PROVENTIL HFA;VENTOLIN HFA) 90 mcg/actuation inhaler Inhale 2 puffs 4 (four) times a day as needed for wheezing.      aluminum-magnesium hydroxide-simethicone (MAALOX ADVANCED) 200-200-20 mg/5 mL Susp 20 mL by G-tube route 4 (four) times a day.      aspirin 81 mg chewable tablet 81 mg by G-tube route daily.     atorvastatin (LIPITOR) 20 MG tablet 20 mg by G-tube route  daily.      budesonide-formoterol (SYMBICORT) 160-4.5 mcg/actuation inhaler Inhale 2 puffs 2 (two) times a day.     calcium carbonate 500 mg/5 mL (1,250 mg/5 mL) suspension 600 mg by G-tube route daily.     cholecalciferol, vitamin D3, (VITAMIN D3 ORAL) Take by mouth daily. Vitamin D3 (D-VI-SOL)  liquid; 400U; amt: 1ml (400units); gastric tube  Once A Day;     cholecalciferol, vitamin D3, 400 unit/5 mL Liqd 400 Units by G-tube route daily.     DULoxetine (CYMBALTA) 60 MG capsule 60 mg by G-tube route daily.      ferrous sulfate 220 mg (44 mg iron)/5 mL solution Take by mouth daily. FERROUS SULFATE Rosina M [place]  solution; 220mg (44mg iron) / 5ml; amt: 7.4ml; gastric tube  Special Instructions: anorexia  Once A Day;     lactose-reduced food/fiber (ISOSOURCE 1.5 JASPREET ENTERAL TUBE) by Enteral Tube route. Isosource 1.5 continuous at goal rate of 40ml/hr Rosina JAYNA [place]  Special Instructions: tube feeding  Goal per day 960ml/day  Every Shift;     lansoprazole (PREVACID SOLUTAB) 30 MG disintegrating tablet 30 mg by G-tube route daily.     methylcellulose (CITRUCEL) 500 mg Tab Take 500 mg by mouth daily.     mirtazapine (REMERON) 15 MG tablet 7.5 mg by G-tube route at bedtime.     morphine 100 mg/5 mL (20 mg/mL) concentrated solution Take 5 mg by mouth every 4 (four) hours as needed for pain.     ondansetron (ZOFRAN-ODT) 4 MG disintegrating tablet 4 mg by G-tube route every 6 (six) hours as needed for nausea.     polyethylene glycol (MIRALAX) 17 gram packet 17 g by G-tube route daily as needed.      sennosides (SENNOSIDES) 8.8 mg/5 mL Syrp syrup 6.5 mg by G-tube route daily as needed.      sucralfate (CARAFATE) 100 mg/mL suspension Take 1 g by mouth 4 (four) times a day.     tiotropium (SPIRIVA) 18 mcg inhalation capsule Place 18 mcg into inhaler and inhale daily.       Allergies:  Allergies   Allergen Reactions     Sulfa (Sulfonamide Antibiotics) Other (See Comments)      Ototoxicity         Penicillins Rash         Review of Systems:  Pertinent items are noted in HPI.      Physical Exam:   General: Patient is alert female, lying in bed, no distress.  Vitals: /79, Temp 96.9, Pulse 73, RR 20, O2 sat 91% RA.  HEENT: Head is NCAT. Eyes show no injection or icterus. Nares negative. Oropharynx well hydrated.  Neck: Supple. No tenderness or adenopathy. No JVD.  Lungs: Clear bilaterally. No wheezes.  Cardiovascular: Regular rate and rhythm, normal S1. S2.  Back: No spinal or CVA tenderness.  Abdomen: GT site. Soft, no tenderness on exam. Bowel sounds present. No guarding rebound or rigidity.  : Deferred.  Extremities: No edema is noted.  Musculoskeletal: Age related degen changes.   Skin: No rashes.  Psych: Mood appears good.      Labs:  Lab Results   Component Value Date    WBC 5.4 07/06/2018    HGB 11.6 (L) 07/06/2018    HCT 36.8 07/06/2018    MCV 96 07/06/2018     07/06/2018       Assessment/Plan:  1. Esophageal dysphagia. She plans to follow up with surgeon for any further recommendations.   2. Malnutrition, failure to thrive, NPO on tube feeding.  3. COPD. Stable. No current respiratory concerns. Continue inhalers.  4. Anemia. She is on iron.  5. HTN. Not on BP meds.  6. Depression. Continue duloxetine.  7. Hx surgery for sliding hiatal hernia.  8. Code status is DNR/DNI.       Total time greater than 35 minutes, greater than 50% counseling and coordination of care, time spent in interview and examination of patient, review of records, discussion with nursing staff.        Electronically signed by: Rosina Guo MD

## 2021-06-21 NOTE — LETTER
Letter by Elif Carter CNP at      Author: Elif Carter CNP Service: -- Author Type: --    Filed:  Encounter Date: 2/24/2021 Status: (Other)         [facility]  [address] [place] [id number]                                  February 25, 2021    Patient: Rosina Link   MR Number: [id number]   YOB: 1945   Date of Visit: 2/24/2021     Dear Dr. Home:    Thank you for referring Rosina Link to me for evaluation. Below are the relevant portions of my assessment and plan of care.    If you have questions, please do not hesitate to call me. I look forward to following Rosina along with you.    Sincerely,        Elif Carter CNP          CC  No Recipients  Elif Carter CNP  2/25/2021  4:18 PM  Signed  [facility]    Facility:   [facility] [[id number]]   Code Status: DNI       CHIEF COMPLAINT/REASON FOR VISIT:  Chief Complaint   Patient presents with   ? Review Of Multiple Medical Conditions     COPD, HTN       HISTORY:      HPI: Rosina is a 75 y.o. female who has a past medical history of COPD, dyslipidemia, HTN, anemia, PMR, TERESA, pancreatitis, chronic pericardial effusion, depression, and insomnia. She has a history of pancreatitis, gastric obstruction and feeding tube (removed 8/2019). She was on Hospice for her COPD as she did not want progressive treatments during her last hospitalization however her status improved at the [facility] and she was signed off of Hospice on 9/11/2020.      I see her today for review of her multiple medical conditions.  She denies any pain or issues.  She spends most of her day lying in bed and watching tv.  Nursing reports that she does like junk food.      In September 2020, she had herpes zoster on her abdomen. This resolved with treatment of Valtrex.     In December 2020, she did have an unresponsive episode in the tub which appeared to be orthostatic. Vitals were stable and she  quickly returned to baseline.  She did not have reoccurence of this and no residual effects.     COPD: without exacerbation since March 2020.  She continues on Symbicort and prn albuterol.     HTN: on no medications, BPs stable    PMR: no recent exacerbation, denies any pain, no medications    GERD: eats many snacks while lying in bed.  In December, she had an episode of acute gastritis that resolved with diet change and prn zofran. She has tolerated weaning omeprazole to 10mg every other day.  She denies any issues today.     HLD: no lipids noted, on no statin.      Labs drawn 1/23 showed CMP and CBC within normal limits.     Current Outpatient Medications on File Prior to Visit   Medication Sig Dispense Refill   ? acetaminophen (TYLENOL) 325 MG tablet Take 650 mg by mouth every 4 (four) hours as needed for pain.     ? aluminum-magnesium hydroxide-simethicone (MAALOX ADVANCED) 200-200-20 mg/5 mL Susp Take 30 mL by mouth every 4 (four) hours as needed.     ? bisacodyL (DULCOLAX) 10 mg suppository Insert 10 mg into the rectum daily as needed.     ? budesonide-formoterol (SYMBICORT) 160-4.5 mcg/actuation inhaler Inhale 2 puffs 2 (two) times a day.     ? calcium carbonate (OS-JASPREET) 600 mg calcium (1,500 mg) tablet Take 600 mg by mouth daily.     ? omeprazole (PRILOSEC) 10 MG capsule Take 10 mg by mouth daily before breakfast.     ? senna (SENOKOT) 8.6 mg tablet Take 1 tablet by mouth 2 (two) times a day.     ? venlafaxine (EFFEXOR) 37.5 MG tablet Take 37.5 mg by mouth daily.            ? [DISCONTINUED] ondansetron (ZOFRAN-ODT) 4 MG disintegrating tablet Take 4 mg by mouth 3 (three) times a day as needed for nausea.       No current facility-administered medications on file prior to visit.             Review of Systems   Patient denies fever, chills, headache, lightheadedness, dizziness, rhinorrhea, cough, congestion, shortness of breath, chest pain, palpitations, abdominal pain, n/v, diarrhea, constipation, change in  appetite, dysuria, frequency, burning or pain with urination.  Other than stated in HPI all other review of systems is negative.         Physical Exam   In order to maintain social distancing during the COVID pandemic, a visual exam was completed.     Vitals:    02/24/21 0942   BP: 133/89   Pulse: 83   Resp: 20   Temp: 97.5  F (36.4  C)   SpO2: 96%        Patient is a thin, frail elderly woman in no acute distress.  Head is AT/NC, EOM intact. Respiratory effort normal. No visible LE edema. Alert and oriented, face symmetric. No visible skin issues or rashes. Mood euthymic          LABS:   Results for TITA HODGSON (MRN [id number]) as of 2/25/2021 16:10   Ref. Range 1/23/2020 05:25   Sodium Latest Ref Range: 136 - 145 mmol/L 138   Potassium Latest Ref Range: 3.5 - 5.0 mmol/L 4.3   Chloride Latest Ref Range: 98 - 107 mmol/L 99   CO2 Latest Ref Range: 22 - 31 mmol/L 34 (H)   Anion Gap, Calculation Latest Ref Range: 5 - 18 mmol/L 5   BUN Latest Ref Range: 8 - 28 mg/dL 12   Creatinine Latest Ref Range: 0.60 - 1.10 mg/dL 0.73   GFR MDRD Af Amer Latest Ref Range: >60 mL/min/1.73m2 >60   GFR MDRD Non Af Amer Latest Ref Range: >60 mL/min/1.73m2 >60   Calcium Latest Ref Range: 8.5 - 10.5 mg/dL 9.0   AST Latest Ref Range: 0 - 40 U/L 17   ALT Latest Ref Range: 0 - 45 U/L 26   ALBUMIN Latest Ref Range: 3.5 - 5.0 g/dL 3.0 (L)   Protein, Total Latest Ref Range: 6.0 - 8.0 g/dL 6.0   Alkaline Phosphatase Latest Ref Range: 45 - 120 U/L 106   Bilirubin, Total Latest Ref Range: 0.0 - 1.0 mg/dL 0.4   Glucose Latest Ref Range: 70 - 125 mg/dL 123   WBC Latest Ref Range: 4.0 - 11.0 thou/uL 5.7   RBC Latest Ref Range: 3.80 - 5.40 mill/uL 4.82   Hemoglobin Latest Ref Range: 12.0 - 16.0 g/dL 13.8   Hematocrit Latest Ref Range: 35.0 - 47.0 % 43.2   MCV Latest Ref Range: 80 - 100 fL 90   MCH Latest Ref Range: 27.0 - 34.0 pg 28.6   MCHC Latest Ref Range: 32.0 - 36.0 g/dL 31.9 (L)   RDW Latest Ref Range: 11.0 - 14.5 % 14.8 (H)   Platelets Latest  Ref Range: 140 - 440 thou/uL 227   MPV Latest Ref Range: 8.5 - 12.5 fL 11.7     ASSESSMENT:      ICD-10-CM    1. Coronary artery disease involving native coronary artery of native heart without angina pectoris  I25.10    2. Malnutrition, unspecified type (H)  E46    3. Chronic obstructive pulmonary disease, unspecified COPD type (H)  J44.9    4. Major depressive disorder in full remission, unspecified whether recurrent (H)  F32.5    5. Chronic GERD  K21.9        PLAN:    CAD: No chest pain, no asa or statin.  Will need to consider if adding these meds would align with her goals of care.     Malnutrition: good appetite, eats lots of junk food.  Weights are stable 119lbs.     COPD: stable without exacerbation, continue on symbicort and albuterol inhaler.      Depression: mood is stable, continue venlafaxine    GERD:  Tolerating reduction of omeprazole 10mg EOD.  Continue to monitor.     HTN: BPs stable on no medications    Depression: mood is stable, continue on venlafaxine    HLD: taken off of statin when started on hospice to reduce pill burden.  Will plan to check lipids on next lab draw and discuss goals of care with patient.         Electronically signed by: Elif Carter CNP

## 2021-06-22 NOTE — H&P
H&P documented within 30 days. I have performed and assessment and examined the patient, as necessary, to update the patient's current status that may have changed since the prior History and Physical.       Lazaro Mccann MD, [facility]  Vascular and Interventional Radiology  Pager: [phone]  Clinic: [phone]

## 2021-06-23 NOTE — TELEPHONE ENCOUNTER
[facility] Nurse Triage Telephone Note      Provider: ERIBERTO Walker  Facility: [facility] Type: LTC    Caller: Temi 2nd  Call Back Number:  [phone]    Allergies: Sulfa (sulfonamide antibiotics) and Penicillins    Reason for call: Pt weak, not eating much. Recent GT clamped per surgeon, on mechanical soft diet, regular liquids, daily wt, calorie counts. Having a productive white to clear cough. On Augmentin. Afeb.     Verbal Order/Direction given by Provider: Continue to monitor, encourage to eat.    Provider giving order: ERIBERTO Walker    Verbal order given to: Temi Roman RN

## 2021-06-23 NOTE — PROGRESS NOTES
[facility]    Facility:   [facility] [[id number]]   Code Status: DNR/DNI       Chief Complaint   Patient presents with     H & P     Re-admit to LTC after para esophageal hernia surgery.        HPI:  Rosina is a 73 y.o. female with hx of HTN, hiatal hernia, GERD, anemia, PMR, TERESA, pancreatitis, gastric obstruction and FTT on feeding tube, residing in LTC. She was admitted to the hospital on 1/23/19 for paraesophageal hernia repair as per below.     HOSPITAL DISCHARGE SUMMARY  BRIEF HOSPITAL COURSE: This 73 y.o. female was admitted after laparoscopic reduction repair of recurrent paraesophageal hernia. Her postoperative course has been uneventful. A esophagram on postop day 1 revealed no hernia, no leak, and no obstruction. She is tolerating a full liquid diet and her pain is under control. We reinitiated tube feeding during her admission as well.    She will be transferred back to her facility. Her orders will be the same except that we will send her on a full liquid diet for 2 weeks. She will follow-up with Dr. De Jesus. Our hope was that we can discontinue tube feeding and remove the feeding tube in the next couple of weeks. Overall she is done very well and she is happy with the outcome.    PROCEDURES PERFORMED DURING HOSPITALIZATION: Laparoscopic reduction and repair of recurrent paraesophageal hernia    Returned to LTC on 1/25/19.    Today:  She reports no concerns. States is now able to eat orally with TF supplemental. Will monitor closely to ensure she can hold her weight and nutrition. Dietary following. She reports no pain. Denies shortness of breath or chest pain. No abdominal pain. Normal BMs. No urinary problems. No new vision or hearing problems.       Past Medical History:  Past Medical History:   Diagnosis Date     Acute encephalopathy      Aperistalsis of esophagus      Arthritis      Compression fracture of lumbar vertebra (H)     L2     COPD (chronic  obstructive pulmonary disease) (H)      Depression      Dyslipidemia      Encephalopathy      Esophageal candidiasis (H)      Esophagitis      Failure to thrive in adult      Fall      GERD (gastroesophageal reflux disease)      Hiatal hernia      HLD (hyperlipidemia)      HTN (hypertension)      Hypocalcemia      Hypomagnesemia      Hypoxia      Insomnia      Lactic acidosis      Major depressive disorder      Malnutrition (H)      Maternal MCV (mean corpuscular volume) low      Microcytic anemia      Microcytic hypochromic anemia 01/2013     Osteoporosis      Pericardial effusion      PMR (polymyalgia rheumatica) (H)      Pneumonia      Pneumothorax      Positive urine drug screen 10/2012    meth and coke positive 10/12. negative in 11/19/12     Pulmonary emphysema (H)      Rib fracture      S/P laparoscopic fundoplication      SIRS (systemic inflammatory response syndrome) (H)      Thrombocytopenia (H)      Trigger finger     left hand, middle finger     Underweight      Urinary urgency            Surgical History:  Past Surgical History:   Procedure Laterality Date     CARPAL TUNNEL RELEASE Right      CHOLECYSTECTOMY  01/09/2015     ESOPHAGOGASTRODUODENOSCOPY       HIATAL HERNIA REPAIR  02/15/2016    lap with mesh     HIATAL HERNIA REPAIR  01/23/2019    Laparoscopic repair (reduction and repair) of recurrent hiatal hernia,      HYSTERECTOMY  1990     IR GJ TUBE REPLACEMENT  12/13/2018     REPAIR PARA ESOPHAGEAL PLACEMENT OF GASTRIC J TUBE       TRIGGER FINGER RELEASE Left     left middle finger       Family History:   Family History   Problem Relation Age of Onset     Diabetes Mother      Cancer Mother         lung     Hypertension Mother      Emphysema Father      Heart disease Father      Hypertension Father      No Medical Problems Brother      Breast cancer Maternal Aunt        Social History:    Social History     Socioeconomic History     Marital status: Single     Spouse name: Not on file     Number of  children: Not on file     Years of education: Not on file     Highest education level: Not on file   Social Needs     Financial resource strain: Not on file     Food insecurity - worry: Not on file     Food insecurity - inability: Not on file     Transportation needs - medical: Not on file     Transportation needs - non-medical: Not on file   Occupational History     Not on file   Tobacco Use     Smoking status: Former Smoker     Packs/day: 1.50     Years: 30.00     Pack years: 45.00     Types: Cigarettes     Last attempt to quit: 8/10/1991     Years since quittin.5     Smokeless tobacco: Never Used   Substance and Sexual Activity     Alcohol use: No     Drug use: No     Sexual activity: Not Currently   Other Topics Concern     Not on file   Social History Narrative     Not on file       Medications:  Current Outpatient Medications   Medication Sig     acetaminophen (TYLENOL) 650 mg/20.3 mL Soln 650 mg by G-tube route every 4 (four) hours as needed.     aluminum-magnesium hydroxide-simethicone (MAALOX ADVANCED) 200-200-20 mg/5 mL Susp 30 mL by G-tube route 4 (four) times a day .           amoxicillin-clavulanate (AUGMENTIN) 875-125 mg per tablet Take 1 tablet by mouth 2 (two) times a day.     aspirin 81 mg chewable tablet 81 mg by G-tube route daily.     atorvastatin (LIPITOR) 20 MG tablet 20 mg by G-tube route daily.      budesonide-formoterol (SYMBICORT) 160-4.5 mcg/actuation inhaler Inhale 2 puffs 2 (two) times a day.     calcium carbonate 500 mg/5 mL (1,250 mg/5 mL) suspension 600 mg by G-tube route daily.     cholecalciferol, vitamin D3, 400 unit/5 mL Liqd 400 Units by G-tube route daily.     ferrous sulfate 220 mg (44 mg iron)/5 mL solution 7.4ml by gastric tube daily.           furosemide (LASIX) 40 MG tablet Take 40 mg by mouth daily.     lactose-reduced food/fiber (ISOSOURCE 1.5 JASPREET ENTERAL TUBE) 71 mL/hr by Feeding route 2 (two) times a day.            lipase-protease-amylase (CREON) 3,000-9,500-  15,000 unit CpDR 2 capsules by G-tube route as needed With 650 mg of sodium bicarbonate dissolved in 30 cc of water. If unable to unclog GJ tube send to replace .     methylcellulose (CITRUCEL) 500 mg Tab 500 mg by g tube at bedtime.           mirtazapine (REMERON) 15 MG tablet 7.5 mg by G-tube route at bedtime.     omeprazole (PRILOSEC) 2 mg/mL SusR suspension 20 mg by Gastrostomy Tube route daily before breakfast.     oxyCODONE (ROXICODONE) 5 mg/5 mL solution Take 5 mg by mouth every 4 (four) hours as needed for pain.     saliva stimulant (BIOTENE ORALBALANCE, GLYCERIN,) gel Take 1 application by mouth as needed Half inch length of gel directly on the tongue and spread thoroughly inside mouth .     sucralfate (CARAFATE) 100 mg/mL suspension 1 g by G-tube route 4 (four) times a day.            tiotropium (SPIRIVA) 18 mcg inhalation capsule Place 18 mcg into inhaler and inhale daily.     venlafaxine (EFFEXOR) 37.5 MG tablet 37.5 mg by G-tube route 2 (two) times a day.       Allergies:  Allergies   Allergen Reactions     Sulfa (Sulfonamide Antibiotics) Other (See Comments)      Ototoxicity         Penicillins Rash       Review of Systems:  Pertinent items are noted in HPI.      Physical Exam:   General: Patient is alert pale female, no distress.   Vitals: /73, Pulse 85, RR 18.  HEENT: Head is NCAT. Eyes show no injection or icterus. Nares negative. Oropharynx well hydrated.  Neck: Supple. No tenderness or adenopathy. No JVD.  Lungs: Clear bilaterally. No wheezes.  Cardiovascular: Regular rate and rhythm, normal S1. S2.  Back: No spinal or CVA tenderness.  Abdomen: FT intact. Soft, no tenderness on exam. Bowel sounds present. No guarding rebound or rigidity.  : Deferred.  Extremities: No LE edema is noted.  Musculoskeletal: Mild degen changes.   Skin: No rashes.   Psych: Mood appears good.      Labs:  Outside hospital.      Assessment/Plan:  1. Paraesophageal hernia. S/p operative repair. Follow up with  surgeon. Allowed oral intake with supplemental TF. Dietary following closely.  2. Anemia. On iron.  3. COPD. Stable. No current respiratory concerns.   4. HTN. Not on BP meds.  5. Depression. Continue on venlafaxine.  6. Code status is DNR/DNI.       Total time greater than 35 minutes, greater than 50% counseling and coordination of care, time spent in interview and examination of patient, review of records, discussion with nursing staff. CC includes review of recent surgery, oral feeding orders and supplemental TF, discussion of nutrition, weight and overall medical condition.         Electronically signed by: Rosina Guo MD

## 2021-06-23 NOTE — TELEPHONE ENCOUNTER
[facility] Nurse Triage Telephone Note      Provider: ERIBERTO Walker  Facility: [facility] Type: LT    Caller: Gema  Call Back Number:  [phone]    Allergies: Sulfa (sulfonamide antibiotics) and Penicillins    Reason for call: CXR results: Mild cardiomegally with congestive failure. Superimposed RLL infiltrate. (Yesterday to surgeon for F/U after her hernia repair surgery. There she was reported to have T:98.8, was flushed & cough reported & CXR suggested.)  WBC 8.6, 98.4. Non-productive cough, wheeze heard on inspiration, crackles on expiration, diminished in LLL. Sat 89% RA, O2 started. 104/72, R 23 2+ bilat LE edema. Not on any diuretic. Previously had GT & clear liquid diet, yesterday surgeon removed her GT & okayed her to start pureed diet.    Verbal Order/Direction given by Provider: O2 1-2L NC keep>90%. Wt daily, Lasix 40mg daily-give dose yet today, BMP 2/8, Augmentin 875mg PO q 12hr X 10days. ST ordered earlier today.    Provider giving order: ERIBERTO Walker    Verbal order given to: Gema Roman RN

## 2021-06-23 NOTE — PROGRESS NOTES
"[facility]    Name:   Rosina Link  : 1945  Facility:   [facility] [[id number]]   Room: 224  Code Status: DNR/DNI -   Fac type:   NF (Long Term Care, LTC) -     CHIEF COMPLAINT / REASON FOR VISIT:  Chief Complaint   Patient presents with     Pre-op Exam     Laparoscopic repair of a recurrent hiatal hernia, possible open, possible cholecystic gastroplasty     [facility] from 18 until 18  [facility] from 18 until 03/10/18  [facility] from 18 until 18  [facility] from 8018 until   [facility] from 18 until 18    Patient was last seen by me on 18.    HPI: Rosina is a 73 y.o. female with a history of severe COPD/pulmonary emphysema, depression, hiatal hernia/GERD, and insomnia.  She underwent surgery in 2016 for a large hiatal hernia (total gastric herniation) that included laparoscopic repair with mesh and Gorge fundoplication.  With recurrence, she is scheduled again for repair (and possible Jamee gastroplasty) on 19 with Dr. De Jesus.      Hospitalization 2018: She had suffered a 60 pound unintentional weight loss prior to that, dropping from about 160 pounds down to 95 pounds.  There was a brief period of weight gain after that, but she eventually returned to the lower weight.  She developed progressive weakness and inability to eat much.  She would take several bites and then feel full.  She was so weak upon her admission to [facility] in 2018 that she could hardly walk.      She had had a CT scan on 18 that showed of her stomach back in her chest.  She was supposed to see Dr. De Jesus at on 18 to review pictures and to discuss options.  The CT scan described \"small amount of scarring and residual consolidation in the paramedial right lower lobe at the site of resolving pneumonia.\"  Chest x-rays performed on 18 " "showed \"right lower lobe pneumonia with small parapneumonic effusion.  The amount of infiltrate in the right lower lobe appeared increased from the CT examination.\"  She was admitted for community-acquired pneumonia.  She had no fevers or leukocytosis.      Hospitalization June 2018: More recently, she was seen by Dr. Vasquez on 05/18/18 for dysphagia, nausea, and early satiety with weight loss.  An EGD with general anesthesia was planned at some point with consideration for repair of hiatal hernia and redo partial fundoplication.    She presented to [facility] ER on 06/05/18 with weakness, anorexia, and constipation.  Constipation eventually resolved without changes to appetite.  Psych was consulted with concern that depression may be contributory, and she was started on low-dose mirtazapine.  Megace was started as an appetite stimulant, and a CT showed a large recurrent hiatal hernia.  A GJ tube placement was suggested, but the patient was felt not to be a candidate for GJ tube per IR due to anatomy.  Therefore, GI and surgery were consulted, and she underwent laparoscopic partial fundoplication takedown repair paraesophageal hernia and placement of gastric jejunostomy tube placement; upper endoscopy by Dr. Daniel De Jesus on 06/12/18.  She was also found to have esophageal candidiasis and was given fluconazole.  The PICC line was placed on 06/13/18 to start TPN.    Overnight (06/14/18 to 06/15/18), rapid response was called due to hypoxemia and tachycardia.  A CT of the chest was negative for pulmonary embolism but showed aspiration pneumonitis.  She was treated with IV antibiotics which ended on 06/23/18.    She underwent EGD and advancement of GJ feeding tube on 06/21/18.  Abdominal x-rays on 06/22/18 showed GJ tube malpositioned, looped in the fundus.  This J-tube repositioned itself and was then being used again.      Of note: An echocardiogram on 06/15/18 showed moderate pericardial effusion (known " pericardial effusion 08/20/17 and 12/20/17).  Cardiology felt likely malnutrition with low protein causing capillary leak as a probable etiology.  Recommendations for observation with no further workup, but a repeat echocardiogram on 06/29/18 showed no change in no hemodynamic effects of effusion.      Hospitalization in July 2018: On 07/7/18, she was getting out of bed early in the day and decided to walk to the bathroom on her own rather than summoned assistance.  On the way to her bathroom, she became dizzy, fell, and struck the side of the garbage can.  She had experienced dizzy spells which have caused falls frequently before.  A CT of the chest, abdomen, and pelvis showed displaced left ninth and 10th rib fractures with a moderate sized pneumothorax and increased moderate pericardial effusion.  A chest tube was placed in the ED.  Trauma surgery evaluated the patient, and the chest tube was removed on 07/10/18.  She remained stable off oxygen with no dyspnea.    Another echocardiogram was done, as the pericardial effusion had possibly increased in size per the CT on admission.  It showed no significant change compared to previously, and there are recommendations for her to follow-up with cardiology in 4 weeks.    A UGI was done, showing persistent partial functional obstruction at the level of the diaphragmatic hiatus, and a strict n.p.o. and tube feedings continued.  She was subsequently discharged back here on 07/12/18.  She was to follow-up with Dr. De Jesus on 08/10/18 for further esophageal evaluation (as described in CURRENT ISSUES).      CURRENT ISSUES    Failure to thrive/esophageal dysphagia/NPO status: This is the main reason why she is here.  Since her stay at the [facility] in February/March 2018, she had dropped 17 pounds.  She was quite cachectic with a BMI of 16-17 (now 20.56).   Admitted NPO with tube feedings at 30 mL/h, food was being introduced (regular diet, thin liquids,  "small portions).  Isosource 1.5 is now being administered at 71 mL/h for 14 hours.  Medications continue to be given via gastric tube. She was seen by speech therapy here and appeared to be doing okay; however, we are continuing to follow guidelines set by Dr. De Jesus at this juncture.  With her current tube feeding, her weight has varied by a few pounds in either direction.    She remains NPO.  She was also followed by speech therapy in the hospital, and her swallow function was deemed intact. She had a follow-up appointment on 07/09/18 but remained NPO.  At a follow-up appointment with Dr. De Jesus on 08/10/18, she was given a new diagnosis of dysphagia, related to her paraesophageal hernia.  More tests were performed, including an upper GI with barium swallow (on 08/23/18) and esophageal manometry (performed on 10/01/18) .  She is to remain strict NPO, pending recommendations, with tube feedings through the J port and medications through the G port.  At one point, she was told that surgery would not be appropriate for her but dilatation might.  She seems aware of the big picture with regard to what has already been done and her upcoming procedure(s).  She did undergo an EGD on 11/12/18.    GERD: She had complained of heartburn despite being on lansoprazole 30 mg daily.  She previously had orders for Maalox as needed, and she was not taking it.  We scheduled that while awake, keeping with 4 times daily dosing.  Epigastric/suprasternal heartburn was described as \"terrible, terrible, terrible.\"  She told me it had been all right for some time but had returned.  We added sucralfate 1 g 4 times daily and a stool check for H pylori (negative).  This has been quite effective, and she offers no further complaints.    Depression: She does admit to being depressed \"some days.\"  She continues on mirtazapine and duloxetine and seems to be doing well on these.    COPD: She does have COPD/pulmonary emphysema, is barrel-chested, " and is on a variety of inhalers.  She continues to deny dyspnea, coughing, or chest pain.    Urinary incontinence: She does have stress incontinence and is checked by nursing staff during the night.   She finds it very hard to control, stating that she does not realize it until after it starts.  There is some nocturia as well.  None of this has changed since admission.    Bowel incontinence: Sometime back, she developed loose, incontinent stools. Her tube feeding formula had been recently changed, and despite the loose stools, she was receiving scheduled Colace and PRN senna and MiraLAX.  When addressed with staff, this was attributed to poor communication during shift report, so we discontinued all of her bowel medications.  The problem persisted until we replaced psyllium with a 500 mg tab of methylcellulose daily.  Despite resolution of her loose stools, she remains incontinent of bowel.      Pain management: For the most part, she is pain-free, and her morphine was discontinued.      ROS: No complaints whatsoever today. She is on trazodone 25 mg nightly and sleeps well with this.  She denies any headaches or chest pains, coughing or congestion, nausea or vomiting,  dizziness or dyspnea, dysuria, difficulty chewing or swallowing, or any integumentary issues.      Past Medical History:   Diagnosis Date     Acute encephalopathy      Aperistalsis of esophagus      Arthritis      Compression fracture of lumbar vertebra (H)     L2     COPD (chronic obstructive pulmonary disease) (H)      Depression      Dyslipidemia      Encephalopathy      Esophageal candidiasis (H)      Failure to thrive in adult      Fall      GERD (gastroesophageal reflux disease)      Hiatal hernia      HLD (hyperlipidemia)      HTN (hypertension)      Hypocalcemia      Hypomagnesemia      Hypoxia      Insomnia      Lactic acidosis      Major depressive disorder      Malnutrition (H)      Maternal MCV (mean corpuscular volume) low      Microcytic  anemia      Microcytic hypochromic anemia 2013     Osteoporosis      Pericardial effusion      PMR (polymyalgia rheumatica) (H)      Pneumonia      Pneumothorax      Positive urine drug screen 10/2012    meth and coke positive 10/12. negative in 12     Pulmonary emphysema (H)      Rib fracture      S/P laparoscopic fundoplication      SIRS (systemic inflammatory response syndrome) (H)      Thrombocytopenia (H)      Trigger finger     left hand, middle finger     Underweight      Urinary urgency               Family History   Problem Relation Age of Onset     Diabetes Mother      Cancer Mother         lung     Hypertension Mother      Emphysema Father      Heart disease Father      Hypertension Father      No Medical Problems Brother      Breast cancer Maternal Aunt      Social History     Socioeconomic History     Marital status: Single     Spouse name: Not on file     Number of children: Not on file     Years of education: Not on file     Highest education level: Not on file   Social Needs     Financial resource strain: Not on file     Food insecurity - worry: Not on file     Food insecurity - inability: Not on file     Transportation needs - medical: Not on file     Transportation needs - non-medical: Not on file   Occupational History     Not on file   Tobacco Use     Smoking status: Former Smoker     Packs/day: 1.50     Years: 30.00     Pack years: 45.00     Types: Cigarettes     Last attempt to quit: 8/10/1991     Years since quittin.4     Smokeless tobacco: Never Used   Substance and Sexual Activity     Alcohol use: No     Drug use: No     Sexual activity: Not Currently   Other Topics Concern     Not on file   Social History Narrative     Not on file     MEDICATIONS: Reviewed from the MAR, physician orders, and earlier progress notes.    Current Outpatient Medications   Medication Sig     acetaminophen (TYLENOL) 650 mg/20.3 mL Soln 650 mg by G-tube route every 4 (four) hours as needed.      aluminum-magnesium hydroxide-simethicone (MAALOX ADVANCED) 200-200-20 mg/5 mL Susp 30 mL by G-tube route 4 (four) times a day .           aspirin 81 mg chewable tablet 81 mg by G-tube route daily.     atorvastatin (LIPITOR) 20 MG tablet 20 mg by G-tube route daily.      budesonide-formoterol (SYMBICORT) 160-4.5 mcg/actuation inhaler Inhale 2 puffs 2 (two) times a day.     calcium carbonate 500 mg/5 mL (1,250 mg/5 mL) suspension 600 mg by G-tube route daily.     cholecalciferol, vitamin D3, 400 unit/5 mL Liqd 400 Units by G-tube route daily.     ferrous sulfate 220 mg (44 mg iron)/5 mL solution 7.4ml by gastric tube daily.           lactose-reduced food/fiber (ISOSOURCE 1.5 JASPREET ENTERAL TUBE) 56 mL/hr by Enteral Tube route 2 (two) times a day .           lansoprazole (PREVACID SOLUTAB) 30 MG disintegrating tablet 30 mg by G-tube route daily.     lipase-protease-amylase (CREON) 3,000-9,500- 15,000 unit CpDR 2 capsules by G-tube route as needed With 650 mg of sodium bicarbonate dissolved in 30 cc of water. If unable to unclog GJ tube send to replace .     methylcellulose (CITRUCEL) 500 mg Tab 500 mg by g tube at bedtime.           mirtazapine (REMERON) 15 MG tablet 7.5 mg by G-tube route at bedtime.     omeprazole (PRILOSEC) 2 mg/mL SusR suspension 20 mg by Gastrostomy Tube route daily before breakfast.     saliva stimulant (BIOTENE ORALBALANCE, GLYCERIN,) gel Take 1 application by mouth as needed Half inch length of gel directly on the tongue and spread thoroughly inside mouth .     sucralfate (CARAFATE) 100 mg/mL suspension Take 1 g by mouth 4 (four) times a day.     tiotropium (SPIRIVA) 18 mcg inhalation capsule Place 18 mcg into inhaler and inhale daily.     venlafaxine (EFFEXOR) 37.5 MG tablet 37.5 mg by G-tube route 2 (two) times a day.     ALLERGIES:   Allergies   Allergen Reactions     Sulfa (Sulfonamide Antibiotics) Other (See Comments)      Ototoxicity         Penicillins Rash     DIET: NPO.  She is on tube  "feeding at 56 mL/h for 18 hours per day.    Vitals:    01/19/19 1753   BP: 110/76   Pulse: 78   Resp: 18   Temp: 97.2  F (36.2  C)   SpO2: 96%   Weight: 101 lb 12.8 oz (46.2 kg)   Height: 4' 11\" (1.499 m)   After considerable weight loss, she is actually gained 9 pounds over the last few months.  Body mass index is 20.56 kg/m .    EXAMINATION:   General: Pleasant, alert, and conversant middle-aged female, looking much older than her stated age, resting in bed and playing games on her smart phone, in no apparent distress.  Head: Normocephalic and atraumatic.  Significant hirsutism.  Eyes: PERRLA, sclerae clear.   ENT: Slightly dry oral mucosa.  Full upper and lower dentures.  She is only wearing the uppers currently.  Hearing is unimpaired.  Cardiovascular: Sinus tachycardia with no appreciable murmur.  Respiratory: Lung sounds are diminished due to severe kyphosis but otherwise clear.   Abdomen: Soft and nontender.   Musculoskeletal/Extremities: Age-related degenerative joint disease.  Barrel chested with severe thoracic kyphosis.  No peripheral edema.   Neurological: Occasional fine bilateral upper extremity tremor (not noticeable today).  Integument: No rashes, clinically significant lesions, or skin breakdown.   Cognitive/Psychiatric: Alert and oriented.  Today, she is obviously not feeling well following the procedure.     DIAGNOSTICS: Prior to upcoming surgery, labs ordered include PT/INR, prealbumin, albumin, ferritin, BMP, and CBC.  Results for orders placed or performed in visit on 01/04/19   Basic Metabolic Panel   Result Value Ref Range    Sodium 140 136 - 145 mmol/L    Potassium 4.4 3.5 - 5.0 mmol/L    Chloride 102 98 - 107 mmol/L    CO2 30 22 - 31 mmol/L    Anion Gap, Calculation 8 5 - 18 mmol/L    Glucose 83 70 - 125 mg/dL    Calcium 9.3 8.5 - 10.5 mg/dL    BUN 24 8 - 28 mg/dL    Creatinine 0.62 0.60 - 1.10 mg/dL    GFR MDRD Af Amer >60 >60 mL/min/1.73m2    GFR MDRD Non Af Amer >60 >60 mL/min/1.73m2 "     Lab Results   Component Value Date    WBC 6.0 01/04/2019    HGB 13.8 01/04/2019    HCT 44.0 01/04/2019    MCV 92 01/04/2019     01/04/2019     CrCl cannot be calculated (Patient's most recent lab result is older than the maximum 5 days allowed.).    ASSESSMENT/Plan:      ICD-10-CM    1. Esophageal dysphagia R13.10    2. Hernia of abdominal cavity K46.9    3. Chronic GERD K21.9    4. Major depressive disorder, remission status unspecified, unspecified whether recurrent F32.9    5. Pulmonary emphysema, unspecified emphysema type (H) J43.9    6. Bowel and bladder incontinence R32     R15.9    7. Coronary artery disease involving native coronary artery of native heart without angina pectoris I25.10        This patient has been examined by me this day and has been found to be an acceptable candidate for surgery with appropriate anesthesia. Total time spent with the patient was approximately 35 minutes, with greater than 50% spent in face-to-face counseling regarding disease state, treatment, documentation, review of clinical data, and coordination of care for upcoming surgery.    The above has been created using voice recognition software. Please be aware that this may unintentionally  produce inaccuracies and/or nonsensical sentences.       Electronically signed by: Ramana Blandon CNP

## 2021-06-24 NOTE — PROGRESS NOTES
[facility]    Name:   Rosina Link  : 1945  Facility:   [facility] [[id number]]   Room: 228A  Code Status: DNR/DNI -   Fac type:   NF (Long Term Care, LTC) -     CHIEF COMPLAINT / REASON FOR VISIT:  Chief Complaint   Patient presents with     Review Of Multiple Medical Conditions     Follow-up after hospitalization for laparoscopic repair of a recurrent hiatal hernia, possible open, possible cholecystic gastroplasty.     [facility] from 18 until 18  [facility] from 18 until 03/10/18  [facility] from 18 until 18  [facility] from 8018 until   [facility] from 18 until 18    Patient was last seen by me on 19 for a preop examination prior to hiatal hernia repair and cholecystic gastroplasty.  She was subsequently seen by Dr. Guo for a readmission visit on 19.    HPI: Rosina is a 73 y.o. female with a history of severe COPD/pulmonary emphysema, depression, hiatal hernia/GERD, and insomnia.        RECENT HOSPITALIZATION    Hospitalization 2019: She underwent surgery in 2016 for a large hiatal hernia (total gastric herniation) that included laparoscopic repair with mesh and Gorge fundoplication.  With recurrence, she was scheduled again for repair (and possible Jamee gastroplasty) on 19 with Dr. De Jesus.    On 19, she was admitted after laparoscopic reduction repair of a recurrent paraesophageal hernia.  Her postoperative course was uneventful.  An esophagram on POD 1 revealed no hernia, no leak, and no obstruction.  She was tolerating a full liquid diet, and her pain was under control.  Tube feeding was reinitiated during her admission as well.  Discharge orders back to [facility] included a full liquid diet for 2 weeks with subsequent follow-up with Dr. De Jesus.  The hope was that tube feeding could be discontinued  "in the near future.  Overall, she was noted to be doing well and was considered happy with the outcome.  She was readmitted on 01/25/19.      EARLIER HOSPITALIZATIONS    Hospitalization March 2018: She had suffered a 60 pound unintentional weight loss prior to that, dropping from about 160 pounds down to 95 pounds.  There was a brief period of weight gain after that, but she eventually returned to the lower weight.  She developed progressive weakness and inability to eat much.  She would take several bites and then feel full.  She was so weak upon her admission to [facility] in February 2018 that she could hardly walk.      She had had a CT scan on 02/09/18 that showed of her stomach back in her chest.  She was supposed to see Dr. De Jesus at on 03/07/18 to review pictures and to discuss options.  The CT scan described \"small amount of scarring and residual consolidation in the paramedial right lower lobe at the site of resolving pneumonia.\"  Chest x-rays performed on 02/16/18 showed \"right lower lobe pneumonia with small parapneumonic effusion.  The amount of infiltrate in the right lower lobe appeared increased from the CT examination.\"  She was admitted for community-acquired pneumonia.  She had no fevers or leukocytosis.      Hospitalization June 2018: More recently, she was seen by Dr. Vasquez on 05/18/18 for dysphagia, nausea, and early satiety with weight loss.  An EGD with general anesthesia was planned at some point with consideration for repair of hiatal hernia and redo partial fundoplication.    She presented to [facility] ER on 06/05/18 with weakness, anorexia, and constipation.  Constipation eventually resolved without changes to appetite.  Psych was consulted with concern that depression may be contributory, and she was started on low-dose mirtazapine.  Megace was started as an appetite stimulant, and a CT showed a large recurrent hiatal hernia.  A GJ tube placement was suggested, but the " patient was felt not to be a candidate for GJ tube per IR due to anatomy.  Therefore, GI and surgery were consulted, and she underwent laparoscopic partial fundoplication takedown repair paraesophageal hernia and placement of gastric jejunostomy tube placement; upper endoscopy by Dr. Daniel De Jesus on 06/12/18.  She was also found to have esophageal candidiasis and was given fluconazole.  The PICC line was placed on 06/13/18 to start TPN.    Overnight (06/14/18 to 06/15/18), rapid response was called due to hypoxemia and tachycardia.  A CT of the chest was negative for pulmonary embolism but showed aspiration pneumonitis.  She was treated with IV antibiotics which ended on 06/23/18.    She underwent EGD and advancement of GJ feeding tube on 06/21/18.  Abdominal x-rays on 06/22/18 showed GJ tube malpositioned, looped in the fundus.  This J-tube repositioned itself and was then being used again.      Of note: An echocardiogram on 06/15/18 showed moderate pericardial effusion (known pericardial effusion 08/20/17 and 12/20/17).  Cardiology felt likely malnutrition with low protein causing capillary leak as a probable etiology.  Recommendations for observation with no further workup, but a repeat echocardiogram on 06/29/18 showed no change in no hemodynamic effects of effusion.      Hospitalization in July 2018: On 07/7/18, she was getting out of bed early in the day and decided to walk to the bathroom on her own rather than summoned assistance.  On the way to her bathroom, she became dizzy, fell, and struck the side of the garbage can.  She had experienced dizzy spells which have caused falls frequently before.  A CT of the chest, abdomen, and pelvis showed displaced left ninth and 10th rib fractures with a moderate sized pneumothorax and increased moderate pericardial effusion.  A chest tube was placed in the ED.  Trauma surgery evaluated the patient, and the chest tube was removed on 07/10/18.  She remained stable off  oxygen with no dyspnea.    Another echocardiogram was done, as the pericardial effusion had possibly increased in size per the CT on admission.  It showed no significant change compared to previously, and there are recommendations for her to follow-up with cardiology in 4 weeks.    A UGI was done, showing persistent partial functional obstruction at the level of the diaphragmatic hiatus, and a strict n.p.o. and tube feedings continued.  She was subsequently discharged back here on 07/12/18.  She was to follow-up with Dr. De Jesus on 08/10/18 for further esophageal evaluation (as described in CURRENT ISSUES).      CURRENT ISSUES    Sequelae of recent surgery: Dr. De Jesus did start her on a puréed diet.  She still has her Rehan button in place, and speech say she can eat regular food; however, she tells me she feels full quickly and is not receiving sufficient nutrition orally.  She is being resumed on tube feeding as a supplement.  I did have conversations with speech therapy and the nutritionist regarding the need for supplementation.    Cardiopulmonary issues: Chest x-rays on 02/05/19 revealed mild cardiomegaly with congestive failure and superimposed right lower lobe infiltrate.  Her white count was normal but temp slightly elevated from her baseline.  She had a nonproductive cough, wheeze on inspiration, crackles on expiration, and diminished sounds in the left lower lobe with sats of 89% on room air.  We ordered 40 mg of Lasix daily and Augmentin for 10 days.  A follow-up BMP was unremarkable.     COPD: She does have COPD/pulmonary emphysema, is barrel-chested, and is on a variety of inhalers.  She continues to deny dyspnea, coughing, or chest pain.    Urinary incontinence: She does have stress incontinence and is checked by nursing staff during the night.   She finds it very hard to control, stating that she does not realize it until after it starts.  There is some nocturia as well.  None of this has changed  since admission.    Room change: She recently moved from a private room, as she no longer qualified for one.    Pain management: For the most part, she is pain-free.      ROS: No complaints whatsoever today. She is on trazodone 25 mg nightly and sleeps well with this.  She denies any headaches or chest pains, coughing or congestion, nausea or vomiting,  dizziness or dyspnea, dysuria, difficulty chewing or swallowing, or any integumentary issues.      Past Medical History:   Diagnosis Date     Acute encephalopathy      Aperistalsis of esophagus      Arthritis      Compression fracture of lumbar vertebra (H)     L2     COPD (chronic obstructive pulmonary disease) (H)      Depression      Dyslipidemia      Encephalopathy      Esophageal candidiasis (H)      Esophagitis      Failure to thrive in adult      Fall      GERD (gastroesophageal reflux disease)      Hiatal hernia      HLD (hyperlipidemia)      HTN (hypertension)      Hypocalcemia      Hypomagnesemia      Hypoxia      Insomnia      Lactic acidosis      Major depressive disorder      Malnutrition (H)      Maternal MCV (mean corpuscular volume) low      Microcytic anemia      Microcytic hypochromic anemia 01/2013     Osteoporosis      Pericardial effusion      PMR (polymyalgia rheumatica) (H)      Pneumonia      Pneumothorax      Positive urine drug screen 10/2012    meth and coke positive 10/12. negative in 11/19/12     Pulmonary emphysema (H)      Rib fracture      S/P laparoscopic fundoplication      SIRS (systemic inflammatory response syndrome) (H)      Thrombocytopenia (H)      Trigger finger     left hand, middle finger     Underweight      Urinary urgency               Family History   Problem Relation Age of Onset     Diabetes Mother      Cancer Mother         lung     Hypertension Mother      Emphysema Father      Heart disease Father      Hypertension Father      No Medical Problems Brother      Breast cancer Maternal Aunt      Social History      Socioeconomic History     Marital status: Single     Spouse name: Not on file     Number of children: Not on file     Years of education: Not on file     Highest education level: Not on file   Social Needs     Financial resource strain: Not on file     Food insecurity - worry: Not on file     Food insecurity - inability: Not on file     Transportation needs - medical: Not on file     Transportation needs - non-medical: Not on file   Occupational History     Not on file   Tobacco Use     Smoking status: Former Smoker     Packs/day: 1.50     Years: 30.00     Pack years: 45.00     Types: Cigarettes     Last attempt to quit: 8/10/1991     Years since quittin.5     Smokeless tobacco: Never Used   Substance and Sexual Activity     Alcohol use: No     Drug use: No     Sexual activity: Not Currently   Other Topics Concern     Not on file   Social History Narrative     Not on file     MEDICATIONS: Reviewed from the MAR, physician orders, and earlier progress notes.    Current Outpatient Medications   Medication Sig     acetaminophen (TYLENOL) 650 mg/20.3 mL Soln 650 mg by G-tube route every 4 (four) hours as needed.     aluminum-magnesium hydroxide-simethicone (MAALOX ADVANCED) 200-200-20 mg/5 mL Susp 30 mL by G-tube route 4 (four) times a day .           amoxicillin-clavulanate (AUGMENTIN) 875-125 mg per tablet Take 1 tablet by mouth 2 (two) times a day.     aspirin 81 mg chewable tablet 81 mg by G-tube route daily.     atorvastatin (LIPITOR) 20 MG tablet 20 mg by G-tube route daily.      budesonide-formoterol (SYMBICORT) 160-4.5 mcg/actuation inhaler Inhale 2 puffs 2 (two) times a day.     calcium carbonate 500 mg/5 mL (1,250 mg/5 mL) suspension 600 mg by G-tube route daily.     cholecalciferol, vitamin D3, 400 unit/5 mL Liqd 400 Units by G-tube route daily.     ferrous sulfate 220 mg (44 mg iron)/5 mL solution 7.4ml by gastric tube daily.           furosemide (LASIX) 40 MG tablet Take 40 mg by mouth daily.      "lactose-reduced food/fiber (ISOSOURCE 1.5 JASPREET ENTERAL TUBE) 71 mL/hr by Feeding route 2 (two) times a day.            lipase-protease-amylase (CREON) 3,000-9,500- 15,000 unit CpDR 2 capsules by G-tube route as needed With 650 mg of sodium bicarbonate dissolved in 30 cc of water. If unable to unclog GJ tube send to replace .     methylcellulose (CITRUCEL) 500 mg Tab 500 mg by g tube at bedtime.           mirtazapine (REMERON) 15 MG tablet 7.5 mg by G-tube route at bedtime.     omeprazole (PRILOSEC) 2 mg/mL SusR suspension 20 mg by Gastrostomy Tube route daily before breakfast.     oxyCODONE (ROXICODONE) 5 mg/5 mL solution Take 5 mg by mouth every 4 (four) hours as needed for pain.     saliva stimulant (BIOTENE ORALBALANCE, GLYCERIN,) gel Take 1 application by mouth as needed Half inch length of gel directly on the tongue and spread thoroughly inside mouth .     sucralfate (CARAFATE) 100 mg/mL suspension 1 g by G-tube route 4 (four) times a day.            tiotropium (SPIRIVA) 18 mcg inhalation capsule Place 18 mcg into inhaler and inhale daily.     venlafaxine (EFFEXOR) 37.5 MG tablet 37.5 mg by G-tube route 2 (two) times a day.     ALLERGIES:   Allergies   Allergen Reactions     Sulfa (Sulfonamide Antibiotics) Other (See Comments)      Ototoxicity         Penicillins Rash     DIET: Regular diet, mechanical soft texture, thin liquids.  She is still receiving tube feeding supplementally.    Vitals:    02/14/19 1410   BP: 95/59   Pulse: 72   Resp: 21   Temp: (!) 96.4  F (35.8  C)   SpO2: 93%   Weight: (!) 98 lb 3.2 oz (44.5 kg)   Height: 4' 11\" (1.499 m)   Down 3.5 pounds, probably due to difficulty with oral intake.  Body mass index is 19.83 kg/m .    EXAMINATION:   General: Pleasant, alert, and conversant middle-aged female, sitting in a wheelchair, in no apparent distress.  Head: Normocephalic and atraumatic.  Significant hirsutism.  Eyes: PERRLA, sclerae clear.   ENT: Slightly dry oral mucosa.  Full upper and lower " dentures.  She is only wearing the uppers currently.  Hearing is unimpaired.  Cardiovascular: Sinus tachycardia with no appreciable murmur.  Respiratory: Lung sounds are diminished due to severe kyphosis but otherwise clear.   Abdomen: Soft and nontender.   Musculoskeletal/Extremities: Age-related degenerative joint disease.  Barrel chested with severe thoracic kyphosis.  No peripheral edema.   Neurological: Occasional fine bilateral upper extremity tremor (not noticeable today).  Integument: No rashes, clinically significant lesions, or skin breakdown.   Cognitive/Psychiatric: Alert and oriented.  Today, she is obviously not feeling well following the procedure.     DIAGNOSTICS: .  Results for orders placed or performed in visit on 01/04/19   Basic Metabolic Panel   Result Value Ref Range    Sodium 140 136 - 145 mmol/L    Potassium 4.4 3.5 - 5.0 mmol/L    Chloride 102 98 - 107 mmol/L    CO2 30 22 - 31 mmol/L    Anion Gap, Calculation 8 5 - 18 mmol/L    Glucose 83 70 - 125 mg/dL    Calcium 9.3 8.5 - 10.5 mg/dL    BUN 24 8 - 28 mg/dL    Creatinine 0.62 0.60 - 1.10 mg/dL    GFR MDRD Af Amer >60 >60 mL/min/1.73m2    GFR MDRD Non Af Amer >60 >60 mL/min/1.73m2     Lab Results   Component Value Date    WBC 8.6 02/05/2019    HGB 13.8 01/04/2019    HCT 44.0 01/04/2019    MCV 92 01/04/2019     01/04/2019     CrCl cannot be calculated (Patient's most recent lab result is older than the maximum 5 days allowed.).    ASSESSMENT/Plan:      ICD-10-CM    1. Esophageal dysphagia R13.10    2. History of abdominal hernia Z87.19    3. Chronic GERD K21.9    4. Major depressive disorder, remission status unspecified, unspecified whether recurrent F32.9    5. Pulmonary emphysema, unspecified emphysema type (H) J43.9    6. Bowel and bladder incontinence R32     R15.9    7. Coronary artery disease involving native coronary artery of native heart without angina pectoris I25.10      CHANGES:  None.    CARE PLAN:  The care plan has been  reviewed and all orders signed.  Changes to care plan, if any, as noted.  Otherwise, continue current plan of care.  Total time spent with this patient was approximately 35 minutes, with greater than 50% spent in counseling and coordination of care that included a review of her recent hospital records and discussions with speech therapy and the nutritionist regarding the need for supplemental nutrition as she regains the ability to take food orally.    The above has been created using voice recognition software. Please be aware that this may unintentionally  produce inaccuracies and/or nonsensical sentences.       Electronically signed by: Ramana Blandon CNP

## 2021-06-25 NOTE — TELEPHONE ENCOUNTER
[facility] Nurse Triage Telephone Note      Provider: Rosina Guo MD  Facility: [facility] Type: LT    Caller: Temi  Call Back Number:  [phone]    Allergies: Sulfa (sulfonamide antibiotics) and Penicillins    Reason for call: Lab results reported WNL     Verbal Order/Direction given by Provider: ANA    Provider giving order: Yuli Alvarez MD     Verbal order given to: Temi Arreola RN

## 2021-06-26 NOTE — PROGRESS NOTES
"[facility]    Facility:   [facility] [[id number]]   Code Status: DNI       CHIEF COMPLAINT/REASON FOR VISIT:  Chief Complaint   Patient presents with     Review Of Multiple Medical Conditions     COPD, malnutrition       HISTORY:      HPI: Rosina is a 75 y.o. female who has a past medical history of COPD, dyslipidemia, HTN, anemia, PMR, TERESA, pancreatitis, chronic pericardial effusion, depression, and insomnia. She has a history of pancreatitis, gastric obstruction and feeding tube (removed 8/2019). She was on Hospice for her COPD as she did not want progressive treatments during her last hospitalization (2020) however her status improved at the [facility] and she was signed off of Hospice on 9/11/2020.      She spends most of her day lying in bed watching tv.  Nursing reports that recently she is eating more and had a 2lb weight gain in the past month.  She continues to eat junk food and today she has 3 open bags of dioritos or cheetos in bed with her.     In March 2021, she did have complaints of a sore throat and this was approximately 2 months after decreasing her omeprazole in efforts of weaning off.  Omeprazole was increased and she reports that her \"sore throat\" resolved.  Also, in April the dentist did treat mouth sores with a course of nystatin swish and spit and she reports that her sores has improved until recently.  She was seen by the dentist last week for denture follow up and salt water rinses were ordered with some improvement.  She does have some redness to the mucosa on the inside of her mouth but unsure if this erythema or Dorito dust.     She has had no respiratory illnesses in over 1 year.        Current Outpatient Medications on File Prior to Visit   Medication Sig Dispense Refill     albuterol (PROAIR HFA;PROVENTIL HFA;VENTOLIN HFA) 90 mcg/actuation inhaler Inhale 2 puffs every 4 (four) hours as needed for wheezing.       acetaminophen (TYLENOL) 325 MG tablet " Take 650 mg by mouth every 4 (four) hours as needed for pain.       aluminum-magnesium hydroxide-simethicone (MAALOX ADVANCED) 200-200-20 mg/5 mL Susp Take 30 mL by mouth every 4 (four) hours as needed.       bisacodyL (DULCOLAX) 10 mg suppository Insert 10 mg into the rectum daily as needed.       budesonide-formoterol (SYMBICORT) 160-4.5 mcg/actuation inhaler Inhale 2 puffs 2 (two) times a day.       calcium carbonate (OS-JASPREET) 600 mg calcium (1,500 mg) tablet Take 600 mg by mouth daily.       omeprazole (PRILOSEC) 10 MG capsule Take 20 mg by mouth daily before breakfast.        senna (SENOKOT) 8.6 mg tablet Take 1 tablet by mouth 2 (two) times a day.       venlafaxine (EFFEXOR) 37.5 MG tablet Take 37.5 mg by mouth daily.              No current facility-administered medications on file prior to visit.             Review of Systems   Patient denies fever, chills, headache, lightheadedness, dizziness, rhinorrhea, cough, congestion, shortness of breath, chest pain, palpitations, abdominal pain, n/v, diarrhea, constipation, change in appetite, dysuria, frequency, burning or pain with urination.  Other than stated in HPI all other review of systems is negative.       Physical Exam   Vitals:    06/11/21 1059   BP: 111/71   Pulse: 76   Resp: 18   Temp: 98  F (36.7  C)   SpO2: 93%       GENERAL APPEARANCE: thin, elderly female, in no acute distress.  HEENT: normocephalic, atraumatic  PERRL, sclerae anicteric, conjunctivae clear and moist, EOM intact  Red oral mucosa and tongue, no lesions appreciated. Eating Doritos  LUNGS: no tachypnea, respiratory effort normal.  ABD: nondistended   EXTREMITIES: No cyanosis, clubbing or edema.  NEURO: Alert and oriented x 3.  Face is symmetric.  SKIN: visual Inspection of the skin reveals no rashes, ulcerations or petechiae.  PSYCH: euthymic      LABS:   No results found for this or any previous visit (from the past 240 hour(s)).    ASSESSMENT:      ICD-10-CM    1. Chronic obstructive  pulmonary disease, unspecified COPD type (H)  J44.9    2. Chronic GERD  K21.9    3. Depression, unspecified depression type  F32.9    4. Malnutrition, unspecified type (H)  E46    5. Coronary artery disease involving native coronary artery of native heart without angina pectoris  I25.10    6. Hyperlipidemia, unspecified hyperlipidemia type  E78.5        PLAN:  CoPD: stable without exacerbation, continue symbicort and albuterol.     GERD: stable, omeprazole 20, did not tolerate weaning down on this.      Depression: PHQ showed mild depression, continue on Effexor.     Malnutrition: appetite improving, weight gain of 2lbs after loss when she had mouth sores, now 115lbs BMI good at 23.23    CAD: No chest pain, no asa or statin.  Will need to consider if adding these meds would align with her goals of care.     HLD: taken off of statin when started on hospice to reduce pill burden.  Check lipid panel.     Electronically signed by: Elif Carter CNP

## 2021-07-04 NOTE — LETTER
Letter by Elif Carter CNP at      Author: Elif Carter CNP Service: -- Author Type: --    Filed:  Encounter Date: 6/11/2021 Status: (Other)         [facility]  [address] [place] [id number]                                  June 11, 2021    Patient: Rosina Link   MR Number: [id number]   YOB: 1945   Date of Visit: 6/11/2021     Dear Dr. Home:    Thank you for referring Rosina Link to me for evaluation. Below are the relevant portions of my assessment and plan of care.    If you have questions, please do not hesitate to call me. I look forward to following Rosina along with you.    Sincerely,        Elif Carter CNP          CC  No Recipients  Elif Carter CNP  6/11/2021  2:11 PM  Signed  [facility]    Facility:   [facility] [[id number]]   Code Status: DNI       CHIEF COMPLAINT/REASON FOR VISIT:  Chief Complaint   Patient presents with   ? Review Of Multiple Medical Conditions     COPD, malnutrition       HISTORY:      HPI: Rosina is a 75 y.o. female who has a past medical history of COPD, dyslipidemia, HTN, anemia, PMR, TERESA, pancreatitis, chronic pericardial effusion, depression, and insomnia. She has a history of pancreatitis, gastric obstruction and feeding tube (removed 8/2019). She was on Hospice for her COPD as she did not want progressive treatments during her last hospitalization (2020) however her status improved at the [facility] and she was signed off of Hospice on 9/11/2020.      She spends most of her day lying in bed watching tv.  Nursing reports that recently she is eating more and had a 2lb weight gain in the past month.  She continues to eat junk food and today she has 3 open bags of dioritos or cheetos in bed with her.     In March 2021, she did have complaints of a sore throat and this was approximately 2 months after decreasing her omeprazole in efforts of weaning off.  Omeprazole was  "increased and she reports that her \"sore throat\" resolved.  Also, in April the dentist did treat mouth sores with a course of nystatin swish and spit and she reports that her sores has improved until recently.  She was seen by the dentist last week for denture follow up and salt water rinses were ordered with some improvement.  She does have some redness to the mucosa on the inside of her mouth but unsure if this erythema or Dorito dust.     She has had no respiratory illnesses in over 1 year.        Current Outpatient Medications on File Prior to Visit   Medication Sig Dispense Refill   ? albuterol (PROAIR HFA;PROVENTIL HFA;VENTOLIN HFA) 90 mcg/actuation inhaler Inhale 2 puffs every 4 (four) hours as needed for wheezing.     ? acetaminophen (TYLENOL) 325 MG tablet Take 650 mg by mouth every 4 (four) hours as needed for pain.     ? aluminum-magnesium hydroxide-simethicone (MAALOX ADVANCED) 200-200-20 mg/5 mL Susp Take 30 mL by mouth every 4 (four) hours as needed.     ? bisacodyL (DULCOLAX) 10 mg suppository Insert 10 mg into the rectum daily as needed.     ? budesonide-formoterol (SYMBICORT) 160-4.5 mcg/actuation inhaler Inhale 2 puffs 2 (two) times a day.     ? calcium carbonate (OS-JASPREET) 600 mg calcium (1,500 mg) tablet Take 600 mg by mouth daily.     ? omeprazole (PRILOSEC) 10 MG capsule Take 20 mg by mouth daily before breakfast.      ? senna (SENOKOT) 8.6 mg tablet Take 1 tablet by mouth 2 (two) times a day.     ? venlafaxine (EFFEXOR) 37.5 MG tablet Take 37.5 mg by mouth daily.              No current facility-administered medications on file prior to visit.             Review of Systems   Patient denies fever, chills, headache, lightheadedness, dizziness, rhinorrhea, cough, congestion, shortness of breath, chest pain, palpitations, abdominal pain, n/v, diarrhea, constipation, change in appetite, dysuria, frequency, burning or pain with urination.  Other than stated in HPI all other review of systems is " negative.       Physical Exam   Vitals:    06/11/21 1059   BP: 111/71   Pulse: 76   Resp: 18   Temp: 98  F (36.7  C)   SpO2: 93%       GENERAL APPEARANCE: thin, elderly female, in no acute distress.  HEENT: normocephalic, atraumatic  PERRL, sclerae anicteric, conjunctivae clear and moist, EOM intact  Red oral mucosa and tongue, no lesions appreciated. Eating Doritos  LUNGS: no tachypnea, respiratory effort normal.  ABD: nondistended   EXTREMITIES: No cyanosis, clubbing or edema.  NEURO: Alert and oriented x 3.  Face is symmetric.  SKIN: visual Inspection of the skin reveals no rashes, ulcerations or petechiae.  PSYCH: euthymic      LABS:   No results found for this or any previous visit (from the past 240 hour(s)).    ASSESSMENT:      ICD-10-CM    1. Chronic obstructive pulmonary disease, unspecified COPD type (H)  J44.9    2. Chronic GERD  K21.9    3. Depression, unspecified depression type  F32.9    4. Malnutrition, unspecified type (H)  E46    5. Coronary artery disease involving native coronary artery of native heart without angina pectoris  I25.10    6. Hyperlipidemia, unspecified hyperlipidemia type  E78.5        PLAN:  CoPD: stable without exacerbation, continue symbicort and albuterol.     GERD: stable, omeprazole 20, did not tolerate weaning down on this.      Depression: PHQ showed mild depression, continue on Effexor.     Malnutrition: appetite improving, weight gain of 2lbs after loss when she had mouth sores, now 115lbs BMI good at 23.23    CAD: No chest pain, no asa or statin.  Will need to consider if adding these meds would align with her goals of care.     HLD: taken off of statin when started on hospice to reduce pill burden.  Check lipid panel.     Electronically signed by: Elif Carter CNP

## 2021-07-06 VITALS
WEIGHT: 115 LBS | HEART RATE: 76 BPM | DIASTOLIC BLOOD PRESSURE: 71 MMHG | BODY MASS INDEX: 23.18 KG/M2 | HEIGHT: 59 IN | TEMPERATURE: 98 F | RESPIRATION RATE: 18 BRPM | OXYGEN SATURATION: 93 % | SYSTOLIC BLOOD PRESSURE: 111 MMHG

## 2021-07-14 PROBLEM — J96.21 ACUTE ON CHRONIC RESPIRATORY FAILURE WITH HYPOXIA (H): Status: RESOLVED | Noted: 2021-02-25 | Resolved: 2021-06-11

## 2021-07-14 PROBLEM — F32.5 MAJOR DEPRESSIVE DISORDER IN FULL REMISSION, UNSPECIFIED WHETHER RECURRENT (H): Status: RESOLVED | Noted: 2021-02-25 | Resolved: 2021-06-11

## 2021-08-30 ENCOUNTER — NURSING HOME VISIT (OUTPATIENT)
Dept: GERIATRICS | Facility: CLINIC | Age: 76
End: 2021-08-30
Payer: MEDICARE

## 2021-08-30 VITALS
WEIGHT: 115 LBS | SYSTOLIC BLOOD PRESSURE: 123 MMHG | HEIGHT: 59 IN | BODY MASS INDEX: 23.18 KG/M2 | HEART RATE: 67 BPM | TEMPERATURE: 98.7 F | DIASTOLIC BLOOD PRESSURE: 76 MMHG | OXYGEN SATURATION: 95 % | RESPIRATION RATE: 20 BRPM

## 2021-08-30 DIAGNOSIS — K21.9 CHRONIC GERD: ICD-10-CM

## 2021-08-30 DIAGNOSIS — I10 ESSENTIAL HYPERTENSION: ICD-10-CM

## 2021-08-30 DIAGNOSIS — J44.9 CHRONIC OBSTRUCTIVE PULMONARY DISEASE, UNSPECIFIED COPD TYPE (H): Primary | ICD-10-CM

## 2021-08-30 DIAGNOSIS — F32.A DEPRESSION, UNSPECIFIED DEPRESSION TYPE: ICD-10-CM

## 2021-08-30 PROCEDURE — 99309 SBSQ NF CARE MODERATE MDM 30: CPT | Performed by: FAMILY MEDICINE

## 2021-08-30 RX ORDER — MAGNESIUM HYDROXIDE/ALUMINUM HYDROXICE/SIMETHICONE 120; 1200; 1200 MG/30ML; MG/30ML; MG/30ML
30 SUSPENSION ORAL EVERY 4 HOURS PRN
COMMUNITY

## 2021-08-30 RX ORDER — POLYETHYLENE GLYCOL 3350 17 G/17G
17 POWDER, FOR SOLUTION ORAL DAILY PRN
COMMUNITY

## 2021-08-30 RX ORDER — AMOXICILLIN 250 MG
1 CAPSULE ORAL 2 TIMES DAILY
COMMUNITY

## 2021-08-30 RX ORDER — VENLAFAXINE 25 MG/1
25 TABLET ORAL DAILY
COMMUNITY

## 2021-08-30 RX ORDER — ALBUTEROL SULFATE 90 UG/1
2 AEROSOL, METERED RESPIRATORY (INHALATION) EVERY 4 HOURS PRN
COMMUNITY

## 2021-08-30 RX ORDER — ACETAMINOPHEN 325 MG/1
650 TABLET ORAL EVERY 4 HOURS PRN
COMMUNITY

## 2021-08-30 RX ORDER — BISACODYL 10 MG
10 SUPPOSITORY, RECTAL RECTAL EVERY OTHER DAY
COMMUNITY
End: 2022-01-01

## 2021-08-30 ASSESSMENT — MIFFLIN-ST. JEOR: SCORE: 917.27

## 2021-08-30 NOTE — LETTER
8/30/2021        RE: Rosina Link  [address]  [place] [address]          [facility]    [facility] Record Number:  [id number]  Place of Service where encounter took place: [facility] () [[id number]]  CODE STATUS:   DNR / DNI    Chief Complaint:  Chief Complaint   Patient presents with     long-term Regulatory     LTC 8/30/2021. COPD. General debility.        HPI:   Rosina is a 76 y.o. female who resides in LTC at [facility]. She has hx of COPD, HTN, hiatal hernia, GERD, anemia, PMR, TERESA, pancreatitis, gastric obstruction and FTT previously on feeding tube (discontinued 8/2019). She was sent to the hospital on 2/28/20 from a planned routine cardiology visit due to cough, dyspnea and hypoxia. She was diagnosed with hypoxemic respiratory failure, needed oxygen, had bronchoscopy which opened left upper lobe with some improvement but unable to be weaned. Repeat bronch with extensive mucous plugging. Subsequent resp failure requiring CPAP during which briefly unresponsive. She did not want to comply with treatments and ultimately moved to comfort based approach, returning to LTC on 3/16/20 on [facility]. Subsequent stabilization over time and was discharged from Hospice eff 9/11/2020.      Today:  No interim concerns since my last visit. Meds reviewed, she is on Symbicort for COPD, no reported respiratory concerns. She denies fever, cough, shortness of breath or wheezing. She is on omeprazole for GERD with increase in sx in March, no complaints today. She denies abdominal pain, nausea, vomiting or diarrhea. Per nurses charting refuses toileting and doesn't like to walk. It is unclear if she is able to ambulate well. She is on venlafaxine for depression, denies any mood concerns.       Past Medical History:  Past Medical History:   Diagnosis Date     Acute encephalopathy      Acute on chronic respiratory failure (H)      Aperistalsis of esophagus       Arthritis      Compression fracture of lumbar vertebra (H)     L2     COPD (chronic obstructive pulmonary disease) (H)      Depression      Dyslipidemia      Encephalopathy      Esophageal candidiasis (H)      Esophagitis      Failure to thrive in adult      Fall      GERD (gastroesophageal reflux disease)      Hiatal hernia      HLD (hyperlipidemia)      HTN (hypertension)      Hypocalcemia      Hypomagnesemia      Hypoxia      Insomnia      Lactic acidosis      Major depressive disorder      Malnutrition (H)      Maternal MCV (mean corpuscular volume) low      Microcytic anemia      Microcytic hypochromic anemia 01/2013     Mucus plugging of bronchi      Osteoporosis      Pericardial effusion      PMR (polymyalgia rheumatica) (H)      Pneumonia      Pneumothorax      Positive urine drug screen 10/2012    meth and coke positive 10/12. negative in 11/19/12     Pulmonary emphysema (H)      Rib fracture      S/P laparoscopic fundoplication      SIRS (systemic inflammatory response syndrome) (H)      Thrombocytopenia (H)      Trigger finger     left hand, middle finger     Underweight      Urinary urgency        Medications:  Current Outpatient Medications   Medication Instructions     acetaminophen (TYLENOL) 650 mg, Oral, EVERY 4 HOURS PRN     albuterol (PROAIR HFA/PROVENTIL HFA/VENTOLIN HFA) 108 (90 Base) MCG/ACT inhaler 2 puffs, Inhalation, EVERY 4 HOURS PRN     alendronate (FOSAMAX) 70 mg, EVERY 7 DAYS     alum & mag hydroxide-simethicone (MAALOX) 200-200-20 MG/5ML SUSP suspension 30 mLs, Oral, EVERY 4 HOURS PRN     bisacodyl (DULCOLAX) 10 mg, Rectal, EVERY OTHER DAY     budesonide-formoterol (SYMBICORT) 160-4.5 MCG/ACT inhaler 2 puffs, 2 TIMES DAILY     Calcium Carbonate-Vitamin D (CALCIUM + D) 600-200 MG-UNIT TABS Take  by mouth.     DULoxetine (CYMBALTA) 60 mg, DAILY     esomeprazole (NEXIUM) 40 mg, DAILY     METOPROLOL SUCCINATE PO 20 mg, DAILY     omeprazole (PRILOSEC) 20 mg, Oral, DAILY      "oxycodone-acetaminophen (PERCOCET) 5-325 MG per tablet 1 tablet, EVERY 4 HOURS PRN     polyethylene glycol (MIRALAX) 17 g, Oral, DAILY     predniSONE (DELTASONE) 5 mg, DAILY     senna-docusate (SENOKOT-S/PERICOLACE) 8.6-50 MG tablet 1 tablet, Oral, 2 TIMES DAILY     tiotropium (SPIRIVA HANDIHALER) 18 mcg, DAILY     tolterodine ER (DETROL LA) 4 mg, DAILY     venlafaxine (EFFEXOR) 25 mg, Oral, DAILY       Physical Exam:  General: Patient is alert female, no distress.  Vitals: /76   Pulse 67   Temp 98.7  F (37.1  C)   Resp 20   Ht 1.499 m (4' 11\")   Wt 52.2 kg (115 lb)   SpO2 95%   BMI 23.23 kg/m    HEENT: Head is NCAT. Eyes show no injection or icterus. Nares negative. Oropharynx well hydrated.  Neck: No JVD.  Lungs: Non labored respirations.   : Deferred.  Extremities: No LE edema is noted.  Musculoskeletal: Age related degen changes.   Psych: Mood appears pretty good.      Labs:  Lab Results   Component Value Date    WBC 5.7 01/23/2020    HGB 13.8 01/23/2020    HCT 43.2 01/23/2020    MCV 90 01/23/2020     01/23/2020     Results for orders placed or performed in visit on 12/17/19   Basic Metabolic Panel   Result Value Ref Range    Sodium 139 136 - 145 mmol/L    Potassium 3.5 3.5 - 5.0 mmol/L    Chloride 100 98 - 107 mmol/L    CO2 31 22 - 31 mmol/L    Anion Gap, Calculation 8 5 - 18 mmol/L    Glucose 99 70 - 125 mg/dL    Calcium 9.0 8.5 - 10.5 mg/dL    BUN 16 8 - 28 mg/dL    Creatinine 0.82 0.60 - 1.10 mg/dL    GFR MDRD Af Amer >60 >60 mL/min/1.73m2    GFR MDRD Non Af Amer >60 >60 mL/min/1.73m2         Assessment/Plan:  1. COPD. Continue on Symbicort. She does not require supplemental oxygen. Respiratory status currently stable.   2. Depression. Continue on Venlafaxine.  3. HTN. BPs are satisfactory, not on medications for HTN.   4. GERD. Stable on omeprazole. Dietary indiscretion contributing.   5. Anemia. Hx of anemia, last checked Jan 2020 at 13.8.    Overall she appears stable, no new orders " needed.       Electronically signed by: Rosina Guo MD                 Sincerely,        Rosina Guo MD

## 2021-09-19 NOTE — PROGRESS NOTES
[facility] Medical Record Number:  [id number]  Place of Service where encounter took place: [facility] () [[id number]]  CODE STATUS:   DNR / DNI    Chief Complaint:  Chief Complaint   Patient presents with     senior care Regulatory     LTC 8/30/2021. COPD. General debility.        HPI:   Rosina is a 76 y.o. female who resides in LTC at [facility]. She has hx of COPD, HTN, hiatal hernia, GERD, anemia, PMR, TERESA, pancreatitis, gastric obstruction and FTT previously on feeding tube (discontinued 8/2019). She was sent to the hospital on 2/28/20 from a planned routine cardiology visit due to cough, dyspnea and hypoxia. She was diagnosed with hypoxemic respiratory failure, needed oxygen, had bronchoscopy which opened left upper lobe with some improvement but unable to be weaned. Repeat bronch with extensive mucous plugging. Subsequent resp failure requiring CPAP during which briefly unresponsive. She did not want to comply with treatments and ultimately moved to comfort based approach, returning to LTC on 3/16/20 on [facility]. Subsequent stabilization over time and was discharged from Hospice eff 9/11/2020.      Today:  No interim concerns since my last visit. Meds reviewed, she is on Symbicort for COPD, no reported respiratory concerns. She denies fever, cough, shortness of breath or wheezing. She is on omeprazole for GERD with increase in sx in March, no complaints today. She denies abdominal pain, nausea, vomiting or diarrhea. Per nurses charting refuses toileting and doesn't like to walk. It is unclear if she is able to ambulate well. She is on venlafaxine for depression, denies any mood concerns.       Past Medical History:  Past Medical History:   Diagnosis Date     Acute encephalopathy      Acute on chronic respiratory failure (H)      Aperistalsis of esophagus      Arthritis      Compression fracture of lumbar vertebra (H)     L2     COPD (chronic  obstructive pulmonary disease) (H)      Depression      Dyslipidemia      Encephalopathy      Esophageal candidiasis (H)      Esophagitis      Failure to thrive in adult      Fall      GERD (gastroesophageal reflux disease)      Hiatal hernia      HLD (hyperlipidemia)      HTN (hypertension)      Hypocalcemia      Hypomagnesemia      Hypoxia      Insomnia      Lactic acidosis      Major depressive disorder      Malnutrition (H)      Maternal MCV (mean corpuscular volume) low      Microcytic anemia      Microcytic hypochromic anemia 01/2013     Mucus plugging of bronchi      Osteoporosis      Pericardial effusion      PMR (polymyalgia rheumatica) (H)      Pneumonia      Pneumothorax      Positive urine drug screen 10/2012    meth and coke positive 10/12. negative in 11/19/12     Pulmonary emphysema (H)      Rib fracture      S/P laparoscopic fundoplication      SIRS (systemic inflammatory response syndrome) (H)      Thrombocytopenia (H)      Trigger finger     left hand, middle finger     Underweight      Urinary urgency        Medications:  Current Outpatient Medications   Medication Instructions     acetaminophen (TYLENOL) 650 mg, Oral, EVERY 4 HOURS PRN     albuterol (PROAIR HFA/PROVENTIL HFA/VENTOLIN HFA) 108 (90 Base) MCG/ACT inhaler 2 puffs, Inhalation, EVERY 4 HOURS PRN     alendronate (FOSAMAX) 70 mg, EVERY 7 DAYS     alum & mag hydroxide-simethicone (MAALOX) 200-200-20 MG/5ML SUSP suspension 30 mLs, Oral, EVERY 4 HOURS PRN     bisacodyl (DULCOLAX) 10 mg, Rectal, EVERY OTHER DAY     budesonide-formoterol (SYMBICORT) 160-4.5 MCG/ACT inhaler 2 puffs, 2 TIMES DAILY     Calcium Carbonate-Vitamin D (CALCIUM + D) 600-200 MG-UNIT TABS Take  by mouth.     DULoxetine (CYMBALTA) 60 mg, DAILY     esomeprazole (NEXIUM) 40 mg, DAILY     METOPROLOL SUCCINATE PO 20 mg, DAILY     omeprazole (PRILOSEC) 20 mg, Oral, DAILY     oxycodone-acetaminophen (PERCOCET) 5-325 MG per tablet 1 tablet, EVERY 4 HOURS PRN     polyethylene  "glycol (MIRALAX) 17 g, Oral, DAILY     predniSONE (DELTASONE) 5 mg, DAILY     senna-docusate (SENOKOT-S/PERICOLACE) 8.6-50 MG tablet 1 tablet, Oral, 2 TIMES DAILY     tiotropium (SPIRIVA HANDIHALER) 18 mcg, DAILY     tolterodine ER (DETROL LA) 4 mg, DAILY     venlafaxine (EFFEXOR) 25 mg, Oral, DAILY       Physical Exam:  General: Patient is alert female, no distress.  Vitals: /76   Pulse 67   Temp 98.7  F (37.1  C)   Resp 20   Ht 1.499 m (4' 11\")   Wt 52.2 kg (115 lb)   SpO2 95%   BMI 23.23 kg/m    HEENT: Head is NCAT. Eyes show no injection or icterus. Nares negative. Oropharynx well hydrated.  Neck: No JVD.  Lungs: Non labored respirations.   : Deferred.  Extremities: No LE edema is noted.  Musculoskeletal: Age related degen changes.   Psych: Mood appears pretty good.      Labs:  Lab Results   Component Value Date    WBC 5.7 01/23/2020    HGB 13.8 01/23/2020    HCT 43.2 01/23/2020    MCV 90 01/23/2020     01/23/2020     Results for orders placed or performed in visit on 12/17/19   Basic Metabolic Panel   Result Value Ref Range    Sodium 139 136 - 145 mmol/L    Potassium 3.5 3.5 - 5.0 mmol/L    Chloride 100 98 - 107 mmol/L    CO2 31 22 - 31 mmol/L    Anion Gap, Calculation 8 5 - 18 mmol/L    Glucose 99 70 - 125 mg/dL    Calcium 9.0 8.5 - 10.5 mg/dL    BUN 16 8 - 28 mg/dL    Creatinine 0.82 0.60 - 1.10 mg/dL    GFR MDRD Af Amer >60 >60 mL/min/1.73m2    GFR MDRD Non Af Amer >60 >60 mL/min/1.73m2         Assessment/Plan:  1. COPD. Continue on Symbicort. She does not require supplemental oxygen. Respiratory status currently stable.   2. Depression. Continue on Venlafaxine.  3. HTN. BPs are satisfactory, not on medications for HTN.   4. GERD. Stable on omeprazole. Dietary indiscretion contributing.   5. Anemia. Hx of anemia, last checked Jan 2020 at 13.8.    Overall she appears stable, no new orders needed.       Electronically signed by: Rosina Guo MD           "

## 2021-10-19 ENCOUNTER — LAB REQUISITION (OUTPATIENT)
Dept: LAB | Facility: CLINIC | Age: 76
End: 2021-10-19
Payer: MEDICARE

## 2021-10-19 DIAGNOSIS — Z01.812 ENCOUNTER FOR PREPROCEDURAL LABORATORY EXAMINATION: ICD-10-CM

## 2021-10-19 DIAGNOSIS — Z51.81 ENCOUNTER FOR THERAPEUTIC DRUG LEVEL MONITORING: ICD-10-CM

## 2021-10-19 DIAGNOSIS — R62.7 ADULT FAILURE TO THRIVE: ICD-10-CM

## 2021-10-19 ASSESSMENT — MIFFLIN-ST. JEOR: SCORE: 913.64

## 2021-10-20 ENCOUNTER — TELEPHONE (OUTPATIENT)
Dept: GERIATRICS | Facility: CLINIC | Age: 76
End: 2021-10-20

## 2021-10-20 LAB
ANION GAP SERPL CALCULATED.3IONS-SCNC: 7 MMOL/L (ref 5–18)
BUN SERPL-MCNC: 8 MG/DL (ref 8–28)
CALCIUM SERPL-MCNC: 8.8 MG/DL (ref 8.5–10.5)
CHLORIDE BLD-SCNC: 104 MMOL/L (ref 98–107)
CHOLEST SERPL-MCNC: 186 MG/DL
CO2 SERPL-SCNC: 27 MMOL/L (ref 22–31)
CREAT SERPL-MCNC: 0.68 MG/DL (ref 0.6–1.1)
DEPRECATED CALCIDIOL+CALCIFEROL SERPL-MC: 27 UG/L (ref 30–80)
ERYTHROCYTE [DISTWIDTH] IN BLOOD BY AUTOMATED COUNT: 14.5 % (ref 10–15)
FASTING STATUS PATIENT QL REPORTED: NORMAL
GFR SERPL CREATININE-BSD FRML MDRD: 85 ML/MIN/1.73M2
GLUCOSE BLD-MCNC: 103 MG/DL (ref 70–125)
HBA1C MFR BLD: 5.1 %
HCT VFR BLD AUTO: 41.2 % (ref 35–47)
HDLC SERPL-MCNC: 55 MG/DL
HGB BLD-MCNC: 12.9 G/DL (ref 11.7–15.7)
LDLC SERPL CALC-MCNC: 116 MG/DL
MAGNESIUM SERPL-MCNC: 2.1 MG/DL (ref 1.8–2.6)
MCH RBC QN AUTO: 27.4 PG (ref 26.5–33)
MCHC RBC AUTO-ENTMCNC: 31.3 G/DL (ref 31.5–36.5)
MCV RBC AUTO: 88 FL (ref 78–100)
PLATELET # BLD AUTO: 187 10E3/UL (ref 150–450)
POTASSIUM BLD-SCNC: 4 MMOL/L (ref 3.5–5)
RBC # BLD AUTO: 4.71 10E6/UL (ref 3.8–5.2)
SODIUM SERPL-SCNC: 138 MMOL/L (ref 136–145)
TRIGL SERPL-MCNC: 75 MG/DL
TSH SERPL DL<=0.005 MIU/L-ACNC: 2.63 UIU/ML (ref 0.3–5)
WBC # BLD AUTO: 5.2 10E3/UL (ref 4–11)

## 2021-10-20 PROCEDURE — P9604 ONE-WAY ALLOW PRORATED TRIP: HCPCS | Mod: ORL | Performed by: NURSE PRACTITIONER

## 2021-10-20 PROCEDURE — 84443 ASSAY THYROID STIM HORMONE: CPT | Mod: ORL | Performed by: NURSE PRACTITIONER

## 2021-10-20 PROCEDURE — 85027 COMPLETE CBC AUTOMATED: CPT | Mod: ORL | Performed by: NURSE PRACTITIONER

## 2021-10-20 PROCEDURE — 83036 HEMOGLOBIN GLYCOSYLATED A1C: CPT | Mod: ORL | Performed by: NURSE PRACTITIONER

## 2021-10-20 PROCEDURE — 80061 LIPID PANEL: CPT | Mod: ORL | Performed by: NURSE PRACTITIONER

## 2021-10-20 PROCEDURE — 36415 COLL VENOUS BLD VENIPUNCTURE: CPT | Mod: ORL | Performed by: NURSE PRACTITIONER

## 2021-10-20 PROCEDURE — 80048 BASIC METABOLIC PNL TOTAL CA: CPT | Mod: ORL | Performed by: NURSE PRACTITIONER

## 2021-10-20 PROCEDURE — 83735 ASSAY OF MAGNESIUM: CPT | Mod: ORL | Performed by: NURSE PRACTITIONER

## 2021-10-20 PROCEDURE — 82306 VITAMIN D 25 HYDROXY: CPT | Mod: ORL | Performed by: NURSE PRACTITIONER

## 2021-10-20 NOTE — TELEPHONE ENCOUNTER
[facility] Nurse Triage Telephone Note    Provider: ERIBERTO Delgado  Facility: [facility] Type:  C    Caller: Mayelin  Call Back Number: [phone]    Allergies:    Allergies   Allergen Reactions     Misc. Sulfonamide Containing Compounds      SULFA     Pcn [Penicillins]         Reason for call: Nurse calling to report Heme 2, BMP, TSH, Mg, lipid, HgbA1c, and Vitamin D levels.      Verbal Order/Direction given by Provider: Vitamin D3----take 1000 units daily.      Provider giving Order:  ERIBERTO Delgado    Verbal Order given to: Mayelin Nagy RN

## 2021-10-26 ENCOUNTER — NURSING HOME VISIT (OUTPATIENT)
Dept: GERIATRICS | Facility: CLINIC | Age: 76
End: 2021-10-26
Payer: MEDICARE

## 2021-10-26 VITALS
HEART RATE: 65 BPM | TEMPERATURE: 98.3 F | OXYGEN SATURATION: 94 % | BODY MASS INDEX: 23.02 KG/M2 | WEIGHT: 114.2 LBS | SYSTOLIC BLOOD PRESSURE: 104 MMHG | DIASTOLIC BLOOD PRESSURE: 81 MMHG | RESPIRATION RATE: 20 BRPM | HEIGHT: 59 IN

## 2021-10-26 DIAGNOSIS — F32.A DEPRESSION, UNSPECIFIED DEPRESSION TYPE: ICD-10-CM

## 2021-10-26 DIAGNOSIS — J44.9 CHRONIC OBSTRUCTIVE PULMONARY DISEASE, UNSPECIFIED COPD TYPE (H): ICD-10-CM

## 2021-10-26 DIAGNOSIS — K21.9 CHRONIC GERD: ICD-10-CM

## 2021-10-26 DIAGNOSIS — I25.10 CORONARY ARTERY DISEASE INVOLVING NATIVE CORONARY ARTERY OF NATIVE HEART WITHOUT ANGINA PECTORIS: ICD-10-CM

## 2021-10-26 DIAGNOSIS — M19.90 OSTEOARTHRITIS, UNSPECIFIED OSTEOARTHRITIS TYPE, UNSPECIFIED SITE: Primary | ICD-10-CM

## 2021-10-26 DIAGNOSIS — I10 ESSENTIAL HYPERTENSION: ICD-10-CM

## 2021-10-26 PROCEDURE — 99207 PR CDG-CODE CATEGORY CHANGED: CPT | Performed by: NURSE PRACTITIONER

## 2021-10-26 PROCEDURE — 99309 SBSQ NF CARE MODERATE MDM 30: CPT | Performed by: NURSE PRACTITIONER

## 2021-10-26 NOTE — PROGRESS NOTES
[facility] GERIATRIC SERVICES  Chief Complaint   Patient presents with     Annual Comprehensive Nursing Home     [place] Medical Record Number:  [id number]  Place of Service where encounter took place:  [facility] () [[id number]]    HPI:    Rosina Link  is a 76 year old  (1945), who is being seen today for an annual comprehensive visit. She has a past medical history of COPD, dyslipidemia, HTN, anemia, PMR, TERESA, pancreatitis, chronic pericardial effusion, depression, and insomnia. She has a history of pancreatitis, gastric obstruction and feeding tube (removed 8/2019). She was on Hospice for her COPD as she did not want progressive treatments during her last hospitalization (2020) however her status improved at the LT and she was signed off of Hospice on 9/11/2020.    Today, she is asking to follow up with osteoporosis clinic as she states she had an infusion a few years ago and never went back.  She is hoping to be transferring and ambulating.  She voices fear with transferring and ambulating because she is fearful of falling.      She has had no COPD exacerbations in over 1.5 years.     Weights are stable at 114#      ALLERGIES: Misc. sulfonamide containing compounds and Pcn [penicillins]  PAST MEDICAL HISTORY:   Past Medical History:   Diagnosis Date     Acute encephalopathy      Acute on chronic respiratory failure (H)      Aperistalsis of esophagus      Arthritis      Compression fracture of lumbar vertebra (H)     L2     COPD (chronic obstructive pulmonary disease) (H)      Depression      Dyslipidemia      Encephalopathy      Esophageal candidiasis (H)      Esophagitis      Failure to thrive in adult      Fall      GERD (gastroesophageal reflux disease)      Hiatal hernia      HLD (hyperlipidemia)      HTN (hypertension)      Hypocalcemia      Hypomagnesemia      Hypoxia      Insomnia      Lactic acidosis      Major depressive disorder      Malnutrition (H)      Maternal MCV (mean corpuscular  volume) low      Microcytic anemia      Microcytic hypochromic anemia 01/2013     Mucus plugging of bronchi      Osteoporosis      Pericardial effusion      PMR (polymyalgia rheumatica) (H)      Pneumonia      Pneumothorax      Positive urine drug screen 10/2012    meth and coke positive 10/12. negative in 11/19/12     Pulmonary emphysema (H)      Rib fracture      S/P laparoscopic fundoplication      SIRS (systemic inflammatory response syndrome) (H)      Thrombocytopenia (H)      Trigger finger     left hand, middle finger     Underweight      Urinary urgency       PAST SURGICAL HISTORY:  has a past surgical history that includes IR Gastro Jejunostomy Tube Change (10/24/2018); IR Gastro Jejunostomy Tube Change (11/8/2018); IR Gastro Jejunostomy Tube Change (12/13/2018); IR Gastro Jejunostomy Tube Change (3/15/2019); IR Gastro Jejunostomy Tube Change (3/25/2019); IR Gastro Jejunostomy Tube Change (6/24/2019); IR Tube Removal (8/19/2019); Hysterectomy (1990); Cholecystectomy (01/09/2015); Hiatal Hernia Repair (02/15/2016); Release carpal tunnel (Right); Release trigger finger (Left); Esophagoscopy, gastroscopy, duodenoscopy (EGD), combined; other surgical history; Ir Gj Tube Replacement (12/13/2018); Hiatal Hernia Repair (01/23/2019); Ir Gj Tube Replacement (3/15/2019); Ir Gj Tube Replacement (3/25/2019); Ir Gj Tube Replacement (6/24/2019); and Ir Tube Removal (8/19/2019).  IMMUNIZATIONS:  Immunization History   Administered Date(s) Administered     COVID-19,PF,Moderna 12/28/2020, 01/25/2021     FLU 6-35 months 09/15/2011     Flu, Unspecified 10/31/2018, 11/11/2019     Influenza (High Dose) 3 valent vaccine 12/24/2014, 12/23/2015, 10/05/2016, 08/23/2017     Influenza (IIV3) PF 09/15/2011     Influenza Vaccine, 6+MO IM (QUADRIVALENT W/PRESERVATIVES) 10/12/2020     Pneumo Conj 13-V (2010&after) 12/23/2015     Pneumococcal 23 valent 03/07/2014     Zoster vaccine, live 12/27/2014     Above immunizations pulled from  [facility]. MIIC and facility records also reconciled. Outstanding information sent to  to update [facility].  Future immunizations are not needed at this point as all recommended immunizations are up to date.       Current Outpatient Medications:      acetaminophen (TYLENOL) 325 MG tablet, Take 650 mg by mouth every 4 hours as needed, Disp: , Rfl:      albuterol (PROAIR HFA/PROVENTIL HFA/VENTOLIN HFA) 108 (90 Base) MCG/ACT inhaler, Inhale 2 puffs into the lungs every 4 hours as needed for shortness of breath / dyspnea or wheezing, Disp: , Rfl:      ALENdronate (FOSAMAX) 70 MG tablet, Take 70 mg by mouth every 7 days. (Patient not taking: Reported on 8/30/2021), Disp: , Rfl:      alum & mag hydroxide-simethicone (MAALOX) 200-200-20 MG/5ML SUSP suspension, Take 30 mLs by mouth every 4 hours as needed for indigestion, Disp: , Rfl:      bisacodyl (DULCOLAX) 10 MG suppository, Place 10 mg rectally every other day, Disp: , Rfl:      budesonide-formoterol (SYMBICORT) 160-4.5 MCG/ACT inhaler, Inhale 2 puffs into the lungs 2 times daily., Disp: , Rfl:      Calcium Carbonate-Vitamin D (CALCIUM + D) 600-200 MG-UNIT TABS, Take  by mouth. (Patient not taking: Reported on 8/30/2021), Disp: , Rfl:      cholecalciferol 25 MCG (1000 UT) TABS, Take 1 tablet by mouth daily, Disp: , Rfl:      DULoxetine (CYMBALTA) 60 MG capsule, Take 60 mg by mouth daily. (Patient not taking: Reported on 8/30/2021), Disp: , Rfl:      esomeprazole (NEXIUM) 40 MG capsule, Take 40 mg by mouth daily. One hour before meals  (Patient not taking: Reported on 8/30/2021), Disp: , Rfl:      METOPROLOL SUCCINATE PO, Take 20 mg by mouth daily. (Patient not taking: Reported on 8/30/2021), Disp: , Rfl:      omeprazole (PRILOSEC) 20 MG DR capsule, Take 20 mg by mouth daily, Disp: , Rfl:      oxycodone-acetaminophen (PERCOCET) 5-325 MG per tablet, Take 1 tablet by mouth every 4 hours as needed. 1-2 q 4-6 hours  (Patient not taking: Reported on  8/30/2021), Disp: , Rfl:      polyethylene glycol (MIRALAX) 17 GM/Dose powder, Take 17 g by mouth daily, Disp: , Rfl:      predniSONE (DELTASONE) 5 MG tablet, Take 5 mg by mouth daily. (Patient not taking: Reported on 8/30/2021), Disp: , Rfl:      senna-docusate (SENOKOT-S/PERICOLACE) 8.6-50 MG tablet, Take 1 tablet by mouth 2 times daily, Disp: , Rfl:      tiotropium (SPIRIVA HANDIHALER) 18 MCG inhalation capsule, Inhale 18 mcg into the lungs daily. Inhale contents of one capsule  (Patient not taking: Reported on 8/30/2021), Disp: , Rfl:      tolterodine (DETROL LA) 4 MG 24 hr capsule, Take 4 mg by mouth daily. (Patient not taking: Reported on 8/30/2021), Disp: , Rfl:      venlafaxine (EFFEXOR) 25 MG tablet, Take 25 mg by mouth daily, Disp: , Rfl:      Case Management:  I have reviewed the facility/SNF care plan/MDS, including the falls risk, nutrition and pain screening. I also reviewed the current immunizations, and preventive care.. Future cancer screening is not clinically indicated secondary to age/goals of care Patient's desire to return to the community is not present. Current Level of Care is appropriate.    Advance Directive Discussion:    I reviewed the current advanced directives as reflected in EPIC, the POLST and the facility chart, and verified the congruency of orders .  I did review the advance directives with the resident. Patient's goal is walk.    Team Discussion:  I communicated with the appropriate disciplines involved with the Plan of Care:   Nursing    Information reviewed:  Medications, vital signs, orders, and nursing notes.    ROS:  Patient denies fever, chills, headache, lightheadedness, dizziness, rhinorrhea, cough, congestion, shortness of breath, chest pain, palpitations, abdominal pain, n/v, diarrhea, constipation, change in appetite, change in sleep pattern, dysuria, frequency, burning or pain with urination.  Other than stated in HPI all other review of systems is negative.  "    Physical Exam  Vital signs:/81   Pulse 65   Temp 98.3  F (36.8  C)   Resp 20   Ht 1.499 m (4' 11\")   Wt 51.8 kg (114 lb 3.2 oz)   SpO2 94%   BMI 23.07 kg/m     GENERAL APPEARANCE: Well developed, well nourished, in no acute distress.  HEENT: normocephalic, atraumatic  sclerae anicteric, conjunctivae clear and moist, EOM intact  LUNGS: Lung sounds CTA, no adventitious sounds, respiratory effort normal.  CARD: RRR, S1, S2, without murmurs, gallops, rubs  ABD: Soft, nondistended and nontender with normal bowel sounds.   MSK: Muscle strength and tone were equal bilaterally.   EXTREMITIES: No cyanosis, clubbing or edema.  NEURO: Alert and oriented x 3. Face is symmetric.  SKIN: Inspection of the skin reveals no rashes, ulcerations or petechiae.  PSYCH: euthymic    Lab/Diagnostic data:   Lab Results   Component Value Date    WBC 5.2 10/20/2021     Lab Results   Component Value Date    RBC 4.71 10/20/2021     Lab Results   Component Value Date    HGB 12.9 10/20/2021     Lab Results   Component Value Date    HCT 41.2 10/20/2021     Lab Results   Component Value Date    MCV 88 10/20/2021     Lab Results   Component Value Date    MCH 27.4 10/20/2021     Lab Results   Component Value Date    MCHC 31.3 10/20/2021     Lab Results   Component Value Date    RDW 14.5 10/20/2021     Lab Results   Component Value Date     10/20/2021     Last Comprehensive Metabolic Panel:  Sodium   Date Value Ref Range Status   10/20/2021 138 136 - 145 mmol/L Final     Potassium   Date Value Ref Range Status   10/20/2021 4.0 3.5 - 5.0 mmol/L Final     Chloride   Date Value Ref Range Status   10/20/2021 104 98 - 107 mmol/L Final     Carbon Dioxide (CO2)   Date Value Ref Range Status   10/20/2021 27 22 - 31 mmol/L Final     Anion Gap   Date Value Ref Range Status   10/20/2021 7 5 - 18 mmol/L Final     Glucose   Date Value Ref Range Status   10/20/2021 103 70 - 125 mg/dL Final     Urea Nitrogen   Date Value Ref Range Status "   10/20/2021 8 8 - 28 mg/dL Final     Creatinine   Date Value Ref Range Status   10/20/2021 0.68 0.60 - 1.10 mg/dL Final     GFR Estimate   Date Value Ref Range Status   10/20/2021 85 >60 mL/min/1.73m2 Final     Comment:     As of July 11, 2021, eGFR is calculated by the CKD-EPI creatinine equation, without race adjustment. eGFR can be influenced by muscle mass, exercise, and diet. The reported eGFR is an estimation only and is only applicable if the renal function is stable.   01/23/2020 >60 >60 mL/min/1.73m2 Final     Calcium   Date Value Ref Range Status   10/20/2021 8.8 8.5 - 10.5 mg/dL Final         ASSESSMENT/PLAN  Osteoarthritis, unspecified osteoarthritis type, unspecified site  Will have  investigate which clinic she has been at before for further recommendations.  Continue on vit D supplement    Chronic obstructive pulmonary disease, unspecified COPD type (H)  No exacerbations, continue on albuterol and symbicort    Essential hypertension  Acceptable, on no medications    Chronic GERD  Stable on Maalox and omprazole    Depression, unspecified depression type  Stable with venlafaxine, venlafaxine was decreased in July without issues    Coronary artery disease involving native coronary artery of native heart without angina pectoris  No chest pain, was taken off asa and statin when she was on hospice and this was not restarted.  Continue to monitor.       Electronically signed by:  Elif Carter NP

## 2021-10-26 NOTE — LETTER
10/19/2021        RE: Rosina Link  [address]  [place] [address]        [facility]ERIATRIC [facility]  Chief Complaint   Patient presents with     Annual Comprehensive Nursing Home     [place] Medical Record Number:  [id number]  Place of Service where encounter took place:  [facility] () [[id number]]    HPI:    Rosina Link  is a 76 year old  (1945), who is being seen today for an annual comprehensive visit. She has a past medical history of COPD, dyslipidemia, HTN, anemia, PMR, TERESA, pancreatitis, chronic pericardial effusion, depression, and insomnia. She has a history of pancreatitis, gastric obstruction and feeding tube (removed 8/2019). She was on Hospice for her COPD as she did not want progressive treatments during her last hospitalization (2020) however her status improved at the LT and she was signed off of Hospice on 9/11/2020.    Today, she is asking to follow up with osteoporosis clinic as she states she had an infusion a few years ago and never went back.  She is hoping to be transferring and ambulating.  She voices fear with transferring and ambulating because she is fearful of falling.      She has had no COPD exacerbations in over 1.5 years.     Weights are stable at 114#      ALLERGIES: Misc. sulfonamide containing compounds and Pcn [penicillins]  PAST MEDICAL HISTORY:   Past Medical History:   Diagnosis Date     Acute encephalopathy      Acute on chronic respiratory failure (H)      Aperistalsis of esophagus      Arthritis      Compression fracture of lumbar vertebra (H)     L2     COPD (chronic obstructive pulmonary disease) (H)      Depression      Dyslipidemia      Encephalopathy      Esophageal candidiasis (H)      Esophagitis      Failure to thrive in adult      Fall      GERD (gastroesophageal reflux disease)      Hiatal hernia      HLD (hyperlipidemia)      HTN (hypertension)      Hypocalcemia      Hypomagnesemia      Hypoxia      Insomnia      Lactic acidosis       Major depressive disorder      Malnutrition (H)      Maternal MCV (mean corpuscular volume) low      Microcytic anemia      Microcytic hypochromic anemia 01/2013     Mucus plugging of bronchi      Osteoporosis      Pericardial effusion      PMR (polymyalgia rheumatica) (H)      Pneumonia      Pneumothorax      Positive urine drug screen 10/2012    meth and coke positive 10/12. negative in 11/19/12     Pulmonary emphysema (H)      Rib fracture      S/P laparoscopic fundoplication      SIRS (systemic inflammatory response syndrome) (H)      Thrombocytopenia (H)      Trigger finger     left hand, middle finger     Underweight      Urinary urgency       PAST SURGICAL HISTORY:  has a past surgical history that includes IR Gastro Jejunostomy Tube Change (10/24/2018); IR Gastro Jejunostomy Tube Change (11/8/2018); IR Gastro Jejunostomy Tube Change (12/13/2018); IR Gastro Jejunostomy Tube Change (3/15/2019); IR Gastro Jejunostomy Tube Change (3/25/2019); IR Gastro Jejunostomy Tube Change (6/24/2019); IR Tube Removal (8/19/2019); Hysterectomy (1990); Cholecystectomy (01/09/2015); Hiatal Hernia Repair (02/15/2016); Release carpal tunnel (Right); Release trigger finger (Left); Esophagoscopy, gastroscopy, duodenoscopy (EGD), combined; other surgical history; Ir Gj Tube Replacement (12/13/2018); Hiatal Hernia Repair (01/23/2019); Ir Gj Tube Replacement (3/15/2019); Ir Gj Tube Replacement (3/25/2019); Ir Gj Tube Replacement (6/24/2019); and Ir Tube Removal (8/19/2019).  IMMUNIZATIONS:  Immunization History   Administered Date(s) Administered     COVID-19,PF,Moderna 12/28/2020, 01/25/2021     FLU 6-35 months 09/15/2011     Flu, Unspecified 10/31/2018, 11/11/2019     Influenza (High Dose) 3 valent vaccine 12/24/2014, 12/23/2015, 10/05/2016, 08/23/2017     Influenza (IIV3) PF 09/15/2011     Influenza Vaccine, 6+MO IM (QUADRIVALENT W/PRESERVATIVES) 10/12/2020     Pneumo Conj 13-V (2010&after) 12/23/2015     Pneumococcal 23 valent  03/07/2014     Zoster vaccine, live 12/27/2014     Above immunizations pulled from [facility]. MIIC and facility records also reconciled. Outstanding information sent to  to update [facility].  Future immunizations are not needed at this point as all recommended immunizations are up to date.       Current Outpatient Medications:      acetaminophen (TYLENOL) 325 MG tablet, Take 650 mg by mouth every 4 hours as needed, Disp: , Rfl:      albuterol (PROAIR HFA/PROVENTIL HFA/VENTOLIN HFA) 108 (90 Base) MCG/ACT inhaler, Inhale 2 puffs into the lungs every 4 hours as needed for shortness of breath / dyspnea or wheezing, Disp: , Rfl:      ALENdronate (FOSAMAX) 70 MG tablet, Take 70 mg by mouth every 7 days. (Patient not taking: Reported on 8/30/2021), Disp: , Rfl:      alum & mag hydroxide-simethicone (MAALOX) 200-200-20 MG/5ML SUSP suspension, Take 30 mLs by mouth every 4 hours as needed for indigestion, Disp: , Rfl:      bisacodyl (DULCOLAX) 10 MG suppository, Place 10 mg rectally every other day, Disp: , Rfl:      budesonide-formoterol (SYMBICORT) 160-4.5 MCG/ACT inhaler, Inhale 2 puffs into the lungs 2 times daily., Disp: , Rfl:      Calcium Carbonate-Vitamin D (CALCIUM + D) 600-200 MG-UNIT TABS, Take  by mouth. (Patient not taking: Reported on 8/30/2021), Disp: , Rfl:      cholecalciferol 25 MCG (1000 UT) TABS, Take 1 tablet by mouth daily, Disp: , Rfl:      DULoxetine (CYMBALTA) 60 MG capsule, Take 60 mg by mouth daily. (Patient not taking: Reported on 8/30/2021), Disp: , Rfl:      esomeprazole (NEXIUM) 40 MG capsule, Take 40 mg by mouth daily. One hour before meals  (Patient not taking: Reported on 8/30/2021), Disp: , Rfl:      METOPROLOL SUCCINATE PO, Take 20 mg by mouth daily. (Patient not taking: Reported on 8/30/2021), Disp: , Rfl:      omeprazole (PRILOSEC) 20 MG DR capsule, Take 20 mg by mouth daily, Disp: , Rfl:      oxycodone-acetaminophen (PERCOCET) 5-325 MG per tablet, Take 1 tablet by  mouth every 4 hours as needed. 1-2 q 4-6 hours  (Patient not taking: Reported on 8/30/2021), Disp: , Rfl:      polyethylene glycol (MIRALAX) 17 GM/Dose powder, Take 17 g by mouth daily, Disp: , Rfl:      predniSONE (DELTASONE) 5 MG tablet, Take 5 mg by mouth daily. (Patient not taking: Reported on 8/30/2021), Disp: , Rfl:      senna-docusate (SENOKOT-S/PERICOLACE) 8.6-50 MG tablet, Take 1 tablet by mouth 2 times daily, Disp: , Rfl:      tiotropium (SPIRIVA HANDIHALER) 18 MCG inhalation capsule, Inhale 18 mcg into the lungs daily. Inhale contents of one capsule  (Patient not taking: Reported on 8/30/2021), Disp: , Rfl:      tolterodine (DETROL LA) 4 MG 24 hr capsule, Take 4 mg by mouth daily. (Patient not taking: Reported on 8/30/2021), Disp: , Rfl:      venlafaxine (EFFEXOR) 25 MG tablet, Take 25 mg by mouth daily, Disp: , Rfl:      Case Management:  I have reviewed the facility/SNF care plan/MDS, including the falls risk, nutrition and pain screening. I also reviewed the current immunizations, and preventive care.. Future cancer screening is not clinically indicated secondary to age/goals of care Patient's desire to return to the community is not present. Current Level of Care is appropriate.    Advance Directive Discussion:    I reviewed the current advanced directives as reflected in EPIC, the POLST and the facility chart, and verified the congruency of orders .  I did review the advance directives with the resident. Patient's goal is walk.    Team Discussion:  I communicated with the appropriate disciplines involved with the Plan of Care:   Nursing    Information reviewed:  Medications, vital signs, orders, and nursing notes.    ROS:  Patient denies fever, chills, headache, lightheadedness, dizziness, rhinorrhea, cough, congestion, shortness of breath, chest pain, palpitations, abdominal pain, n/v, diarrhea, constipation, change in appetite, change in sleep pattern, dysuria, frequency, burning or pain with  "urination.  Other than stated in HPI all other review of systems is negative.     Physical Exam  Vital signs:/81   Pulse 65   Temp 98.3  F (36.8  C)   Resp 20   Ht 1.499 m (4' 11\")   Wt 51.8 kg (114 lb 3.2 oz)   SpO2 94%   BMI 23.07 kg/m     GENERAL APPEARANCE: Well developed, well nourished, in no acute distress.  HEENT: normocephalic, atraumatic  sclerae anicteric, conjunctivae clear and moist, EOM intact  LUNGS: Lung sounds CTA, no adventitious sounds, respiratory effort normal.  CARD: RRR, S1, S2, without murmurs, gallops, rubs  ABD: Soft, nondistended and nontender with normal bowel sounds.   MSK: Muscle strength and tone were equal bilaterally.   EXTREMITIES: No cyanosis, clubbing or edema.  NEURO: Alert and oriented x 3. Face is symmetric.  SKIN: Inspection of the skin reveals no rashes, ulcerations or petechiae.  PSYCH: euthymic    Lab/Diagnostic data:   Lab Results   Component Value Date    WBC 5.2 10/20/2021     Lab Results   Component Value Date    RBC 4.71 10/20/2021     Lab Results   Component Value Date    HGB 12.9 10/20/2021     Lab Results   Component Value Date    HCT 41.2 10/20/2021     Lab Results   Component Value Date    MCV 88 10/20/2021     Lab Results   Component Value Date    MCH 27.4 10/20/2021     Lab Results   Component Value Date    MCHC 31.3 10/20/2021     Lab Results   Component Value Date    RDW 14.5 10/20/2021     Lab Results   Component Value Date     10/20/2021     Last Comprehensive Metabolic Panel:  Sodium   Date Value Ref Range Status   10/20/2021 138 136 - 145 mmol/L Final     Potassium   Date Value Ref Range Status   10/20/2021 4.0 3.5 - 5.0 mmol/L Final     Chloride   Date Value Ref Range Status   10/20/2021 104 98 - 107 mmol/L Final     Carbon Dioxide (CO2)   Date Value Ref Range Status   10/20/2021 27 22 - 31 mmol/L Final     Anion Gap   Date Value Ref Range Status   10/20/2021 7 5 - 18 mmol/L Final     Glucose   Date Value Ref Range Status   10/20/2021 " 103 70 - 125 mg/dL Final     Urea Nitrogen   Date Value Ref Range Status   10/20/2021 8 8 - 28 mg/dL Final     Creatinine   Date Value Ref Range Status   10/20/2021 0.68 0.60 - 1.10 mg/dL Final     GFR Estimate   Date Value Ref Range Status   10/20/2021 85 >60 mL/min/1.73m2 Final     Comment:     As of July 11, 2021, eGFR is calculated by the CKD-EPI creatinine equation, without race adjustment. eGFR can be influenced by muscle mass, exercise, and diet. The reported eGFR is an estimation only and is only applicable if the renal function is stable.   01/23/2020 >60 >60 mL/min/1.73m2 Final     Calcium   Date Value Ref Range Status   10/20/2021 8.8 8.5 - 10.5 mg/dL Final         ASSESSMENT/PLAN  Osteoarthritis, unspecified osteoarthritis type, unspecified site  Will have  investigate which clinic she has been at before for further recommendations.  Continue on vit D supplement    Chronic obstructive pulmonary disease, unspecified COPD type (H)  No exacerbations, continue on albuterol and symbicort    Essential hypertension  Acceptable, on no medications    Chronic GERD  Stable on Maalox and omprazole    Depression, unspecified depression type  Stable with venlafaxine, venlafaxine was decreased in July without issues    Coronary artery disease involving native coronary artery of native heart without angina pectoris  No chest pain, was taken off asa and statin when she was on hospice and this was not restarted.  Continue to monitor.       Electronically signed by:  Elif Carter NP             Sincerely,        Elif Carter NP

## 2021-12-27 ENCOUNTER — NURSING HOME VISIT (OUTPATIENT)
Dept: GERIATRICS | Facility: CLINIC | Age: 76
End: 2021-12-27
Payer: MEDICARE

## 2021-12-27 VITALS
HEART RATE: 80 BPM | HEIGHT: 59 IN | WEIGHT: 113.8 LBS | DIASTOLIC BLOOD PRESSURE: 66 MMHG | RESPIRATION RATE: 20 BRPM | BODY MASS INDEX: 22.94 KG/M2 | OXYGEN SATURATION: 93 % | SYSTOLIC BLOOD PRESSURE: 122 MMHG | TEMPERATURE: 98.5 F

## 2021-12-27 DIAGNOSIS — I10 ESSENTIAL HYPERTENSION: ICD-10-CM

## 2021-12-27 DIAGNOSIS — J44.9 CHRONIC OBSTRUCTIVE PULMONARY DISEASE, UNSPECIFIED COPD TYPE (H): Primary | ICD-10-CM

## 2021-12-27 DIAGNOSIS — F32.A DEPRESSION, UNSPECIFIED DEPRESSION TYPE: ICD-10-CM

## 2021-12-27 DIAGNOSIS — K21.9 CHRONIC GERD: ICD-10-CM

## 2021-12-27 PROCEDURE — 99309 SBSQ NF CARE MODERATE MDM 30: CPT | Performed by: FAMILY MEDICINE

## 2021-12-27 ASSESSMENT — MIFFLIN-ST. JEOR: SCORE: 911.82

## 2021-12-27 NOTE — LETTER
12/27/2021        RE: Rosina Link  [address]  [place] [address]          M [facility] GERIATRIC SERVICES    [facility] Medical Record Number:  [id number]  Place of Service where encounter took place: [facility] () [[id number]]  CODE STATUS:   DNR / DNI    Chief Complaint:  Chief Complaint   Patient presents with     [facility] Regulatory     LTC 12/27/2021.        HPI:   Rosina is a 76 y.o. female who resides in LTC at [facility]. She has hx of COPD, HTN, hiatal hernia, GERD, anemia, PMR, TERESA, pancreatitis, gastric obstruction and FTT previously on feeding tube (discontinued 8/2019). She was sent to the hospital on 2/28/20 from a planned routine cardiology visit due to cough, dyspnea and hypoxia. She was diagnosed with hypoxemic respiratory failure, needed oxygen, had bronchoscopy which opened left upper lobe with some improvement but unable to be weaned. Repeat bronch with extensive mucous plugging. Subsequent resp failure requiring CPAP during which briefly unresponsive. She did not want to comply with treatments and ultimately moved to comfort based approach, returning to LTC on 3/16/20 on [facility]. Subsequent stabilization over time and was discharged from Hospice eff 9/11/2020.      Today:  She has been stable. No recent illness, no concerns from nursing. Her respiratory status is stable. She is on Symbicort, does not need supplemental oxygen.  She denies fever, cough, shortness of breath or wheezing. She is on omeprazole for GERD. She denies abdominal pain, nausea, vomiting or diarrhea. Weight stable x 3 months. No open areas of skin. Appetite is fair. She is on venlafaxine for depression, denies any mood concerns.       Past Medical History:  Past Medical History:   Diagnosis Date     Acute encephalopathy      Acute on chronic respiratory failure (H)      Aperistalsis of esophagus      Arthritis      Compression fracture of lumbar vertebra (H)     L2     COPD (chronic  obstructive pulmonary disease) (H)      Depression      Dyslipidemia      Encephalopathy      Esophageal candidiasis (H)      Esophagitis      Failure to thrive in adult      Fall      GERD (gastroesophageal reflux disease)      Hiatal hernia      HLD (hyperlipidemia)      HTN (hypertension)      Hypocalcemia      Hypomagnesemia      Hypoxia      Insomnia      Lactic acidosis      Major depressive disorder      Malnutrition (H)      Maternal MCV (mean corpuscular volume) low      Microcytic anemia      Microcytic hypochromic anemia 01/2013     Mucus plugging of bronchi      Osteoporosis      Pericardial effusion      PMR (polymyalgia rheumatica) (H)      Pneumonia      Pneumothorax      Positive urine drug screen 10/2012    meth and coke positive 10/12. negative in 11/19/12     Pulmonary emphysema (H)      Rib fracture      S/P laparoscopic fundoplication      SIRS (systemic inflammatory response syndrome) (H)      Thrombocytopenia (H)      Trigger finger     left hand, middle finger     Underweight      Urinary urgency        Medications:  Current Outpatient Medications   Medication Sig Dispense Refill     acetaminophen (TYLENOL) 325 MG tablet Take 650 mg by mouth every 4 hours as needed       albuterol (PROAIR HFA/PROVENTIL HFA/VENTOLIN HFA) 108 (90 Base) MCG/ACT inhaler Inhale 2 puffs into the lungs every 4 hours as needed for shortness of breath / dyspnea or wheezing       alum & mag hydroxide-simethicone (MAALOX) 200-200-20 MG/5ML SUSP suspension Take 30 mLs by mouth every 4 hours as needed for indigestion       bisacodyl (DULCOLAX) 10 MG suppository Place 10 mg rectally every other day       budesonide-formoterol (SYMBICORT) 160-4.5 MCG/ACT inhaler Inhale 2 puffs into the lungs 2 times daily.       cholecalciferol 25 MCG (1000 UT) TABS Take 1 tablet by mouth daily       omeprazole (PRILOSEC) 20 MG DR capsule Take 20 mg by mouth daily       oxycodone-acetaminophen (PERCOCET) 5-325 MG per tablet Take 1 tablet by  "mouth every 4 hours as needed. 1-2 q 4-6 hours  (Patient not taking: Reported on 8/30/2021)       polyethylene glycol (MIRALAX) 17 GM/Dose powder Take 17 g by mouth daily       senna-docusate (SENOKOT-S/PERICOLACE) 8.6-50 MG tablet Take 1 tablet by mouth 2 times daily       venlafaxine (EFFEXOR) 25 MG tablet Take 25 mg by mouth daily          Physical Exam:  General: Patient is alert female, no distress.  Vitals: /66   Pulse 80   Temp 98.5  F (36.9  C)   Resp 20   Ht 1.499 m (4' 11\")   Wt 51.6 kg (113 lb 12.8 oz)   SpO2 93%   BMI 22.98 kg/m    HEENT: Head is NCAT. Eyes show no injection or icterus. Nares negative. Oropharynx well hydrated.  Neck: No JVD.  Lungs: Non labored respirations.   : Deferred.  Extremities: No LE edema is noted.  Musculoskeletal: Age related degen changes.   Psych: Mood appears pretty good.      Labs:  Component      Latest Ref Rng & Units 1/23/2020 10/20/2021   WBC      4.0 - 11.0 10e3/uL 5.7 5.2   RBC Count      3.80 - 5.20 10e6/uL 4.82 4.71   Hemoglobin      11.7 - 15.7 g/dL 13.8 12.9   Hematocrit      35.0 - 47.0 % 43.2 41.2   MCV      78 - 100 fL 90 88   MCH      26.5 - 33.0 pg 28.6 27.4   MCHC      31.5 - 36.5 g/dL 31.9 (L) 31.3 (L)   RDW      10.0 - 15.0 % 14.8 (H) 14.5   Platelet Count      150 - 450 10e3/uL 227 187     Component      Latest Ref Rng & Units 6/15/2021 10/20/2021   TSH      0.30 - 5.00 uIU/mL 1.55 2.63     Component      Latest Ref Rng & Units 1/23/2020 10/20/2021   Sodium      136 - 145 mmol/L 138 138   Potassium      3.5 - 5.0 mmol/L 4.3 4.0   Chloride      98 - 107 mmol/L 99 104   Carbon Dioxide      22 - 31 mmol/L 34 (H) 27   Anion Gap      5 - 18 mmol/L 5 7   Glucose      70 - 125 mg/dL 123 103   Urea Nitrogen      8 - 28 mg/dL 12 8   Creatinine      0.60 - 1.10 mg/dL 0.73 0.68   GFR Estimate      >60 mL/min/1.73m2 >60 85   Calcium      8.5 - 10.5 mg/dL 9.0 8.8         Assessment/Plan:  1. COPD. Stable respiratory status, on Symbicort.  2. HTN. Not " on meds, BPs satisfactory.  3. Depression. Continues on Venlafaxine, no changes needed.   4. GERD. On omeprazole.   5. Anemia. Last checked normal at 13.8.        Electronically signed by: Rosina Guo MD                 Sincerely,        Rosina Guo MD

## 2022-01-01 ENCOUNTER — LAB REQUISITION (OUTPATIENT)
Dept: LAB | Facility: CLINIC | Age: 77
End: 2022-01-01
Payer: MEDICARE

## 2022-01-01 ENCOUNTER — NURSING HOME VISIT (OUTPATIENT)
Dept: GERIATRICS | Facility: CLINIC | Age: 77
End: 2022-01-01
Payer: MEDICARE

## 2022-01-01 ENCOUNTER — TELEPHONE (OUTPATIENT)
Dept: GERIATRICS | Facility: CLINIC | Age: 77
End: 2022-01-01
Payer: MEDICARE

## 2022-01-01 ENCOUNTER — DOCUMENTATION ONLY (OUTPATIENT)
Dept: GERIATRICS | Facility: CLINIC | Age: 77
End: 2022-01-01
Payer: MEDICARE

## 2022-01-01 ENCOUNTER — OFFICE VISIT (OUTPATIENT)
Dept: GERIATRICS | Facility: CLINIC | Age: 77
End: 2022-01-01
Payer: MEDICARE

## 2022-01-01 ENCOUNTER — TELEPHONE (OUTPATIENT)
Dept: GERIATRICS | Facility: CLINIC | Age: 77
End: 2022-01-01

## 2022-01-01 ENCOUNTER — MEDICAL CORRESPONDENCE (OUTPATIENT)
Dept: HEALTH INFORMATION MANAGEMENT | Facility: CLINIC | Age: 77
End: 2022-01-01
Payer: MEDICARE

## 2022-01-01 VITALS
WEIGHT: 106.4 LBS | RESPIRATION RATE: 18 BRPM | HEIGHT: 59 IN | HEART RATE: 82 BPM | OXYGEN SATURATION: 94 % | DIASTOLIC BLOOD PRESSURE: 70 MMHG | SYSTOLIC BLOOD PRESSURE: 122 MMHG | BODY MASS INDEX: 21.45 KG/M2 | TEMPERATURE: 98 F

## 2022-01-01 VITALS
HEART RATE: 79 BPM | BODY MASS INDEX: 20.96 KG/M2 | DIASTOLIC BLOOD PRESSURE: 61 MMHG | HEIGHT: 59 IN | TEMPERATURE: 98 F | SYSTOLIC BLOOD PRESSURE: 109 MMHG | WEIGHT: 104 LBS | RESPIRATION RATE: 18 BRPM | OXYGEN SATURATION: 91 %

## 2022-01-01 VITALS
WEIGHT: 113 LBS | OXYGEN SATURATION: 90 % | HEART RATE: 84 BPM | SYSTOLIC BLOOD PRESSURE: 118 MMHG | TEMPERATURE: 97.8 F | RESPIRATION RATE: 20 BRPM | HEIGHT: 59 IN | DIASTOLIC BLOOD PRESSURE: 70 MMHG | BODY MASS INDEX: 22.78 KG/M2

## 2022-01-01 VITALS
RESPIRATION RATE: 20 BRPM | DIASTOLIC BLOOD PRESSURE: 61 MMHG | HEART RATE: 82 BPM | HEIGHT: 59 IN | WEIGHT: 104 LBS | TEMPERATURE: 98.1 F | SYSTOLIC BLOOD PRESSURE: 108 MMHG | BODY MASS INDEX: 20.96 KG/M2 | OXYGEN SATURATION: 92 %

## 2022-01-01 VITALS
HEART RATE: 87 BPM | DIASTOLIC BLOOD PRESSURE: 70 MMHG | HEIGHT: 59 IN | WEIGHT: 107.2 LBS | OXYGEN SATURATION: 97 % | TEMPERATURE: 98.3 F | RESPIRATION RATE: 18 BRPM | SYSTOLIC BLOOD PRESSURE: 112 MMHG | BODY MASS INDEX: 21.61 KG/M2

## 2022-01-01 VITALS
OXYGEN SATURATION: 94 % | RESPIRATION RATE: 18 BRPM | SYSTOLIC BLOOD PRESSURE: 105 MMHG | TEMPERATURE: 97.8 F | HEIGHT: 59 IN | BODY MASS INDEX: 22.78 KG/M2 | DIASTOLIC BLOOD PRESSURE: 60 MMHG | HEART RATE: 85 BPM | WEIGHT: 113 LBS

## 2022-01-01 VITALS
WEIGHT: 107.2 LBS | DIASTOLIC BLOOD PRESSURE: 68 MMHG | RESPIRATION RATE: 18 BRPM | BODY MASS INDEX: 21.61 KG/M2 | OXYGEN SATURATION: 95 % | HEIGHT: 59 IN | HEART RATE: 92 BPM | TEMPERATURE: 98.3 F | SYSTOLIC BLOOD PRESSURE: 100 MMHG

## 2022-01-01 VITALS
HEIGHT: 59 IN | TEMPERATURE: 96.7 F | RESPIRATION RATE: 16 BRPM | OXYGEN SATURATION: 98 % | HEART RATE: 78 BPM | WEIGHT: 114.4 LBS | BODY MASS INDEX: 23.06 KG/M2 | DIASTOLIC BLOOD PRESSURE: 80 MMHG | SYSTOLIC BLOOD PRESSURE: 120 MMHG

## 2022-01-01 VITALS
OXYGEN SATURATION: 98 % | TEMPERATURE: 98 F | RESPIRATION RATE: 20 BRPM | HEIGHT: 59 IN | DIASTOLIC BLOOD PRESSURE: 68 MMHG | WEIGHT: 113 LBS | BODY MASS INDEX: 22.78 KG/M2 | SYSTOLIC BLOOD PRESSURE: 110 MMHG | HEART RATE: 80 BPM

## 2022-01-01 DIAGNOSIS — J44.9 CHRONIC OBSTRUCTIVE PULMONARY DISEASE, UNSPECIFIED COPD TYPE (H): Primary | ICD-10-CM

## 2022-01-01 DIAGNOSIS — F32.9 MAJOR DEPRESSIVE DISORDER, SINGLE EPISODE, UNSPECIFIED: ICD-10-CM

## 2022-01-01 DIAGNOSIS — M35.3 POLYMYALGIA RHEUMATICA (H): ICD-10-CM

## 2022-01-01 DIAGNOSIS — K21.9 CHRONIC GERD: ICD-10-CM

## 2022-01-01 DIAGNOSIS — I10 ESSENTIAL HYPERTENSION: ICD-10-CM

## 2022-01-01 DIAGNOSIS — R53.81 PHYSICAL DEBILITY: ICD-10-CM

## 2022-01-01 DIAGNOSIS — R53.81 PHYSICAL DEBILITY: Primary | ICD-10-CM

## 2022-01-01 DIAGNOSIS — I25.10 CORONARY ARTERY DISEASE INVOLVING NATIVE CORONARY ARTERY OF NATIVE HEART WITHOUT ANGINA PECTORIS: ICD-10-CM

## 2022-01-01 DIAGNOSIS — F32.A DEPRESSION, UNSPECIFIED DEPRESSION TYPE: ICD-10-CM

## 2022-01-01 DIAGNOSIS — J90 PLEURAL EFFUSION: ICD-10-CM

## 2022-01-01 DIAGNOSIS — R07.9 CHEST PAIN, UNSPECIFIED TYPE: Primary | ICD-10-CM

## 2022-01-01 DIAGNOSIS — I25.10 CORONARY ARTERY DISEASE INVOLVING NATIVE HEART WITHOUT ANGINA PECTORIS, UNSPECIFIED VESSEL OR LESION TYPE: ICD-10-CM

## 2022-01-01 DIAGNOSIS — U07.1 COVID-19: Primary | ICD-10-CM

## 2022-01-01 DIAGNOSIS — J43.9 PULMONARY EMPHYSEMA, UNSPECIFIED EMPHYSEMA TYPE (H): Primary | ICD-10-CM

## 2022-01-01 DIAGNOSIS — J96.90 RESPIRATORY FAILURE, UNSPECIFIED, UNSPECIFIED WHETHER WITH HYPOXIA OR HYPERCAPNIA (H): ICD-10-CM

## 2022-01-01 DIAGNOSIS — J44.9 CHRONIC OBSTRUCTIVE PULMONARY DISEASE, UNSPECIFIED COPD TYPE (H): ICD-10-CM

## 2022-01-01 DIAGNOSIS — R00.0 TACHYCARDIA, UNSPECIFIED: ICD-10-CM

## 2022-01-01 DIAGNOSIS — R62.7 ADULT FAILURE TO THRIVE: ICD-10-CM

## 2022-01-01 DIAGNOSIS — K21.00 GASTROESOPHAGEAL REFLUX DISEASE WITH ESOPHAGITIS, UNSPECIFIED WHETHER HEMORRHAGE: ICD-10-CM

## 2022-01-01 DIAGNOSIS — J43.9 PULMONARY EMPHYSEMA, UNSPECIFIED EMPHYSEMA TYPE (H): ICD-10-CM

## 2022-01-01 DIAGNOSIS — R06.02 SHORTNESS OF BREATH: ICD-10-CM

## 2022-01-01 DIAGNOSIS — M81.0 OSTEOPOROSIS WITHOUT CURRENT PATHOLOGICAL FRACTURE, UNSPECIFIED OSTEOPOROSIS TYPE: ICD-10-CM

## 2022-01-01 DIAGNOSIS — J44.9 CHRONIC OBSTRUCTIVE PULMONARY DISEASE, UNSPECIFIED (H): ICD-10-CM

## 2022-01-01 LAB
ANION GAP SERPL CALCULATED.3IONS-SCNC: 9 MMOL/L (ref 5–18)
BUN SERPL-MCNC: 12 MG/DL (ref 8–28)
CALCIUM SERPL-MCNC: 8.9 MG/DL (ref 8.5–10.5)
CHLORIDE BLD-SCNC: 97 MMOL/L (ref 98–107)
CO2 SERPL-SCNC: 26 MMOL/L (ref 22–31)
CREAT SERPL-MCNC: 0.66 MG/DL (ref 0.6–1.1)
CREAT SERPL-MCNC: 0.67 MG/DL (ref 0.6–1.1)
ERYTHROCYTE [DISTWIDTH] IN BLOOD BY AUTOMATED COUNT: 15 % (ref 10–15)
GFR SERPL CREATININE-BSD FRML MDRD: 90 ML/MIN/1.73M2
GFR SERPL CREATININE-BSD FRML MDRD: 90 ML/MIN/1.73M2
GLUCOSE BLD-MCNC: 95 MG/DL (ref 70–125)
HCT VFR BLD AUTO: 38.1 % (ref 35–47)
HGB BLD-MCNC: 12.2 G/DL (ref 11.7–15.7)
MCH RBC QN AUTO: 27.1 PG (ref 26.5–33)
MCHC RBC AUTO-ENTMCNC: 32 G/DL (ref 31.5–36.5)
MCV RBC AUTO: 85 FL (ref 78–100)
PLATELET # BLD AUTO: 191 10E3/UL (ref 150–450)
POTASSIUM BLD-SCNC: 4 MMOL/L (ref 3.5–5)
RBC # BLD AUTO: 4.5 10E6/UL (ref 3.8–5.2)
SODIUM SERPL-SCNC: 132 MMOL/L (ref 136–145)
WBC # BLD AUTO: 7.1 10E3/UL (ref 4–11)

## 2022-01-01 PROCEDURE — 99308 SBSQ NF CARE LOW MDM 20: CPT | Performed by: NURSE PRACTITIONER

## 2022-01-01 PROCEDURE — 99309 SBSQ NF CARE MODERATE MDM 30: CPT | Performed by: FAMILY MEDICINE

## 2022-01-01 PROCEDURE — 99309 SBSQ NF CARE MODERATE MDM 30: CPT | Performed by: NURSE PRACTITIONER

## 2022-01-01 PROCEDURE — 36415 COLL VENOUS BLD VENIPUNCTURE: CPT | Mod: ORL | Performed by: NURSE PRACTITIONER

## 2022-01-01 PROCEDURE — P9604 ONE-WAY ALLOW PRORATED TRIP: HCPCS | Mod: ORL | Performed by: NURSE PRACTITIONER

## 2022-01-01 PROCEDURE — 99318 PR ANNUAL NURSING FAC ASSESSMNT, STABLE: CPT | Performed by: NURSE PRACTITIONER

## 2022-01-01 PROCEDURE — 80048 BASIC METABOLIC PNL TOTAL CA: CPT | Mod: ORL | Performed by: NURSE PRACTITIONER

## 2022-01-01 PROCEDURE — 85027 COMPLETE CBC AUTOMATED: CPT | Mod: ORL | Performed by: NURSE PRACTITIONER

## 2022-01-01 PROCEDURE — 82565 ASSAY OF CREATININE: CPT | Mod: ORL | Performed by: NURSE PRACTITIONER

## 2022-01-01 RX ORDER — MORPHINE SULFATE 100 MG/5ML
2.5 SOLUTION ORAL
Qty: 30 ML | Refills: 0 | Status: SHIPPED | OUTPATIENT
Start: 2022-01-01 | End: 2022-01-01

## 2022-01-01 RX ORDER — ONDANSETRON 4 MG/1
4 TABLET, FILM COATED ORAL DAILY PRN
Start: 2022-01-01

## 2022-01-01 RX ORDER — OMEPRAZOLE 10 MG/1
10 CAPSULE, DELAYED RELEASE ORAL DAILY
Start: 2022-01-01

## 2022-01-01 ASSESSMENT — MIFFLIN-ST. JEOR: SCORE: 914.54

## 2022-01-24 NOTE — PROGRESS NOTES
[facility] SERVICES    [place] Medical Record Number:  [id number]  Place of Service where encounter took place: [facility] () [[id number]]  CODE STATUS:   DNR / DNI    Chief Complaint:  Chief Complaint   Patient presents with     senior living Regulatory     LTC 12/27/2021.        HPI:   Rosina is a 76 y.o. female who resides in LTC at [facility]. She has hx of COPD, HTN, hiatal hernia, GERD, anemia, PMR, TERESA, pancreatitis, gastric obstruction and FTT previously on feeding tube (discontinued 8/2019). She was sent to the hospital on 2/28/20 from a planned routine cardiology visit due to cough, dyspnea and hypoxia. She was diagnosed with hypoxemic respiratory failure, needed oxygen, had bronchoscopy which opened left upper lobe with some improvement but unable to be weaned. Repeat bronch with extensive mucous plugging. Subsequent resp failure requiring CPAP during which briefly unresponsive. She did not want to comply with treatments and ultimately moved to comfort based approach, returning to LTC on 3/16/20 on [facility]. Subsequent stabilization over time and was discharged from Hospice eff 9/11/2020.      Today:  She has been stable. No recent illness, no concerns from nursing. Her respiratory status is stable. She is on Symbicort, does not need supplemental oxygen.  She denies fever, cough, shortness of breath or wheezing. She is on omeprazole for GERD. She denies abdominal pain, nausea, vomiting or diarrhea. Weight stable x 3 months. No open areas of skin. Appetite is fair. She is on venlafaxine for depression, denies any mood concerns.       Past Medical History:  Past Medical History:   Diagnosis Date     Acute encephalopathy      Acute on chronic respiratory failure (H)      Aperistalsis of esophagus      Arthritis      Compression fracture of lumbar vertebra (H)     L2     COPD (chronic obstructive pulmonary disease) (H)      Depression      Dyslipidemia      Encephalopathy       Esophageal candidiasis (H)      Esophagitis      Failure to thrive in adult      Fall      GERD (gastroesophageal reflux disease)      Hiatal hernia      HLD (hyperlipidemia)      HTN (hypertension)      Hypocalcemia      Hypomagnesemia      Hypoxia      Insomnia      Lactic acidosis      Major depressive disorder      Malnutrition (H)      Maternal MCV (mean corpuscular volume) low      Microcytic anemia      Microcytic hypochromic anemia 01/2013     Mucus plugging of bronchi      Osteoporosis      Pericardial effusion      PMR (polymyalgia rheumatica) (H)      Pneumonia      Pneumothorax      Positive urine drug screen 10/2012    meth and coke positive 10/12. negative in 11/19/12     Pulmonary emphysema (H)      Rib fracture      S/P laparoscopic fundoplication      SIRS (systemic inflammatory response syndrome) (H)      Thrombocytopenia (H)      Trigger finger     left hand, middle finger     Underweight      Urinary urgency        Medications:  Current Outpatient Medications   Medication Sig Dispense Refill     acetaminophen (TYLENOL) 325 MG tablet Take 650 mg by mouth every 4 hours as needed       albuterol (PROAIR HFA/PROVENTIL HFA/VENTOLIN HFA) 108 (90 Base) MCG/ACT inhaler Inhale 2 puffs into the lungs every 4 hours as needed for shortness of breath / dyspnea or wheezing       alum & mag hydroxide-simethicone (MAALOX) 200-200-20 MG/5ML SUSP suspension Take 30 mLs by mouth every 4 hours as needed for indigestion       bisacodyl (DULCOLAX) 10 MG suppository Place 10 mg rectally every other day       budesonide-formoterol (SYMBICORT) 160-4.5 MCG/ACT inhaler Inhale 2 puffs into the lungs 2 times daily.       cholecalciferol 25 MCG (1000 UT) TABS Take 1 tablet by mouth daily       omeprazole (PRILOSEC) 20 MG DR capsule Take 20 mg by mouth daily       oxycodone-acetaminophen (PERCOCET) 5-325 MG per tablet Take 1 tablet by mouth every 4 hours as needed. 1-2 q 4-6 hours  (Patient not taking: Reported on  "8/30/2021)       polyethylene glycol (MIRALAX) 17 GM/Dose powder Take 17 g by mouth daily       senna-docusate (SENOKOT-S/PERICOLACE) 8.6-50 MG tablet Take 1 tablet by mouth 2 times daily       venlafaxine (EFFEXOR) 25 MG tablet Take 25 mg by mouth daily          Physical Exam:  General: Patient is alert female, no distress.  Vitals: /66   Pulse 80   Temp 98.5  F (36.9  C)   Resp 20   Ht 1.499 m (4' 11\")   Wt 51.6 kg (113 lb 12.8 oz)   SpO2 93%   BMI 22.98 kg/m    HEENT: Head is NCAT. Eyes show no injection or icterus. Nares negative. Oropharynx well hydrated.  Neck: No JVD.  Lungs: Non labored respirations.   : Deferred.  Extremities: No LE edema is noted.  Musculoskeletal: Age related degen changes.   Psych: Mood appears pretty good.      Labs:  Component      Latest Ref Rng & Units 1/23/2020 10/20/2021   WBC      4.0 - 11.0 10e3/uL 5.7 5.2   RBC Count      3.80 - 5.20 10e6/uL 4.82 4.71   Hemoglobin      11.7 - 15.7 g/dL 13.8 12.9   Hematocrit      35.0 - 47.0 % 43.2 41.2   MCV      78 - 100 fL 90 88   MCH      26.5 - 33.0 pg 28.6 27.4   MCHC      31.5 - 36.5 g/dL 31.9 (L) 31.3 (L)   RDW      10.0 - 15.0 % 14.8 (H) 14.5   Platelet Count      150 - 450 10e3/uL 227 187     Component      Latest Ref Rng & Units 6/15/2021 10/20/2021   TSH      0.30 - 5.00 uIU/mL 1.55 2.63     Component      Latest Ref Rng & Units 1/23/2020 10/20/2021   Sodium      136 - 145 mmol/L 138 138   Potassium      3.5 - 5.0 mmol/L 4.3 4.0   Chloride      98 - 107 mmol/L 99 104   Carbon Dioxide      22 - 31 mmol/L 34 (H) 27   Anion Gap      5 - 18 mmol/L 5 7   Glucose      70 - 125 mg/dL 123 103   Urea Nitrogen      8 - 28 mg/dL 12 8   Creatinine      0.60 - 1.10 mg/dL 0.73 0.68   GFR Estimate      >60 mL/min/1.73m2 >60 85   Calcium      8.5 - 10.5 mg/dL 9.0 8.8         Assessment/Plan:  1. COPD. Stable respiratory status, on Symbicort.  2. HTN. Not on meds, BPs satisfactory.  3. Depression. Continues on Venlafaxine, no changes " needed.   4. GERD. On omeprazole.   5. Anemia. Last checked normal at 13.8.        Electronically signed by: Rosina Guo MD

## 2022-02-08 NOTE — LETTER
2/8/2022        RE: Rosina Link  [address]  [place] [address]        M [facility] GERIATRICS  Chief Complaint   Patient presents with     [facility] Regulatory     [facility] Medical Record Number:  [id number]  Place of Service where encounter took place:  [facility] () [[id number]]    HPI:    Rosina Link  is 76 year old (1945), who is being seen today for a federally mandated E/M visit.     Today's concerns are:  COVID-19  Chronic obstructive pulmonary disease, unspecified COPD type (H)  Patient just came off her isolation for mildly symptomatic COVID-19. She is currently sleeping in bed, says she is achy and tired still. No SOB, cough, hypoxia, fever. Appetite is ok    Chronic GERD  Patient failed a previous attempt to reduce PPI. Today she denies any dyspepsia or nausea    Depression, unspecified depression type  On low dose venlafaxine. PHQ9 = 5    Coronary artery disease involving native coronary artery of native heart without angina pectoris  No chest pain, SOB    Polymyalgia rheumatica (H)  Noted in history, is not on prednisone. Denies any acute concerns    Essential hypertension  Noted in history. Not on antihypertensive medications. -120s/60-80s      ALLERGIES:Misc. sulfonamide containing compounds and Pcn [penicillins]  PAST MEDICAL HISTORY:   Past Medical History:   Diagnosis Date     Acute encephalopathy      Acute on chronic respiratory failure (H)      Aperistalsis of esophagus      Arthritis      Compression fracture of lumbar vertebra (H)     L2     COPD (chronic obstructive pulmonary disease) (H)      Depression      Dyslipidemia      Encephalopathy      Esophageal candidiasis (H)      Esophagitis      Failure to thrive in adult      Fall      GERD (gastroesophageal reflux disease)      Hiatal hernia      HLD (hyperlipidemia)      HTN (hypertension)      Hypocalcemia      Hypomagnesemia      Hypoxia      Insomnia      Lactic acidosis      Major depressive  disorder      Malnutrition (H)      Maternal MCV (mean corpuscular volume) low      Microcytic anemia      Microcytic hypochromic anemia 01/2013     Mucus plugging of bronchi      Osteoporosis      Pericardial effusion      PMR (polymyalgia rheumatica) (H)      Pneumonia      Pneumothorax      Positive urine drug screen 10/2012    meth and coke positive 10/12. negative in 11/19/12     Pulmonary emphysema (H)      Rib fracture      S/P laparoscopic fundoplication      SIRS (systemic inflammatory response syndrome) (H)      Thrombocytopenia (H)      Trigger finger     left hand, middle finger     Underweight      Urinary urgency      PAST SURGICAL HISTORY:   has a past surgical history that includes IR Gastro Jejunostomy Tube Change (10/24/2018); IR Gastro Jejunostomy Tube Change (11/8/2018); IR Gastro Jejunostomy Tube Change (12/13/2018); IR Gastro Jejunostomy Tube Change (3/15/2019); IR Gastro Jejunostomy Tube Change (3/25/2019); IR Gastro Jejunostomy Tube Change (6/24/2019); IR Tube Removal (8/19/2019); Hysterectomy (1990); Cholecystectomy (01/09/2015); Hiatal Hernia Repair (02/15/2016); Release carpal tunnel (Right); Release trigger finger (Left); Esophagoscopy, gastroscopy, duodenoscopy (EGD), combined; other surgical history; Ir Gj Tube Replacement (12/13/2018); Hiatal Hernia Repair (01/23/2019); Ir Gj Tube Replacement (3/15/2019); Ir Gj Tube Replacement (3/25/2019); Ir Gj Tube Replacement (6/24/2019); and Ir Tube Removal (8/19/2019).  FAMILY HISTORY: family history includes Breast Cancer in her maternal aunt; Cancer in her mother; Diabetes in her mother; Emphysema in her father; Heart Disease in her father; Hypertension in her father and mother; No Known Problems in her brother.  SOCIAL HISTORY:  reports that she does not drink alcohol and does not use drugs.    MEDICATIONS:  Current Outpatient Medications   Medication Sig Dispense Refill     acetaminophen (TYLENOL) 325 MG tablet Take 650 mg by mouth every 4  "hours as needed       albuterol (PROAIR HFA/PROVENTIL HFA/VENTOLIN HFA) 108 (90 Base) MCG/ACT inhaler Inhale 2 puffs into the lungs every 4 hours as needed for shortness of breath / dyspnea or wheezing       alum & mag hydroxide-simethicone (MAALOX) 200-200-20 MG/5ML SUSP suspension Take 30 mLs by mouth every 4 hours as needed for indigestion       budesonide-formoterol (SYMBICORT) 160-4.5 MCG/ACT inhaler Inhale 2 puffs into the lungs 2 times daily.       calcium carbonate antacid 1000 MG CHEW Take 2 tablets (2,000 mg) by mouth daily       cholecalciferol 25 MCG (1000 UT) TABS Take 1 tablet by mouth daily       omeprazole (PRILOSEC) 20 MG DR capsule Take 20 mg by mouth daily       polyethylene glycol (MIRALAX) 17 GM/Dose powder Take 17 g by mouth daily       senna-docusate (SENOKOT-S/PERICOLACE) 8.6-50 MG tablet Take 1 tablet by mouth 2 times daily       venlafaxine (EFFEXOR) 25 MG tablet Take 25 mg by mouth daily         Case Management:  I have reviewed the care plan and MDS and do agree with the plan. Patient's desire to return to the community is not present. Information reviewed:  Medications, vital signs, orders, and nursing notes.    ROS:  10 point ROS of systems including Constitutional, Eyes, Respiratory, Cardiovascular, Gastroenterology, Genitourinary, Integumentary, Musculoskeletal, Psychiatric were all negative except for pertinent positives noted in my HPI.    Vitals:  /80   Pulse 78   Temp (!) 96.7  F (35.9  C)   Resp 16   Ht 1.499 m (4' 11\")   Wt 51.9 kg (114 lb 6.4 oz)   SpO2 98%   BMI 23.11 kg/m    Body mass index is 23.11 kg/m .  Exam:  GENERAL APPEARANCE:  Alert, in no distress, thin  ENT:  Mouth and posterior oropharynx normal, moist mucous membranes, normal hearing acuity  EYES:  EOM normal, conjunctiva and lids normal  RESP:  respiratory effort and palpation of chest normal, lungs clear to auscultation , no respiratory distress  CV:  Palpation and auscultation of heart done , " regular rate and rhythm, no murmur, rub, or gallop, no edema  ABDOMEN:  bowel sounds normal, soft, non-tender  SKIN:  Inspection of skin and subcutaneous tissue baseline, Palpation of skin and subcutaneous tissue baseline  PSYCH:  oriented X 3, affect and mood normal    Lab/Diagnostic data:   Recent labs in [facility] reviewed by me today.       ASSESSMENT/PLAN  (U07.1) COVID-19  (primary encounter diagnosis)  Comment: No severe illness. Given her complaints of fatigue and body aches, would encourage her to continue resting for now. Once she feels recovered, she may need to be evaluated by therapy if she is more weak than usual.   Plan: Monitor for weakness, falls    (J44.9) Chronic obstructive pulmonary disease, unspecified COPD type (H)  Comment: Chronic condition being managed with medications and is currently asymptomatic. Patient was on hospice in 2020 due to COPD, but then improved clinically. Wishes are still for comfort focused treatment  Plan: Continue current POC with no changes at this time and adjustments as needed.    (K21.9) Chronic GERD  Comment: Asymptomatic. Would not recommend reducing PPI again due to worsening symptoms with last attempt  Plan: Continue current POC with no changes at this time and adjustments as needed.    (F32.A) Depression, unspecified depression type  Comment: Mild per PHQ9, but score was high enough that stopping venlafaxine is not recommended and could result in worsening depression  Plan: Continue current POC with no changes at this time and adjustments as needed.    (I25.10) Coronary artery disease involving native coronary artery of native heart without angina pectoris  Comment: Asymptomatic. It appears she was taken off aspirin and statin when she was on hospice almost 2 years ago. Would not recommend resuming these.  Plan: Continue current POC with no changes at this time and adjustments as needed.    (M35.3) Polymyalgia rheumatica (H)  Comment: Noted in history. No acute  concerns  Plan: Monitor for joint pain    (I10) Essential hypertension  Comment: BP goals are <150/90 mm Hg.This is higher than ACC and AHA recommendations due to goals of care, risk for hypotension, risk of dizziness and falls, risk of tissue/cerebral hypoperfusion and frailty. Patient is stable and continue without pharmacological invention with routine assessment.      Electronically signed by:  CODY [facility]  Phone: [phone]

## 2022-02-08 NOTE — PROGRESS NOTES
[facility] GERIATRICS  Chief Complaint   Patient presents with     [facility]     [place] Medical Record Number:  [id number]  Place of Service where encounter took place:  [facility] () [[id number]]    HPI:    Rosina Link  is 76 year old (1945), who is being seen today for a federally mandated E/M visit.     Today's concerns are:  COVID-19  Chronic obstructive pulmonary disease, unspecified COPD type (H)  Patient just came off her isolation for mildly symptomatic COVID-19. She is currently sleeping in bed, says she is achy and tired still. No SOB, cough, hypoxia, fever. Appetite is ok    Chronic GERD  Patient failed a previous attempt to reduce PPI. Today she denies any dyspepsia or nausea    Depression, unspecified depression type  On low dose venlafaxine. PHQ9 = 5    Coronary artery disease involving native coronary artery of native heart without angina pectoris  No chest pain, SOB    Polymyalgia rheumatica (H)  Noted in history, is not on prednisone. Denies any acute concerns    Essential hypertension  Noted in history. Not on antihypertensive medications. -120s/60-80s      ALLERGIES:Misc. sulfonamide containing compounds and Pcn [penicillins]  PAST MEDICAL HISTORY:   Past Medical History:   Diagnosis Date     Acute encephalopathy      Acute on chronic respiratory failure (H)      Aperistalsis of esophagus      Arthritis      Compression fracture of lumbar vertebra (H)     L2     COPD (chronic obstructive pulmonary disease) (H)      Depression      Dyslipidemia      Encephalopathy      Esophageal candidiasis (H)      Esophagitis      Failure to thrive in adult      Fall      GERD (gastroesophageal reflux disease)      Hiatal hernia      HLD (hyperlipidemia)      HTN (hypertension)      Hypocalcemia      Hypomagnesemia      Hypoxia      Insomnia      Lactic acidosis      Major depressive disorder      Malnutrition (H)      Maternal MCV (mean corpuscular volume) low       Microcytic anemia      Microcytic hypochromic anemia 01/2013     Mucus plugging of bronchi      Osteoporosis      Pericardial effusion      PMR (polymyalgia rheumatica) (H)      Pneumonia      Pneumothorax      Positive urine drug screen 10/2012    meth and coke positive 10/12. negative in 11/19/12     Pulmonary emphysema (H)      Rib fracture      S/P laparoscopic fundoplication      SIRS (systemic inflammatory response syndrome) (H)      Thrombocytopenia (H)      Trigger finger     left hand, middle finger     Underweight      Urinary urgency      PAST SURGICAL HISTORY:   has a past surgical history that includes IR Gastro Jejunostomy Tube Change (10/24/2018); IR Gastro Jejunostomy Tube Change (11/8/2018); IR Gastro Jejunostomy Tube Change (12/13/2018); IR Gastro Jejunostomy Tube Change (3/15/2019); IR Gastro Jejunostomy Tube Change (3/25/2019); IR Gastro Jejunostomy Tube Change (6/24/2019); IR Tube Removal (8/19/2019); Hysterectomy (1990); Cholecystectomy (01/09/2015); Hiatal Hernia Repair (02/15/2016); Release carpal tunnel (Right); Release trigger finger (Left); Esophagoscopy, gastroscopy, duodenoscopy (EGD), combined; other surgical history; Ir Gj Tube Replacement (12/13/2018); Hiatal Hernia Repair (01/23/2019); Ir Gj Tube Replacement (3/15/2019); Ir Gj Tube Replacement (3/25/2019); Ir Gj Tube Replacement (6/24/2019); and Ir Tube Removal (8/19/2019).  FAMILY HISTORY: family history includes Breast Cancer in her maternal aunt; Cancer in her mother; Diabetes in her mother; Emphysema in her father; Heart Disease in her father; Hypertension in her father and mother; No Known Problems in her brother.  SOCIAL HISTORY:  reports that she does not drink alcohol and does not use drugs.    MEDICATIONS:  Current Outpatient Medications   Medication Sig Dispense Refill     acetaminophen (TYLENOL) 325 MG tablet Take 650 mg by mouth every 4 hours as needed       albuterol (PROAIR HFA/PROVENTIL HFA/VENTOLIN HFA) 108 (90 Base)  "MCG/ACT inhaler Inhale 2 puffs into the lungs every 4 hours as needed for shortness of breath / dyspnea or wheezing       alum & mag hydroxide-simethicone (MAALOX) 200-200-20 MG/5ML SUSP suspension Take 30 mLs by mouth every 4 hours as needed for indigestion       budesonide-formoterol (SYMBICORT) 160-4.5 MCG/ACT inhaler Inhale 2 puffs into the lungs 2 times daily.       calcium carbonate antacid 1000 MG CHEW Take 2 tablets (2,000 mg) by mouth daily       cholecalciferol 25 MCG (1000 UT) TABS Take 1 tablet by mouth daily       omeprazole (PRILOSEC) 20 MG DR capsule Take 20 mg by mouth daily       polyethylene glycol (MIRALAX) 17 GM/Dose powder Take 17 g by mouth daily       senna-docusate (SENOKOT-S/PERICOLACE) 8.6-50 MG tablet Take 1 tablet by mouth 2 times daily       venlafaxine (EFFEXOR) 25 MG tablet Take 25 mg by mouth daily         Case Management:  I have reviewed the care plan and MDS and do agree with the plan. Patient's desire to return to the community is not present. Information reviewed:  Medications, vital signs, orders, and nursing notes.    ROS:  10 point ROS of systems including Constitutional, Eyes, Respiratory, Cardiovascular, Gastroenterology, Genitourinary, Integumentary, Musculoskeletal, Psychiatric were all negative except for pertinent positives noted in my HPI.    Vitals:  /80   Pulse 78   Temp (!) 96.7  F (35.9  C)   Resp 16   Ht 1.499 m (4' 11\")   Wt 51.9 kg (114 lb 6.4 oz)   SpO2 98%   BMI 23.11 kg/m    Body mass index is 23.11 kg/m .  Exam:  GENERAL APPEARANCE:  Alert, in no distress, thin  ENT:  Mouth and posterior oropharynx normal, moist mucous membranes, normal hearing acuity  EYES:  EOM normal, conjunctiva and lids normal  RESP:  respiratory effort and palpation of chest normal, lungs clear to auscultation , no respiratory distress  CV:  Palpation and auscultation of heart done , regular rate and rhythm, no murmur, rub, or gallop, no edema  ABDOMEN:  bowel sounds " normal, soft, non-tender  SKIN:  Inspection of skin and subcutaneous tissue baseline, Palpation of skin and subcutaneous tissue baseline  PSYCH:  oriented X 3, affect and mood normal    Lab/Diagnostic data:   Recent labs in [facility] reviewed by me today.       ASSESSMENT/PLAN  (U07.1) COVID-19  (primary encounter diagnosis)  Comment: No severe illness. Given her complaints of fatigue and body aches, would encourage her to continue resting for now. Once she feels recovered, she may need to be evaluated by therapy if she is more weak than usual.   Plan: Monitor for weakness, falls    (J44.9) Chronic obstructive pulmonary disease, unspecified COPD type (H)  Comment: Chronic condition being managed with medications and is currently asymptomatic. Patient was on hospice in 2020 due to COPD, but then improved clinically. Wishes are still for comfort focused treatment  Plan: Continue current POC with no changes at this time and adjustments as needed.    (K21.9) Chronic GERD  Comment: Asymptomatic. Would not recommend reducing PPI again due to worsening symptoms with last attempt  Plan: Continue current POC with no changes at this time and adjustments as needed.    (F32.A) Depression, unspecified depression type  Comment: Mild per PHQ9, but score was high enough that stopping venlafaxine is not recommended and could result in worsening depression  Plan: Continue current POC with no changes at this time and adjustments as needed.    (I25.10) Coronary artery disease involving native coronary artery of native heart without angina pectoris  Comment: Asymptomatic. It appears she was taken off aspirin and statin when she was on hospice almost 2 years ago. Would not recommend resuming these.  Plan: Continue current POC with no changes at this time and adjustments as needed.    (M35.3) Polymyalgia rheumatica (H)  Comment: Noted in history. No acute concerns  Plan: Monitor for joint pain    (I10) Essential hypertension  Comment: BP goals  are <150/90 mm Hg.This is higher than ACC and AHA recommendations due to goals of care, risk for hypotension, risk of dizziness and falls, risk of tissue/cerebral hypoperfusion and frailty. Patient is stable and continue without pharmacological invention with routine assessment.      Electronically signed by:  CODY Beltre [facility]  Phone: [phone]

## 2022-02-10 PROBLEM — M35.3 POLYMYALGIA RHEUMATICA (H): Status: ACTIVE | Noted: 2022-01-01

## 2022-02-10 PROBLEM — E46 MALNUTRITION, UNSPECIFIED TYPE (H): Status: RESOLVED | Noted: 2021-02-25 | Resolved: 2022-01-01

## 2022-02-21 NOTE — LETTER
"    2/21/2022        RE: Rosina Link  [address]  [place] [address]        [facility]    Chief Complaint   Patient presents with     RECHECK     CHEST PAIN     HPI:  Rosina Link is a 76 year old  (1945), who is being seen today for an episodic care visit at: [facility] () [[id number]].     Today's concern is:   Patient is seen today due to c/o chest pain yesterday. She says she had pain in the center of her chest. She thinks it resolved on it's own. Today she says she just does not feel well. She has difficulty further describing this or her symptoms. She may have some SOB. She denies any GI symptoms. She just says several times that she does not feel well.    Allergies, and PMH/PSH reviewed in [facility] today.  REVIEW OF SYSTEMS:  4 point ROS including Respiratory, CV, GI and , other than that noted in the HPI,  is negative    Objective:   /70   Pulse 84   Temp 97.8  F (36.6  C)   Resp 20   Ht 1.499 m (4' 11\")   Wt 51.3 kg (113 lb)   SpO2 90%   BMI 22.82 kg/m    GENERAL APPEARANCE:  Alert, mild respiratory distress, frail appearing  EYES:  EOM normal, conjunctiva and lids normal  RESP:  dyspnea noted, lung sounds diminished left side, no cough  CV:  no edema, tachycardic 100s  SKIN:  Inspection of skin and subcutaneous tissue baseline  PSYCH:  insight and judgement impaired, memory impaired , affect abnormal flat      CXR ordered, results returned after visit and prior to note completion:  Impression:  1. There is a substantial shift of the mediastinum to the left consistent with volume loss of the left chest  2. Large left pleural effusion  3. Suggestion of a truncated left main bronchus and this may indicate a malignancy and/or dense infiltrate with atelectasis      Assessment/Plan:  (R07.9) Chest pain, unspecified type  (primary encounter diagnosis)  (J90) Pleural effusion  Comment: Per chart review, patient had a hospital stay in 3/2020 for acute " respiratory failure requiring 2 different bronchoscopies and removal of mucus plug. She was placed on comfort cares/hospice after that, but stabilized. It was not clear if her left lung fully opened again or not, but per xrays, it seems not. Patient had COVID recently, but only c/o being achy and tired, no respiratory symptoms. Would not treat this as a pneumonia as antibiotics will not resolve the pleural effusion or large volume loss. Prognosis with this is poor. She appears mildly uncomfortable. Phone call placed to daughter Maritza to discuss findings. She does verify that her mom would want comfort care and would not want to go to the hospital. She would like us to give her oxygen for comfort for now. She will visit her at the nursing home later today. She does not want hospice called in yet, would prefer to monitor for now. No morphine at this time per daughter, but suspect she may need this soon for comfort.  Plan: Oxygen 2 liters for comfort. If condition worsens, refer to hospice. Do not hospitalize      Electronically signed by: CODY Beltre [facility]  Phone: [phone]

## 2022-02-21 NOTE — PROGRESS NOTES
"[facility] GERIATRICS    Chief Complaint   Patient presents with     RECHECK     CHEST PAIN     HPI:  Rosina Link is a 76 year old  (1945), who is being seen today for an episodic care visit at: [facility] () [[id number]].     Today's concern is:   Patient is seen today due to c/o chest pain yesterday. She says she had pain in the center of her chest. She thinks it resolved on it's own. Today she says she just does not feel well. She has difficulty further describing this or her symptoms. She may have some SOB. She denies any GI symptoms. She just says several times that she does not feel well.    Allergies, and PMH/PSH reviewed in [facility] today.  REVIEW OF SYSTEMS:  4 point ROS including Respiratory, CV, GI and , other than that noted in the HPI,  is negative    Objective:   /70   Pulse 84   Temp 97.8  F (36.6  C)   Resp 20   Ht 1.499 m (4' 11\")   Wt 51.3 kg (113 lb)   SpO2 90%   BMI 22.82 kg/m    GENERAL APPEARANCE:  Alert, mild respiratory distress, frail appearing  EYES:  EOM normal, conjunctiva and lids normal  RESP:  dyspnea noted, lung sounds diminished left side, no cough  CV:  no edema, tachycardic 100s  SKIN:  Inspection of skin and subcutaneous tissue baseline  PSYCH:  insight and judgement impaired, memory impaired , affect abnormal flat      CXR ordered, results returned after visit and prior to note completion:  Impression:  1. There is a substantial shift of the mediastinum to the left consistent with volume loss of the left chest  2. Large left pleural effusion  3. Suggestion of a truncated left main bronchus and this may indicate a malignancy and/or dense infiltrate with atelectasis      Assessment/Plan:  (R07.9) Chest pain, unspecified type  (primary encounter diagnosis)  (J90) Pleural effusion  Comment: Per chart review, patient had a hospital stay in 3/2020 for acute respiratory failure requiring 2 different bronchoscopies and removal of mucus plug. She was " placed on comfort cares/hospice after that, but stabilized. It was not clear if her left lung fully opened again or not, but per xrays, it seems not. Patient had COVID recently, but only c/o being achy and tired, no respiratory symptoms. Would not treat this as a pneumonia as antibiotics will not resolve the pleural effusion or large volume loss. Prognosis with this is poor. She appears mildly uncomfortable. Phone call placed to daughter Maritza to discuss findings. She does verify that her mom would want comfort care and would not want to go to the hospital. She would like us to give her oxygen for comfort for now. She will visit her at the nursing home later today. She does not want hospice called in yet, would prefer to monitor for now. No morphine at this time per daughter, but suspect she may need this soon for comfort.  Plan: Oxygen 2 liters for comfort. If condition worsens, refer to hospice. Do not hospitalize      Electronically signed by: CODY Beltre [facility] Geriatric Services  Phone: [phone]

## 2022-03-02 NOTE — TELEPHONE ENCOUNTER
Nurse manager spoke with patient's daughter Maritza today. She is concerned about her mom's cough and discomfort. She did agree to referral to hospice now. Will order morphine prn now for comfort.    Plan:  Hospice eval and treat  Morphine 2.5mg q2h prn

## 2022-03-08 NOTE — PROGRESS NOTES
"[facility] GERIATRICS    Chief Complaint   Patient presents with     RECHECK     HPI:  Rosina Link is a 76 year old  (1945), who is being seen today for an episodic care visit at: [facility] () [[id number]].     Today's concern is:   CXR done on 2/21 due to chest pain and cough revealed, a substantial shift of the mediastinum to the left consistent with volume loss of the left chest, large left pleural effusion and suggestion of a truncated left main bronchus possibly indicatng a malignancy and/or dense infiltrate with atelectasis. Patient has orders for hospice, they will meet with her and her daughter 3/14. She has been coughing quite a bit, this has been her biggest concern along with poor appetite and weakness. No fever, chills, SOB. SaO2 has been >92% on 2 liters of oxygen    Allergies, and PMH/PSH reviewed in [facility] today.    REVIEW OF SYSTEMS:  4 point ROS including Respiratory, CV, GI and , other than that noted in the HPI,  is negative    Objective:   /60   Pulse 85   Temp 97.8  F (36.6  C)   Resp 18   Ht 1.499 m (4' 11\")   Wt 51.3 kg (113 lb)   SpO2 94%   BMI 22.82 kg/m    GENERAL APPEARANCE:  Alert, in no distress  RESP:  frequent loose cough, appears mildly dyspneic  PSYCH:  oriented X 3, affect and mood normal      Assessment/Plan:  (R07.9) Chest pain, unspecified type  (primary encounter diagnosis)  (J90) Pleural effusion  (J43.9) Pulmonary emphysema, unspecified emphysema type (H)  Comment: Prognosis is poor. Given her goals of care, hospice is appropriate. She has morphine available if needed for comfort  Plan: Continue current POC with no changes at this time and adjustments as needed.      Electronically signed by: CODY Beltre CNP   [facility] Geriatric Services  Phone: [phone]        "

## 2022-03-08 NOTE — LETTER
"    3/8/2022        RE: Rosina Link  [address]  [place] [address]        [facility] GERIATRICS    Chief Complaint   Patient presents with     RECHECK     HPI:  Rosina Link is a 76 year old  (1945), who is being seen today for an episodic care visit at: [facility] () [[id number]].     Today's concern is:   CXR done on 2/21 due to chest pain and cough revealed, a substantial shift of the mediastinum to the left consistent with volume loss of the left chest, large left pleural effusion and suggestion of a truncated left main bronchus possibly indicatng a malignancy and/or dense infiltrate with atelectasis. Patient has orders for hospice, they will meet with her and her daughter 3/14. She has been coughing quite a bit, this has been her biggest concern along with poor appetite and weakness. No fever, chills, SOB. SaO2 has been >92% on 2 liters of oxygen    Allergies, and PMH/PSH reviewed in [facility] today.    REVIEW OF SYSTEMS:  4 point ROS including Respiratory, CV, GI and , other than that noted in the HPI,  is negative    Objective:   /60   Pulse 85   Temp 97.8  F (36.6  C)   Resp 18   Ht 1.499 m (4' 11\")   Wt 51.3 kg (113 lb)   SpO2 94%   BMI 22.82 kg/m    GENERAL APPEARANCE:  Alert, in no distress  RESP:  frequent loose cough, appears mildly dyspneic  PSYCH:  oriented X 3, affect and mood normal      Assessment/Plan:  (R07.9) Chest pain, unspecified type  (primary encounter diagnosis)  (J90) Pleural effusion  (J43.9) Pulmonary emphysema, unspecified emphysema type (H)  Comment: Prognosis is poor. Given her goals of care, hospice is appropriate. She has morphine available if needed for comfort  Plan: Continue current POC with no changes at this time and adjustments as needed.      Electronically signed by: CODY Beltre CNP   [facility]  Phone: [phone]                  "

## 2022-04-26 NOTE — PROGRESS NOTES
[facility] called and they are discharging patient from hospice services due to lack of decline.

## 2022-05-31 NOTE — PROGRESS NOTES
[facility] GERIATRICS  [address] [place] [address]  Phone: [phone]  Fax: [phone]  Primary Provider: Yarely Melendez      PREOPERATIVE EVALUATION:  Today's date: 5/31/2022    Rosina Link is a 76 year old female who presents for a preoperative evaluation.    Surgical Information:  Surgery/Procedure: Cataract removal  Surgery Location: [facility]  Surgeon: Dr. Parmar  Surgery Date: Right eye 6/7/22 and left eye 6/21/22  Where patient plans to recover: At a nursing home  Fax number for surgical facility: Note does not need to be faxed, will be available electronically in Epic.    Type of Anesthesia Anticipated: Local with MAC    Assessment & Plan     The proposed surgical procedure is considered LOW risk.    (J44.9) Chronic obstructive pulmonary disease, unspecified COPD type (H)  (primary encounter diagnosis)  Comment: Well controlled. No s/sx acute exacerbation    (I25.10) Coronary artery disease involving native heart without angina pectoris, unspecified vessel or lesion type  Comment: Patient has intermittent chest pain, but none recently. DNR/DNI.    (K21.9) Chronic GERD  Comment: Controlled on current regimen. Zofran ordered today due to chronic nausea/vomiting when sitting up in a chair.      Medication Instructions:  Patient is to take all scheduled medications on the day of surgery    RECOMMENDATION:  APPROVAL GIVEN to proceed with proposed procedure, without further diagnostic evaluation.      Subjective     HPI related to upcoming procedure:   Patient was enrolled in hospice due to COPD, pleural effusions, possible malignancy. She stabilized and was discharged from hospice. She has been feeling well lately. She is nervous about having surgery, but is looking forward to having clearer vision.    Health Care Directive:  Patient does not have a Health Care Directive or Living Will: Patient has POLST, DNR/DNI/comfort care    Preoperative Review of :    reviewed - controlled substances reflected in medication list.      Status of Chronic Conditions:  See problem list for active medical problems.  Problems all longstanding and stable, except as noted/documented.  See ROS for pertinent symptoms related to these conditions.      Review of Systems  CONSTITUTIONAL: NEGATIVE for fever, chills, change in weight  ENT/MOUTH: NEGATIVE for ear, mouth and throat problems  RESP: NEGATIVE for significant cough or SOB  CV: NEGATIVE for chest pain, palpitations or peripheral edema    Patient Active Problem List    Diagnosis Date Noted     Polymyalgia rheumatica (H) 02/10/2022     Priority: Medium     Esophageal dysphagia 08/14/2018     Priority: Medium     Bowel and bladder incontinence 08/07/2018     Priority: Medium     Pulmonary emphysema (H) 02/25/2018     Priority: Medium     Hernia of abdominal cavity 02/25/2018     Priority: Medium     Slow transit constipation 02/25/2018     Priority: Medium     Chronic GERD 02/25/2018     Priority: Medium     Coronary artery disease without angina pectoris 02/25/2018     Priority: Medium     Major depressive disorder 02/25/2018     Priority: Medium     Stress incontinence in female 02/25/2018     Priority: Medium      Past Medical History:   Diagnosis Date     Acute encephalopathy      Acute on chronic respiratory failure (H)      Aperistalsis of esophagus      Arthritis      Compression fracture of lumbar vertebra (H)     L2     COPD (chronic obstructive pulmonary disease) (H)      Depression      Dyslipidemia      Encephalopathy      Esophageal candidiasis (H)      Esophagitis      Failure to thrive in adult      Fall      GERD (gastroesophageal reflux disease)      Hiatal hernia      HLD (hyperlipidemia)      HTN (hypertension)      Hypocalcemia      Hypomagnesemia      Hypoxia      Insomnia      Lactic acidosis      Major depressive disorder      Malnutrition (H)      Maternal MCV (mean corpuscular volume) low      Microcytic anemia       Microcytic hypochromic anemia 01/2013     Mucus plugging of bronchi      Osteoporosis      Pericardial effusion      PMR (polymyalgia rheumatica) (H)      Pneumonia      Pneumothorax      Positive urine drug screen 10/2012    meth and coke positive 10/12. negative in 11/19/12     Pulmonary emphysema (H)      Rib fracture      S/P laparoscopic fundoplication      SIRS (systemic inflammatory response syndrome) (H)      Thrombocytopenia (H)      Trigger finger     left hand, middle finger     Underweight      Urinary urgency      Past Surgical History:   Procedure Laterality Date     CHOLECYSTECTOMY  01/09/2015     ESOPHAGOSCOPY, GASTROSCOPY, DUODENOSCOPY (EGD), COMBINED       HIATAL HERNIA REPAIR  02/15/2016    lap with mesh     HIATAL HERNIA REPAIR  01/23/2019    Laparoscopic repair (reduction and repair) of recurrent hiatal hernia,      HYSTERECTOMY  1990     IR GASTRO JEJUNOSTOMY TUBE CHANGE  10/24/2018     IR GASTRO JEJUNOSTOMY TUBE CHANGE  11/8/2018     IR GASTRO JEJUNOSTOMY TUBE CHANGE  12/13/2018     IR GASTRO JEJUNOSTOMY TUBE CHANGE  3/15/2019     IR GASTRO JEJUNOSTOMY TUBE CHANGE  3/25/2019     IR GASTRO JEJUNOSTOMY TUBE CHANGE  6/24/2019     IR GJ TUBE REPLACEMENT  12/13/2018     IR GJ TUBE REPLACEMENT  3/15/2019     IR GJ TUBE REPLACEMENT  3/25/2019     IR GJ TUBE REPLACEMENT  6/24/2019     IR TUBE REMOVAL  8/19/2019     IR TUBE REMOVAL  8/19/2019     OTHER SURGICAL HISTORY      REPAIR PARA ESOPHAGEAL PLACEMENT OF GASTRIC J TUBE     RELEASE CARPAL TUNNEL Right      RELEASE TRIGGER FINGER Left     left middle finger     Current Outpatient Medications   Medication Sig Dispense Refill     acetaminophen (TYLENOL) 325 MG tablet Take 650 mg by mouth every 4 hours as needed       albuterol (PROAIR HFA/PROVENTIL HFA/VENTOLIN HFA) 108 (90 Base) MCG/ACT inhaler Inhale 2 puffs into the lungs every 4 hours as needed for shortness of breath / dyspnea or wheezing       alum & mag hydroxide-simethicone (MAALOX)  "200-200-20 MG/5ML SUSP suspension Take 30 mLs by mouth every 4 hours as needed for indigestion       budesonide-formoterol (SYMBICORT) 160-4.5 MCG/ACT inhaler Inhale 2 puffs into the lungs 2 times daily.       calcium carbonate antacid 1000 MG CHEW Take 2 tablets (2,000 mg) by mouth daily       cholecalciferol 25 MCG (1000 UT) TABS Take 1 tablet by mouth daily       morphine sulfate, high concentrate, (ROXANOL-CONCENTRATED) 20 MG/ML concentrated solution Take 0.125 mLs (2.5 mg) by mouth every 2 hours as needed for shortness of breath / dyspnea or breakthrough pain 30 mL 0     omeprazole (PRILOSEC) 10 MG DR capsule Take 1 capsule (10 mg) by mouth daily       ondansetron (ZOFRAN) 4 MG tablet Take 1 tablet (4 mg) by mouth daily as needed for nausea Give 30 minutes prior to leaving for any appointments       polyethylene glycol (MIRALAX) 17 GM/Dose powder Take 17 g by mouth daily       senna-docusate (SENOKOT-S/PERICOLACE) 8.6-50 MG tablet Take 1 tablet by mouth 2 times daily       venlafaxine (EFFEXOR) 25 MG tablet Take 25 mg by mouth daily         Allergies   Allergen Reactions     Misc. Sulfonamide Containing Compounds      SULFA     Pcn [Penicillins]         Social History     Tobacco Use     Smoking status: Not on file     Smokeless tobacco: Not on file   Substance Use Topics     Alcohol use: No       History   Drug Use No         Objective     /68   Pulse 80   Temp 98  F (36.7  C)   Resp 20   Ht 1.499 m (4' 11\")   Wt 51.3 kg (113 lb)   SpO2 98%   BMI 22.82 kg/m      Physical Exam  GENERAL APPEARANCE: healthy, alert and no distress  HENT: ear canals and TM's normal and nose and mouth without ulcers or lesions  RESP: lungs clear to auscultation - no rales, rhonchi or wheezes  CV: regular rate and rhythm, normal S1 S2, no S3 or S4 and no murmur, click or rub   ABDOMEN: soft, nontender, no HSM or masses and bowel sounds normal  NEURO: Normal strength and tone, sensory exam grossly normal, mentation intact " and speech normal    Recent Labs   Lab Test 05/19/22  0509 02/23/22  0453 10/20/21  0522 10/20/21  0522 06/15/21  0526   HGB  --  12.2  --  12.9  --    PLT  --  191  --  187  --    NA  --  132*  --  138  --    POTASSIUM  --  4.0  --  4.0  --    CR 0.66 0.67   < > 0.68  --    A1C  --   --   --  5.1 5.1    < > = values in this interval not displayed.        Diagnostics:  No labs were ordered during this visit.   No EKG required for low risk surgery (cataract, skin procedure, breast biopsy, etc).    Revised Cardiac Risk Index (RCRI):  The patient has the following serious cardiovascular risks for perioperative complications:   - Coronary Artery Disease (MI, positive stress test, angina, Qs on EKG) = 1 point     RCRI Interpretation: 1 point: Class II (low risk - 0.9% complication rate)           Signed Electronically by: CODY Beltre [facility]  Phone: [phone]      Copy of this evaluation report is provided to requesting physician.

## 2022-05-31 NOTE — LETTER
5/31/2022        RE: Rosina Link  [address] [place] [address]  [place] [address]        [facility]  [address] [place] [address]  Phone: [phone]  Fax: [phone]  Primary Provider: Yarely Melendez      PREOPERATIVE EVALUATION:  Today's date: 5/31/2022    Rosina Link is a 76 year old female who presents for a preoperative evaluation.    Surgical Information:  Surgery/Procedure: Cataract removal  Surgery Location: [facility]  Surgeon: Dr. Parmar  Surgery Date: Right eye 6/7/22 and left eye 6/21/22  Where patient plans to recover: At a nursing home  Fax number for surgical facility: Note does not need to be faxed, will be available electronically in Epic.    Type of Anesthesia Anticipated: Local with MAC    Assessment & Plan     The proposed surgical procedure is considered LOW risk.    (J44.9) Chronic obstructive pulmonary disease, unspecified COPD type (H)  (primary encounter diagnosis)  Comment: Well controlled. No s/sx acute exacerbation    (I25.10) Coronary artery disease involving native heart without angina pectoris, unspecified vessel or lesion type  Comment: Patient has intermittent chest pain, but none recently. DNR/DNI.    (K21.9) Chronic GERD  Comment: Controlled on current regimen. Zofran ordered today due to chronic nausea/vomiting when sitting up in a chair.      Medication Instructions:  Patient is to take all scheduled medications on the day of surgery    RECOMMENDATION:  APPROVAL GIVEN to proceed with proposed procedure, without further diagnostic evaluation.      Subjective     HPI related to upcoming procedure:   Patient was enrolled in hospice due to COPD, pleural effusions, possible malignancy. She stabilized and was discharged from hospice. She has been feeling well lately. She is nervous about having surgery, but is looking forward to having clearer vision.    Health Care Directive:  Patient does not have a Health Care Directive or Living  Will: Patient has POLST, DNR/DNI/comfort care    Preoperative Review of :   reviewed - controlled substances reflected in medication list.      Status of Chronic Conditions:  See problem list for active medical problems.  Problems all longstanding and stable, except as noted/documented.  See ROS for pertinent symptoms related to these conditions.      Review of Systems  CONSTITUTIONAL: NEGATIVE for fever, chills, change in weight  ENT/MOUTH: NEGATIVE for ear, mouth and throat problems  RESP: NEGATIVE for significant cough or SOB  CV: NEGATIVE for chest pain, palpitations or peripheral edema    Patient Active Problem List    Diagnosis Date Noted     Polymyalgia rheumatica (H) 02/10/2022     Priority: Medium     Esophageal dysphagia 08/14/2018     Priority: Medium     Bowel and bladder incontinence 08/07/2018     Priority: Medium     Pulmonary emphysema (H) 02/25/2018     Priority: Medium     Hernia of abdominal cavity 02/25/2018     Priority: Medium     Slow transit constipation 02/25/2018     Priority: Medium     Chronic GERD 02/25/2018     Priority: Medium     Coronary artery disease without angina pectoris 02/25/2018     Priority: Medium     Major depressive disorder 02/25/2018     Priority: Medium     Stress incontinence in female 02/25/2018     Priority: Medium      Past Medical History:   Diagnosis Date     Acute encephalopathy      Acute on chronic respiratory failure (H)      Aperistalsis of esophagus      Arthritis      Compression fracture of lumbar vertebra (H)     L2     COPD (chronic obstructive pulmonary disease) (H)      Depression      Dyslipidemia      Encephalopathy      Esophageal candidiasis (H)      Esophagitis      Failure to thrive in adult      Fall      GERD (gastroesophageal reflux disease)      Hiatal hernia      HLD (hyperlipidemia)      HTN (hypertension)      Hypocalcemia      Hypomagnesemia      Hypoxia      Insomnia      Lactic acidosis      Major depressive disorder       Malnutrition (H)      Maternal MCV (mean corpuscular volume) low      Microcytic anemia      Microcytic hypochromic anemia 01/2013     Mucus plugging of bronchi      Osteoporosis      Pericardial effusion      PMR (polymyalgia rheumatica) (H)      Pneumonia      Pneumothorax      Positive urine drug screen 10/2012    meth and coke positive 10/12. negative in 11/19/12     Pulmonary emphysema (H)      Rib fracture      S/P laparoscopic fundoplication      SIRS (systemic inflammatory response syndrome) (H)      Thrombocytopenia (H)      Trigger finger     left hand, middle finger     Underweight      Urinary urgency      Past Surgical History:   Procedure Laterality Date     CHOLECYSTECTOMY  01/09/2015     ESOPHAGOSCOPY, GASTROSCOPY, DUODENOSCOPY (EGD), COMBINED       HIATAL HERNIA REPAIR  02/15/2016    lap with mesh     HIATAL HERNIA REPAIR  01/23/2019    Laparoscopic repair (reduction and repair) of recurrent hiatal hernia,      HYSTERECTOMY  1990     IR GASTRO JEJUNOSTOMY TUBE CHANGE  10/24/2018     IR GASTRO JEJUNOSTOMY TUBE CHANGE  11/8/2018     IR GASTRO JEJUNOSTOMY TUBE CHANGE  12/13/2018     IR GASTRO JEJUNOSTOMY TUBE CHANGE  3/15/2019     IR GASTRO JEJUNOSTOMY TUBE CHANGE  3/25/2019     IR GASTRO JEJUNOSTOMY TUBE CHANGE  6/24/2019     IR GJ TUBE REPLACEMENT  12/13/2018     IR GJ TUBE REPLACEMENT  3/15/2019     IR GJ TUBE REPLACEMENT  3/25/2019     IR GJ TUBE REPLACEMENT  6/24/2019     IR TUBE REMOVAL  8/19/2019     IR TUBE REMOVAL  8/19/2019     OTHER SURGICAL HISTORY      REPAIR PARA ESOPHAGEAL PLACEMENT OF GASTRIC J TUBE     RELEASE CARPAL TUNNEL Right      RELEASE TRIGGER FINGER Left     left middle finger     Current Outpatient Medications   Medication Sig Dispense Refill     acetaminophen (TYLENOL) 325 MG tablet Take 650 mg by mouth every 4 hours as needed       albuterol (PROAIR HFA/PROVENTIL HFA/VENTOLIN HFA) 108 (90 Base) MCG/ACT inhaler Inhale 2 puffs into the lungs every 4 hours as needed for  "shortness of breath / dyspnea or wheezing       alum & mag hydroxide-simethicone (MAALOX) 200-200-20 MG/5ML SUSP suspension Take 30 mLs by mouth every 4 hours as needed for indigestion       budesonide-formoterol (SYMBICORT) 160-4.5 MCG/ACT inhaler Inhale 2 puffs into the lungs 2 times daily.       calcium carbonate antacid 1000 MG CHEW Take 2 tablets (2,000 mg) by mouth daily       cholecalciferol 25 MCG (1000 UT) TABS Take 1 tablet by mouth daily       morphine sulfate, high concentrate, (ROXANOL-CONCENTRATED) 20 MG/ML concentrated solution Take 0.125 mLs (2.5 mg) by mouth every 2 hours as needed for shortness of breath / dyspnea or breakthrough pain 30 mL 0     omeprazole (PRILOSEC) 10 MG DR capsule Take 1 capsule (10 mg) by mouth daily       ondansetron (ZOFRAN) 4 MG tablet Take 1 tablet (4 mg) by mouth daily as needed for nausea Give 30 minutes prior to leaving for any appointments       polyethylene glycol (MIRALAX) 17 GM/Dose powder Take 17 g by mouth daily       senna-docusate (SENOKOT-S/PERICOLACE) 8.6-50 MG tablet Take 1 tablet by mouth 2 times daily       venlafaxine (EFFEXOR) 25 MG tablet Take 25 mg by mouth daily         Allergies   Allergen Reactions     Misc. Sulfonamide Containing Compounds      SULFA     Pcn [Penicillins]         Social History     Tobacco Use     Smoking status: Not on file     Smokeless tobacco: Not on file   Substance Use Topics     Alcohol use: No       History   Drug Use No         Objective     /68   Pulse 80   Temp 98  F (36.7  C)   Resp 20   Ht 1.499 m (4' 11\")   Wt 51.3 kg (113 lb)   SpO2 98%   BMI 22.82 kg/m      Physical Exam  GENERAL APPEARANCE: healthy, alert and no distress  HENT: ear canals and TM's normal and nose and mouth without ulcers or lesions  RESP: lungs clear to auscultation - no rales, rhonchi or wheezes  CV: regular rate and rhythm, normal S1 S2, no S3 or S4 and no murmur, click or rub   ABDOMEN: soft, nontender, no HSM or masses and bowel " sounds normal  NEURO: Normal strength and tone, sensory exam grossly normal, mentation intact and speech normal    Recent Labs   Lab Test 05/19/22  0509 02/23/22  0453 10/20/21  0522 10/20/21  0522 06/15/21  0526   HGB  --  12.2  --  12.9  --    PLT  --  191  --  187  --    NA  --  132*  --  138  --    POTASSIUM  --  4.0  --  4.0  --    CR 0.66 0.67   < > 0.68  --    A1C  --   --   --  5.1 5.1    < > = values in this interval not displayed.        Diagnostics:  No labs were ordered during this visit.   No EKG required for low risk surgery (cataract, skin procedure, breast biopsy, etc).    Revised Cardiac Risk Index (RCRI):  The patient has the following serious cardiovascular risks for perioperative complications:   - Coronary Artery Disease (MI, positive stress test, angina, Qs on EKG) = 1 point     RCRI Interpretation: 1 point: Class II (low risk - 0.9% complication rate)           Signed Electronically by: CODY Beltre [facility]  Phone: [phone]      Copy of this evaluation report is provided to requesting physician.

## 2022-08-16 NOTE — PROGRESS NOTES
[facility] GERIATRICS  Chief Complaint   Patient presents with     penitentiary Regulatory     [place] Medical Record Number:  [id number]  Place of Service where encounter took place:  [facility] () [[id number]]    HPI:    Rosina Link  is 77 year old (1945), who is being seen today for a federally mandated E/M visit.     Today's concerns are:  Chronic obstructive pulmonary disease, unspecified COPD type (H)  Patient was on hospice due to acute respiratory decline, but she stabilized and is now off hospice. She denies any acute concerns with her breathing    Coronary artery disease involving native heart without angina pectoris, unspecified vessel or lesion type  No chest pain, but this has occurred occasionally in the past    Chronic GERD  Denies dyspepsia      ALLERGIES:Misc. sulfonamide containing compounds and Pcn [penicillins]  PAST MEDICAL HISTORY:   Past Medical History:   Diagnosis Date     Acute encephalopathy      Acute on chronic respiratory failure (H)      Aperistalsis of esophagus      Arthritis      Compression fracture of lumbar vertebra (H)     L2     COPD (chronic obstructive pulmonary disease) (H)      Depression      Dyslipidemia      Encephalopathy      Esophageal candidiasis (H)      Esophagitis      Failure to thrive in adult      Fall      GERD (gastroesophageal reflux disease)      Hiatal hernia      HLD (hyperlipidemia)      HTN (hypertension)      Hypocalcemia      Hypomagnesemia      Hypoxia      Insomnia      Lactic acidosis      Major depressive disorder      Malnutrition (H)      Maternal MCV (mean corpuscular volume) low      Microcytic anemia      Microcytic hypochromic anemia 01/2013     Mucus plugging of bronchi      Osteoporosis      Pericardial effusion      PMR (polymyalgia rheumatica) (H)      Pneumonia      Pneumothorax      Positive urine drug screen 10/2012    meth and coke positive 10/12. negative in 11/19/12     Pulmonary emphysema (H)      Rib  fracture      S/P laparoscopic fundoplication      SIRS (systemic inflammatory response syndrome) (H)      Thrombocytopenia (H)      Trigger finger     left hand, middle finger     Underweight      Urinary urgency      PAST SURGICAL HISTORY:   has a past surgical history that includes IR Gastro Jejunostomy Tube Change (10/24/2018); IR Gastro Jejunostomy Tube Change (11/8/2018); IR Gastro Jejunostomy Tube Change (12/13/2018); IR Gastro Jejunostomy Tube Change (3/15/2019); IR Gastro Jejunostomy Tube Change (3/25/2019); IR Gastro Jejunostomy Tube Change (6/24/2019); IR Tube Removal (8/19/2019); Hysterectomy (1990); Cholecystectomy (01/09/2015); Hiatal Hernia Repair (02/15/2016); Release carpal tunnel (Right); Release trigger finger (Left); Esophagoscopy, gastroscopy, duodenoscopy (EGD), combined; other surgical history; Ir Gj Tube Replacement (12/13/2018); Hiatal Hernia Repair (01/23/2019); Ir Gj Tube Replacement (3/15/2019); Ir Gj Tube Replacement (3/25/2019); Ir Gj Tube Replacement (6/24/2019); and Ir Tube Removal (8/19/2019).  FAMILY HISTORY: family history includes Breast Cancer in her maternal aunt; Cancer in her mother; Diabetes in her mother; Emphysema in her father; Heart Disease in her father; Hypertension in her father and mother; No Known Problems in her brother.  SOCIAL HISTORY:  reports that she does not drink alcohol and does not use drugs.    MEDICATIONS:  Current Outpatient Medications   Medication Sig Dispense Refill     acetaminophen (TYLENOL) 325 MG tablet Take 650 mg by mouth every 4 hours as needed       albuterol (PROAIR HFA/PROVENTIL HFA/VENTOLIN HFA) 108 (90 Base) MCG/ACT inhaler Inhale 2 puffs into the lungs every 4 hours as needed for shortness of breath / dyspnea or wheezing       alum & mag hydroxide-simethicone (MAALOX) 200-200-20 MG/5ML SUSP suspension Take 30 mLs by mouth every 4 hours as needed for indigestion       budesonide-formoterol (SYMBICORT) 160-4.5 MCG/ACT inhaler Inhale 2 puffs  "into the lungs 2 times daily.       calcium carbonate antacid 1000 MG CHEW Take 2 tablets (2,000 mg) by mouth daily       cholecalciferol 25 MCG (1000 UT) TABS Take 2 tablets by mouth daily       omeprazole (PRILOSEC) 10 MG DR capsule Take 1 capsule (10 mg) by mouth daily       ondansetron (ZOFRAN) 4 MG tablet Take 1 tablet (4 mg) by mouth daily as needed for nausea Give 30 minutes prior to leaving for any appointments       polyethylene glycol (MIRALAX) 17 GM/Dose powder Take 17 g by mouth daily       senna-docusate (SENOKOT-S/PERICOLACE) 8.6-50 MG tablet Take 1 tablet by mouth 2 times daily       venlafaxine (EFFEXOR) 25 MG tablet Take 25 mg by mouth daily         Case Management:  I have reviewed the care plan and MDS and do agree with the plan. Patient's desire to return to the community is not present. Information reviewed:  Medications, vital signs, orders, and nursing notes.    ROS:  4 point ROS including Respiratory, CV, GI and , other than that noted in the HPI,  is negative    Vitals:  /68   Pulse 92   Temp 98.3  F (36.8  C)   Resp 18   Ht 1.499 m (4' 11\")   Wt 48.6 kg (107 lb 3.2 oz)   SpO2 95%   BMI 21.65 kg/m    Body mass index is 21.65 kg/m .  Exam:  GENERAL APPEARANCE:  Alert, in no distress  RESP:  no respiratory distress  CV:  no edema  SKIN:  Inspection of skin and subcutaneous tissue baseline  PSYCH:  oriented X 3, affect and mood normal    Lab/Diagnostic data:   Recent labs in [facility] reviewed by me today.     ASSESSMENT/PLAN  (J44.9) Chronic obstructive pulmonary disease, unspecified COPD type (H)  (primary encounter diagnosis)  Comment: Chronic condition being managed with medications and is currently asymptomatic.  Plan: Continue current POC with no changes at this time and adjustments as needed.    (I25.10) Coronary artery disease involving native heart without angina pectoris, unspecified vessel or lesion type  Comment: Asymptomatic. No longer on cardiac medications due to goals " of care, hypotension  Plan: Continue current POC with no changes at this time and adjustments as needed.    (K21.9) Chronic GERD  Comment: Chronic condition being managed with medications and is currently asymptomatic.  Plan: Continue current POC with no changes at this time and adjustments as needed.      Electronically signed by:  CODY Beltre [facility]  Phone: [phone]

## 2022-08-16 NOTE — LETTER
8/16/2022        RE: Rosina Link  [address]  [place] [address]        M [facility] GERIATRICS  Chief Complaint   Patient presents with     [facility] Regulatory     [place] Medical Record Number:  [id number]  Place of Service where encounter took place[facility] () [[id number]]    HPI:    Rosina Link  is 77 year old (1945), who is being seen today for a federally mandated E/M visit.     Today's concerns are:  Chronic obstructive pulmonary disease, unspecified COPD type (H)  Patient was on hospice due to acute respiratory decline, but she stabilized and is now off hospice. She denies any acute concerns with her breathing    Coronary artery disease involving native heart without angina pectoris, unspecified vessel or lesion type  No chest pain, but this has occurred occasionally in the past    Chronic GERD  Denies dyspepsia      ALLERGIES:Misc. sulfonamide containing compounds and Pcn [penicillins]  PAST MEDICAL HISTORY:   Past Medical History:   Diagnosis Date     Acute encephalopathy      Acute on chronic respiratory failure (H)      Aperistalsis of esophagus      Arthritis      Compression fracture of lumbar vertebra (H)     L2     COPD (chronic obstructive pulmonary disease) (H)      Depression      Dyslipidemia      Encephalopathy      Esophageal candidiasis (H)      Esophagitis      Failure to thrive in adult      Fall      GERD (gastroesophageal reflux disease)      Hiatal hernia      HLD (hyperlipidemia)      HTN (hypertension)      Hypocalcemia      Hypomagnesemia      Hypoxia      Insomnia      Lactic acidosis      Major depressive disorder      Malnutrition (H)      Maternal MCV (mean corpuscular volume) low      Microcytic anemia      Microcytic hypochromic anemia 01/2013     Mucus plugging of bronchi      Osteoporosis      Pericardial effusion      PMR (polymyalgia rheumatica) (H)      Pneumonia      Pneumothorax      Positive urine drug screen 10/2012    meth  and coke positive 10/12. negative in 11/19/12     Pulmonary emphysema (H)      Rib fracture      S/P laparoscopic fundoplication      SIRS (systemic inflammatory response syndrome) (H)      Thrombocytopenia (H)      Trigger finger     left hand, middle finger     Underweight      Urinary urgency      PAST SURGICAL HISTORY:   has a past surgical history that includes IR Gastro Jejunostomy Tube Change (10/24/2018); IR Gastro Jejunostomy Tube Change (11/8/2018); IR Gastro Jejunostomy Tube Change (12/13/2018); IR Gastro Jejunostomy Tube Change (3/15/2019); IR Gastro Jejunostomy Tube Change (3/25/2019); IR Gastro Jejunostomy Tube Change (6/24/2019); IR Tube Removal (8/19/2019); Hysterectomy (1990); Cholecystectomy (01/09/2015); Hiatal Hernia Repair (02/15/2016); Release carpal tunnel (Right); Release trigger finger (Left); Esophagoscopy, gastroscopy, duodenoscopy (EGD), combined; other surgical history; Ir Gj Tube Replacement (12/13/2018); Hiatal Hernia Repair (01/23/2019); Ir Gj Tube Replacement (3/15/2019); Ir Gj Tube Replacement (3/25/2019); Ir Gj Tube Replacement (6/24/2019); and Ir Tube Removal (8/19/2019).  FAMILY HISTORY: family history includes Breast Cancer in her maternal aunt; Cancer in her mother; Diabetes in her mother; Emphysema in her father; Heart Disease in her father; Hypertension in her father and mother; No Known Problems in her brother.  SOCIAL HISTORY:  reports that she does not drink alcohol and does not use drugs.    MEDICATIONS:  Current Outpatient Medications   Medication Sig Dispense Refill     acetaminophen (TYLENOL) 325 MG tablet Take 650 mg by mouth every 4 hours as needed       albuterol (PROAIR HFA/PROVENTIL HFA/VENTOLIN HFA) 108 (90 Base) MCG/ACT inhaler Inhale 2 puffs into the lungs every 4 hours as needed for shortness of breath / dyspnea or wheezing       alum & mag hydroxide-simethicone (MAALOX) 200-200-20 MG/5ML SUSP suspension Take 30 mLs by mouth every 4 hours as needed for  "indigestion       budesonide-formoterol (SYMBICORT) 160-4.5 MCG/ACT inhaler Inhale 2 puffs into the lungs 2 times daily.       calcium carbonate antacid 1000 MG CHEW Take 2 tablets (2,000 mg) by mouth daily       cholecalciferol 25 MCG (1000 UT) TABS Take 2 tablets by mouth daily       omeprazole (PRILOSEC) 10 MG DR capsule Take 1 capsule (10 mg) by mouth daily       ondansetron (ZOFRAN) 4 MG tablet Take 1 tablet (4 mg) by mouth daily as needed for nausea Give 30 minutes prior to leaving for any appointments       polyethylene glycol (MIRALAX) 17 GM/Dose powder Take 17 g by mouth daily       senna-docusate (SENOKOT-S/PERICOLACE) 8.6-50 MG tablet Take 1 tablet by mouth 2 times daily       venlafaxine (EFFEXOR) 25 MG tablet Take 25 mg by mouth daily         Case Management:  I have reviewed the care plan and MDS and do agree with the plan. Patient's desire to return to the community is not present. Information reviewed:  Medications, vital signs, orders, and nursing notes.    ROS:  4 point ROS including Respiratory, CV, GI and , other than that noted in the HPI,  is negative    Vitals:  /68   Pulse 92   Temp 98.3  F (36.8  C)   Resp 18   Ht 1.499 m (4' 11\")   Wt 48.6 kg (107 lb 3.2 oz)   SpO2 95%   BMI 21.65 kg/m    Body mass index is 21.65 kg/m .  Exam:  GENERAL APPEARANCE:  Alert, in no distress  RESP:  no respiratory distress  CV:  no edema  SKIN:  Inspection of skin and subcutaneous tissue baseline  PSYCH:  oriented X 3, affect and mood normal    Lab/Diagnostic data:   Recent labs in [facility] reviewed by me today.     ASSESSMENT/PLAN  (J44.9) Chronic obstructive pulmonary disease, unspecified COPD type (H)  (primary encounter diagnosis)  Comment: Chronic condition being managed with medications and is currently asymptomatic.  Plan: Continue current POC with no changes at this time and adjustments as needed.    (I25.10) Coronary artery disease involving native heart without angina pectoris, unspecified " vessel or lesion type  Comment: Asymptomatic. No longer on cardiac medications due to goals of care, hypotension  Plan: Continue current POC with no changes at this time and adjustments as needed.    (K21.9) Chronic GERD  Comment: Chronic condition being managed with medications and is currently asymptomatic.  Plan: Continue current POC with no changes at this time and adjustments as needed.      Electronically signed by:  CODY Beltre CNP   [facility]  Phone: [phone]                Sincerely,        CODY Beltre CNP

## 2022-08-29 NOTE — LETTER
8/29/2022        RE: Rosina Link  [address]  [place] [address]          M [facility] GERIATRIC SERVICES    [place] Medical Record Number:  [id number]  Place of Service where encounter took place: [facility] () [[id number]]  CODE STATUS:   DNR / DNI    Chief Complaint:  Chief Complaint   Patient presents with     senior care Regulatory     LTC 8/29/2022. COPD. Debility.        HPI:   Rosina is a 77 y.o. female who resides in LTC at [facility]. She has hx of COPD, HTN, hiatal hernia, GERD, anemia, PMR, TERESA, pancreatitis, gastric obstruction and FTT previously on feeding tube (discontinued 8/2019). She was sent to the hospital on 2/28/20 from a planned routine cardiology visit due to cough, dyspnea and hypoxia. She was diagnosed with hypoxemic respiratory failure, needed oxygen, had bronchoscopy which opened left upper lobe with some improvement but unable to be weaned. Repeat bronch with extensive mucous plugging. Subsequent resp failure requiring CPAP during which briefly unresponsive. She did not want to comply with treatments and ultimately moved to comfort based approach, returning to LTC on 3/16/20 on [facility]. Subsequent stabilization over time and was discharged from Hospice eff 9/11/2020.    Today:  Several months ago had worsening of her condition, frailty. Developed cough, respiratory failure requiring supplemental oxygen, abnormal CXR imaging. Due to decline and previous history of pulmonary problems, known COPD, she decided on comfort based approach and signed on with Hospice effective 3/14/2022 with diagnosis acute respiratory failure complicated with pleural effusion. She was treated for comfort and symptoms. Over time however, she improved to baseline, no longer required supplemental oxygen and felt her breathing had returned to normal. She was discharged from Hospice care with last day on 4/26/2022.    No current acute concerns. She is laying in bed as per  usual. Not on oxygen. Reports feeling weak at times and with poor appetite but overall satisfactory. No diarrhea or constipation. Respiratory status is stable. She is on Symbicort. Denies abdominal pain or GERD sx. She is on PPI. Weight is down 4 pounds in the last 2 months. No open areas of skin, no falls. She is on venlafaxine for depression, denies any mood concerns.      She had cataract surgeries, right eye on 6/7/2022, left eye on 6/21/2022. No complications.       Past Medical History:  Past Medical History:   Diagnosis Date     Acute encephalopathy      Acute on chronic respiratory failure (H)      Aperistalsis of esophagus      Arthritis      Compression fracture of lumbar vertebra (H)     L2     COPD (chronic obstructive pulmonary disease) (H)      Depression      Dyslipidemia      Encephalopathy      Esophageal candidiasis (H)      Esophagitis      Failure to thrive in adult      Fall      GERD (gastroesophageal reflux disease)      Hiatal hernia      HLD (hyperlipidemia)      HTN (hypertension)      Hypocalcemia      Hypomagnesemia      Hypoxia      Insomnia      Lactic acidosis      Major depressive disorder      Malnutrition (H)      Maternal MCV (mean corpuscular volume) low      Microcytic anemia      Microcytic hypochromic anemia 01/2013     Mucus plugging of bronchi      Osteoporosis      Pericardial effusion      PMR (polymyalgia rheumatica) (H)      Pneumonia      Pneumothorax      Positive urine drug screen 10/2012    meth and coke positive 10/12. negative in 11/19/12     Pulmonary emphysema (H)      Rib fracture      S/P laparoscopic fundoplication      SIRS (systemic inflammatory response syndrome) (H)      Thrombocytopenia (H)      Trigger finger     left hand, middle finger     Underweight      Urinary urgency        Medications:  Current Outpatient Medications   Medication Sig Dispense Refill     acetaminophen (TYLENOL) 325 MG tablet Take 650 mg by mouth every 4 hours as needed        "albuterol (PROAIR HFA/PROVENTIL HFA/VENTOLIN HFA) 108 (90 Base) MCG/ACT inhaler Inhale 2 puffs into the lungs every 4 hours as needed for shortness of breath / dyspnea or wheezing       alum & mag hydroxide-simethicone (MAALOX) 200-200-20 MG/5ML SUSP suspension Take 30 mLs by mouth every 4 hours as needed for indigestion       budesonide-formoterol (SYMBICORT) 160-4.5 MCG/ACT inhaler Inhale 2 puffs into the lungs 2 times daily.       calcium carbonate antacid 1000 MG CHEW Take 2 tablets (2,000 mg) by mouth daily       cholecalciferol 25 MCG (1000 UT) TABS Take 2 tablets by mouth daily       omeprazole (PRILOSEC) 10 MG DR capsule Take 1 capsule (10 mg) by mouth daily       ondansetron (ZOFRAN) 4 MG tablet Take 1 tablet (4 mg) by mouth daily as needed for nausea Give 30 minutes prior to leaving for any appointments       polyethylene glycol (MIRALAX) 17 GM/Dose powder Take 17 g by mouth daily       senna-docusate (SENOKOT-S/PERICOLACE) 8.6-50 MG tablet Take 1 tablet by mouth 2 times daily       venlafaxine (EFFEXOR) 25 MG tablet Take 25 mg by mouth daily          Physical Exam:  General: Patient is alert female, no distress.  Vitals: /70   Pulse 87   Temp 98.3  F (36.8  C)   Resp 18   Ht 1.499 m (4' 11\")   Wt 48.6 kg (107 lb 3.2 oz)   SpO2 97%   BMI 21.65 kg/m    HEENT: Head is NCAT. Eyes show no injection or icterus. Nares negative. Oropharynx moist.  Neck: No JVD.  Lungs: Non labored respirations.   : Deferred.  Extremities: No edema.   Musculoskeletal: Age related degen changes.   Psych: Mood appears good.      Labs:  Component      Latest Ref Rng & Units 1/23/2020 10/20/2021   WBC      4.0 - 11.0 10e3/uL 5.7 5.2   RBC Count      3.80 - 5.20 10e6/uL 4.82 4.71   Hemoglobin      11.7 - 15.7 g/dL 13.8 12.9   Hematocrit      35.0 - 47.0 % 43.2 41.2   MCV      78 - 100 fL 90 88   MCH      26.5 - 33.0 pg 28.6 27.4   MCHC      31.5 - 36.5 g/dL 31.9 (L) 31.3 (L)   RDW      10.0 - 15.0 % 14.8 (H) 14.5 "   Platelet Count      150 - 450 10e3/uL 227 187     Component      Latest Ref Rng & Units 6/15/2021 10/20/2021   TSH      0.30 - 5.00 uIU/mL 1.55 2.63     Component      Latest Ref Rng & Units 1/23/2020 10/20/2021   Sodium      136 - 145 mmol/L 138 138   Potassium      3.5 - 5.0 mmol/L 4.3 4.0   Chloride      98 - 107 mmol/L 99 104   Carbon Dioxide      22 - 31 mmol/L 34 (H) 27   Anion Gap      5 - 18 mmol/L 5 7   Glucose      70 - 125 mg/dL 123 103   Urea Nitrogen      8 - 28 mg/dL 12 8   Creatinine      0.60 - 1.10 mg/dL 0.73 0.68   GFR Estimate      >60 mL/min/1.73m2 >60 85   Calcium      8.5 - 10.5 mg/dL 9.0 8.8     Component      Latest Ref Rng & Units 2/23/2022 5/19/2022   Sodium      136 - 145 mmol/L 132 (L)    Potassium      3.5 - 5.0 mmol/L 4.0    Chloride      98 - 107 mmol/L 97 (L)    Carbon Dioxide      22 - 31 mmol/L 26    Anion Gap      5 - 18 mmol/L 9    Glucose      70 - 125 mg/dL 95    Urea Nitrogen      8 - 28 mg/dL 12    Creatinine      0.60 - 1.10 mg/dL 0.67 0.66   GFR Estimate      >60 mL/min/1.73m2 90 90   Calcium      8.5 - 10.5 mg/dL 8.9      Component      Latest Ref Rng & Units 10/20/2021 2/23/2022   WBC      4.0 - 11.0 10e3/uL 5.2 7.1   RBC Count      3.80 - 5.20 10e6/uL 4.71 4.50   Hemoglobin      11.7 - 15.7 g/dL 12.9 12.2   Hematocrit      35.0 - 47.0 % 41.2 38.1   MCV      78 - 100 fL 88 85   MCH      26.5 - 33.0 pg 27.4 27.1   MCHC      31.5 - 36.5 g/dL 31.3 (L) 32.0   RDW      10.0 - 15.0 % 14.5 15.0   Platelet Count      150 - 450 10e3/uL 187 191       Assessment/Plan:  1. General debilitation. Frailty, medical complexities. She was on Hospice 3/14/2022-4/26/2022 due to resp failure and pleural effusion.   2. COPD. Continue Symbicort. No acute resp concerns today.   3. Depression. On venlafaxine, low dose, continue.  4. Osteoporosis. Seen by endocrinology Feb 2022. Recommended Reclast infusions, plan for annual infusions x 3 years then a drug holiday.  5. GERD. Continue PPI, sx in  remission.   6. HTN. Not on meds, BPs are acceptable.          Electronically signed by: Rosina Guo MD                 Sincerely,        Rosina Guo MD

## 2022-09-05 NOTE — PROGRESS NOTES
[facility] GERIATRIC SERVICES    [place] Medical Record Number:  [id number]  Place of Service where encounter took place: [facility] () [[id number]]  CODE STATUS:   DNR / DNI    Chief Complaint:  Chief Complaint   Patient presents with     [facility]     LTC 8/29/2022. COPD. Debility.        HPI:   Rosina is a 77 y.o. female who resides in LTC at [facility]. She has hx of COPD, HTN, hiatal hernia, GERD, anemia, PMR, TERESA, pancreatitis, gastric obstruction and FTT previously on feeding tube (discontinued 8/2019). She was sent to the hospital on 2/28/20 from a planned routine cardiology visit due to cough, dyspnea and hypoxia. She was diagnosed with hypoxemic respiratory failure, needed oxygen, had bronchoscopy which opened left upper lobe with some improvement but unable to be weaned. Repeat bronch with extensive mucous plugging. Subsequent resp failure requiring CPAP during which briefly unresponsive. She did not want to comply with treatments and ultimately moved to comfort based approach, returning to LTC on 3/16/20 on [facility]. Subsequent stabilization over time and was discharged from Hospice eff 9/11/2020.    Today:  Several months ago had worsening of her condition, frailty. Developed cough, respiratory failure requiring supplemental oxygen, abnormal CXR imaging. Due to decline and previous history of pulmonary problems, known COPD, she decided on comfort based approach and signed on with Hospice effective 3/14/2022 with diagnosis acute respiratory failure complicated with pleural effusion. She was treated for comfort and symptoms. Over time however, she improved to baseline, no longer required supplemental oxygen and felt her breathing had returned to normal. She was discharged from Hospice care with last day on 4/26/2022.    No current acute concerns. She is laying in bed as per usual. Not on oxygen. Reports feeling weak at times and with poor appetite but overall  satisfactory. No diarrhea or constipation. Respiratory status is stable. She is on Symbicort. Denies abdominal pain or GERD sx. She is on PPI. Weight is down 4 pounds in the last 2 months. No open areas of skin, no falls. She is on venlafaxine for depression, denies any mood concerns.      She had cataract surgeries, right eye on 6/7/2022, left eye on 6/21/2022. No complications.       Past Medical History:  Past Medical History:   Diagnosis Date     Acute encephalopathy      Acute on chronic respiratory failure (H)      Aperistalsis of esophagus      Arthritis      Compression fracture of lumbar vertebra (H)     L2     COPD (chronic obstructive pulmonary disease) (H)      Depression      Dyslipidemia      Encephalopathy      Esophageal candidiasis (H)      Esophagitis      Failure to thrive in adult      Fall      GERD (gastroesophageal reflux disease)      Hiatal hernia      HLD (hyperlipidemia)      HTN (hypertension)      Hypocalcemia      Hypomagnesemia      Hypoxia      Insomnia      Lactic acidosis      Major depressive disorder      Malnutrition (H)      Maternal MCV (mean corpuscular volume) low      Microcytic anemia      Microcytic hypochromic anemia 01/2013     Mucus plugging of bronchi      Osteoporosis      Pericardial effusion      PMR (polymyalgia rheumatica) (H)      Pneumonia      Pneumothorax      Positive urine drug screen 10/2012    meth and coke positive 10/12. negative in 11/19/12     Pulmonary emphysema (H)      Rib fracture      S/P laparoscopic fundoplication      SIRS (systemic inflammatory response syndrome) (H)      Thrombocytopenia (H)      Trigger finger     left hand, middle finger     Underweight      Urinary urgency        Medications:  Current Outpatient Medications   Medication Sig Dispense Refill     acetaminophen (TYLENOL) 325 MG tablet Take 650 mg by mouth every 4 hours as needed       albuterol (PROAIR HFA/PROVENTIL HFA/VENTOLIN HFA) 108 (90 Base) MCG/ACT inhaler Inhale 2  "puffs into the lungs every 4 hours as needed for shortness of breath / dyspnea or wheezing       alum & mag hydroxide-simethicone (MAALOX) 200-200-20 MG/5ML SUSP suspension Take 30 mLs by mouth every 4 hours as needed for indigestion       budesonide-formoterol (SYMBICORT) 160-4.5 MCG/ACT inhaler Inhale 2 puffs into the lungs 2 times daily.       calcium carbonate antacid 1000 MG CHEW Take 2 tablets (2,000 mg) by mouth daily       cholecalciferol 25 MCG (1000 UT) TABS Take 2 tablets by mouth daily       omeprazole (PRILOSEC) 10 MG DR capsule Take 1 capsule (10 mg) by mouth daily       ondansetron (ZOFRAN) 4 MG tablet Take 1 tablet (4 mg) by mouth daily as needed for nausea Give 30 minutes prior to leaving for any appointments       polyethylene glycol (MIRALAX) 17 GM/Dose powder Take 17 g by mouth daily       senna-docusate (SENOKOT-S/PERICOLACE) 8.6-50 MG tablet Take 1 tablet by mouth 2 times daily       venlafaxine (EFFEXOR) 25 MG tablet Take 25 mg by mouth daily          Physical Exam:  General: Patient is alert female, no distress.  Vitals: /70   Pulse 87   Temp 98.3  F (36.8  C)   Resp 18   Ht 1.499 m (4' 11\")   Wt 48.6 kg (107 lb 3.2 oz)   SpO2 97%   BMI 21.65 kg/m    HEENT: Head is NCAT. Eyes show no injection or icterus. Nares negative. Oropharynx moist.  Neck: No JVD.  Lungs: Non labored respirations.   : Deferred.  Extremities: No edema.   Musculoskeletal: Age related degen changes.   Psych: Mood appears good.      Labs:  Component      Latest Ref Rng & Units 1/23/2020 10/20/2021   WBC      4.0 - 11.0 10e3/uL 5.7 5.2   RBC Count      3.80 - 5.20 10e6/uL 4.82 4.71   Hemoglobin      11.7 - 15.7 g/dL 13.8 12.9   Hematocrit      35.0 - 47.0 % 43.2 41.2   MCV      78 - 100 fL 90 88   MCH      26.5 - 33.0 pg 28.6 27.4   MCHC      31.5 - 36.5 g/dL 31.9 (L) 31.3 (L)   RDW      10.0 - 15.0 % 14.8 (H) 14.5   Platelet Count      150 - 450 10e3/uL 227 187     Component      Latest Ref Rng & Units " 6/15/2021 10/20/2021   TSH      0.30 - 5.00 uIU/mL 1.55 2.63     Component      Latest Ref Rng & Units 1/23/2020 10/20/2021   Sodium      136 - 145 mmol/L 138 138   Potassium      3.5 - 5.0 mmol/L 4.3 4.0   Chloride      98 - 107 mmol/L 99 104   Carbon Dioxide      22 - 31 mmol/L 34 (H) 27   Anion Gap      5 - 18 mmol/L 5 7   Glucose      70 - 125 mg/dL 123 103   Urea Nitrogen      8 - 28 mg/dL 12 8   Creatinine      0.60 - 1.10 mg/dL 0.73 0.68   GFR Estimate      >60 mL/min/1.73m2 >60 85   Calcium      8.5 - 10.5 mg/dL 9.0 8.8     Component      Latest Ref Rng & Units 2/23/2022 5/19/2022   Sodium      136 - 145 mmol/L 132 (L)    Potassium      3.5 - 5.0 mmol/L 4.0    Chloride      98 - 107 mmol/L 97 (L)    Carbon Dioxide      22 - 31 mmol/L 26    Anion Gap      5 - 18 mmol/L 9    Glucose      70 - 125 mg/dL 95    Urea Nitrogen      8 - 28 mg/dL 12    Creatinine      0.60 - 1.10 mg/dL 0.67 0.66   GFR Estimate      >60 mL/min/1.73m2 90 90   Calcium      8.5 - 10.5 mg/dL 8.9      Component      Latest Ref Rng & Units 10/20/2021 2/23/2022   WBC      4.0 - 11.0 10e3/uL 5.2 7.1   RBC Count      3.80 - 5.20 10e6/uL 4.71 4.50   Hemoglobin      11.7 - 15.7 g/dL 12.9 12.2   Hematocrit      35.0 - 47.0 % 41.2 38.1   MCV      78 - 100 fL 88 85   MCH      26.5 - 33.0 pg 27.4 27.1   MCHC      31.5 - 36.5 g/dL 31.3 (L) 32.0   RDW      10.0 - 15.0 % 14.5 15.0   Platelet Count      150 - 450 10e3/uL 187 191       Assessment/Plan:  1. General debilitation. Frailty, medical complexities. She was on Hospice 3/14/2022-4/26/2022 due to resp failure and pleural effusion.   2. COPD. Continue Symbicort. No acute resp concerns today.   3. Depression. On venlafaxine, low dose, continue.  4. Osteoporosis. Seen by endocrinology Feb 2022. Recommended Reclast infusions, plan for annual infusions x 3 years then a drug holiday.  5. GERD. Continue PPI, sx in remission.   6. HTN. Not on meds, BPs are acceptable.          Electronically signed by:  Rosina Guo MD

## 2022-10-12 NOTE — LETTER
10/12/2022        RE: Rosina Link  [address]  [place] [address]        [facility] GERIATRICS  Chief Complaint   Patient presents with     Annual Comprehensive Nursing Home     [place] Medical Record Number:  [id number]  Place of Service where encounter took place:  [facility] () [[id number]]    HPI:    Rosina Link  is a 77 year old  (1945), who is being seen today for an annual comprehensive visit. HPI information obtained from: facility chart records, facility staff and patient report.      Chronic obstructive pulmonary disease, unspecified COPD type (H)  Coronary artery disease involving native heart without angina pectoris, unspecified vessel or lesion type  Gastroesophageal reflux disease with esophagitis, unspecified whether hemorrhage    Patient reports that she feels fine. No acute concerns. No SOB or CP. She sleeps ok for the most part.   -120s/60s  HR 80s    ALLERGIES: Misc. sulfonamide containing compounds and Pcn [penicillins]  PAST MEDICAL HISTORY:   Past Medical History:   Diagnosis Date     Acute encephalopathy      Acute on chronic respiratory failure (H)      Aperistalsis of esophagus      Arthritis      Compression fracture of lumbar vertebra (H)     L2     COPD (chronic obstructive pulmonary disease) (H)      Depression      Dyslipidemia      Encephalopathy      Esophageal candidiasis (H)      Esophagitis      Failure to thrive in adult      Fall      GERD (gastroesophageal reflux disease)      Hiatal hernia      HLD (hyperlipidemia)      HTN (hypertension)      Hypocalcemia      Hypomagnesemia      Hypoxia      Insomnia      Lactic acidosis      Major depressive disorder      Malnutrition (H)      Maternal MCV (mean corpuscular volume) low      Microcytic anemia      Microcytic hypochromic anemia 01/2013     Mucus plugging of bronchi      Osteoporosis      Pericardial effusion      PMR (polymyalgia rheumatica) (H)      Pneumonia      Pneumothorax       Positive urine drug screen 10/2012    meth and coke positive 10/12. negative in 11/19/12     Pulmonary emphysema (H)      Rib fracture      S/P laparoscopic fundoplication      SIRS (systemic inflammatory response syndrome) (H)      Thrombocytopenia (H)      Trigger finger     left hand, middle finger     Underweight      Urinary urgency       PAST SURGICAL HISTORY:  has a past surgical history that includes IR Gastro Jejunostomy Tube Change (10/24/2018); IR Gastro Jejunostomy Tube Change (11/8/2018); IR Gastro Jejunostomy Tube Change (12/13/2018); IR Gastro Jejunostomy Tube Change (3/15/2019); IR Gastro Jejunostomy Tube Change (3/25/2019); IR Gastro Jejunostomy Tube Change (6/24/2019); IR Tube Removal (8/19/2019); Hysterectomy (1990); Cholecystectomy (01/09/2015); Hiatal Hernia Repair (02/15/2016); Release carpal tunnel (Right); Release trigger finger (Left); Esophagoscopy, gastroscopy, duodenoscopy (EGD), combined; other surgical history; Ir Gj Tube Replacement (12/13/2018); Hiatal Hernia Repair (01/23/2019); Ir Gj Tube Replacement (3/15/2019); Ir Gj Tube Replacement (3/25/2019); Ir Gj Tube Replacement (6/24/2019); and Ir Tube Removal (8/19/2019).  IMMUNIZATIONS:  Immunization History   Administered Date(s) Administered     COVID-19,PF,Moderna 12/28/2020, 01/25/2021, 11/18/2021     FLU 6-35 months 09/15/2011     FLUAD(HD)65+ QUAD 10/25/2021, 10/10/2022     Flu, Unspecified 10/31/2018, 11/11/2019     Influenza (High Dose) 3 valent vaccine 12/24/2014, 12/23/2015, 10/05/2016, 08/23/2017     Influenza (IIV3) PF 09/15/2011     Influenza Vaccine, 6+MO IM (QUADRIVALENT W/PRESERVATIVES) 10/12/2020     Pneumo Conj 13-V (2010&after) 12/23/2015     Pneumococcal 23 valent 03/07/2014     Zoster vaccine, live 12/27/2014     Above immunizations pulled from [facility]. MIIC and facility records also reconciled. Future immunizations are not needed at this point as all recommended immunizations are up to date.       Current  Outpatient Medications:      acetaminophen (TYLENOL) 325 MG tablet, Take 650 mg by mouth every 4 hours as needed, Disp: , Rfl:      albuterol (PROAIR HFA/PROVENTIL HFA/VENTOLIN HFA) 108 (90 Base) MCG/ACT inhaler, Inhale 2 puffs into the lungs every 4 hours as needed for shortness of breath / dyspnea or wheezing, Disp: , Rfl:      alum & mag hydroxide-simethicone (MAALOX) 200-200-20 MG/5ML SUSP suspension, Take 30 mLs by mouth every 4 hours as needed for indigestion, Disp: , Rfl:      budesonide-formoterol (SYMBICORT) 160-4.5 MCG/ACT inhaler, Inhale 2 puffs into the lungs 2 times daily., Disp: , Rfl:      calcium carbonate antacid 1000 MG CHEW, Take 2 tablets (2,000 mg) by mouth daily, Disp: , Rfl:      cholecalciferol 25 MCG (1000 UT) TABS, Take 2 tablets by mouth daily, Disp: , Rfl:      omeprazole (PRILOSEC) 10 MG DR capsule, Take 1 capsule (10 mg) by mouth daily, Disp: , Rfl:      ondansetron (ZOFRAN) 4 MG tablet, Take 1 tablet (4 mg) by mouth daily as needed for nausea Give 30 minutes prior to leaving for any appointments, Disp: , Rfl:      polyethylene glycol (MIRALAX) 17 GM/Dose powder, Take 17 g by mouth daily, Disp: , Rfl:      senna-docusate (SENOKOT-S/PERICOLACE) 8.6-50 MG tablet, Take 1 tablet by mouth 2 times daily, Disp: , Rfl:      venlafaxine (EFFEXOR) 25 MG tablet, Take 25 mg by mouth daily, Disp: , Rfl:      Advance Directive Discussion:    I reviewed the current advanced directives as reflected in EPIC, the POLST and the facility chart, and verified the congruency of orders. I contacted the first party, the patient herself, and discussed the plan of Care. Patient's goal is pain control and comfort.    Team Discussion:  I communicated with the appropriate disciplines involved with the Plan of Care:   Nursing    Information reviewed:  Medications, vital signs, orders, and nursing notes.    ROS:  10 point ROS of systems including Constitutional, Eyes, Respiratory, Cardiovascular, Gastroenterology,  "Genitourinary, Integumentary, Musculoskeletal, Psychiatric were all negative except for pertinent positives noted in my HPI.    Vitals:  /70   Pulse 82   Temp 98  F (36.7  C)   Resp 18   Ht 1.499 m (4' 11\")   Wt 48.3 kg (106 lb 6.4 oz)   SpO2 94%   BMI 21.49 kg/m   Body mass index is 21.49 kg/m .  Exam:  GENERAL APPEARANCE:  Alert, in no distress, thin  ENT:  Mouth and posterior oropharynx normal, moist mucous membranes, normal hearing acuity  EYES:  EOM normal, conjunctiva and lids normal  RESP:  respiratory effort and palpation of chest normal, no respiratory distress, lungs clear to auscultation, but exam limited by supine position and shallow inspiration  CV:  Palpation and auscultation of heart done , regular rate and rhythm, no murmur, rub, or gallop, no edema  ABDOMEN:  bowel sounds normal, soft, non-tender  SKIN:  Inspection of skin and subcutaneous tissue baseline, Palpation of skin and subcutaneous tissue baseline  NEURO:   Cranial nerves 2-12 are normal tested and grossly at patient's baseline  PSYCH:  oriented X 3, affect abnormal flat     Lab/Diagnostic data:   Recent labs in [facility] reviewed by me today.     ASSESSMENT/PLAN  (J44.9) Chronic obstructive pulmonary disease, unspecified COPD type (H)  (primary encounter diagnosis)  Comment: Chronic condition being managed with medications and is currently asymptomatic. Goal is comfort care.  Plan: Continue current POC with no changes at this time and adjustments as needed.    (I25.10) Coronary artery disease involving native heart without angina pectoris, unspecified vessel or lesion type  Comment: Asymptomatic  Plan: Continue current POC with no changes at this time and adjustments as needed.    (K21.00) Gastroesophageal reflux disease with esophagitis, unspecified whether hemorrhage  Comment: Asymptomatic  Plan: Continue current POC with no changes at this time.      Electronically signed by:  CODY Beltre CNP "             Sincerely,        CODY Beltre CNP

## 2022-10-12 NOTE — PROGRESS NOTES
[facility] GERIATRICS  Chief Complaint   Patient presents with     Annual Comprehensive Nursing Home     [place] Medical Record Number:  [id number]  Place of Service where encounter took place:  [facility] () [[id number]]    HPI:    Rosina Link  is a 77 year old  (1945), who is being seen today for an annual comprehensive visit. HPI information obtained from: facility chart records, facility staff and patient report.      Chronic obstructive pulmonary disease, unspecified COPD type (H)  Coronary artery disease involving native heart without angina pectoris, unspecified vessel or lesion type  Gastroesophageal reflux disease with esophagitis, unspecified whether hemorrhage    Patient reports that she feels fine. No acute concerns. No SOB or CP. She sleeps ok for the most part.   -120s/60s  HR 80s    ALLERGIES: Misc. sulfonamide containing compounds and Pcn [penicillins]  PAST MEDICAL HISTORY:   Past Medical History:   Diagnosis Date     Acute encephalopathy      Acute on chronic respiratory failure (H)      Aperistalsis of esophagus      Arthritis      Compression fracture of lumbar vertebra (H)     L2     COPD (chronic obstructive pulmonary disease) (H)      Depression      Dyslipidemia      Encephalopathy      Esophageal candidiasis (H)      Esophagitis      Failure to thrive in adult      Fall      GERD (gastroesophageal reflux disease)      Hiatal hernia      HLD (hyperlipidemia)      HTN (hypertension)      Hypocalcemia      Hypomagnesemia      Hypoxia      Insomnia      Lactic acidosis      Major depressive disorder      Malnutrition (H)      Maternal MCV (mean corpuscular volume) low      Microcytic anemia      Microcytic hypochromic anemia 01/2013     Mucus plugging of bronchi      Osteoporosis      Pericardial effusion      PMR (polymyalgia rheumatica) (H)      Pneumonia      Pneumothorax      Positive urine drug screen 10/2012    meth and coke positive 10/12. negative in  11/19/12     Pulmonary emphysema (H)      Rib fracture      S/P laparoscopic fundoplication      SIRS (systemic inflammatory response syndrome) (H)      Thrombocytopenia (H)      Trigger finger     left hand, middle finger     Underweight      Urinary urgency       PAST SURGICAL HISTORY:  has a past surgical history that includes IR Gastro Jejunostomy Tube Change (10/24/2018); IR Gastro Jejunostomy Tube Change (11/8/2018); IR Gastro Jejunostomy Tube Change (12/13/2018); IR Gastro Jejunostomy Tube Change (3/15/2019); IR Gastro Jejunostomy Tube Change (3/25/2019); IR Gastro Jejunostomy Tube Change (6/24/2019); IR Tube Removal (8/19/2019); Hysterectomy (1990); Cholecystectomy (01/09/2015); Hiatal Hernia Repair (02/15/2016); Release carpal tunnel (Right); Release trigger finger (Left); Esophagoscopy, gastroscopy, duodenoscopy (EGD), combined; other surgical history; Ir Gj Tube Replacement (12/13/2018); Hiatal Hernia Repair (01/23/2019); Ir Gj Tube Replacement (3/15/2019); Ir Gj Tube Replacement (3/25/2019); Ir Gj Tube Replacement (6/24/2019); and Ir Tube Removal (8/19/2019).  IMMUNIZATIONS:  Immunization History   Administered Date(s) Administered     COVID-19,PF,Moderna 12/28/2020, 01/25/2021, 11/18/2021     FLU 6-35 months 09/15/2011     FLUAD(HD)65+ QUAD 10/25/2021, 10/10/2022     Flu, Unspecified 10/31/2018, 11/11/2019     Influenza (High Dose) 3 valent vaccine 12/24/2014, 12/23/2015, 10/05/2016, 08/23/2017     Influenza (IIV3) PF 09/15/2011     Influenza Vaccine, 6+MO IM (QUADRIVALENT W/PRESERVATIVES) 10/12/2020     Pneumo Conj 13-V (2010&after) 12/23/2015     Pneumococcal 23 valent 03/07/2014     Zoster vaccine, live 12/27/2014     Above immunizations pulled from [facility]. MIIC and facility records also reconciled. Future immunizations are not needed at this point as all recommended immunizations are up to date.       Current Outpatient Medications:      acetaminophen (TYLENOL) 325 MG tablet, Take 650 mg by  mouth every 4 hours as needed, Disp: , Rfl:      albuterol (PROAIR HFA/PROVENTIL HFA/VENTOLIN HFA) 108 (90 Base) MCG/ACT inhaler, Inhale 2 puffs into the lungs every 4 hours as needed for shortness of breath / dyspnea or wheezing, Disp: , Rfl:      alum & mag hydroxide-simethicone (MAALOX) 200-200-20 MG/5ML SUSP suspension, Take 30 mLs by mouth every 4 hours as needed for indigestion, Disp: , Rfl:      budesonide-formoterol (SYMBICORT) 160-4.5 MCG/ACT inhaler, Inhale 2 puffs into the lungs 2 times daily., Disp: , Rfl:      calcium carbonate antacid 1000 MG CHEW, Take 2 tablets (2,000 mg) by mouth daily, Disp: , Rfl:      cholecalciferol 25 MCG (1000 UT) TABS, Take 2 tablets by mouth daily, Disp: , Rfl:      omeprazole (PRILOSEC) 10 MG DR capsule, Take 1 capsule (10 mg) by mouth daily, Disp: , Rfl:      ondansetron (ZOFRAN) 4 MG tablet, Take 1 tablet (4 mg) by mouth daily as needed for nausea Give 30 minutes prior to leaving for any appointments, Disp: , Rfl:      polyethylene glycol (MIRALAX) 17 GM/Dose powder, Take 17 g by mouth daily, Disp: , Rfl:      senna-docusate (SENOKOT-S/PERICOLACE) 8.6-50 MG tablet, Take 1 tablet by mouth 2 times daily, Disp: , Rfl:      venlafaxine (EFFEXOR) 25 MG tablet, Take 25 mg by mouth daily, Disp: , Rfl:      Advance Directive Discussion:    I reviewed the current advanced directives as reflected in EPIC, the POLST and the facility chart, and verified the congruency of orders. I contacted the first party, the patient herself, and discussed the plan of Care. Patient's goal is pain control and comfort.    Team Discussion:  I communicated with the appropriate disciplines involved with the Plan of Care:   Nursing    Information reviewed:  Medications, vital signs, orders, and nursing notes.    ROS:  10 point ROS of systems including Constitutional, Eyes, Respiratory, Cardiovascular, Gastroenterology, Genitourinary, Integumentary, Musculoskeletal, Psychiatric were all negative except  "for pertinent positives noted in my HPI.    Vitals:  /70   Pulse 82   Temp 98  F (36.7  C)   Resp 18   Ht 1.499 m (4' 11\")   Wt 48.3 kg (106 lb 6.4 oz)   SpO2 94%   BMI 21.49 kg/m   Body mass index is 21.49 kg/m .  Exam:  GENERAL APPEARANCE:  Alert, in no distress, thin  ENT:  Mouth and posterior oropharynx normal, moist mucous membranes, normal hearing acuity  EYES:  EOM normal, conjunctiva and lids normal  RESP:  respiratory effort and palpation of chest normal, no respiratory distress, lungs clear to auscultation, but exam limited by supine position and shallow inspiration  CV:  Palpation and auscultation of heart done , regular rate and rhythm, no murmur, rub, or gallop, no edema  ABDOMEN:  bowel sounds normal, soft, non-tender  SKIN:  Inspection of skin and subcutaneous tissue baseline, Palpation of skin and subcutaneous tissue baseline  NEURO:   Cranial nerves 2-12 are normal tested and grossly at patient's baseline  PSYCH:  oriented X 3, affect abnormal flat     Lab/Diagnostic data:   Recent labs in [facility] reviewed by me today.     ASSESSMENT/PLAN  (J44.9) Chronic obstructive pulmonary disease, unspecified COPD type (H)  (primary encounter diagnosis)  Comment: Chronic condition being managed with medications and is currently asymptomatic. Goal is comfort care.  Plan: Continue current POC with no changes at this time and adjustments as needed.    (I25.10) Coronary artery disease involving native heart without angina pectoris, unspecified vessel or lesion type  Comment: Asymptomatic  Plan: Continue current POC with no changes at this time and adjustments as needed.    (K21.00) Gastroesophageal reflux disease with esophagitis, unspecified whether hemorrhage  Comment: Asymptomatic  Plan: Continue current POC with no changes at this time.      Electronically signed by:  Yarely Melendez, CODY CNP       "

## 2022-12-12 NOTE — TELEPHONE ENCOUNTER
[facility] Triage Nurse Telephone Encounter    Provider: CODY Ochoa CNP   Facility: [facility] Type:  LTC    Caller: IE  Call Back Number: [phone]    Allergies:    Allergies   Allergen Reactions     Misc. Sulfonamide Containing Compounds      SULFA     Pcn [Penicillins]         Reason for call: Pt has an order for miralax 17g daily and has been refusing the medication as she feels that she doesn't need it and doesn't like it. Per nursing pt is having regular BM's.    Verbal Order/Direction given by Provider: Change miralax to 17g po every day prn.     Provider giving Order:  CODY Ochoa CNP     Verbal Order given to: CHRISTEN Anderson RN

## 2022-12-13 NOTE — LETTER
12/13/2022        RE: Rosina Link  [address]  [place] [address]        [facility]  Chief Complaint   Patient presents with     correction [facility] Medical Record Number:  [id number]  Place of Service where encounter took place:  [facility] () [[id number]]    HPI:    Rosina Link  is 77 year old (1945), who is being seen today for a federally mandated E/M visit.     Today's concerns are:  Pulmonary emphysema, unspecified emphysema type (H)  Chronic GERD  Polymyalgia rheumatica (H)  Coronary artery disease involving native heart without angina pectoris, unspecified vessel or lesion type    Patient reports that she feels well, has no complaints. She is excited to move into the new facility next week.   BP 80-100s/60s  HR 70-80s  Wt Readings from Last 4 Encounters:   12/13/22 47.2 kg (104 lb)   10/12/22 48.3 kg (106 lb 6.4 oz)   08/29/22 48.6 kg (107 lb 3.2 oz)   08/16/22 48.6 kg (107 lb 3.2 oz)         ALLERGIES:Misc. sulfonamide containing compounds and Pcn [penicillins]  PAST MEDICAL HISTORY:   Past Medical History:   Diagnosis Date     Acute encephalopathy      Acute on chronic respiratory failure (H)      Aperistalsis of esophagus      Arthritis      Compression fracture of lumbar vertebra (H)     L2     COPD (chronic obstructive pulmonary disease) (H)      Depression      Dyslipidemia      Encephalopathy      Esophageal candidiasis (H)      Esophagitis      Failure to thrive in adult      Fall      GERD (gastroesophageal reflux disease)      Hiatal hernia      HLD (hyperlipidemia)      HTN (hypertension)      Hypocalcemia      Hypomagnesemia      Hypoxia      Insomnia      Lactic acidosis      Major depressive disorder      Malnutrition (H)      Maternal MCV (mean corpuscular volume) low      Microcytic anemia      Microcytic hypochromic anemia 01/2013     Mucus plugging of bronchi      Osteoporosis      Pericardial effusion      PMR (polymyalgia  rheumatica) (H)      Pneumonia      Pneumothorax      Positive urine drug screen 10/2012    meth and coke positive 10/12. negative in 11/19/12     Pulmonary emphysema (H)      Rib fracture      S/P laparoscopic fundoplication      SIRS (systemic inflammatory response syndrome) (H)      Thrombocytopenia (H)      Trigger finger     left hand, middle finger     Underweight      Urinary urgency      PAST SURGICAL HISTORY:   has a past surgical history that includes IR Gastro Jejunostomy Tube Change (10/24/2018); IR Gastro Jejunostomy Tube Change (11/8/2018); IR Gastro Jejunostomy Tube Change (12/13/2018); IR Gastro Jejunostomy Tube Change (3/15/2019); IR Gastro Jejunostomy Tube Change (3/25/2019); IR Gastro Jejunostomy Tube Change (6/24/2019); IR Tube Removal (8/19/2019); Hysterectomy (1990); Cholecystectomy (01/09/2015); Hiatal Hernia Repair (02/15/2016); Release carpal tunnel (Right); Release trigger finger (Left); Esophagoscopy, gastroscopy, duodenoscopy (EGD), combined; other surgical history; Ir Gj Tube Replacement (12/13/2018); Hiatal Hernia Repair (01/23/2019); Ir Gj Tube Replacement (3/15/2019); Ir Gj Tube Replacement (3/25/2019); Ir Gj Tube Replacement (6/24/2019); and Ir Tube Removal (8/19/2019).  FAMILY HISTORY: family history includes Breast Cancer in her maternal aunt; Cancer in her mother; Diabetes in her mother; Emphysema in her father; Heart Disease in her father; Hypertension in her father and mother; No Known Problems in her brother.  SOCIAL HISTORY:  reports that she does not drink alcohol and does not use drugs.    MEDICATIONS:  Current Outpatient Medications   Medication Sig Dispense Refill     acetaminophen (TYLENOL) 325 MG tablet Take 650 mg by mouth every 4 hours as needed       albuterol (PROAIR HFA/PROVENTIL HFA/VENTOLIN HFA) 108 (90 Base) MCG/ACT inhaler Inhale 2 puffs into the lungs every 4 hours as needed for shortness of breath / dyspnea or wheezing       alum & mag hydroxide-simethicone  "(MAALOX) 200-200-20 MG/5ML SUSP suspension Take 30 mLs by mouth every 4 hours as needed for indigestion       budesonide-formoterol (SYMBICORT) 160-4.5 MCG/ACT inhaler Inhale 2 puffs into the lungs 2 times daily.       calcium carbonate antacid 1000 MG CHEW Take 2 tablets (2,000 mg) by mouth daily       cholecalciferol 25 MCG (1000 UT) TABS Take 2 tablets by mouth daily       omeprazole (PRILOSEC) 10 MG DR capsule Take 1 capsule (10 mg) by mouth daily       ondansetron (ZOFRAN) 4 MG tablet Take 1 tablet (4 mg) by mouth daily as needed for nausea Give 30 minutes prior to leaving for any appointments       polyethylene glycol (MIRALAX) 17 GM/Dose powder Take 17 g by mouth daily as needed       senna-docusate (SENOKOT-S/PERICOLACE) 8.6-50 MG tablet Take 1 tablet by mouth 2 times daily       venlafaxine (EFFEXOR) 25 MG tablet Take 25 mg by mouth daily         Case Management:  I have reviewed the care plan and MDS and do agree with the plan. Patient's desire to return to the community is not present. Information reviewed:  Medications, vital signs, orders, and nursing notes.    ROS:  10 point ROS of systems including Constitutional, Eyes, Respiratory, Cardiovascular, Gastroenterology, Genitourinary, Integumentary, Musculoskeletal, Psychiatric were all negative except for pertinent positives noted in my HPI.    Vitals:  /61   Pulse 79   Temp 98  F (36.7  C)   Resp 18   Ht 1.499 m (4' 11\")   Wt 47.2 kg (104 lb)   SpO2 91%   BMI 21.01 kg/m    Body mass index is 21.01 kg/m .  Exam:  GENERAL APPEARANCE:  Alert, in no distress, thin  ENT:  Mouth and posterior oropharynx normal, moist mucous membranes, normal hearing acuity  EYES:  EOM normal, conjunctiva and lids normal  RESP:  respiratory effort and palpation of chest normal, no respiratory distress, diminished breath sounds throughout  CV:  Palpation and auscultation of heart done , regular rate and rhythm, no murmur, rub, or gallop, no edema  SKIN:  Inspection " of skin and subcutaneous tissue baseline  PSYCH:  oriented X 3, affect and mood normal    Lab/Diagnostic data:   Recent labs in [facility] reviewed by me today.       ASSESSMENT/PLAN  (J43.9) Pulmonary emphysema, unspecified emphysema type (H)  (primary encounter diagnosis)  Comment: Chronic condition being managed with medications and is currently asymptomatic. Goals of care are comfort focused  Plan: Continue current POC with no changes at this time and adjustments as needed.    (K21.9) Chronic GERD  Comment: Chronic condition being managed with medications and is currently asymptomatic.  Plan: Continue current POC with no changes at this time and adjustments as needed.    (M35.3) Polymyalgia rheumatica (H)  Comment: Asymptomatic. Not on chronic treatment  Plan: Monitor for joint pain    (I25.10) Coronary artery disease involving native heart without angina pectoris, unspecified vessel or lesion type  Comment: Asymptomatic  Plan: Continue current POC with no changes at this time and adjustments as needed.      Electronically signed by:  CODY Beltre CNP              Sincerely,        CODY Beltre CNP

## 2022-12-13 NOTE — PROGRESS NOTES
[facility] GERIATR[facility]  Chief Complaint   Patient presents with     assisted Regulatory     [place] Medical Record Number:  [id number]  Place of Service where encounter took place:  [facility] () [[id number]]    HPI:    Rosina Link  is 77 year old (1945), who is being seen today for a federally mandated E/M visit.     Today's concerns are:  Pulmonary emphysema, unspecified emphysema type (H)  Chronic GERD  Polymyalgia rheumatica (H)  Coronary artery disease involving native heart without angina pectoris, unspecified vessel or lesion type    Patient reports that she feels well, has no complaints. She is excited to move into the new facility next week.   BP 80-100s/60s  HR 70-80s  Wt Readings from Last 4 Encounters:   12/13/22 47.2 kg (104 lb)   10/12/22 48.3 kg (106 lb 6.4 oz)   08/29/22 48.6 kg (107 lb 3.2 oz)   08/16/22 48.6 kg (107 lb 3.2 oz)         ALLERGIES:Misc. sulfonamide containing compounds and Pcn [penicillins]  PAST MEDICAL HISTORY:   Past Medical History:   Diagnosis Date     Acute encephalopathy      Acute on chronic respiratory failure (H)      Aperistalsis of esophagus      Arthritis      Compression fracture of lumbar vertebra (H)     L2     COPD (chronic obstructive pulmonary disease) (H)      Depression      Dyslipidemia      Encephalopathy      Esophageal candidiasis (H)      Esophagitis      Failure to thrive in adult      Fall      GERD (gastroesophageal reflux disease)      Hiatal hernia      HLD (hyperlipidemia)      HTN (hypertension)      Hypocalcemia      Hypomagnesemia      Hypoxia      Insomnia      Lactic acidosis      Major depressive disorder      Malnutrition (H)      Maternal MCV (mean corpuscular volume) low      Microcytic anemia      Microcytic hypochromic anemia 01/2013     Mucus plugging of bronchi      Osteoporosis      Pericardial effusion      PMR (polymyalgia rheumatica) (H)      Pneumonia      Pneumothorax      Positive urine drug screen 10/2012     meth and coke positive 10/12. negative in 11/19/12     Pulmonary emphysema (H)      Rib fracture      S/P laparoscopic fundoplication      SIRS (systemic inflammatory response syndrome) (H)      Thrombocytopenia (H)      Trigger finger     left hand, middle finger     Underweight      Urinary urgency      PAST SURGICAL HISTORY:   has a past surgical history that includes IR Gastro Jejunostomy Tube Change (10/24/2018); IR Gastro Jejunostomy Tube Change (11/8/2018); IR Gastro Jejunostomy Tube Change (12/13/2018); IR Gastro Jejunostomy Tube Change (3/15/2019); IR Gastro Jejunostomy Tube Change (3/25/2019); IR Gastro Jejunostomy Tube Change (6/24/2019); IR Tube Removal (8/19/2019); Hysterectomy (1990); Cholecystectomy (01/09/2015); Hiatal Hernia Repair (02/15/2016); Release carpal tunnel (Right); Release trigger finger (Left); Esophagoscopy, gastroscopy, duodenoscopy (EGD), combined; other surgical history; Ir Gj Tube Replacement (12/13/2018); Hiatal Hernia Repair (01/23/2019); Ir Gj Tube Replacement (3/15/2019); Ir Gj Tube Replacement (3/25/2019); Ir Gj Tube Replacement (6/24/2019); and Ir Tube Removal (8/19/2019).  FAMILY HISTORY: family history includes Breast Cancer in her maternal aunt; Cancer in her mother; Diabetes in her mother; Emphysema in her father; Heart Disease in her father; Hypertension in her father and mother; No Known Problems in her brother.  SOCIAL HISTORY:  reports that she does not drink alcohol and does not use drugs.    MEDICATIONS:  Current Outpatient Medications   Medication Sig Dispense Refill     acetaminophen (TYLENOL) 325 MG tablet Take 650 mg by mouth every 4 hours as needed       albuterol (PROAIR HFA/PROVENTIL HFA/VENTOLIN HFA) 108 (90 Base) MCG/ACT inhaler Inhale 2 puffs into the lungs every 4 hours as needed for shortness of breath / dyspnea or wheezing       alum & mag hydroxide-simethicone (MAALOX) 200-200-20 MG/5ML SUSP suspension Take 30 mLs by mouth every 4 hours as needed for  "indigestion       budesonide-formoterol (SYMBICORT) 160-4.5 MCG/ACT inhaler Inhale 2 puffs into the lungs 2 times daily.       calcium carbonate antacid 1000 MG CHEW Take 2 tablets (2,000 mg) by mouth daily       cholecalciferol 25 MCG (1000 UT) TABS Take 2 tablets by mouth daily       omeprazole (PRILOSEC) 10 MG DR capsule Take 1 capsule (10 mg) by mouth daily       ondansetron (ZOFRAN) 4 MG tablet Take 1 tablet (4 mg) by mouth daily as needed for nausea Give 30 minutes prior to leaving for any appointments       polyethylene glycol (MIRALAX) 17 GM/Dose powder Take 17 g by mouth daily as needed       senna-docusate (SENOKOT-S/PERICOLACE) 8.6-50 MG tablet Take 1 tablet by mouth 2 times daily       venlafaxine (EFFEXOR) 25 MG tablet Take 25 mg by mouth daily         Case Management:  I have reviewed the care plan and MDS and do agree with the plan. Patient's desire to return to the community is not present. Information reviewed:  Medications, vital signs, orders, and nursing notes.    ROS:  10 point ROS of systems including Constitutional, Eyes, Respiratory, Cardiovascular, Gastroenterology, Genitourinary, Integumentary, Musculoskeletal, Psychiatric were all negative except for pertinent positives noted in my HPI.    Vitals:  /61   Pulse 79   Temp 98  F (36.7  C)   Resp 18   Ht 1.499 m (4' 11\")   Wt 47.2 kg (104 lb)   SpO2 91%   BMI 21.01 kg/m    Body mass index is 21.01 kg/m .  Exam:  GENERAL APPEARANCE:  Alert, in no distress, thin  ENT:  Mouth and posterior oropharynx normal, moist mucous membranes, normal hearing acuity  EYES:  EOM normal, conjunctiva and lids normal  RESP:  respiratory effort and palpation of chest normal, no respiratory distress, diminished breath sounds throughout  CV:  Palpation and auscultation of heart done , regular rate and rhythm, no murmur, rub, or gallop, no edema  SKIN:  Inspection of skin and subcutaneous tissue baseline  PSYCH:  oriented X 3, affect and mood " normal    Lab/Diagnostic data:   Recent labs in [facility] reviewed by me today.       ASSESSMENT/PLAN  (J43.9) Pulmonary emphysema, unspecified emphysema type (H)  (primary encounter diagnosis)  Comment: Chronic condition being managed with medications and is currently asymptomatic. Goals of care are comfort focused  Plan: Continue current POC with no changes at this time and adjustments as needed.    (K21.9) Chronic GERD  Comment: Chronic condition being managed with medications and is currently asymptomatic.  Plan: Continue current POC with no changes at this time and adjustments as needed.    (M35.3) Polymyalgia rheumatica (H)  Comment: Asymptomatic. Not on chronic treatment  Plan: Monitor for joint pain    (I25.10) Coronary artery disease involving native heart without angina pectoris, unspecified vessel or lesion type  Comment: Asymptomatic  Plan: Continue current POC with no changes at this time and adjustments as needed.      Electronically signed by:  CODY Beltre CNP

## 2022-12-16 NOTE — LETTER
12/16/2022        RE: Rosina Link  [address]  [place] [address]          [facility]IATR[facility]    [place] Medical Record Number:  [id number]  Place of Service where encounter took place: [facility] () [[id number]]  CODE STATUS:   DNR / DNI    Chief Complaint:  Chief Complaint   Patient presents with     long term Regulatory     LTC 12/16/2022.        HPI:   Rosina is a 77 y.o. female who resides in LTC at [facility]. She has hx of COPD, HTN, hiatal hernia, GERD, anemia, PMR, TERESA, pancreatitis, gastric obstruction and FTT previously on feeding tube (discontinued 8/2019). She was sent to the hospital on 2/28/20 from a planned routine cardiology visit due to cough, dyspnea and hypoxia. She was diagnosed with hypoxemic respiratory failure, needed oxygen, had bronchoscopy which opened left upper lobe with some improvement but unable to be weaned. Repeat bronch with extensive mucous plugging. Subsequent resp failure requiring CPAP during which briefly unresponsive. She did not want to comply with treatments and ultimately moved to comfort based approach, returning to LTC on 3/16/20 on [facility]. Subsequent stabilization over time and was discharged from Hospice eff 9/11/2020. Early 2022 agin had decline, episode resp failure and was on Hospice from 3/14/2022 until stabliation and discharge from Hospice 4/26/2022.       Today:  She is seen today for routine physician follow up in LTC. No new concerns. She is in her bed, watching TV. States she has no pain. Wishes she could walk but has not been able to in quite some time. No recent illness. No new concerns per nursing. Respiratory status is stable. She is on Symbicort. Has not needed supplemental oxygen. No open areas of skin, no falls. She is on venlafaxine for depression, denies any mood concerns. She had cataract surgeries, right eye on 6/7/2022, left eye on 6/21/2022. No complications.       Past Medical  History:  Past Medical History:   Diagnosis Date     Acute encephalopathy      Acute on chronic respiratory failure (H)      Aperistalsis of esophagus      Arthritis      Compression fracture of lumbar vertebra (H)     L2     COPD (chronic obstructive pulmonary disease) (H)      Depression      Dyslipidemia      Encephalopathy      Esophageal candidiasis (H)      Esophagitis      Failure to thrive in adult      Fall      GERD (gastroesophageal reflux disease)      Hiatal hernia      HLD (hyperlipidemia)      HTN (hypertension)      Hypocalcemia      Hypomagnesemia      Hypoxia      Insomnia      Lactic acidosis      Major depressive disorder      Malnutrition (H)      Maternal MCV (mean corpuscular volume) low      Microcytic anemia      Microcytic hypochromic anemia 01/2013     Mucus plugging of bronchi      Osteoporosis      Pericardial effusion      PMR (polymyalgia rheumatica) (H)      Pneumonia      Pneumothorax      Positive urine drug screen 10/2012    meth and coke positive 10/12. negative in 11/19/12     Pulmonary emphysema (H)      Rib fracture      S/P laparoscopic fundoplication      SIRS (systemic inflammatory response syndrome) (H)      Thrombocytopenia (H)      Trigger finger     left hand, middle finger     Underweight      Urinary urgency        Medications:  Current Outpatient Medications   Medication Sig Dispense Refill     acetaminophen (TYLENOL) 325 MG tablet Take 650 mg by mouth every 4 hours as needed       albuterol (PROAIR HFA/PROVENTIL HFA/VENTOLIN HFA) 108 (90 Base) MCG/ACT inhaler Inhale 2 puffs into the lungs every 4 hours as needed for shortness of breath / dyspnea or wheezing       alum & mag hydroxide-simethicone (MAALOX) 200-200-20 MG/5ML SUSP suspension Take 30 mLs by mouth every 4 hours as needed for indigestion       budesonide-formoterol (SYMBICORT) 160-4.5 MCG/ACT inhaler Inhale 2 puffs into the lungs 2 times daily.       calcium carbonate antacid 1000 MG CHEW Take 2 tablets  "(2,000 mg) by mouth daily       cholecalciferol 25 MCG (1000 UT) TABS Take 2 tablets by mouth daily       omeprazole (PRILOSEC) 10 MG DR capsule Take 1 capsule (10 mg) by mouth daily       ondansetron (ZOFRAN) 4 MG tablet Take 1 tablet (4 mg) by mouth daily as needed for nausea Give 30 minutes prior to leaving for any appointments       polyethylene glycol (MIRALAX) 17 GM/Dose powder Take 17 g by mouth daily as needed       senna-docusate (SENOKOT-S/PERICOLACE) 8.6-50 MG tablet Take 1 tablet by mouth 2 times daily       venlafaxine (EFFEXOR) 25 MG tablet Take 25 mg by mouth daily          Physical Exam:  General: Patient is alert female, no distress.  Vitals: /61   Pulse 82   Temp 98.1  F (36.7  C)   Resp 20   Ht 1.499 m (4' 11\")   Wt 47.2 kg (104 lb)   SpO2 92%   BMI 21.01 kg/m    HEENT: Head is NCAT. Eyes show no injection or icterus. Nares negative. Oropharynx moist.  Neck: No JVD.  Lungs: Non labored respirations.   : Deferred.  Extremities: No edema.   Musculoskeletal: Degen changes.   Psych: Mood is good.     Labs:  Component      Latest Ref Rng & Units 1/23/2020 10/20/2021   WBC      4.0 - 11.0 10e3/uL 5.7 5.2   RBC Count      3.80 - 5.20 10e6/uL 4.82 4.71   Hemoglobin      11.7 - 15.7 g/dL 13.8 12.9   Hematocrit      35.0 - 47.0 % 43.2 41.2   MCV      78 - 100 fL 90 88   MCH      26.5 - 33.0 pg 28.6 27.4   MCHC      31.5 - 36.5 g/dL 31.9 (L) 31.3 (L)   RDW      10.0 - 15.0 % 14.8 (H) 14.5   Platelet Count      150 - 450 10e3/uL 227 187     Component      Latest Ref Rng & Units 6/15/2021 10/20/2021   TSH      0.30 - 5.00 uIU/mL 1.55 2.63     Component      Latest Ref Rng & Units 1/23/2020 10/20/2021   Sodium      136 - 145 mmol/L 138 138   Potassium      3.5 - 5.0 mmol/L 4.3 4.0   Chloride      98 - 107 mmol/L 99 104   Carbon Dioxide      22 - 31 mmol/L 34 (H) 27   Anion Gap      5 - 18 mmol/L 5 7   Glucose      70 - 125 mg/dL 123 103   Urea Nitrogen      8 - 28 mg/dL 12 8   Creatinine      " 0.60 - 1.10 mg/dL 0.73 0.68   GFR Estimate      >60 mL/min/1.73m2 >60 85   Calcium      8.5 - 10.5 mg/dL 9.0 8.8     Component      Latest Ref Rng & Units 2/23/2022 5/19/2022   Sodium      136 - 145 mmol/L 132 (L)    Potassium      3.5 - 5.0 mmol/L 4.0    Chloride      98 - 107 mmol/L 97 (L)    Carbon Dioxide      22 - 31 mmol/L 26    Anion Gap      5 - 18 mmol/L 9    Glucose      70 - 125 mg/dL 95    Urea Nitrogen      8 - 28 mg/dL 12    Creatinine      0.60 - 1.10 mg/dL 0.67 0.66   GFR Estimate      >60 mL/min/1.73m2 90 90   Calcium      8.5 - 10.5 mg/dL 8.9      Component      Latest Ref Rng & Units 10/20/2021 2/23/2022   WBC      4.0 - 11.0 10e3/uL 5.2 7.1   RBC Count      3.80 - 5.20 10e6/uL 4.71 4.50   Hemoglobin      11.7 - 15.7 g/dL 12.9 12.2   Hematocrit      35.0 - 47.0 % 41.2 38.1   MCV      78 - 100 fL 88 85   MCH      26.5 - 33.0 pg 27.4 27.1   MCHC      31.5 - 36.5 g/dL 31.3 (L) 32.0   RDW      10.0 - 15.0 % 14.5 15.0   Platelet Count      150 - 450 10e3/uL 187 191       Assessment/Plan:  1. COPD. Resp status is stable, no hypoxia. Continue Symbicort.  2. Depression. On Venlafaxine.   3. HTN. Noted historically and not on meds currently. Bps acceptable.   4. General debilitation. Frailty, medical complexities. She was on Hospice 3/14/2022-4/26/2022 due to resp failure and pleural effusion.           Electronically signed by: Rosina Guo MD                 Sincerely,        Rosina Guo MD

## 2022-12-25 NOTE — PROGRESS NOTES
[facility] Medical Record Number:  [id number]  Place of Service where encounter took place: [facility] () [[id number]]  CODE STATUS:   DNR / DNI    Chief Complaint:  Chief Complaint   Patient presents with     alf Regulatory     LTC 12/16/2022.        HPI:   Rosina is a 77 y.o. female who resides in LTC at [facility]. She has hx of COPD, HTN, hiatal hernia, GERD, anemia, PMR, TERESA, pancreatitis, gastric obstruction and FTT previously on feeding tube (discontinued 8/2019). She was sent to the hospital on 2/28/20 from a planned routine cardiology visit due to cough, dyspnea and hypoxia. She was diagnosed with hypoxemic respiratory failure, needed oxygen, had bronchoscopy which opened left upper lobe with some improvement but unable to be weaned. Repeat bronch with extensive mucous plugging. Subsequent resp failure requiring CPAP during which briefly unresponsive. She did not want to comply with treatments and ultimately moved to comfort based approach, returning to LTC on 3/16/20 on [facility]. Subsequent stabilization over time and was discharged from Hospice eff 9/11/2020. Early 2022 alex had decline, episode resp failure and was on Hospice from 3/14/2022 until stabliation and discharge from Hospice 4/26/2022.       Today:  She is seen today for routine physician follow up in LTC. No new concerns. She is in her bed, watching TV. States she has no pain. Wishes she could walk but has not been able to in quite some time. No recent illness. No new concerns per nursing. Respiratory status is stable. She is on Symbicort. Has not needed supplemental oxygen. No open areas of skin, no falls. She is on venlafaxine for depression, denies any mood concerns. She had cataract surgeries, right eye on 6/7/2022, left eye on 6/21/2022. No complications.       Past Medical History:  Past Medical History:   Diagnosis Date     Acute encephalopathy      Acute on chronic  respiratory failure (H)      Aperistalsis of esophagus      Arthritis      Compression fracture of lumbar vertebra (H)     L2     COPD (chronic obstructive pulmonary disease) (H)      Depression      Dyslipidemia      Encephalopathy      Esophageal candidiasis (H)      Esophagitis      Failure to thrive in adult      Fall      GERD (gastroesophageal reflux disease)      Hiatal hernia      HLD (hyperlipidemia)      HTN (hypertension)      Hypocalcemia      Hypomagnesemia      Hypoxia      Insomnia      Lactic acidosis      Major depressive disorder      Malnutrition (H)      Maternal MCV (mean corpuscular volume) low      Microcytic anemia      Microcytic hypochromic anemia 01/2013     Mucus plugging of bronchi      Osteoporosis      Pericardial effusion      PMR (polymyalgia rheumatica) (H)      Pneumonia      Pneumothorax      Positive urine drug screen 10/2012    meth and coke positive 10/12. negative in 11/19/12     Pulmonary emphysema (H)      Rib fracture      S/P laparoscopic fundoplication      SIRS (systemic inflammatory response syndrome) (H)      Thrombocytopenia (H)      Trigger finger     left hand, middle finger     Underweight      Urinary urgency        Medications:  Current Outpatient Medications   Medication Sig Dispense Refill     acetaminophen (TYLENOL) 325 MG tablet Take 650 mg by mouth every 4 hours as needed       albuterol (PROAIR HFA/PROVENTIL HFA/VENTOLIN HFA) 108 (90 Base) MCG/ACT inhaler Inhale 2 puffs into the lungs every 4 hours as needed for shortness of breath / dyspnea or wheezing       alum & mag hydroxide-simethicone (MAALOX) 200-200-20 MG/5ML SUSP suspension Take 30 mLs by mouth every 4 hours as needed for indigestion       budesonide-formoterol (SYMBICORT) 160-4.5 MCG/ACT inhaler Inhale 2 puffs into the lungs 2 times daily.       calcium carbonate antacid 1000 MG CHEW Take 2 tablets (2,000 mg) by mouth daily       cholecalciferol 25 MCG (1000 UT) TABS Take 2 tablets by mouth daily  "      omeprazole (PRILOSEC) 10 MG DR capsule Take 1 capsule (10 mg) by mouth daily       ondansetron (ZOFRAN) 4 MG tablet Take 1 tablet (4 mg) by mouth daily as needed for nausea Give 30 minutes prior to leaving for any appointments       polyethylene glycol (MIRALAX) 17 GM/Dose powder Take 17 g by mouth daily as needed       senna-docusate (SENOKOT-S/PERICOLACE) 8.6-50 MG tablet Take 1 tablet by mouth 2 times daily       venlafaxine (EFFEXOR) 25 MG tablet Take 25 mg by mouth daily          Physical Exam:  General: Patient is alert female, no distress.  Vitals: /61   Pulse 82   Temp 98.1  F (36.7  C)   Resp 20   Ht 1.499 m (4' 11\")   Wt 47.2 kg (104 lb)   SpO2 92%   BMI 21.01 kg/m    HEENT: Head is NCAT. Eyes show no injection or icterus. Nares negative. Oropharynx moist.  Neck: No JVD.  Lungs: Non labored respirations.   : Deferred.  Extremities: No edema.   Musculoskeletal: Degen changes.   Psych: Mood is good.     Labs:  Component      Latest Ref Rng & Units 1/23/2020 10/20/2021   WBC      4.0 - 11.0 10e3/uL 5.7 5.2   RBC Count      3.80 - 5.20 10e6/uL 4.82 4.71   Hemoglobin      11.7 - 15.7 g/dL 13.8 12.9   Hematocrit      35.0 - 47.0 % 43.2 41.2   MCV      78 - 100 fL 90 88   MCH      26.5 - 33.0 pg 28.6 27.4   MCHC      31.5 - 36.5 g/dL 31.9 (L) 31.3 (L)   RDW      10.0 - 15.0 % 14.8 (H) 14.5   Platelet Count      150 - 450 10e3/uL 227 187     Component      Latest Ref Rng & Units 6/15/2021 10/20/2021   TSH      0.30 - 5.00 uIU/mL 1.55 2.63     Component      Latest Ref Rng & Units 1/23/2020 10/20/2021   Sodium      136 - 145 mmol/L 138 138   Potassium      3.5 - 5.0 mmol/L 4.3 4.0   Chloride      98 - 107 mmol/L 99 104   Carbon Dioxide      22 - 31 mmol/L 34 (H) 27   Anion Gap      5 - 18 mmol/L 5 7   Glucose      70 - 125 mg/dL 123 103   Urea Nitrogen      8 - 28 mg/dL 12 8   Creatinine      0.60 - 1.10 mg/dL 0.73 0.68   GFR Estimate      >60 mL/min/1.73m2 >60 85   Calcium      8.5 - 10.5 " mg/dL 9.0 8.8     Component      Latest Ref Rng & Units 2/23/2022 5/19/2022   Sodium      136 - 145 mmol/L 132 (L)    Potassium      3.5 - 5.0 mmol/L 4.0    Chloride      98 - 107 mmol/L 97 (L)    Carbon Dioxide      22 - 31 mmol/L 26    Anion Gap      5 - 18 mmol/L 9    Glucose      70 - 125 mg/dL 95    Urea Nitrogen      8 - 28 mg/dL 12    Creatinine      0.60 - 1.10 mg/dL 0.67 0.66   GFR Estimate      >60 mL/min/1.73m2 90 90   Calcium      8.5 - 10.5 mg/dL 8.9      Component      Latest Ref Rng & Units 10/20/2021 2/23/2022   WBC      4.0 - 11.0 10e3/uL 5.2 7.1   RBC Count      3.80 - 5.20 10e6/uL 4.71 4.50   Hemoglobin      11.7 - 15.7 g/dL 12.9 12.2   Hematocrit      35.0 - 47.0 % 41.2 38.1   MCV      78 - 100 fL 88 85   MCH      26.5 - 33.0 pg 27.4 27.1   MCHC      31.5 - 36.5 g/dL 31.3 (L) 32.0   RDW      10.0 - 15.0 % 14.5 15.0   Platelet Count      150 - 450 10e3/uL 187 191       Assessment/Plan:  1. COPD. Resp status is stable, no hypoxia. Continue Symbicort.  2. Depression. On Venlafaxine.   3. HTN. Noted historically and not on meds currently. Bps acceptable.   4. General debilitation. Frailty, medical complexities. She was on Hospice 3/14/2022-4/26/2022 due to resp failure and pleural effusion.           Electronically signed by: Rosina Guo MD

## 2023-01-01 ENCOUNTER — TELEPHONE (OUTPATIENT)
Dept: GERIATRICS | Facility: CLINIC | Age: 78
End: 2023-01-01

## 2023-01-01 DIAGNOSIS — R06.00 DYSPNEA: Primary | ICD-10-CM

## 2023-01-01 RX ORDER — MORPHINE SULFATE 20 MG/ML
2.5 SOLUTION ORAL
COMMUNITY
End: 2023-01-01

## 2023-01-01 RX ORDER — MORPHINE SULFATE 20 MG/ML
2.5 SOLUTION ORAL
Qty: 30 ML | Refills: 0 | Status: SHIPPED | OUTPATIENT
Start: 2023-01-01

## 2023-01-06 NOTE — TELEPHONE ENCOUNTER
[facility]iatric[facility] Triage Nurse Telephone Encounter    Provider: CODY Ochoa CNP   Facility: [facility]  Facility Type:  LTC    Caller: Kyleigh  Call Back Number: [phone]    Allergies:    Allergies   Allergen Reactions     Misc. Sulfonamide Containing Compounds      SULFA     Pcn [Penicillins]         Reason for call: Pt is congested, coughing and has wheezes heard bilaterally. In the AM she c/o SOB and O2 was at 58%. Oxygen applied and O2 went back up. Now O2 is aqt 80% with 5L. Vitals: /84, P 118, R 24, T 98. Pt had COVID this last month.    Verbal Order/Direction given by Provider:   1) Prednisone 40 mg PO daily x 5 days  2) 0.5 mL Albuterol Nebs 0.5%, TID x 5 days then Q4H PRN  3) Doxycycline 100 mg PO BID x 7 days  4) Augmentin 875 mg PO BID x 7 days  5) Morphine 20 mg/mL, 2.5 mg PO Q2H PRN, dx: SOB/dyspnea  *PCP aware of allergy listed; okay to give  UPDATE: Family wants pt to be treated in place and not go to the ED; they are focusing on comfort cares.    Provider giving Order:  CODY Ochoa CNP     Verbal Order given to: Kyleigh Phillip RN